# Patient Record
Sex: FEMALE | Race: WHITE | NOT HISPANIC OR LATINO | URBAN - METROPOLITAN AREA
[De-identification: names, ages, dates, MRNs, and addresses within clinical notes are randomized per-mention and may not be internally consistent; named-entity substitution may affect disease eponyms.]

---

## 2018-08-29 ENCOUNTER — INPATIENT (INPATIENT)
Facility: HOSPITAL | Age: 83
LOS: 7 days | Discharge: NURSING, REHAB OR LONG-TERM CARE | DRG: 291 | End: 2018-09-06
Attending: INTERNAL MEDICINE | Admitting: INTERNAL MEDICINE
Payer: COMMERCIAL

## 2018-08-29 VITALS
HEART RATE: 64 BPM | OXYGEN SATURATION: 97 % | SYSTOLIC BLOOD PRESSURE: 110 MMHG | RESPIRATION RATE: 20 BRPM | DIASTOLIC BLOOD PRESSURE: 58 MMHG

## 2018-08-29 DIAGNOSIS — D46.9 MYELODYSPLASTIC SYNDROME, UNSPECIFIED: ICD-10-CM

## 2018-08-29 DIAGNOSIS — E03.9 HYPOTHYROIDISM, UNSPECIFIED: ICD-10-CM

## 2018-08-29 DIAGNOSIS — I50.9 HEART FAILURE, UNSPECIFIED: ICD-10-CM

## 2018-08-29 DIAGNOSIS — I10 ESSENTIAL (PRIMARY) HYPERTENSION: ICD-10-CM

## 2018-08-29 DIAGNOSIS — Z29.9 ENCOUNTER FOR PROPHYLACTIC MEASURES, UNSPECIFIED: ICD-10-CM

## 2018-08-29 DIAGNOSIS — I50.21 ACUTE SYSTOLIC (CONGESTIVE) HEART FAILURE: ICD-10-CM

## 2018-08-29 LAB
ALBUMIN SERPL ELPH-MCNC: 3.4 G/DL — SIGNIFICANT CHANGE UP (ref 3.3–5)
ALP SERPL-CCNC: 59 U/L — SIGNIFICANT CHANGE UP (ref 40–120)
ALT FLD-CCNC: 9 U/L — LOW (ref 10–45)
ANION GAP SERPL CALC-SCNC: 13 MMOL/L — SIGNIFICANT CHANGE UP (ref 5–17)
ANION GAP SERPL CALC-SCNC: 14 MMOL/L — SIGNIFICANT CHANGE UP (ref 5–17)
APPEARANCE UR: CLEAR — SIGNIFICANT CHANGE UP
APTT BLD: 32.1 SEC — SIGNIFICANT CHANGE UP (ref 27.5–37.4)
AST SERPL-CCNC: 20 U/L — SIGNIFICANT CHANGE UP (ref 10–40)
BASOPHILS # BLD AUTO: 0 K/UL — SIGNIFICANT CHANGE UP (ref 0–0.2)
BASOPHILS NFR BLD AUTO: 0 % — SIGNIFICANT CHANGE UP (ref 0–2)
BILIRUB SERPL-MCNC: 0.2 MG/DL — SIGNIFICANT CHANGE UP (ref 0.2–1.2)
BILIRUB UR-MCNC: NEGATIVE — SIGNIFICANT CHANGE UP
BUN SERPL-MCNC: 44 MG/DL — HIGH (ref 7–23)
BUN SERPL-MCNC: 48 MG/DL — HIGH (ref 7–23)
CALCIUM SERPL-MCNC: 8.8 MG/DL — SIGNIFICANT CHANGE UP (ref 8.4–10.5)
CALCIUM SERPL-MCNC: 9.1 MG/DL — SIGNIFICANT CHANGE UP (ref 8.4–10.5)
CHLORIDE SERPL-SCNC: 100 MMOL/L — SIGNIFICANT CHANGE UP (ref 96–108)
CHLORIDE SERPL-SCNC: 100 MMOL/L — SIGNIFICANT CHANGE UP (ref 96–108)
CK SERPL-CCNC: 81 U/L — SIGNIFICANT CHANGE UP (ref 25–170)
CO2 SERPL-SCNC: 21 MMOL/L — LOW (ref 22–31)
CO2 SERPL-SCNC: 23 MMOL/L — SIGNIFICANT CHANGE UP (ref 22–31)
COLOR SPEC: COLORLESS — SIGNIFICANT CHANGE UP
CREAT SERPL-MCNC: 1.27 MG/DL — SIGNIFICANT CHANGE UP (ref 0.5–1.3)
CREAT SERPL-MCNC: 1.28 MG/DL — SIGNIFICANT CHANGE UP (ref 0.5–1.3)
DIFF PNL FLD: NEGATIVE — SIGNIFICANT CHANGE UP
EOSINOPHIL # BLD AUTO: 0.1 K/UL — SIGNIFICANT CHANGE UP (ref 0–0.5)
EOSINOPHIL NFR BLD AUTO: 2.3 % — SIGNIFICANT CHANGE UP (ref 0–6)
GLUCOSE SERPL-MCNC: 91 MG/DL — SIGNIFICANT CHANGE UP (ref 70–99)
GLUCOSE SERPL-MCNC: 91 MG/DL — SIGNIFICANT CHANGE UP (ref 70–99)
GLUCOSE UR QL: NEGATIVE — SIGNIFICANT CHANGE UP
HCT VFR BLD CALC: 34.1 % — LOW (ref 34.5–45)
HGB BLD-MCNC: 10.8 G/DL — LOW (ref 11.5–15.5)
INR BLD: 1.01 RATIO — SIGNIFICANT CHANGE UP (ref 0.88–1.16)
KETONES UR-MCNC: NEGATIVE — SIGNIFICANT CHANGE UP
LEUKOCYTE ESTERASE UR-ACNC: NEGATIVE — SIGNIFICANT CHANGE UP
LYMPHOCYTES # BLD AUTO: 1.7 K/UL — SIGNIFICANT CHANGE UP (ref 1–3.3)
LYMPHOCYTES # BLD AUTO: 26.9 % — SIGNIFICANT CHANGE UP (ref 13–44)
MCHC RBC-ENTMCNC: 27.1 PG — SIGNIFICANT CHANGE UP (ref 27–34)
MCHC RBC-ENTMCNC: 31.7 GM/DL — LOW (ref 32–36)
MCV RBC AUTO: 85.7 FL — SIGNIFICANT CHANGE UP (ref 80–100)
MONOCYTES # BLD AUTO: 0.6 K/UL — SIGNIFICANT CHANGE UP (ref 0–0.9)
MONOCYTES NFR BLD AUTO: 9 % — SIGNIFICANT CHANGE UP (ref 2–14)
NEUTROPHILS # BLD AUTO: 3.9 K/UL — SIGNIFICANT CHANGE UP (ref 1.8–7.4)
NEUTROPHILS NFR BLD AUTO: 61.9 % — SIGNIFICANT CHANGE UP (ref 43–77)
NITRITE UR-MCNC: NEGATIVE — SIGNIFICANT CHANGE UP
NT-PROBNP SERPL-SCNC: 1527 PG/ML — HIGH (ref 0–300)
PH UR: 6.5 — SIGNIFICANT CHANGE UP (ref 5–8)
PHOSPHATE SERPL-MCNC: 3.6 MG/DL — SIGNIFICANT CHANGE UP (ref 2.5–4.5)
PLATELET # BLD AUTO: 331 K/UL — SIGNIFICANT CHANGE UP (ref 150–400)
POTASSIUM SERPL-MCNC: 3.9 MMOL/L — SIGNIFICANT CHANGE UP (ref 3.5–5.3)
POTASSIUM SERPL-MCNC: 4.3 MMOL/L — SIGNIFICANT CHANGE UP (ref 3.5–5.3)
POTASSIUM SERPL-SCNC: 3.9 MMOL/L — SIGNIFICANT CHANGE UP (ref 3.5–5.3)
POTASSIUM SERPL-SCNC: 4.3 MMOL/L — SIGNIFICANT CHANGE UP (ref 3.5–5.3)
PROT SERPL-MCNC: 7.3 G/DL — SIGNIFICANT CHANGE UP (ref 6–8.3)
PROT UR-MCNC: SIGNIFICANT CHANGE UP
PROTHROM AB SERPL-ACNC: 10.9 SEC — SIGNIFICANT CHANGE UP (ref 9.8–12.7)
RBC # BLD: 3.98 M/UL — SIGNIFICANT CHANGE UP (ref 3.8–5.2)
RBC # FLD: 14.6 % — HIGH (ref 10.3–14.5)
SODIUM SERPL-SCNC: 134 MMOL/L — LOW (ref 135–145)
SODIUM SERPL-SCNC: 137 MMOL/L — SIGNIFICANT CHANGE UP (ref 135–145)
SP GR SPEC: 1.01 — LOW (ref 1.01–1.02)
TROPONIN T, HIGH SENSITIVITY RESULT: 21 NG/L — SIGNIFICANT CHANGE UP (ref 0–51)
TROPONIN T, HIGH SENSITIVITY RESULT: 22 NG/L — SIGNIFICANT CHANGE UP (ref 0–51)
UROBILINOGEN FLD QL: NEGATIVE — SIGNIFICANT CHANGE UP
WBC # BLD: 6.3 K/UL — SIGNIFICANT CHANGE UP (ref 3.8–10.5)
WBC # FLD AUTO: 6.3 K/UL — SIGNIFICANT CHANGE UP (ref 3.8–10.5)

## 2018-08-29 PROCEDURE — 71045 X-RAY EXAM CHEST 1 VIEW: CPT | Mod: 26

## 2018-08-29 PROCEDURE — 99223 1ST HOSP IP/OBS HIGH 75: CPT

## 2018-08-29 PROCEDURE — 99284 EMERGENCY DEPT VISIT MOD MDM: CPT

## 2018-08-29 PROCEDURE — 93010 ELECTROCARDIOGRAM REPORT: CPT

## 2018-08-29 RX ORDER — ACETAMINOPHEN 500 MG
650 TABLET ORAL EVERY 6 HOURS
Qty: 0 | Refills: 0 | Status: DISCONTINUED | OUTPATIENT
Start: 2018-08-29 | End: 2018-09-06

## 2018-08-29 RX ORDER — MAGNESIUM SULFATE 500 MG/ML
2 VIAL (ML) INJECTION ONCE
Qty: 0 | Refills: 0 | Status: COMPLETED | OUTPATIENT
Start: 2018-08-29 | End: 2018-08-29

## 2018-08-29 RX ORDER — PANTOPRAZOLE SODIUM 20 MG/1
40 TABLET, DELAYED RELEASE ORAL
Qty: 0 | Refills: 0 | Status: DISCONTINUED | OUTPATIENT
Start: 2018-08-29 | End: 2018-09-06

## 2018-08-29 RX ORDER — LATANOPROST 0.05 MG/ML
1 SOLUTION/ DROPS OPHTHALMIC; TOPICAL AT BEDTIME
Qty: 0 | Refills: 0 | Status: DISCONTINUED | OUTPATIENT
Start: 2018-08-29 | End: 2018-09-06

## 2018-08-29 RX ORDER — AMLODIPINE BESYLATE 2.5 MG/1
5 TABLET ORAL ONCE
Qty: 0 | Refills: 0 | Status: COMPLETED | OUTPATIENT
Start: 2018-08-29 | End: 2018-08-29

## 2018-08-29 RX ORDER — LEVOTHYROXINE SODIUM 125 MCG
112 TABLET ORAL DAILY
Qty: 0 | Refills: 0 | Status: DISCONTINUED | OUTPATIENT
Start: 2018-08-29 | End: 2018-09-06

## 2018-08-29 RX ORDER — ASPIRIN/CALCIUM CARB/MAGNESIUM 324 MG
324 TABLET ORAL DAILY
Qty: 0 | Refills: 0 | Status: DISCONTINUED | OUTPATIENT
Start: 2018-08-29 | End: 2018-08-29

## 2018-08-29 RX ORDER — FUROSEMIDE 40 MG
40 TABLET ORAL DAILY
Qty: 0 | Refills: 0 | Status: DISCONTINUED | OUTPATIENT
Start: 2018-08-30 | End: 2018-09-01

## 2018-08-29 RX ORDER — AMLODIPINE BESYLATE 2.5 MG/1
2.5 TABLET ORAL DAILY
Qty: 0 | Refills: 0 | Status: DISCONTINUED | OUTPATIENT
Start: 2018-08-29 | End: 2018-08-31

## 2018-08-29 RX ORDER — FUROSEMIDE 40 MG
40 TABLET ORAL ONCE
Qty: 0 | Refills: 0 | Status: COMPLETED | OUTPATIENT
Start: 2018-08-29 | End: 2018-08-29

## 2018-08-29 RX ORDER — ASPIRIN/CALCIUM CARB/MAGNESIUM 324 MG
81 TABLET ORAL DAILY
Qty: 0 | Refills: 0 | Status: DISCONTINUED | OUTPATIENT
Start: 2018-08-30 | End: 2018-09-06

## 2018-08-29 RX ORDER — INDAPAMIDE 1.25 MG
1.25 TABLET ORAL DAILY
Qty: 0 | Refills: 0 | Status: DISCONTINUED | OUTPATIENT
Start: 2018-08-29 | End: 2018-09-05

## 2018-08-29 RX ORDER — LOSARTAN POTASSIUM 100 MG/1
100 TABLET, FILM COATED ORAL DAILY
Qty: 0 | Refills: 0 | Status: DISCONTINUED | OUTPATIENT
Start: 2018-08-29 | End: 2018-09-01

## 2018-08-29 RX ORDER — FOLIC ACID 0.8 MG
1 TABLET ORAL DAILY
Qty: 0 | Refills: 0 | Status: DISCONTINUED | OUTPATIENT
Start: 2018-08-29 | End: 2018-09-06

## 2018-08-29 RX ORDER — HEPARIN SODIUM 5000 [USP'U]/ML
5000 INJECTION INTRAVENOUS; SUBCUTANEOUS EVERY 8 HOURS
Qty: 0 | Refills: 0 | Status: DISCONTINUED | OUTPATIENT
Start: 2018-08-29 | End: 2018-09-06

## 2018-08-29 RX ADMIN — Medication 50 GRAM(S): at 23:34

## 2018-08-29 RX ADMIN — Medication 40 MILLIGRAM(S): at 17:04

## 2018-08-29 RX ADMIN — HEPARIN SODIUM 5000 UNIT(S): 5000 INJECTION INTRAVENOUS; SUBCUTANEOUS at 22:22

## 2018-08-29 RX ADMIN — AMLODIPINE BESYLATE 5 MILLIGRAM(S): 2.5 TABLET ORAL at 21:44

## 2018-08-29 RX ADMIN — LATANOPROST 1 DROP(S): 0.05 SOLUTION/ DROPS OPHTHALMIC; TOPICAL at 21:56

## 2018-08-29 RX ADMIN — Medication 324 MILLIGRAM(S): at 15:10

## 2018-08-29 RX ADMIN — Medication 40 MILLIGRAM(S): at 22:21

## 2018-08-29 NOTE — H&P ADULT - ATTENDING COMMENTS
NIGHT HOSPITALIST:  Patient/ daughter in attendance aware of course and agree with plan/care as above.   Given comorbidities, patient's long term prognosis is guarded.   Patient/ daughter/ son are presently not yet ready to discuss advance directives.   Emotional support provided to patient/ family.   Care reviewed with covering NP.   Care assumed by the DAY PROVIDER.    Hakan Snider MD  [phone]

## 2018-08-29 NOTE — H&P ADULT - HISTORY OF PRESENT ILLNESS
NIGHT HOSPITALIST:   Patient UNKNOWN to me previously, assigned to me at this point via the ER and by Dr. Leigh and Dr. Law to admit this 89 y/o F--patient seen with adult daughter in attendance--patient followed by Dr. Leigh and Dr. Dennison in the office with a history of CAD with PCI in 2013, premature ventricular contractions on EKG, reported myelodysplastic syndrome, hypothyroidism, essential HTN, reported GERD with patient noting 3+ weeks of lightheadedness and dyspnea with activity, with patient self limiting activity at home, with patient today attempting to walk with daughter in a nearby garage but with dyspnea and chest pain.  Patient presently denies chest pain/pressure.  No dyspnea.   NO HA, no focal weakness.   No fever, no chills, no rigors.   NO abdominal pain, no red blood per rectum or melena.  No back pain, no tearing back pain.   No dysuria, no hematuria.  Denies weight loss or anorexia.  Chronic B/L LE oedema.   Remaining review of systems not contributory.

## 2018-08-29 NOTE — H&P ADULT - NSHPLABSRESULTS_GEN_ALL_CORE
WBC 6.3.  hgb 10.8.  Platelets of 331K>  INR 1.0.  K+ 4.3.  Random glucose of 91.   Cr 1.2.   Alb 3.4.   BNP  1527.  HS troponin 21, repeat nondiagnostic.  Chest radiograph reviewed with no infiltrate or effusion.   EKG tracing reviewed with NSR at 71 with PVC, RBBB.   Tele with ventricular ectopy but no runs.

## 2018-08-29 NOTE — H&P ADULT - ASSESSMENT
NIGHT HOSPITALIST:   Presentation of patient with a hx of CAD with past PCI in 2013, reported MDS, hypothyroidism, essential HTN with elevated BP upon presentation with suspicion for heart failure exacerbation with clinical improvement with IV Lasix given in the ER>>will continue with IV Lasix as above for now, with daily weights.  Patient with reported outpatient echo from 3 weeks ago (not available) will obtain echo in the AM.   Patient with an asymptomatic LEFT olecranon bursa--would monitor for now.  Unclear if the extent of her MDS is complicating patient's clinical picture.

## 2018-08-29 NOTE — ED PROVIDER NOTE - OBJECTIVE STATEMENT
89yo F with pmhx of CAD s/p stents 2013, PVA and PAC on EKG, Myelodysplasia  (gets aranesp injections) hypothyroidism, HTN, GERD presents to ER for episode of lightheadedness associated with chest pain, SOB and nausea this afternoon.  Patient reports she has been feeling lightheaded in the mornings for the past 3weeks. Patient attributes lightheadedness to a change in BP medication from valstartan to lostartan  Admits to intermittent generalized chest pain which occur randomly, last a few minutes and resolve on its own for past month. Patient follows with cardiologist Solomon from [facility] every 4 months. Patient reports to baseline leg swelling, no increase swelling.  Ambulates with cane at baseline.  Denies fever, chills, cough, numbness/tingling, syncope, vomiting, increase leg swelling, orthopena. 89yo F with pmhx of CAD s/p stents 2013, PVA and PAC on EKG, Myelodysplasia  (gets aranesp injections) hypothyroidism, HTN, GERD presents to ER for episode of lightheadedness associated with chest pain, SOB and nausea this afternoon while exerting herself.  Patient reports she has been feeling lightheaded in the mornings for the past 3weeks. Patient attributes lightheadedness to a change in BP medication from valstartan to lostartan  Admits to intermittent generalized chest pain which occur randomly, last a few minutes and resolve on its own for past month. Patient follows with cardiologist Solomon from [facility] every 4 months. Patient reports to baseline leg swelling, no increase swelling.  Ambulates with cane at baseline.  Denies fever, chills, cough, numbness/tingling, syncope, vomiting, increase leg swelling, orthopena.

## 2018-08-29 NOTE — ED ADULT NURSE NOTE - NSIMPLEMENTINTERV_GEN_ALL_ED
Implemented All Fall with Harm Risk Interventions:  [place] to call system. Call bell, personal items and telephone within reach. Instruct patient to call for assistance. Room bathroom lighting operational. Non-slip footwear when patient is off stretcher. Physically safe environment: no spills, clutter or unnecessary equipment. Stretcher in lowest position, wheels locked, appropriate side rails in place. Provide visual cue, wrist band, yellow gown, etc. Monitor gait and stability. Monitor for mental status changes and reorient to person, place, and time. Review medications for side effects contributing to fall risk. Reinforce activity limits and safety measures with patient and family. Provide visual clues: red socks.

## 2018-08-29 NOTE — H&P ADULT - NSHPPHYSICALEXAM_GEN_ALL_CORE
Physical exam with an elderly, chronically ill appearing nontoxic F.  Afebrile.  HR  78   BP  198/61>>172/56 past IV Lasix.   97% on RA.   HEENT< PERRL< EOMI< neck supple, chest with decreased breath sounds at the bases.  Cor s1 s2, declined breast exam.  Abdomen soft nontender, normal bowel sounds, no rebound.   Ext with LEFT olecranon bursae fullness but NO tenderness, No erythema, no warmth.  B/L LE 2++ oedema.  LEFT knee replacement scar c/d/i.  RIGHT knee mild crepitus but no effusion.   Neurologic exam AxOx3.   Speech fluent and cognition intact.   UE/LE 5/5.

## 2018-08-29 NOTE — ED PROVIDER NOTE - PSH
Basal Cell Carcinoma of Face  4 yrs ago  Bilateral Cataracts  71 y/o  Carpal Tunnel Syndrome  10 yrs ago  Cystocele  47 yrs ago  History of Total Knee Replacement  L 1998  Rectocele  47 yrs ago  S/P Breast Lumpectomy  benign  S/P Breast Lumpectomy  10 yrs ago  S/P Cataract Surgery    S/P T&A  childhood    S/P TKR (Total Knee Replacement)  left  Skin Cancer  of face , basal cell   4 yrs ago

## 2018-08-29 NOTE — ED PROVIDER NOTE - PMH
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

## 2018-08-29 NOTE — ED PROVIDER NOTE - PHYSICAL EXAMINATION
NAD  Bilateral pedal edema, no erythematous, no calf tenderness  Patient able to take full inspiration and expiration.  CTAB- no wheezing, no rales  RRR, s1/s1  Abdomen soft nontender.

## 2018-08-29 NOTE — CONSULT NOTE ADULT - SUBJECTIVE AND OBJECTIVE BOX
Requesting Physician : Dr. Carbajal    Reason for Consultation: Chest pain     HISTORY OF PRESENT ILLNESS:  89 yo F with history of CAD s/p remote PCI to LAD, HTN, MDS who is being seen for chest pain.  The patient reports chronic intermittent chest pain for over a month.  Her pain is describes as sharp with no radiations.  No associated symptoms such as dyspnea, palpitations, diaphoresis, or syncope.  Her pain worsened over the last few days and she came to the ED for further evaluation.  Currently chest pain free.  Her last cardiac workup was with an echocardiogram in August of 2018 showing normal LV function.  Her last ischemic eval was in 2015 with a normal NST.     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;mylar dysplasia&#x27; treated with &quot; Arinesp&quot; x past 1.5 yrs- last dose 4 months ago  GERD (Gastroesophageal Reflux Disease)  Chronic Glaucoma: R eye  Hypercholesterolemia  HTN (Hypertension)  Hypothyroidism  Chronic Osteoarthritis  S/P Cataract Surgery  S/P Breast Lumpectomy: benign  S/P TKR (Total Knee Replacement): left  S/P T&A: childhood    Basal Cell Carcinoma of Face: 4 yrs ago  Carpal Tunnel Syndrome: 10 yrs ago  Rectocele: 47 yrs ago  Cystocele: 47 yrs ago  S/P Breast Lumpectomy: 10 yrs ago  Bilateral Cataracts: 69 y/o  History of Total Knee Replacement: L 1998  Skin Cancer: of face , basal cell   4 yrs ago          MEDICATIONS:  MEDICATIONS  (STANDING):  aspirin  chewable 324 milliGRAM(s) Oral daily      Allergies    No Known Allergies    Intolerances        FAMILY HISTORY:    Non-contributary for premature coronary disease or sudden cardiac death    SOCIAL HISTORY:    [x ] Non-smoker  [ ] Smoker  [ ] Alcohol      REVIEW OF SYSTEMS:  [x ]chest pain  [  ]shortness of breath  [  ]palpitations  [  ]syncope  [ ]near syncope [ ]upper extremity weakness   [ ] lower extremity weakness  [  ]diplopia  [  ]altered mental status   [  ]fevers  [ ]chills [ ]nausea  [ ]vomitting  [  ]dysphagia    [ ]abdominal pain  [ ]melena  [ ]BRBPR    [  ]epistaxis  [  ]rash    [x ]lower extremity edema        [x ] All others negative	  [ ] Unable to obtain    PHYSICAL EXAM:  T(C): 37.2 (08-29-18 @ 14:54), Max: 37.2 (08-29-18 @ 14:54)  HR: 67 (08-29-18 @ 14:54) (64 - 67)  BP: 198/61 (08-29-18 @ 14:54) (110/58 - 198/61)  RR: 18 (08-29-18 @ 14:54) (18 - 20)  SpO2: 98% (08-29-18 @ 14:54) (97% - 98%)  Wt(kg): --  I&O's Summary        HEENT:   Normal oral mucosa, PERRL, EOMI	  Lymphatic: No lymphadenopathy , trace edema in LE bilaterally   Cardiovascular: Normal S1 S2, RRR, No JVD, No murmurs ,   Respiratory: Lungs clear to auscultation, normal effort 	  Gastrointestinal:  Soft, Non-tender, + BS	  Skin: No rashes, No ecchymoses, No cyanosis, warm to touch        TELEMETRY: 	    ECG: SR, RBBB APCs (unchanged from prior ecg)  	  RADIOLOGY:  OTHER:     DIAGNOSTIC TESTING:  [ ] Echocardiogram: < from: Transthoracic Echocardiogram w/Doppler (10.17.11 @ 14:30) >  Conclusions:  1. Mitralannular calcification.   Tethered mitral valve  leaflets with normal opening. Mild-moderate mitral  regurgitation.  2. Calcified trileaflet aortic valve with mildly decreased  opening. Peak transaortic valve gradient equals 16 mm Hg,  mean transaortic valve gradient equals 9 mm Hg, estimated  aortic valve area equals 1.4 sqcm (by continuity equation),  consistent with mild to moderate aortic stenosis. No aortic  valve regurgitation seen.  3. Severely dilated left atrium.  LA volume index = 43  cc/m2.  4. Endocardium not well visualized; grossly normal left  ventricular systolic function.  --------------------------------------    < end of copied text >    [ ]  Catheterization:  [ ] Stress Test:    	  	  LABS:	 	    CARDIAC MARKERS:                              10.8   6.3   )-----------( 331      ( 29 Aug 2018 15:24 )             34.1           proBNP:   Lipid Profile:   HgA1c:   TSH:     ASSESSMENT/PLAN: 	89yFemale with history of HTN, CAD s/p remote PCI to LAD, MDS being seen for chest pain.   -Pt. currently chest pain free  -would check labs including troponin and CPK  -ALEX with 3 sets CE  -further workup pending above    Sanju Law MD

## 2018-08-29 NOTE — ED ADULT NURSE NOTE - OBJECTIVE STATEMENT
89y Female PMH CAD with stents, PACs, myelodysplasia presents to the ED via EMS c/o near syncopal episode.     89yo F with pmhx of CAD s/p stents 2013, PVA and PAC on EKG, Myelodysplasia  (gets aranesp injections) hypothyroidism, HTN, GERD presents to ER for episode of lightheadedness associated with chest pain, SOB and nausea this afternoon while exerting herself.  Patient reports she has been feeling lightheaded in the mornings for the past 3weeks. Patient attributes lightheadedness to a change in BP medication from valstartan to lostartan  Admits to intermittent generalized chest pain which occur randomly, last a few minutes and resolve on its own for past month. Patient follows with cardiologist Solomon from [facility] every 4 months. Patient reports to baseline leg swelling, no increase swelling.  Ambulates with cane at baseline.  Denies fever, chills, cough, numbness/tingling, syncope, vomiting, increase leg swelling, orthopena. 89y Female PMH CAD with stents, PACs, myelodysplasia presents to the ED via EMS c/o near syncopal episode. Pt states that she was walking from car to doctors office today and had onset of light-headedness with generalized CP and SOB. Pt sat down and felt a little better. Pt reports dyspnea on exertion for the last few months along with intermittent CP that she states lasts a few seconds and then resolves on its own. Pt thinks these symptoms may be a result of changing her BP medication from Valsartan to Losartan about 3 weeks ago. Pt reports swelling to bilateral legs at baseline and does not report increased swelling. Pt lives alone and uses cane at baseline. Pt presents a&oX3, well in appearance, non-diaphoretic, airway intact, breathing spontaneously and unlabored, 97% on RA, skin warm dry and intact, cap refill <2 seconds, +peripheral pulses X4, denies fever/chills, numbness/tingling to extremities, n/v, syncope, dysuria, hematuria, palpitations. MD at bedside for eval. safety maintained.

## 2018-08-29 NOTE — ED ADULT NURSE REASSESSMENT NOTE - NS ED NURSE REASSESS COMMENT FT1
Pt brought to bathroom with RN, safety maintained. Pt remains a&oX4, resting comfortably in bed in no apparent distress at this time. Denies SOB/Cp at this time

## 2018-08-29 NOTE — H&P ADULT - REASON FOR ADMISSION
Progressive dyspnea for the past 3 weeks with lightheadedness, dyspnea, episode of chest pain, dyspnea this afternoon while walking in the garage.

## 2018-08-30 LAB
ALBUMIN SERPL ELPH-MCNC: 3.1 G/DL — LOW (ref 3.3–5)
ALP SERPL-CCNC: 51 U/L — SIGNIFICANT CHANGE UP (ref 40–120)
ALT FLD-CCNC: 8 U/L — LOW (ref 10–45)
ANION GAP SERPL CALC-SCNC: 13 MMOL/L — SIGNIFICANT CHANGE UP (ref 5–17)
ANION GAP SERPL CALC-SCNC: 15 MMOL/L — SIGNIFICANT CHANGE UP (ref 5–17)
AST SERPL-CCNC: 16 U/L — SIGNIFICANT CHANGE UP (ref 10–40)
BASOPHILS # BLD AUTO: 0 K/UL — SIGNIFICANT CHANGE UP (ref 0–0.2)
BASOPHILS # BLD AUTO: 0 K/UL — SIGNIFICANT CHANGE UP (ref 0–0.2)
BASOPHILS NFR BLD AUTO: 0.1 % — SIGNIFICANT CHANGE UP (ref 0–2)
BASOPHILS NFR BLD AUTO: 0.5 % — SIGNIFICANT CHANGE UP (ref 0–2)
BILIRUB SERPL-MCNC: 0.2 MG/DL — SIGNIFICANT CHANGE UP (ref 0.2–1.2)
BUN SERPL-MCNC: 42 MG/DL — HIGH (ref 7–23)
BUN SERPL-MCNC: 43 MG/DL — HIGH (ref 7–23)
CALCIUM SERPL-MCNC: 9 MG/DL — SIGNIFICANT CHANGE UP (ref 8.4–10.5)
CALCIUM SERPL-MCNC: 9.3 MG/DL — SIGNIFICANT CHANGE UP (ref 8.4–10.5)
CHLORIDE SERPL-SCNC: 100 MMOL/L — SIGNIFICANT CHANGE UP (ref 96–108)
CHLORIDE SERPL-SCNC: 101 MMOL/L — SIGNIFICANT CHANGE UP (ref 96–108)
CO2 SERPL-SCNC: 22 MMOL/L — SIGNIFICANT CHANGE UP (ref 22–31)
CO2 SERPL-SCNC: 22 MMOL/L — SIGNIFICANT CHANGE UP (ref 22–31)
CREAT SERPL-MCNC: 1.28 MG/DL — SIGNIFICANT CHANGE UP (ref 0.5–1.3)
CREAT SERPL-MCNC: 1.35 MG/DL — HIGH (ref 0.5–1.3)
EOSINOPHIL # BLD AUTO: 0.1 K/UL — SIGNIFICANT CHANGE UP (ref 0–0.5)
EOSINOPHIL # BLD AUTO: 0.2 K/UL — SIGNIFICANT CHANGE UP (ref 0–0.5)
EOSINOPHIL NFR BLD AUTO: 2 % — SIGNIFICANT CHANGE UP (ref 0–6)
EOSINOPHIL NFR BLD AUTO: 2.4 % — SIGNIFICANT CHANGE UP (ref 0–6)
GAS PNL BLDA: SIGNIFICANT CHANGE UP
GLUCOSE BLDC GLUCOMTR-MCNC: 126 MG/DL — HIGH (ref 70–99)
GLUCOSE SERPL-MCNC: 109 MG/DL — HIGH (ref 70–99)
GLUCOSE SERPL-MCNC: 99 MG/DL — SIGNIFICANT CHANGE UP (ref 70–99)
HCT VFR BLD CALC: 29.8 % — LOW (ref 34.5–45)
HCT VFR BLD CALC: 31.8 % — LOW (ref 34.5–45)
HGB BLD-MCNC: 10.1 G/DL — LOW (ref 11.5–15.5)
HGB BLD-MCNC: 9.6 G/DL — LOW (ref 11.5–15.5)
LYMPHOCYTES # BLD AUTO: 1.2 K/UL — SIGNIFICANT CHANGE UP (ref 1–3.3)
LYMPHOCYTES # BLD AUTO: 2.9 K/UL — SIGNIFICANT CHANGE UP (ref 1–3.3)
LYMPHOCYTES # BLD AUTO: 20 % — SIGNIFICANT CHANGE UP (ref 13–44)
LYMPHOCYTES # BLD AUTO: 38.2 % — SIGNIFICANT CHANGE UP (ref 13–44)
MAGNESIUM SERPL-MCNC: 2.1 MG/DL — SIGNIFICANT CHANGE UP (ref 1.6–2.6)
MAGNESIUM SERPL-MCNC: 2.1 MG/DL — SIGNIFICANT CHANGE UP (ref 1.6–2.6)
MCHC RBC-ENTMCNC: 27.1 PG — SIGNIFICANT CHANGE UP (ref 27–34)
MCHC RBC-ENTMCNC: 27.4 PG — SIGNIFICANT CHANGE UP (ref 27–34)
MCHC RBC-ENTMCNC: 31.9 GM/DL — LOW (ref 32–36)
MCHC RBC-ENTMCNC: 32.3 GM/DL — SIGNIFICANT CHANGE UP (ref 32–36)
MCV RBC AUTO: 84.7 FL — SIGNIFICANT CHANGE UP (ref 80–100)
MCV RBC AUTO: 85 FL — SIGNIFICANT CHANGE UP (ref 80–100)
MONOCYTES # BLD AUTO: 0.3 K/UL — SIGNIFICANT CHANGE UP (ref 0–0.9)
MONOCYTES # BLD AUTO: 0.6 K/UL — SIGNIFICANT CHANGE UP (ref 0–0.9)
MONOCYTES NFR BLD AUTO: 5.9 % — SIGNIFICANT CHANGE UP (ref 2–14)
MONOCYTES NFR BLD AUTO: 7.6 % — SIGNIFICANT CHANGE UP (ref 2–14)
NEUTROPHILS # BLD AUTO: 4 K/UL — SIGNIFICANT CHANGE UP (ref 1.8–7.4)
NEUTROPHILS # BLD AUTO: 4.2 K/UL — SIGNIFICANT CHANGE UP (ref 1.8–7.4)
NEUTROPHILS NFR BLD AUTO: 51.7 % — SIGNIFICANT CHANGE UP (ref 43–77)
NEUTROPHILS NFR BLD AUTO: 71.6 % — SIGNIFICANT CHANGE UP (ref 43–77)
NT-PROBNP SERPL-SCNC: 1655 PG/ML — HIGH (ref 0–300)
PHOSPHATE SERPL-MCNC: 3.3 MG/DL — SIGNIFICANT CHANGE UP (ref 2.5–4.5)
PHOSPHATE SERPL-MCNC: 3.4 MG/DL — SIGNIFICANT CHANGE UP (ref 2.5–4.5)
PLATELET # BLD AUTO: 310 K/UL — SIGNIFICANT CHANGE UP (ref 150–400)
PLATELET # BLD AUTO: 332 K/UL — SIGNIFICANT CHANGE UP (ref 150–400)
POTASSIUM SERPL-MCNC: 3.5 MMOL/L — SIGNIFICANT CHANGE UP (ref 3.5–5.3)
POTASSIUM SERPL-MCNC: 3.8 MMOL/L — SIGNIFICANT CHANGE UP (ref 3.5–5.3)
POTASSIUM SERPL-SCNC: 3.5 MMOL/L — SIGNIFICANT CHANGE UP (ref 3.5–5.3)
POTASSIUM SERPL-SCNC: 3.8 MMOL/L — SIGNIFICANT CHANGE UP (ref 3.5–5.3)
PROT SERPL-MCNC: 6.3 G/DL — SIGNIFICANT CHANGE UP (ref 6–8.3)
RBC # BLD: 3.51 M/UL — LOW (ref 3.8–5.2)
RBC # BLD: 3.75 M/UL — LOW (ref 3.8–5.2)
RBC # FLD: 14.5 % — SIGNIFICANT CHANGE UP (ref 10.3–14.5)
RBC # FLD: 14.5 % — SIGNIFICANT CHANGE UP (ref 10.3–14.5)
SODIUM SERPL-SCNC: 136 MMOL/L — SIGNIFICANT CHANGE UP (ref 135–145)
SODIUM SERPL-SCNC: 137 MMOL/L — SIGNIFICANT CHANGE UP (ref 135–145)
TROPONIN T, HIGH SENSITIVITY RESULT: 25 NG/L — SIGNIFICANT CHANGE UP (ref 0–51)
TSH SERPL-MCNC: 6.02 UIU/ML — HIGH (ref 0.27–4.2)
WBC # BLD: 5.9 K/UL — SIGNIFICANT CHANGE UP (ref 3.8–10.5)
WBC # BLD: 7.7 K/UL — SIGNIFICANT CHANGE UP (ref 3.8–10.5)
WBC # FLD AUTO: 5.9 K/UL — SIGNIFICANT CHANGE UP (ref 3.8–10.5)
WBC # FLD AUTO: 7.7 K/UL — SIGNIFICANT CHANGE UP (ref 3.8–10.5)

## 2018-08-30 PROCEDURE — 93010 ELECTROCARDIOGRAM REPORT: CPT

## 2018-08-30 RX ORDER — SODIUM CHLORIDE 9 MG/ML
250 INJECTION INTRAMUSCULAR; INTRAVENOUS; SUBCUTANEOUS ONCE
Qty: 0 | Refills: 0 | Status: COMPLETED | OUTPATIENT
Start: 2018-08-30 | End: 2018-08-30

## 2018-08-30 RX ADMIN — PANTOPRAZOLE SODIUM 40 MILLIGRAM(S): 20 TABLET, DELAYED RELEASE ORAL at 06:04

## 2018-08-30 RX ADMIN — Medication 81 MILLIGRAM(S): at 09:29

## 2018-08-30 RX ADMIN — Medication 650 MILLIGRAM(S): at 18:07

## 2018-08-30 RX ADMIN — HEPARIN SODIUM 5000 UNIT(S): 5000 INJECTION INTRAVENOUS; SUBCUTANEOUS at 21:31

## 2018-08-30 RX ADMIN — Medication 112 MICROGRAM(S): at 06:04

## 2018-08-30 RX ADMIN — Medication 1.25 MILLIGRAM(S): at 06:03

## 2018-08-30 RX ADMIN — LOSARTAN POTASSIUM 100 MILLIGRAM(S): 100 TABLET, FILM COATED ORAL at 06:58

## 2018-08-30 RX ADMIN — Medication 1 MILLIGRAM(S): at 09:29

## 2018-08-30 RX ADMIN — Medication 40 MILLIGRAM(S): at 21:31

## 2018-08-30 RX ADMIN — AMLODIPINE BESYLATE 2.5 MILLIGRAM(S): 2.5 TABLET ORAL at 09:29

## 2018-08-30 RX ADMIN — SODIUM CHLORIDE 1000 MILLILITER(S): 9 INJECTION INTRAMUSCULAR; INTRAVENOUS; SUBCUTANEOUS at 03:38

## 2018-08-30 RX ADMIN — LATANOPROST 1 DROP(S): 0.05 SOLUTION/ DROPS OPHTHALMIC; TOPICAL at 21:32

## 2018-08-30 RX ADMIN — Medication 650 MILLIGRAM(S): at 18:28

## 2018-08-30 RX ADMIN — HEPARIN SODIUM 5000 UNIT(S): 5000 INJECTION INTRAVENOUS; SUBCUTANEOUS at 14:32

## 2018-08-30 RX ADMIN — Medication 40 MILLIGRAM(S): at 06:04

## 2018-08-30 RX ADMIN — HEPARIN SODIUM 5000 UNIT(S): 5000 INJECTION INTRAVENOUS; SUBCUTANEOUS at 06:14

## 2018-08-30 NOTE — CHART NOTE - NSCHARTNOTEFT_GEN_A_CORE
Pt. is s/p RRT for vasovagal response to using the bathroom. Pt. was hypotensive, lightheaded, and diaphoretic. RRT gave 250 IV bolus x1. Pt. subsequently felt much better. Will notify attending/family in AM. Continue to monitor pt. on telemetry. F/u w/ primary team in AM.    -Steven Smith PA-C. #[id number]. Pt. is s/p RRT for vasovagal response to using the bathroom. Pt. was hypotensive, lightheaded, and diaphoretic. RRT gave 250 IV bolus x1. Pt. subsequently felt much better. See RRT note for full details. Will notify attending/family in AM. Continue to monitor pt. on telemetry. F/u w/ primary team in AM.    -Steven Smith PA-C. #[id number].

## 2018-08-30 NOTE — PROVIDER CONTACT NOTE (CHANGE IN STATUS NOTIFICATION) - ASSESSMENT
Patient AOx4. Patient c.o of dizziness, and sweating. Patient states "something doesn't feel right" . Patient AOx4. Patient c.o of dizziness, and sweating. Patient states "something doesn't feel right"  .  As per patient symptoms started when patient came back from the bathroom.

## 2018-08-30 NOTE — PROVIDER CONTACT NOTE (OTHER) - ASSESSMENT
Patient AOx4. Patient was sleeping at the time of the event. Patient asymptomatic. /57, HR 59, RR 18, O2 95% on room air. Patient NSR HR 50s on tele at this time.

## 2018-08-30 NOTE — PROGRESS NOTE ADULT - SUBJECTIVE AND OBJECTIVE BOX
Patient is a 89y old  Female who presents with a chief complaint of Progressive dyspnea for the past 3 weeks with lightheadedness, dyspnea, episode of chest pain, dyspnea this afternoon while walking in the garage. (29 Aug 2018 20:07)      INTERVAL HPI/OVERNIGHT EVENTS:  T(C): 36.9 (18 @ 14:03), Max: 36.9 (18 @ 14:03)  HR: 78 (18 @ 14:03) (52 - 89)  BP: 149/69 (18 @ 14:03) (146/57 - 190/60)  RR: 19 (18 @ 14:03) (18 - 19)  SpO2: 96% (18 @ 14:03) (95% - 98%)  Wt(kg): --  I&O's Summary    29 Aug 2018 07:  -  30 Aug 2018 07:00  --------------------------------------------------------  IN: 180 mL / OUT: 800 mL / NET: -620 mL    30 Aug 2018 07:  -  30 Aug 2018 19:48  --------------------------------------------------------  IN: 720 mL / OUT: 650 mL / NET: 70 mL        LABS:                        10.1   5.9   )-----------( 310      ( 30 Aug 2018 07:19 )             31.8     0830    137  |  100  |  42<H>  ----------------------------<  99  3.8   |  22  |  1.28    Ca    9.3      30 Aug 2018 07:11  Phos  3.4       Mg     2.1         TPro  6.3  /  Alb  3.1<L>  /  TBili  0.2  /  DBili  x   /  AST  16  /  ALT  8<L>  /  AlkPhos  51      PT/INR - ( 29 Aug 2018 15:24 )   PT: 10.9 sec;   INR: 1.01 ratio         PTT - ( 29 Aug 2018 15:24 )  PTT:32.1 sec  Urinalysis Basic - ( 29 Aug 2018 21:04 )    Color: Colorless / Appearance: Clear / S.006 / pH: x  Gluc: x / Ketone: Negative  / Bili: Negative / Urobili: Negative   Blood: x / Protein: Trace / Nitrite: Negative   Leuk Esterase: Negative / RBC: x / WBC x   Sq Epi: x / Non Sq Epi: x / Bacteria: x      CAPILLARY BLOOD GLUCOSE      POCT Blood Glucose.: 101 mg/dL (30 Aug 2018 03:02)    ABG - ( 30 Aug 2018 03:24 )  pH, Arterial: 7.45  pH, Blood: x     /  pCO2: 35    /  pO2: 85    / HCO3: 24    / Base Excess: .8    /  SaO2: 96                  Urinalysis Basic - ( 29 Aug 2018 21:04 )    Color: Colorless / Appearance: Clear / S.006 / pH: x  Gluc: x / Ketone: Negative  / Bili: Negative / Urobili: Negative   Blood: x / Protein: Trace / Nitrite: Negative   Leuk Esterase: Negative / RBC: x / WBC x   Sq Epi: x / Non Sq Epi: x / Bacteria: x        MEDICATIONS  (STANDING):  amLODIPine   Tablet 2.5 milliGRAM(s) Oral daily  aspirin enteric coated 81 milliGRAM(s) Oral daily  folic acid 1 milliGRAM(s) Oral daily  furosemide   Injectable 40 milliGRAM(s) IV Push daily  heparin  Injectable 5000 Unit(s) SubCutaneous every 8 hours  indapamide 1.25 milliGRAM(s) Oral daily  latanoprost 0.005% Ophthalmic Solution 1 Drop(s) Both EYES at bedtime  levothyroxine 112 MICROGram(s) Oral daily  losartan 100 milliGRAM(s) Oral daily  pantoprazole    Tablet 40 milliGRAM(s) Oral before breakfast  pravastatin 40 milliGRAM(s) Oral at bedtime    MEDICATIONS  (PRN):  acetaminophen   Tablet. 650 milliGRAM(s) Oral every 6 hours PRN Mild Pain (1 - 3)          PHYSICAL EXAM:  GENERAL: NAD, well-groomed, well-developed  HEAD:  Atraumatic, Normocephalic  CHEST/LUNG: Clear to percussion bilaterally; No rales, rhonchi, wheezing, or rubs  HEART: Regular rate and rhythm; No murmurs, rubs, or gallops  ABDOMEN: Soft, Nontender, Nondistended; Bowel sounds present  EXTREMITIES:  2+ Peripheral Pulses, No clubbing, cyanosis, or edema  LYMPH: No lymphadenopathy noted  SKIN: No rashes or lesions    Care Discussed with Consultants/Other Providers [ ] YES  [ ] NO

## 2018-08-30 NOTE — CONSULT NOTE ADULT - SUBJECTIVE AND OBJECTIVE BOX
Chief Complaint:    HPI:  NIGHT HOSPITALIST:   Patient UNKNOWN to me previously, assigned to me at this point via the ER and by Dr. Leigh and Dr. Law to admit this 89 y/o F--patient seen with adult daughter in attendance--patient followed by Dr. Leigh and Dr. Dennison in the office with a history of CAD with PCI in 2013, premature ventricular contractions on EKG, reported myelodysplastic syndrome, hypothyroidism, essential HTN, reported GERD with patient noting 3+ weeks of lightheadedness and dyspnea with activity, with patient self limiting activity at home, with patient today attempting to walk with daughter in a nearby garage but with dyspnea and chest pain.  Patient presently denies chest pain/pressure.  No dyspnea.   NO HA, no focal weakness.   No fever, no chills, no rigors.   NO abdominal pain, no red blood per rectum or melena.  No back pain, no tearing back pain.   No dysuria, no hematuria.  Denies weight loss or anorexia.  Chronic B/L LE oedema.   Remaining review of systems not contributory. (29 Aug 2018 20:07)     Patient was seen in our office yesterday by Dr. Herrera, but patient had near syncopal episode in the waiting room. She was then sent to the ER for evaluation. She is followed for low grade MDS for many years on intermittent procrit / aranesp, but hgb usually around 10. Another episode happened last night after she got up to use the bathroom with hypotension, tachycardia and sweating.  She feels better today. She denies CP, SOB, reports mild cough with clear phlegm.     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;mylar dysplasia&#x27; treated with &quot; Arinesp&quot; x past 1.5 yrs- last dose 4 months ago  GERD (Gastroesophageal Reflux Disease)  Chronic Glaucoma: R eye  Hypercholesterolemia  HTN (Hypertension)  Hypothyroidism  Chronic Osteoarthritis  S/P Cataract Surgery  S/P Breast Lumpectomy: benign  S/P TKR (Total Knee Replacement): left  S/P T&A: childhood    Basal Cell Carcinoma of Face: 4 yrs ago  Carpal Tunnel Syndrome: 10 yrs ago  Rectocele: 47 yrs ago  Cystocele: 47 yrs ago  S/P Breast Lumpectomy: 10 yrs ago  Bilateral Cataracts: 71 y/o  History of Total Knee Replacement: L 1998  Skin Cancer: of face , basal cell   4 yrs ago      SOCIAL HISTORY:  Smoking - Non smoker   Alcohol - Social  Drugs - No drug use    FAMILY HISTORY:  No pertinent family history in first degree relatives      MEDICATIONS  (STANDING):  amLODIPine   Tablet 2.5 milliGRAM(s) Oral daily  aspirin enteric coated 81 milliGRAM(s) Oral daily  folic acid 1 milliGRAM(s) Oral daily  furosemide   Injectable 40 milliGRAM(s) IV Push daily  heparin  Injectable 5000 Unit(s) SubCutaneous every 8 hours  indapamide 1.25 milliGRAM(s) Oral daily  latanoprost 0.005% Ophthalmic Solution 1 Drop(s) Both EYES at bedtime  levothyroxine 112 MICROGram(s) Oral daily  losartan 100 milliGRAM(s) Oral daily  pantoprazole    Tablet 40 milliGRAM(s) Oral before breakfast  pravastatin 40 milliGRAM(s) Oral at bedtime    MEDICATIONS  (PRN):  acetaminophen   Tablet. 650 milliGRAM(s) Oral every 6 hours PRN Mild Pain (1 - 3)      Allergies    No Known Allergies    Intolerances        Vital Signs Last 24 Hrs  T(C): 36.3 (30 Aug 2018 04:41), Max: 37.2 (29 Aug 2018 14:54)  T(F): 97.4 (30 Aug 2018 04:41), Max: 98.9 (29 Aug 2018 14:54)  HR: 89 (30 Aug 2018 10:11) (52 - 89)  BP: 174/73 (30 Aug 2018 10:11) (110/58 - 198/61)  BP(mean): --  RR: 18 (30 Aug 2018 04:41) (17 - 20)  SpO2: 98% (30 Aug 2018 04:41) (95% - 99%)    PHYSICAL EXAM  General: NAD  HEENT: clear oropharynx, anicteric sclera, pink conjunctiva  Neck: supple  CV: normal S1/S2 with positive murmur, no rubs or gallops  Lungs: clear to auscultation, no wheezes, no rales  Abdomen: soft non-tender non-distended, no hepatosplenomegaly, positive bowel sounds  Ext: no clubbing cyanosis or edema  Skin: no rashes and no petechiae  Lymph Nodes: No LAD in axillae, neck  Neuro: alert and oriented X 3, no focal deficits    LABS:                          10.1   5.9   )-----------( 310      ( 30 Aug 2018 07:19 )             31.8         Mean Cell Volume : 85.0 fl  Mean Cell Hemoglobin : 27.1 pg  Mean Cell Hemoglobin Concentration : 31.9 gm/dL  Auto Neutrophil # : 4.2 K/uL  Auto Lymphocyte # : 1.2 K/uL  Auto Monocyte # : 0.3 K/uL  Auto Eosinophil # : 0.1 K/uL  Auto Basophil # : 0.0 K/uL  Auto Neutrophil % : 71.6 %  Auto Lymphocyte % : 20.0 %  Auto Monocyte % : 5.9 %  Auto Eosinophil % : 2.0 %  Auto Basophil % : 0.5 %      Serial CBC's  08-30 @ 07:19  Hct-31.8 / Hgb-10.1 / Plat-310 / RBC-3.75 / WBC-5.9  Serial CBC's  08-30 @ 03:36  Hct-29.8 / Hgb-9.6 / Plat-332 / RBC-3.51 / WBC-7.7  Serial CBC's  08-29 @ 15:24  Hct-34.1 / Hgb-10.8 / Plat-331 / RBC-3.98 / WBC-6.3      08-30    137  |  100  |  42<H>  ----------------------------<  99  3.8   |  22  |  1.28    Ca    9.3      30 Aug 2018 07:11  Phos  3.4     08-30  Mg     2.1     08-30    TPro  6.3  /  Alb  3.1<L>  /  TBili  0.2  /  DBili  x   /  AST  16  /  ALT  8<L>  /  AlkPhos  51  08-30      PT/INR - ( 29 Aug 2018 15:24 )   PT: 10.9 sec;   INR: 1.01 ratio         PTT - ( 29 Aug 2018 15:24 )  PTT:32.1 sec

## 2018-08-30 NOTE — PROVIDER CONTACT NOTE (MEDICATION) - ASSESSMENT
Patient AOx4. Patient asymptomatic. Patient denies headache, chest pain, or shortness of breath. As per patient, pt took her Norvasc 2.5 mg and losartan 100 mg PO today at home.

## 2018-08-30 NOTE — CHART NOTE - NSCHARTNOTEFT_GEN_A_CORE
RRT called for hypotension SBP in 70s  This is a 89yr old female with PMHx of CAD with PCI in 2013, myelodysplastic syndrome, hypothyroidism, HTN, HLD, GERD presented with dyspnea and chest pain. On arrival to RRT pt A&O x 3, afebrile SBP in 80s. Gave 250cc NS bolus x 1 and sent all labs including ABG and cardiac enzymes. Repeat SBP in 120s and 140s. According to the nurse, pt got up to use the bathroom. Probable orthostatic hypotension vs vasovagal episode. Pt's HR in high 50s. EKG with RBBB and 1st degree block. Cardiology on board. Otherwise pt hemodynamically stable  Plan:  -F/u with CBC, BMP, troponin, ABG  -Optimize electrolytes, trend troponin  -Hypertensive meds with hold parameters  -may need ECHO to assess LV function

## 2018-08-30 NOTE — PROVIDER CONTACT NOTE (MEDICATION) - SITUATION
Lying /66 HR 49. Manual /60   Sitting /76 HR 72  Standing /59 HR 55  Patient on tele with lots of PVC's; and bigeminy; underlying NSR

## 2018-08-30 NOTE — PROGRESS NOTE ADULT - SUBJECTIVE AND OBJECTIVE BOX
Subjective: No chest pain or sob; symptoms have improved after IV lasix   	  MEDICATIONS:  MEDICATIONS  (STANDING):  amLODIPine   Tablet 2.5 milliGRAM(s) Oral daily  aspirin enteric coated 81 milliGRAM(s) Oral daily  folic acid 1 milliGRAM(s) Oral daily  furosemide   Injectable 40 milliGRAM(s) IV Push daily  heparin  Injectable 5000 Unit(s) SubCutaneous every 8 hours  indapamide 1.25 milliGRAM(s) Oral daily  latanoprost 0.005% Ophthalmic Solution 1 Drop(s) Both EYES at bedtime  levothyroxine 112 MICROGram(s) Oral daily  losartan 100 milliGRAM(s) Oral daily  pantoprazole    Tablet 40 milliGRAM(s) Oral before breakfast  pravastatin 40 milliGRAM(s) Oral at bedtime      LABS:	 	    CARDIAC MARKERS:  CARDIAC MARKERS ( 29 Aug 2018 22:37 )  x     / x     / 86 U/L / x     / x      CARDIAC MARKERS ( 29 Aug 2018 19:20 )  x     / x     / 81 U/L / x     / x                                    10.1   5.9   )-----------( 310      ( 30 Aug 2018 07:19 )             31.8     08-30    137  |  100  |  42<H>  ----------------------------<  99  3.8   |  22  |  1.28    Ca    9.3      30 Aug 2018 07:11  Phos  3.4     08-30  Mg     2.1     08-30    TPro  6.3  /  Alb  3.1<L>  /  TBili  0.2  /  DBili  x   /  AST  16  /  ALT  8<L>  /  AlkPhos  51  08-30    proBNP: Serum Pro-Brain Natriuretic Peptide: 1655 pg/mL (08-30 @ 07:11)  Serum Pro-Brain Natriuretic Peptide: 1527 pg/mL (08-29 @ 15:24)    Lipid Profile:   HgA1c:   TSH: Thyroid Stimulating Hormone, Serum: 6.02 uIU/mL (08-30 @ 09:23)        PHYSICAL EXAM:  T(C): 36.3 (08-30-18 @ 04:41), Max: 37.2 (08-29-18 @ 14:54)  HR: 89 (08-30-18 @ 10:11) (52 - 89)  BP: 174/73 (08-30-18 @ 10:11) (110/58 - 198/61)  RR: 18 (08-30-18 @ 04:41) (17 - 20)  SpO2: 98% (08-30-18 @ 04:41) (95% - 99%)  Wt(kg): --  I&O's Summary    29 Aug 2018 07:01  -  30 Aug 2018 07:00  --------------------------------------------------------  IN: 180 mL / OUT: 800 mL / NET: -620 mL    30 Aug 2018 07:01  -  30 Aug 2018 11:04  --------------------------------------------------------  IN: 0 mL / OUT: 300 mL / NET: -300 mL      Height (cm): 142.24 (08-29 @ 19:37)  Weight (kg): 68.9 (08-29 @ 19:37)  BMI (kg/m2): 34.1 (08-29 @ 19:37)  BSA (m2): 1.58 (08-29 @ 19:37)    Heart: normal S1, S2, RRR, no m/r/g  Lungs: cta b/l  Abd: soft nT, nD  Ext: trace edema bilaterally     TELEMETRY: SR, APC's	    ECG:  < from: 12 Lead ECG (08.29.18 @ 22:43) >  SINUS RHYTHM with frequent  PREMATURE ATRIAL COMPLEXES  RIGHT BUNDLE BRANCH BLOCK  ABNORMAL ECG    < end of copied text >  	  RADIOLOGY:   DIAGNOSTIC TESTING:  [ ] Echocardiogram:  [ ]  Catheterization:  [ ] Stress Test:    OTHER: 	      ASSESSMENT/PLAN:  89yFemale with history of HTN, CAD s/p remote PCI to LAD, MDS being seen for chest pain.   -pt. symptoms likely secondary to volume overload given elevated pBNP and improvement in symptoms after IV lasix  -I do not suspect acs given negative cpk  -would continue with asa for history of pci  -check tte  -RRT event noted - appears vagal - orthostatic negative; bp stable; will monitor    Sanju Law MD

## 2018-08-30 NOTE — CONSULT NOTE ADULT - ASSESSMENT
89 year old female withh/o CAD, CHF, MDS admitted with near syncope in the office yesterday    1. MDS - low grade diagnosed on bone marrow biopsy in 2009. Had not needed any pRBC transfusions. Gets aranesp / procrit periodically.  - hgb stable today, baseline between 9-10, no need for intervention. Monitor CBC    2. near syncope - possible vasovagal  - on telemetry, cardiology following  - h/o CAD s/p PCI with 2 stents  - on lipitor and lasix at home    3. Hypothyroidsm - on levothyroxine

## 2018-08-31 LAB
ALBUMIN SERPL ELPH-MCNC: 3.7 G/DL — SIGNIFICANT CHANGE UP (ref 3.3–5)
ALP SERPL-CCNC: 63 U/L — SIGNIFICANT CHANGE UP (ref 40–120)
ALT FLD-CCNC: 10 U/L — SIGNIFICANT CHANGE UP (ref 10–45)
ANION GAP SERPL CALC-SCNC: 20 MMOL/L — HIGH (ref 5–17)
APTT BLD: 32.8 SEC — SIGNIFICANT CHANGE UP (ref 27.5–37.4)
AST SERPL-CCNC: 20 U/L — SIGNIFICANT CHANGE UP (ref 10–40)
BASE EXCESS BLDV CALC-SCNC: -0.3 MMOL/L — SIGNIFICANT CHANGE UP (ref -2–2)
BILIRUB SERPL-MCNC: 0.2 MG/DL — SIGNIFICANT CHANGE UP (ref 0.2–1.2)
BUN SERPL-MCNC: 54 MG/DL — HIGH (ref 7–23)
CA-I SERPL-SCNC: 1.1 MMOL/L — LOW (ref 1.12–1.3)
CALCIUM SERPL-MCNC: 9.5 MG/DL — SIGNIFICANT CHANGE UP (ref 8.4–10.5)
CHLORIDE BLDV-SCNC: 104 MMOL/L — SIGNIFICANT CHANGE UP (ref 96–108)
CHLORIDE SERPL-SCNC: 97 MMOL/L — SIGNIFICANT CHANGE UP (ref 96–108)
CK MB CFR SERPL CALC: 2.8 NG/ML — SIGNIFICANT CHANGE UP (ref 0–3.8)
CK SERPL-CCNC: 91 U/L — SIGNIFICANT CHANGE UP (ref 25–170)
CO2 BLDV-SCNC: 24 MMOL/L — SIGNIFICANT CHANGE UP (ref 22–30)
CO2 SERPL-SCNC: 20 MMOL/L — LOW (ref 22–31)
CREAT SERPL-MCNC: 1.82 MG/DL — HIGH (ref 0.5–1.3)
GAS PNL BLDV: 131 MMOL/L — LOW (ref 136–145)
GAS PNL BLDV: SIGNIFICANT CHANGE UP
GLUCOSE BLDC GLUCOMTR-MCNC: 146 MG/DL — HIGH (ref 70–99)
GLUCOSE BLDV-MCNC: 130 MG/DL — HIGH (ref 70–99)
GLUCOSE SERPL-MCNC: 149 MG/DL — HIGH (ref 70–99)
HCO3 BLDV-SCNC: 23 MMOL/L — SIGNIFICANT CHANGE UP (ref 21–29)
HCT VFR BLD CALC: 36.5 % — SIGNIFICANT CHANGE UP (ref 34.5–45)
HCT VFR BLDA CALC: 35 % — LOW (ref 39–50)
HGB BLD CALC-MCNC: 11.2 G/DL — LOW (ref 11.5–15.5)
HGB BLD-MCNC: 11.4 G/DL — LOW (ref 11.5–15.5)
INR BLD: 1 RATIO — SIGNIFICANT CHANGE UP (ref 0.88–1.16)
LACTATE BLDV-MCNC: 2.6 MMOL/L — HIGH (ref 0.7–2)
LACTATE SERPL-SCNC: 2.5 MMOL/L — HIGH (ref 0.7–2)
MCHC RBC-ENTMCNC: 26.8 PG — LOW (ref 27–34)
MCHC RBC-ENTMCNC: 31.3 GM/DL — LOW (ref 32–36)
MCV RBC AUTO: 85.6 FL — SIGNIFICANT CHANGE UP (ref 80–100)
OTHER CELLS CSF MANUAL: 16 ML/DL — LOW (ref 18–22)
PCO2 BLDV: 37 MMHG — SIGNIFICANT CHANGE UP (ref 35–50)
PH BLDV: 7.42 — SIGNIFICANT CHANGE UP (ref 7.35–7.45)
PLATELET # BLD AUTO: 368 K/UL — SIGNIFICANT CHANGE UP (ref 150–400)
PO2 BLDV: 168 MMHG — HIGH (ref 25–45)
POTASSIUM BLDV-SCNC: 3.5 MMOL/L — SIGNIFICANT CHANGE UP (ref 3.5–5.3)
POTASSIUM SERPL-MCNC: 4.1 MMOL/L — SIGNIFICANT CHANGE UP (ref 3.5–5.3)
POTASSIUM SERPL-SCNC: 4.1 MMOL/L — SIGNIFICANT CHANGE UP (ref 3.5–5.3)
PROCALCITONIN SERPL-MCNC: 0.17 NG/ML — HIGH (ref 0.02–0.1)
PROT SERPL-MCNC: 7.7 G/DL — SIGNIFICANT CHANGE UP (ref 6–8.3)
PROTHROM AB SERPL-ACNC: 10.8 SEC — SIGNIFICANT CHANGE UP (ref 9.8–12.7)
RBC # BLD: 4.27 M/UL — SIGNIFICANT CHANGE UP (ref 3.8–5.2)
RBC # FLD: 14.8 % — HIGH (ref 10.3–14.5)
SAO2 % BLDV: 99 % — HIGH (ref 67–88)
SODIUM SERPL-SCNC: 137 MMOL/L — SIGNIFICANT CHANGE UP (ref 135–145)
T3 SERPL-MCNC: 80 NG/DL — SIGNIFICANT CHANGE UP (ref 80–200)
T4 AB SER-ACNC: 8.3 UG/DL — SIGNIFICANT CHANGE UP (ref 4.6–12)
TROPONIN T, HIGH SENSITIVITY RESULT: 28 NG/L — SIGNIFICANT CHANGE UP (ref 0–51)
TSH SERPL-MCNC: 4.24 UIU/ML — HIGH (ref 0.27–4.2)
WBC # BLD: 10.7 K/UL — HIGH (ref 3.8–10.5)
WBC # FLD AUTO: 10.7 K/UL — HIGH (ref 3.8–10.5)

## 2018-08-31 PROCEDURE — 70450 CT HEAD/BRAIN W/O DYE: CPT | Mod: 26

## 2018-08-31 PROCEDURE — 93306 TTE W/DOPPLER COMPLETE: CPT | Mod: 26

## 2018-08-31 PROCEDURE — 93010 ELECTROCARDIOGRAM REPORT: CPT

## 2018-08-31 RX ORDER — AMLODIPINE BESYLATE 2.5 MG/1
2.5 TABLET ORAL
Qty: 0 | Refills: 0 | Status: DISCONTINUED | OUTPATIENT
Start: 2018-09-01 | End: 2018-09-02

## 2018-08-31 RX ORDER — ERYTHROPOIETIN 10000 [IU]/ML
20000 INJECTION, SOLUTION INTRAVENOUS; SUBCUTANEOUS ONCE
Qty: 0 | Refills: 0 | Status: COMPLETED | OUTPATIENT
Start: 2018-08-31 | End: 2018-08-31

## 2018-08-31 RX ORDER — DARBEPOETIN ALFA IN POLYSORBAT 200MCG/0.4
200 PEN INJECTOR (ML) SUBCUTANEOUS ONCE
Qty: 0 | Refills: 0 | Status: DISCONTINUED | OUTPATIENT
Start: 2018-08-31 | End: 2018-08-31

## 2018-08-31 RX ADMIN — AMLODIPINE BESYLATE 2.5 MILLIGRAM(S): 2.5 TABLET ORAL at 05:50

## 2018-08-31 RX ADMIN — LOSARTAN POTASSIUM 100 MILLIGRAM(S): 100 TABLET, FILM COATED ORAL at 05:49

## 2018-08-31 RX ADMIN — PANTOPRAZOLE SODIUM 40 MILLIGRAM(S): 20 TABLET, DELAYED RELEASE ORAL at 05:49

## 2018-08-31 RX ADMIN — HEPARIN SODIUM 5000 UNIT(S): 5000 INJECTION INTRAVENOUS; SUBCUTANEOUS at 15:54

## 2018-08-31 RX ADMIN — Medication 112 MICROGRAM(S): at 05:49

## 2018-08-31 RX ADMIN — ERYTHROPOIETIN 20000 UNIT(S): 10000 INJECTION, SOLUTION INTRAVENOUS; SUBCUTANEOUS at 17:48

## 2018-08-31 RX ADMIN — Medication 40 MILLIGRAM(S): at 21:40

## 2018-08-31 RX ADMIN — Medication 650 MILLIGRAM(S): at 07:28

## 2018-08-31 RX ADMIN — Medication 650 MILLIGRAM(S): at 06:35

## 2018-08-31 RX ADMIN — Medication 1.25 MILLIGRAM(S): at 05:49

## 2018-08-31 RX ADMIN — HEPARIN SODIUM 5000 UNIT(S): 5000 INJECTION INTRAVENOUS; SUBCUTANEOUS at 21:40

## 2018-08-31 RX ADMIN — Medication 1 MILLIGRAM(S): at 09:36

## 2018-08-31 RX ADMIN — HEPARIN SODIUM 5000 UNIT(S): 5000 INJECTION INTRAVENOUS; SUBCUTANEOUS at 05:50

## 2018-08-31 RX ADMIN — Medication 81 MILLIGRAM(S): at 09:36

## 2018-08-31 RX ADMIN — Medication 40 MILLIGRAM(S): at 05:50

## 2018-08-31 RX ADMIN — LATANOPROST 1 DROP(S): 0.05 SOLUTION/ DROPS OPHTHALMIC; TOPICAL at 21:40

## 2018-08-31 NOTE — CHART NOTE - NSCHARTNOTEFT_GEN_A_CORE
Patient is a 89y old  Female who presents with a chief complaint of Progressive dyspnea for the past 3 weeks with lightheadedness, dyspnea, episode of chest pain, dyspnea this afternoon while walking in the garage. (29 Aug 2018 20:07)  Called by RN, pt was vasovagal in the bathroom after trying tohave a bowel movement.  Blood pressure 88/53 mmhg.  RRT called.     HPI:  89 y/o female appears weak, very slow to respond.   Skin:  Flush, cool and diaphoretic.   Resp: CTA without adventitious sounds.   CV: S1S2.  Telemetry: Atrial Bigemeny 70-90's.    Chest:  Pt utilizing accessory muscles.  Placed on 100% NRB.       Vital Signs Last 24 Hrs  T(C): 36.5 (31 Aug 2018 04:18), Max: 36.9 (30 Aug 2018 14:03)  T(F): 97.7 (31 Aug 2018 04:18), Max: 98.4 (30 Aug 2018 14:03)  HR: 78 (31 Aug 2018 04:18) (63 - 78)  BP: 170/65 (31 Aug 2018 04:18) (149/69 - 170/65)  BP(mean): --  RR: 18 (31 Aug 2018 04:18) (18 - 19)  SpO2: 95% (31 Aug 2018 04:18) (95% - 96%)    Laboratory:  CARDIAC MARKERS ( 29 Aug 2018 22:37 )  x     / x     / 86 U/L / x     / x      CARDIAC MARKERS ( 29 Aug 2018 19:20 )  x     / x     / 81 U/L / x     / x                                10.1   5.9   )-----------( 310      ( 30 Aug 2018 07:19 )             31.8       BNP: Serum Pro-Brain Natriuretic Peptide: 1655 pg/mL (08-30 @ 07:11)  Serum Pro-Brain Natriuretic Peptide: 1527 pg/mL (08-29 @ 15:24)    Impression:   Vasovagal Syncope    Plan:   Seen RRT Sheet.   Pt's blood pressure has now improved.   Pt more awake and alert.   Will continue to monitor on Telemetry.        Cary Smiley [facility]  Spectralink #[id number]

## 2018-08-31 NOTE — CONSULT NOTE ADULT - SUBJECTIVE AND OBJECTIVE BOX
EP ATTENDING      HISTORY OF PRESENT ILLNESS: She is a pleasant 89 y/o female PMH CAD s/p prior PCI now admitted with near syncope. EP is called because she has an underlying RBBB (normal axis). Recent workup includes echo this month (normal) and MCOT in Feb 2018 which was unremarkable. Her SBP was documented to be 70-80s on arrival, and has improved with IVF. The etiology of her near syncope was likely hypotension, and the etiology of the hypotension remains unclear. Tele monitoring in the hospital shows occasional APCs and PVCs, but no malignant arrhythmias to explain near syncope.     PAST MEDICAL & SURGICAL HISTORY:  MDS (Myelodysplastic Syndrome)  GERD (Gastroesophageal Reflux Disease)  Chronic Glaucoma: R eye  Hypercholesterolemia  HTN (Hypertension)  Hypothyroidism  CAD    S/P Cataract Surgery  S/P Breast Lumpectomy: benign  S/P TKR (Total Knee Replacement): left  S/P T&A: childhood  Basal Cell Carcinoma of Face: 4 yrs ago  Carpal Tunnel Syndrome: 10 yrs ago  Rectocele: 47 yrs ago  Cystocele: 47 yrs ago  S/P Breast Lumpectomy: 10 yrs ago  Bilateral Cataracts: 69 y/o  History of Total Knee Replacement: L 1998  Skin Cancer: of face , basal cell   4 yrs ago    MEDICATIONS  (STANDING):  amLODIPine   Tablet 2.5 milliGRAM(s) Oral daily  aspirin enteric coated 81 milliGRAM(s) Oral daily  darbepoetin Injectable ViaL 200 MICROGram(s) SubCutaneous once  folic acid 1 milliGRAM(s) Oral daily  furosemide   Injectable 40 milliGRAM(s) IV Push daily  heparin  Injectable 5000 Unit(s) SubCutaneous every 8 hours  indapamide 1.25 milliGRAM(s) Oral daily  latanoprost 0.005% Ophthalmic Solution 1 Drop(s) Both EYES at bedtime  levothyroxine 112 MICROGram(s) Oral daily  losartan 100 milliGRAM(s) Oral daily  pantoprazole    Tablet 40 milliGRAM(s) Oral before breakfast  pravastatin 40 milliGRAM(s) Oral at bedtime    No Known Allergies    FAMILY HISTORY:  No pertinent family history in first degree relatives  Non-contributary for premature coronary disease or sudden cardiac death    SOCIAL HISTORY:    [x ] Non-smoker  [ ] Smoker  [ ] Alcohol      REVIEW OF SYSTEMS:  [ ]chest pain  [  ]shortness of breath  [  ]palpitations  [  ]syncope  [x ]near syncope [ ]upper extremity weakness   [ ] lower extremity weakness  [  ]diplopia  [  ]altered mental status   [  ]fevers  [ ]chills [ ]nausea  [ ]vomitting  [  ]dysphagia    [ ]abdominal pain  [ ]melena  [ ]BRBPR    [  ]epistaxis  [  ]rash    [ ]lower extremity edema        [x ] All others negative	  [ ] Unable to obtain    PHYSICAL EXAM:  T(C): 36.5 (08-31-18 @ 04:18), Max: 36.9 (08-30-18 @ 14:03)  HR: 78 (08-31-18 @ 04:18) (63 - 89)  BP: 170/65 (08-31-18 @ 04:18) (149/69 - 174/73)  RR: 18 (08-31-18 @ 04:18) (18 - 19)  SpO2: 95% (08-31-18 @ 04:18) (95% - 96%)  Wt(kg): --    no JVD  RRR, no murmurs  CTAB  soft nt/nd  no c/c/e      TELEMETRY: 	  as above  ECG:  	as above    Echo: normal  NST: n/a  Cath: n/a  	  	  LABS:	 	                          10.1   5.9   )-----------( 310      ( 30 Aug 2018 07:19 )             31.8     08-30    137  |  100  |  42<H>  ----------------------------<  99  3.8   |  22  |  1.28    Ca    9.3      30 Aug 2018 07:11  Phos  3.4     08-30  Mg     2.1     08-30    TPro  6.3  /  Alb  3.1<L>  /  TBili  0.2  /  DBili  x   /  AST  16  /  ALT  8<L>  /  AlkPhos  51  08-30    proBNP:   Lipid Profile:   HgA1c:   TSH: Thyroid Stimulating Hormone, Serum: 6.02 uIU/mL (08-30 @ 09:23)      A/P) She is a pleasant 89 y/o female PMH CAD s/p prior PCI now admitted with near syncope. EP is called because she has an underlying RBBB (normal axis). Recent workup includes echo this month (normal) and MCOT in Feb 2018 which was unremarkable. Her SBP was documented to be 70-80s on arrival, and has improved with IVF. The etiology of her near syncope was likely hypotension, and the etiology of the hypotension remains unclear. Tele monitoring in the hospital shows occasional APCs and PVCs, but no malignant arrhythmias to explain near syncope.     -workup of hypotension as per primary team  -workup of chest pain as per cardiology  -no further inpatient EP workup needed as long as tele continues to show no malignant arrhythmias  -recommend serial MCOT monitoring with Dr. Carbajal after discharge      Becky Muñoz M.D., [facility]  Cardiac Electrophysiology  [facility]  [address] [place] [address], [place] [address]  [email or web address]    office [phone]  pager [phone]

## 2018-08-31 NOTE — CHART NOTE - NSCHARTNOTEFT_GEN_A_CORE
[facility] RRT NOTE    CC: RRT called for pt with syncope  HPI: 89F hx CAd s/p stent, MDS, CHF, HTN p/w dizziness and GUO thought to be chf exacerbation. RRT called for syncope.     Pt reportedly was using the bathroom this am and had a bowel movement (flushed before saw color). During and immediately after BM she started "not feeling well". Pt barely able to stand on feet when had syncopal episodes. She was immediately brought to bed. Pt never fully unconscious. No head trauma. RRT called. BP found to be 88/50 (baseline 140/70). Heart rate 50's to 60's in [facility] on monitor, no other events. No labs to review from this am.   Pt initially lethargic, but eventually came to and able to answer all questions. BP improved to 140/88 without intervention. EKG was unchanged from previous.    REVIEW OF SYSTEMS:  Respiratory: Denies cough, wheezing, chills, hemoptysis, shortness of breath, difficulty breathing  Cardiovascular: Denies chest pain, palpitations, dizziness, leg swelling  Gastrointestinal: Denies abdominal pain, nausea, vomiting, hematemesis, diarrhea, constipation, melena, hematochezia  Genitourinary: Denies dysuria, frequency, hematuria, retention, incontinence  Neurological: Denies headaches, memory loss, +weakness    VITALS:  T(C): 36.5 (08-31-18 @ 04:18), Max: 36.9 (08-30-18 @ 14:03)  HR: 78 (08-31-18 @ 04:18) (63 - 78)  BP: 170/65 (08-31-18 @ 04:18) (149/69 - 170/65)  RR: 18 (08-31-18 @ 04:18) (18 - 19)  SpO2: 95% (08-31-18 @ 04:18) (95% - 96%)  Wt(kg): --    PHYSICAL EXAM:  General: lethargy improved   Eyes: PERRLA, EOMI. Conjunctiva and sclera clear.  Lungs: Airway patent. CTA B/L.   Heart: irregular rhythm, border line bradcardia  Abdomen: Soft, NT/ND. Normoactive BS x 4 quadrants.  Neurological:  AAOx3. Good concentration. Strength 5/5 B/L upper and lower extremities. No pronator drift.  Extremities:  2+ B/L peripheral pulses. No clubbing, cyanosis, or edema.    LABS:  CAPILLARY BLOOD GLUCOSE      POCT Blood Glucose.: 146 mg/dL (31 Aug 2018 10:45)  POCT Blood Glucose.: 126 mg/dL (30 Aug 2018 21:18)                          11.4   10.7  )-----------( 368      ( 31 Aug 2018 11:04 )             36.5     08-30    137  |  100  |  42<H>  ----------------------------<  99  3.8   |  22  |  1.28    Ca    9.3      30 Aug 2018 07:11  Phos  3.4     08-30  Mg     2.1     08-30    TPro  6.3  /  Alb  3.1<L>  /  TBili  0.2  /  DBili  x   /  AST  16  /  ALT  8<L>  /  AlkPhos  51  08-30    CARDIAC MARKERS ( 29 Aug 2018 22:37 )  x     / x     / 86 U/L / x     / x      CARDIAC MARKERS ( 29 Aug 2018 19:20 )  x     / x     / 81 U/L / x     / x          PT/INR - ( 29 Aug 2018 15:24 )   PT: 10.9 sec;   INR: 1.01 ratio         PTT - ( 29 Aug 2018 15:24 )  PTT:32.1 sec  LIVER FUNCTIONS - ( 30 Aug 2018 03:36 )  Alb: 3.1 g/dL / Pro: 6.3 g/dL / ALK PHOS: 51 U/L / ALT: 8 U/L / AST: 16 U/L / GGT: x                                     ASSESSMENT:  89F with pmh as mentioned, RRT called for syncope.    #syncope- pt w/ likely vasovagal event following bowel movement, BP initially low, but improved without intervention.   -check orthostatics  -check TTE, call cards to make aware of event  -follow up cbc, bmp and cardiac enzymes  -keep on bed rest for now  -consider staggering am bp meds  -vitals Q4  -plans discussed with primary team    Antonio Aguiar MD  Internal Medicine  [facility] [id number]

## 2018-08-31 NOTE — DIETITIAN INITIAL EVALUATION ADULT. - OTHER INFO
Nutrition consult received, pt seen for initial nutrition assessment. Pt reports no changes to po intake, eating well. She has been following the same meal routine as far back as she can remember. Daughter at bedside notes pt with some wt loss, pt agrees, ~5 lbs x 12 months, non-significant, pt started eating a little less during this time frame. Pt has no c/o n/v/d/c. Endorses lightheadedness at times. No difficulties chewing or swallowing. Pt declines providing additional food preferences. Pt declines nutritional supplementation in-patient, despite having taken ensure enlive at home. Nutrition consult received, pt seen for initial nutrition assessment. Pt reports no changes to po intake, eating well. She has been following the same meal routine as far back as she can remember. Daughter at bedside notes pt with some wt loss, pt agrees, ~5 lbs x 12 months, non-significant, pt started eating a little less during this time frame. Pt has no c/o n/v/d/c. Endorses lightheadedness at times. No difficulties chewing or swallowing. Pt declines providing additional food preferences. Pt declines nutritional supplementation in-patient, despite having taken ensure enlive at home. Nutrition education not indicated at this time, pt of advanced age.

## 2018-08-31 NOTE — PROGRESS NOTE ADULT - SUBJECTIVE AND OBJECTIVE BOX
Patient is a 89y old  Female who presents with a chief complaint of Progressive dyspnea for the past 3 weeks with lightheadedness, dyspnea, episode of chest pain, dyspnea this afternoon while walking in the garage. (29 Aug 2018 20:07)      INTERVAL HPI/OVERNIGHT EVENTS:  T(C): 36.8 (18 @ 14:40), Max: 36.9 (18 @ 21:15)  HR: 61 (18 @ 14:40) (61 - 80)  BP: 140/52 (18 @ 14:40) (88/50 - 170/65)  RR: 18 (18 @ 14:40) (18 - 19)  SpO2: 97% (18 @ 14:40) (95% - 97%)  Wt(kg): --  I&O's Summary    30 Aug 2018 07:  -  31 Aug 2018 07:00  --------------------------------------------------------  IN: 1100 mL / OUT: 1450 mL / NET: -350 mL    31 Aug 2018 07:  -  31 Aug 2018 17:37  --------------------------------------------------------  IN: 120 mL / OUT: 200 mL / NET: -80 mL        LABS:                        11.4   10.7  )-----------( 368      ( 31 Aug 2018 11:04 )             36.5         137  |  97  |  54<H>  ----------------------------<  149<H>  4.1   |  20<L>  |  1.82<H>    Ca    9.5      31 Aug 2018 11:04  Phos  3.4     0830  Mg     2.1         TPro  7.7  /  Alb  3.7  /  TBili  0.2  /  DBili  x   /  AST  20  /  ALT  10  /  AlkPhos  63  08    PT/INR - ( 31 Aug 2018 11:04 )   PT: 10.8 sec;   INR: 1.00 ratio         PTT - ( 31 Aug 2018 11:04 )  PTT:32.8 sec  Urinalysis Basic - ( 29 Aug 2018 21:04 )    Color: Colorless / Appearance: Clear / S.006 / pH: x  Gluc: x / Ketone: Negative  / Bili: Negative / Urobili: Negative   Blood: x / Protein: Trace / Nitrite: Negative   Leuk Esterase: Negative / RBC: x / WBC x   Sq Epi: x / Non Sq Epi: x / Bacteria: x      CAPILLARY BLOOD GLUCOSE      POCT Blood Glucose.: 146 mg/dL (31 Aug 2018 10:45)  POCT Blood Glucose.: 126 mg/dL (30 Aug 2018 21:18)    ABG - ( 30 Aug 2018 03:24 )  pH, Arterial: 7.45  pH, Blood: x     /  pCO2: 35    /  pO2: 85    / HCO3: 24    / Base Excess: .8    /  SaO2: 96                  Urinalysis Basic - ( 29 Aug 2018 21:04 )    Color: Colorless / Appearance: Clear / S.006 / pH: x  Gluc: x / Ketone: Negative  / Bili: Negative / Urobili: Negative   Blood: x / Protein: Trace / Nitrite: Negative   Leuk Esterase: Negative / RBC: x / WBC x   Sq Epi: x / Non Sq Epi: x / Bacteria: x        MEDICATIONS  (STANDING):  aspirin enteric coated 81 milliGRAM(s) Oral daily  epoetin phani Injectable 37714 Unit(s) SubCutaneous once  folic acid 1 milliGRAM(s) Oral daily  furosemide   Injectable 40 milliGRAM(s) IV Push daily  heparin  Injectable 5000 Unit(s) SubCutaneous every 8 hours  indapamide 1.25 milliGRAM(s) Oral daily  latanoprost 0.005% Ophthalmic Solution 1 Drop(s) Both EYES at bedtime  levothyroxine 112 MICROGram(s) Oral daily  losartan 100 milliGRAM(s) Oral daily  pantoprazole    Tablet 40 milliGRAM(s) Oral before breakfast  pravastatin 40 milliGRAM(s) Oral at bedtime    MEDICATIONS  (PRN):  acetaminophen   Tablet. 650 milliGRAM(s) Oral every 6 hours PRN Mild Pain (1 - 3)          PHYSICAL EXAM:  GENERAL: NAD, well-groomed, well-developed  HEAD:  Atraumatic, Normocephalic  CHEST/LUNG: Clear to percussion bilaterally; No rales, rhonchi, wheezing, or rubs  HEART: Regular rate and rhythm; No murmurs, rubs, or gallops  ABDOMEN: Soft, Nontender, Nondistended; Bowel sounds present  EXTREMITIES:  2+ Peripheral Pulses, No clubbing, cyanosis, or edema  LYMPH: No lymphadenopathy noted  SKIN: No rashes or lesions    Care Discussed with Consultants/Other Providers [ ] YES  [ ] NO

## 2018-08-31 NOTE — PROGRESS NOTE ADULT - SUBJECTIVE AND OBJECTIVE BOX
pt seen and examined,  SOB improved over past 24 hrs     acetaminophen   Tablet. 650 milliGRAM(s) Oral every 6 hours PRN  amLODIPine   Tablet 2.5 milliGRAM(s) Oral daily  aspirin enteric coated 81 milliGRAM(s) Oral daily  folic acid 1 milliGRAM(s) Oral daily  furosemide   Injectable 40 milliGRAM(s) IV Push daily  heparin  Injectable 5000 Unit(s) SubCutaneous every 8 hours  indapamide 1.25 milliGRAM(s) Oral daily  latanoprost 0.005% Ophthalmic Solution 1 Drop(s) Both EYES at bedtime  levothyroxine 112 MICROGram(s) Oral daily  losartan 100 milliGRAM(s) Oral daily  pantoprazole    Tablet 40 milliGRAM(s) Oral before breakfast  pravastatin 40 milliGRAM(s) Oral at bedtime                            10.1   5.9   )-----------( 310      ( 30 Aug 2018 07:19 )             31.8       Hemoglobin: 10.1 g/dL (08-30 @ 07:19)  Hemoglobin: 9.6 g/dL (08-30 @ 03:36)  Hemoglobin: 10.8 g/dL (08-29 @ 15:24)      08-30    137  |  100  |  42<H>  ----------------------------<  99  3.8   |  22  |  1.28    Ca    9.3      30 Aug 2018 07:11  Phos  3.4     08-30  Mg     2.1     08-30    TPro  6.3  /  Alb  3.1<L>  /  TBili  0.2  /  DBili  x   /  AST  16  /  ALT  8<L>  /  AlkPhos  51  08-30    Creatinine Trend: 1.28<--, 1.35<--, 1.28<--, 1.27<--    COAGS:     CARDIAC MARKERS ( 29 Aug 2018 22:37 )  x     / x     / 86 U/L / x     / x      CARDIAC MARKERS ( 29 Aug 2018 19:20 )  x     / x     / 81 U/L / x     / x            T(C): 36.5 (08-31-18 @ 04:18), Max: 36.9 (08-30-18 @ 14:03)  HR: 78 (08-31-18 @ 04:18) (63 - 89)  BP: 170/65 (08-31-18 @ 04:18) (149/69 - 174/73)  RR: 18 (08-31-18 @ 04:18) (18 - 19)  SpO2: 95% (08-31-18 @ 04:18) (95% - 96%)  Wt(kg): --    I&O's Summary    29 Aug 2018 07:01  -  30 Aug 2018 07:00  --------------------------------------------------------  IN: 180 mL / OUT: 800 mL / NET: -620 mL    30 Aug 2018 07:01  -  31 Aug 2018 04:46  --------------------------------------------------------  IN: 900 mL / OUT: 850 mL / NET: 50 mL        Heart: normal S1, S2, RRR, no m/r/g  Lungs: cta b/l  Abd: soft nT, nD  Ext: trace edema bilaterally     TELEMETRY: SR, APC's	    ECG:  < from: 12 Lead ECG (08.29.18 @ 22:43) >  SINUS RHYTHM with frequent  PREMATURE ATRIAL COMPLEXES  RIGHT BUNDLE BRANCH BLOCK  ABNORMAL ECG    < end of copied text >  	  RADIOLOGY:   DIAGNOSTIC TESTING:  [ ] Echocardiogram:  [ ]  Catheterization:  [ ] Stress Test:    OTHER: 	      ASSESSMENT/PLAN:  89yFemale with history of HTN, CAD s/p remote PCI to LAD, MDS being seen for chest pain.     ASA, statin    GI / DVT prophylaxis.   keep K>4, mag >2.0   cont IV diuretics at present , daily weights   cont BP control   tele stable   echo  pending   supp O2   D/W Dr Law pt seen and examined,  SOB improved over past 24 hrs     acetaminophen   Tablet. 650 milliGRAM(s) Oral every 6 hours PRN  amLODIPine   Tablet 2.5 milliGRAM(s) Oral daily  aspirin enteric coated 81 milliGRAM(s) Oral daily  folic acid 1 milliGRAM(s) Oral daily  furosemide   Injectable 40 milliGRAM(s) IV Push daily  heparin  Injectable 5000 Unit(s) SubCutaneous every 8 hours  indapamide 1.25 milliGRAM(s) Oral daily  latanoprost 0.005% Ophthalmic Solution 1 Drop(s) Both EYES at bedtime  levothyroxine 112 MICROGram(s) Oral daily  losartan 100 milliGRAM(s) Oral daily  pantoprazole    Tablet 40 milliGRAM(s) Oral before breakfast  pravastatin 40 milliGRAM(s) Oral at bedtime                            10.1   5.9   )-----------( 310      ( 30 Aug 2018 07:19 )             31.8       Hemoglobin: 10.1 g/dL (08-30 @ 07:19)  Hemoglobin: 9.6 g/dL (08-30 @ 03:36)  Hemoglobin: 10.8 g/dL (08-29 @ 15:24)      08-30    137  |  100  |  42<H>  ----------------------------<  99  3.8   |  22  |  1.28    Ca    9.3      30 Aug 2018 07:11  Phos  3.4     08-30  Mg     2.1     08-30    TPro  6.3  /  Alb  3.1<L>  /  TBili  0.2  /  DBili  x   /  AST  16  /  ALT  8<L>  /  AlkPhos  51  08-30    Creatinine Trend: 1.28<--, 1.35<--, 1.28<--, 1.27<--    COAGS:     CARDIAC MARKERS ( 29 Aug 2018 22:37 )  x     / x     / 86 U/L / x     / x      CARDIAC MARKERS ( 29 Aug 2018 19:20 )  x     / x     / 81 U/L / x     / x            T(C): 36.5 (08-31-18 @ 04:18), Max: 36.9 (08-30-18 @ 14:03)  HR: 78 (08-31-18 @ 04:18) (63 - 89)  BP: 170/65 (08-31-18 @ 04:18) (149/69 - 174/73)  RR: 18 (08-31-18 @ 04:18) (18 - 19)  SpO2: 95% (08-31-18 @ 04:18) (95% - 96%)  Wt(kg): --    I&O's Summary    29 Aug 2018 07:01  -  30 Aug 2018 07:00  --------------------------------------------------------  IN: 180 mL / OUT: 800 mL / NET: -620 mL    30 Aug 2018 07:01  -  31 Aug 2018 04:46  --------------------------------------------------------  IN: 900 mL / OUT: 850 mL / NET: 50 mL        Heart: normal S1, S2, RRR, no m/r/g  Lungs: cta b/l  Abd: soft nT, nD  Ext: trace edema bilaterally     TELEMETRY: SR, APC's	    ECG:  < from: 12 Lead ECG (08.29.18 @ 22:43) >  SINUS RHYTHM with frequent  PREMATURE ATRIAL COMPLEXES  RIGHT BUNDLE BRANCH BLOCK  ABNORMAL ECG    < end of copied text >  	  RADIOLOGY:   DIAGNOSTIC TESTING:  [ ] Echocardiogram:  [ ]  Catheterization:  [ ] Stress Test:    OTHER: 	      ASSESSMENT/PLAN:  89yFemale with history of HTN, CAD s/p remote PCI to LAD, MDS being seen for chest pain.     ASA, statin    GI / DVT prophylaxis.   keep K>4, mag >2.0   , daily weights   cont BP control   tele stable   echo  pending   supp O2   D/W Dr Law

## 2018-08-31 NOTE — CONSULT NOTE ADULT - SUBJECTIVE AND OBJECTIVE BOX
[facility]        Patient is a 89y old  Female who presents with a chief complaint of Progressive dyspnea for the past 3 weeks with lightheadedness, dyspnea, episode of chest pain, dyspnea this afternoon while walking in the garage. (29 Aug 2018 20:07)      HPI:   89 y/o F--patient seen with adult daughter in law  attendance--patient followed by Dr. Leigh and Dr. Dennison in the office with a history of CAD with PCI in , premature ventricular contractions on EKG, reported myelodysplastic syndrome, hypothyroidism, essential HTN, reported GERD with patient noting 3+ weeks of lightheadedness and dyspnea with activity, with patient self limiting activity at home, with patient today attempting to walk with daughter in a nearby garage but with dyspnea and chest pain.  Patient presently denies chest pain/pressure.  No dyspnea.   NO HA, no focal weakness.   No fever, no chills, no rigors.   NO abdominal pain, no red blood per rectum or melena.  No back pain, no tearing back pain.   No dysuria, no hematuria.  Denies weight loss or anorexia.  Chronic B/L LE oedema.   Remaining review of systems not contributory. (29 Aug 2018 20:07)  Pt had an episode of near syncope after bm this am, bp dropped to systolic of 88 RRT called, and bp stabilized spontaneously.Denies any constipation or straining on the toilet.   According to daughter in law, pt has had few episodes of near syncope when she exerts herself.          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;mylar dysplasia&#x27; treated with &quot; Arinesp&quot; x past 1.5 yrs- last dose 4 months ago  GERD (Gastroesophageal Reflux Disease)  Chronic Glaucoma: R eye  Hypercholesterolemia  HTN (Hypertension)  Hypothyroidism  Chronic Osteoarthritis  S/P Cataract Surgery  S/P Breast Lumpectomy: benign  S/P TKR (Total Knee Replacement): left  S/P T&A: childhood    Basal Cell Carcinoma of Face: 4 yrs ago  Carpal Tunnel Syndrome: 10 yrs ago  Rectocele: 47 yrs ago  Cystocele: 47 yrs ago  S/P Breast Lumpectomy: 10 yrs ago  Bilateral Cataracts: 69 y/o  History of Total Knee Replacement: L   Skin Cancer: of face , basal cell   4 yrs ago           *****FAMILY HISTORY:  FAMILY HISTORY:  No pertinent family history in first degree relatives           *****SOCIAL HISTORY:  Alcohol: None  Smoking: None         *****ALLERGIES:   Allergies    No Known Allergies    Intolerances             *****MEDICATIONS: current medication reviewed and documented.   MEDICATIONS  (STANDING):  aspirin enteric coated 81 milliGRAM(s) Oral daily  epoetin phani Injectable 17941 Unit(s) SubCutaneous once  folic acid 1 milliGRAM(s) Oral daily  furosemide   Injectable 40 milliGRAM(s) IV Push daily  heparin  Injectable 5000 Unit(s) SubCutaneous every 8 hours  indapamide 1.25 milliGRAM(s) Oral daily  latanoprost 0.005% Ophthalmic Solution 1 Drop(s) Both EYES at bedtime  levothyroxine 112 MICROGram(s) Oral daily  losartan 100 milliGRAM(s) Oral daily  pantoprazole    Tablet 40 milliGRAM(s) Oral before breakfast  pravastatin 40 milliGRAM(s) Oral at bedtime    MEDICATIONS  (PRN):  acetaminophen   Tablet. 650 milliGRAM(s) Oral every 6 hours PRN Mild Pain (1 - 3)           *****REVIEW OF SYSTEM:  GEN: no fever, no chills, no pain  RESP: no SOB, no cough, no sputum  CVS: no chest pain, no palpitations, no edema  GI: no abdominal pain, no nausea, no vomiting, no constipation, no diarrhea  : no dysurea, no frequency, no hematurea  Neuro: no headache, no dizziness  PSYCH: no anxiety, no depression  Derm : no itching, no rash         *****VITAL SIGNS:  T(C): 36.4 (18 @ 10:44), Max: 36.9 (18 @ 14:03)  HR: 66 (18 @ 11:36) (61 - 80)  BP: 130/70 (18 @ 11:36) (88/50 - 170/65)  RR: 18 (18 @ 04:18) (18 - 19)  SpO2: 95% (18 @ 04:18) (95% - 96%)  Wt(kg): --     @ 07:01  -   @ 07:00  --------------------------------------------------------  IN: 1100 mL / OUT: 1450 mL / NET: -350 mL     @ 07:01  -   @ 12:20  --------------------------------------------------------  IN: 0 mL / OUT: 200 mL / NET: -200 mL             *****PHYSICAL EXAM:       Lethargic   Alert oriented x3 knew it was end of august but not the exact date.  Attention comprehension are fair. Able to name, repeat, read without any difficulty.   Able to follow 1-2 step commands.     EOMI fundi not visualized,  blinks to threat b/l   No facial asymmetry   Tongue is midline   Palate elevates symmetrically   Moving all 4 ext symmetrically no pronator drift.  R knee is swollen.   sensation is grossly symmetric  Gait : not assessed.  B/L down going toes               *****LAB AND IMAGIN.4   10.7  )-----------( 368      ( 31 Aug 2018 11:04 )             36.5                   137  |  97  |  54<H>  ----------------------------<  149<H>  4.1   |  20<L>  |  1.82<H>    Ca    9.5      31 Aug 2018 11:04  Phos  3.4     08-30  Mg     2.1     08-30    TPro  7.7  /  Alb  3.7  /  TBili  0.2  /  DBili  x   /  AST  20  /  ALT  10  /  AlkPhos  63      PT/INR - ( 31 Aug 2018 11:04 )   PT: 10.8 sec;   INR: 1.00 ratio         PTT - ( 31 Aug 2018 11:04 )  PTT:32.8 sec            CARDIAC MARKERS ( 31 Aug 2018 11:04 )  x     / x     / 91 U/L / x     / 2.8 ng/mL  CARDIAC MARKERS ( 29 Aug 2018 22:37 )  x     / x     / 86 U/L / x     / x      CARDIAC MARKERS ( 29 Aug 2018 19:20 )  x     / x     / 81 U/L / x     / x                ABG - ( 30 Aug 2018 03:24 )  pH, Arterial: 7.45  pH, Blood: x     /  pCO2: 35    /  pO2: 85    / HCO3: 24    / Base Excess: .8    /  SaO2: 96                Urinalysis Basic - ( 29 Aug 2018 21:04 )    Color: Colorless / Appearance: Clear / S.006 / pH: x  Gluc: x / Ketone: Negative  / Bili: Negative / Urobili: Negative   Blood: x / Protein: Trace / Nitrite: Negative   Leuk Esterase: Negative / RBC: x / WBC x   Sq Epi: x / Non Sq Epi: x / Bacteria: x        [All pertinent recent Imaging reports reviewed]         *****A S S E S S M E N T   A N D   P L A N :    89 y/o F--patient seen with adult daughter in law  attendance--patient followed by Dr. Leigh and Dr. Dennison in the office with a history of CAD with PCI in , premature ventricular contractions on EKG, reported myelodysplastic syndrome, hypothyroidism, essential HTN, reported GERD with patient noting 3+ weeks of lightheadedness and dyspnea with activity, with patient self limiting activity at home, with patient today attempting to walk with daughter in a nearby garage but with dyspnea and chest pain.  Patient presently denies chest pain/pressure.  No dyspnea.   NO HA, no focal weakness.   No fever, no chills, no rigors.   NO abdominal pain, no red blood per rectum or melena.  No back pain, no tearing back pain.   No dysuria, no hematuria.  Denies weight loss or anorexia.  Chronic B/L LE oedema.   Remaining review of systems not contributory. (29 Aug 2018 20:07)  Pt had an episode of near syncope after bm this am, bp dropped to systolic of 88 RRT called, and bp stabilized spontaneously.Denies any constipation or straining on the toilet.   According to daughter in law, pt has had few episodes of near syncope when she exerts herself.        Problem/Recommendations 1: Near syncope of unclear etiology, possible vasovagal syncope vs. orthostatic   Check orthostatic  Doubt seizures as there is lack of normal seizure related stigmata ie  prolonged confusion, tongue laceration.    if Echo shows no acute process consider gentle hydration   Defer to cards for further w/u   monitor o2 sats   ct head to r/o acute process.  Problem/Recommendations 2: toxic metabolic encephalopathy due to renal insufficiency.   ?volume contracted, defer to medicine for w/u and management.       Differential diagnosis and plan of care discussed with patient and daughter in law after the evaluation.   Advanced care planning options discussed.   Pain assessed and judicious use of narcotics when appropriate was discussed.  Importance of Fall prevention discussed.     80 minutes spent on the total encounter;  more than 50 % of the visit was spent on counseling  and or coordinating care by the attending physician.    Thank you for allowing me to participate in the care of this tiki patient. Please do not hesitate to call me if you have any questions.   ___________________________  Will follow with you.  Thank you,  Ranjana Chaudhry MD  Diplomate of the American Board of Neurology and Psychiatry.  Diplomate of the American Board of Vascular Neurology.   [facility]   Ph: [phone]      This and subsequent notes were partially created using voice recognition software and will  inherently be subject to errors including those of syntax and sound alike substitutions which may escape proofreading. In such instances original meaning may be extrapolated by contextual derivation.

## 2018-08-31 NOTE — DIETITIAN INITIAL EVALUATION ADULT. - ENERGY NEEDS
Height: 56 inches (pt verified), Weight: 152 pounds  BMI: 34.1 kg/m2 IBW: 80 pounds (+/-10%), %IBW: 190%  Pertinent Info: No edema, no pressure ulcers noted at this time.   Other pertinent info: Pt admitted for progressive dyspnea x 3 weeks and found with acute on chronic CHF. Pt also c/o joint pain. PMH includes CAD with PCI, hypothyroidism, HTN, GERD, MDS (low grade, dx 2009), hypercholesterolemia. Pt on diuretics and echo pending. Pt also with metabolic encephalopathy due to renal insufficiency. Pt with syncopal events in-patient, likely vasovagal. Nutritionally, nothing of concern at this time.

## 2018-08-31 NOTE — DIETITIAN INITIAL EVALUATION ADULT. - ADHERENCE
pt eats a good variety of food groups, no therapeutic diet restrictions followed. pt eats a variety of food groups, no therapeutic diet restrictions followed.

## 2018-08-31 NOTE — DIETITIAN INITIAL EVALUATION ADULT. - ORAL INTAKE PTA
pt reports good po intake PTA and during current admit. Pt is prepares food and cooks for herself. Consumes 3 balanced meals daily, no snacks. Pt reports drinking ~3 ensure/week to "make my daughter happy." NKFA reported. Pt was taking Iron and B12 supplements on occasion./good good/pt reports good po intake PTA and during current admit. Pt prepares food and cooks for herself. Consumes 3 balanced meals daily, no snacks. Pt reports drinking ~3 ensure/week to "make my daughter happy." NKFA reported. Pt was taking Iron and B12 supplements on occasion.

## 2018-08-31 NOTE — PROGRESS NOTE ADULT - ATTENDING COMMENTS
Patient seen and examined.  Agree with above.   -RRT noted - ? vagal, bp improved  -check orthostatics  -can hold lasix as patient appears more euvolemic  -check TTE    Sanju Law MD

## 2018-08-31 NOTE — PROGRESS NOTE ADULT - SUBJECTIVE AND OBJECTIVE BOX
Chief Complaint: "My right knee hurts".    History of Present Illness:    The patient is having some joint pain.  No chest pain.  No pleuritic chest pain.  No palpitations.  No nausea.  No vomiting.  No diarrhea.  No fevers.  No chills.  No calf pain.  No obvious bleeding.  She does have some SOB with exertion. She does feel weak.  Remainder ROS is stable.     MEDICATIONS  (STANDING):  amLODIPine   Tablet 2.5 milliGRAM(s) Oral daily  aspirin enteric coated 81 milliGRAM(s) Oral daily  folic acid 1 milliGRAM(s) Oral daily  furosemide   Injectable 40 milliGRAM(s) IV Push daily  heparin  Injectable 5000 Unit(s) SubCutaneous every 8 hours  indapamide 1.25 milliGRAM(s) Oral daily  latanoprost 0.005% Ophthalmic Solution 1 Drop(s) Both EYES at bedtime  levothyroxine 112 MICROGram(s) Oral daily  losartan 100 milliGRAM(s) Oral daily  pantoprazole    Tablet 40 milliGRAM(s) Oral before breakfast  pravastatin 40 milliGRAM(s) Oral at bedtime    MEDICATIONS  (PRN):  acetaminophen   Tablet. 650 milliGRAM(s) Oral every 6 hours PRN Mild Pain (1 - 3)      Allergies    No Known Allergies    Intolerances        Vital Signs Last 24 Hrs  T(C): 36.5 (31 Aug 2018 04:18), Max: 36.9 (30 Aug 2018 14:03)  T(F): 97.7 (31 Aug 2018 04:18), Max: 98.4 (30 Aug 2018 14:03)  HR: 78 (31 Aug 2018 04:18) (63 - 89)  BP: 170/65 (31 Aug 2018 04:18) (149/69 - 174/73)  BP(mean): --  RR: 18 (31 Aug 2018 04:18) (18 - 19)  SpO2: 95% (31 Aug 2018 04:18) (95% - 96%)    PHYSICAL EXAM  General: NAD  HEENT: clear oropharynx, anicteric sclera, pink conjunctiva  Neck: supple  CV:  S1/S2   Lungs: decreased at the bases  Abdomen: soft non-tender non-distended, positive bowel sounds  Ext: no edema LE  Lymph Nodes: No LAD in neck  Neuro: alert and oriented X 3    LABS:                          10.1   5.9   )-----------( 310      ( 30 Aug 2018 07:19 )             31.8         Mean Cell Volume : 85.0 fl  Mean Cell Hemoglobin : 27.1 pg  Mean Cell Hemoglobin Concentration : 31.9 gm/dL  Auto Neutrophil # : 4.2 K/uL  Auto Lymphocyte # : 1.2 K/uL  Auto Monocyte # : 0.3 K/uL  Auto Eosinophil # : 0.1 K/uL  Auto Basophil # : 0.0 K/uL  Auto Neutrophil % : 71.6 %  Auto Lymphocyte % : 20.0 %  Auto Monocyte % : 5.9 %  Auto Eosinophil % : 2.0 %  Auto Basophil % : 0.5 %      Serial CBC's  08-30 @ 07:19  Hct-31.8 / Hgb-10.1 / Plat-310 / RBC-3.75 / WBC-5.9  Serial CBC's  08-30 @ 03:36  Hct-29.8 / Hgb-9.6 / Plat-332 / RBC-3.51 / WBC-7.7  Serial CBC's  08-29 @ 15:24  Hct-34.1 / Hgb-10.8 / Plat-331 / RBC-3.98 / WBC-6.3      08-30    137  |  100  |  42<H>  ----------------------------<  99  3.8   |  22  |  1.28    Ca    9.3      30 Aug 2018 07:11  Phos  3.4     08-30  Mg     2.1     08-30    TPro  6.3  /  Alb  3.1<L>  /  TBili  0.2  /  DBili  x   /  AST  16  /  ALT  8<L>  /  AlkPhos  51  08-30      PT/INR - ( 29 Aug 2018 15:24 )   PT: 10.9 sec;   INR: 1.01 ratio         PTT - ( 29 Aug 2018 15:24 )  PTT:32.1 sec

## 2018-09-01 LAB
BASOPHILS # BLD AUTO: 0 K/UL — SIGNIFICANT CHANGE UP (ref 0–0.2)
BASOPHILS NFR BLD AUTO: 0.6 % — SIGNIFICANT CHANGE UP (ref 0–2)
EOSINOPHIL # BLD AUTO: 0.1 K/UL — SIGNIFICANT CHANGE UP (ref 0–0.5)
EOSINOPHIL NFR BLD AUTO: 2.3 % — SIGNIFICANT CHANGE UP (ref 0–6)
HCT VFR BLD CALC: 31.6 % — LOW (ref 34.5–45)
HGB BLD-MCNC: 10.4 G/DL — LOW (ref 11.5–15.5)
LYMPHOCYTES # BLD AUTO: 1.4 K/UL — SIGNIFICANT CHANGE UP (ref 1–3.3)
LYMPHOCYTES # BLD AUTO: 22.9 % — SIGNIFICANT CHANGE UP (ref 13–44)
MCHC RBC-ENTMCNC: 28 PG — SIGNIFICANT CHANGE UP (ref 27–34)
MCHC RBC-ENTMCNC: 32.8 GM/DL — SIGNIFICANT CHANGE UP (ref 32–36)
MCV RBC AUTO: 85.2 FL — SIGNIFICANT CHANGE UP (ref 80–100)
MONOCYTES # BLD AUTO: 0.4 K/UL — SIGNIFICANT CHANGE UP (ref 0–0.9)
MONOCYTES NFR BLD AUTO: 6.9 % — SIGNIFICANT CHANGE UP (ref 2–14)
NEUTROPHILS # BLD AUTO: 4.2 K/UL — SIGNIFICANT CHANGE UP (ref 1.8–7.4)
NEUTROPHILS NFR BLD AUTO: 67.4 % — SIGNIFICANT CHANGE UP (ref 43–77)
PLATELET # BLD AUTO: 298 K/UL — SIGNIFICANT CHANGE UP (ref 150–400)
RBC # BLD: 3.7 M/UL — LOW (ref 3.8–5.2)
RBC # FLD: 14.6 % — HIGH (ref 10.3–14.5)
WBC # BLD: 6.2 K/UL — SIGNIFICANT CHANGE UP (ref 3.8–10.5)
WBC # FLD AUTO: 6.2 K/UL — SIGNIFICANT CHANGE UP (ref 3.8–10.5)

## 2018-09-01 RX ORDER — DICLOFENAC SODIUM 30 MG/G
4 GEL TOPICAL
Qty: 0 | Refills: 0 | Status: DISCONTINUED | OUTPATIENT
Start: 2018-09-01 | End: 2018-09-06

## 2018-09-01 RX ADMIN — Medication 40 MILLIGRAM(S): at 21:23

## 2018-09-01 RX ADMIN — HEPARIN SODIUM 5000 UNIT(S): 5000 INJECTION INTRAVENOUS; SUBCUTANEOUS at 12:31

## 2018-09-01 RX ADMIN — Medication 650 MILLIGRAM(S): at 02:14

## 2018-09-01 RX ADMIN — Medication 650 MILLIGRAM(S): at 13:40

## 2018-09-01 RX ADMIN — LOSARTAN POTASSIUM 100 MILLIGRAM(S): 100 TABLET, FILM COATED ORAL at 06:22

## 2018-09-01 RX ADMIN — Medication 650 MILLIGRAM(S): at 12:31

## 2018-09-01 RX ADMIN — Medication 112 MICROGRAM(S): at 05:01

## 2018-09-01 RX ADMIN — HEPARIN SODIUM 5000 UNIT(S): 5000 INJECTION INTRAVENOUS; SUBCUTANEOUS at 05:01

## 2018-09-01 RX ADMIN — Medication 81 MILLIGRAM(S): at 12:31

## 2018-09-01 RX ADMIN — DICLOFENAC SODIUM 4 GRAM(S): 30 GEL TOPICAL at 21:25

## 2018-09-01 RX ADMIN — AMLODIPINE BESYLATE 2.5 MILLIGRAM(S): 2.5 TABLET ORAL at 12:31

## 2018-09-01 RX ADMIN — Medication 650 MILLIGRAM(S): at 01:44

## 2018-09-01 RX ADMIN — Medication 1.25 MILLIGRAM(S): at 06:22

## 2018-09-01 RX ADMIN — Medication 40 MILLIGRAM(S): at 05:01

## 2018-09-01 RX ADMIN — Medication 1 MILLIGRAM(S): at 12:31

## 2018-09-01 RX ADMIN — HEPARIN SODIUM 5000 UNIT(S): 5000 INJECTION INTRAVENOUS; SUBCUTANEOUS at 21:22

## 2018-09-01 RX ADMIN — Medication 650 MILLIGRAM(S): at 21:23

## 2018-09-01 RX ADMIN — LATANOPROST 1 DROP(S): 0.05 SOLUTION/ DROPS OPHTHALMIC; TOPICAL at 21:24

## 2018-09-01 RX ADMIN — Medication 650 MILLIGRAM(S): at 21:32

## 2018-09-01 RX ADMIN — PANTOPRAZOLE SODIUM 40 MILLIGRAM(S): 20 TABLET, DELAYED RELEASE ORAL at 05:01

## 2018-09-01 NOTE — PROGRESS NOTE ADULT - SUBJECTIVE AND OBJECTIVE BOX
patient resting comfortably in bed    took tylenol for knee pain  received procrit injection yesterday without adverse reaction    denies chest pain/pressure/fever or chills or nausea    126/62 60 98.2 97%RA  SCLERA ANICTERIC  CONJ.-PALE  lungs few bibasilar rales  card-RRR  abd soft  no calf tenderness  alert and oriented    labs  8/31  wbc-10.7 hgb-11.4 plt-368    head ct 8/31--small meningioma  echo 8/31  no pericardial effusion                  normal lv systolic                  + diastolic dysfunction    imp--MDS, on growth factor support--procrit given 8/31          on aranesp typically in our office          not transfusion dependent          OUTPATIENT F/U WITH dR. Junior smith--acute/chronic CHF

## 2018-09-01 NOTE — PROGRESS NOTE ADULT - SUBJECTIVE AND OBJECTIVE BOX
pt seen and examined,  no chest pressure or chest pain over night     acetaminophen   Tablet. 650 milliGRAM(s) Oral every 6 hours PRN  amLODIPine   Tablet 2.5 milliGRAM(s) Oral <User Schedule>  aspirin enteric coated 81 milliGRAM(s) Oral daily  folic acid 1 milliGRAM(s) Oral daily  furosemide   Injectable 40 milliGRAM(s) IV Push daily  heparin  Injectable 5000 Unit(s) SubCutaneous every 8 hours  indapamide 1.25 milliGRAM(s) Oral daily  latanoprost 0.005% Ophthalmic Solution 1 Drop(s) Both EYES at bedtime  levothyroxine 112 MICROGram(s) Oral daily  losartan 100 milliGRAM(s) Oral daily  pantoprazole    Tablet 40 milliGRAM(s) Oral before breakfast  pravastatin 40 milliGRAM(s) Oral at bedtime                            11.4   10.7  )-----------( 368      ( 31 Aug 2018 11:04 )             36.5       Hemoglobin: 11.4 g/dL (08-31 @ 11:04)  Hemoglobin: 10.1 g/dL (08-30 @ 07:19)  Hemoglobin: 9.6 g/dL (08-30 @ 03:36)  Hemoglobin: 10.8 g/dL (08-29 @ 15:24)      08-31    137  |  97  |  54<H>  ----------------------------<  149<H>  4.1   |  20<L>  |  1.82<H>    Ca    9.5      31 Aug 2018 11:04  Phos  3.4     08-30  Mg     2.1     08-30    TPro  7.7  /  Alb  3.7  /  TBili  0.2  /  DBili  x   /  AST  20  /  ALT  10  /  AlkPhos  63  08-31    Creatinine Trend: 1.82<--, 1.28<--, 1.35<--, 1.28<--, 1.27<--    COAGS: PT/INR - ( 31 Aug 2018 11:04 )   PT: 10.8 sec;   INR: 1.00 ratio         PTT - ( 31 Aug 2018 11:04 )  PTT:32.8 sec    CARDIAC MARKERS ( 31 Aug 2018 11:04 )  x     / x     / 91 U/L / x     / 2.8 ng/mL  CARDIAC MARKERS ( 29 Aug 2018 22:37 )  x     / x     / 86 U/L / x     / x      CARDIAC MARKERS ( 29 Aug 2018 19:20 )  x     / x     / 81 U/L / x     / x            T(C): 36.6 (09-01-18 @ 04:47), Max: 36.8 (08-31-18 @ 14:40)  HR: 59 (09-01-18 @ 04:47) (59 - 80)  BP: 130/69 (09-01-18 @ 04:47) (88/50 - 141/59)  RR: 17 (09-01-18 @ 04:47) (17 - 18)  SpO2: 95% (09-01-18 @ 04:47) (95% - 97%)  Wt(kg): --    I&O's Summary    30 Aug 2018 07:01  -  31 Aug 2018 07:00  --------------------------------------------------------  IN: 1100 mL / OUT: 1450 mL / NET: -350 mL    31 Aug 2018 07:01  -  01 Sep 2018 05:09  --------------------------------------------------------  IN: 740 mL / OUT: 750 mL / NET: -10 mL        Heart: normal S1, S2, RRR, no m/r/g  Lungs: cta b/l  Abd: soft nT, nD  Ext: trace edema bilaterally     TELEMETRY: SR, APC's	    ECG:  < from: 12 Lead ECG (08.29.18 @ 22:43) >  SINUS RHYTHM with frequent  PREMATURE ATRIAL COMPLEXES  RIGHT BUNDLE BRANCH BLOCK  ABNORMAL ECG    < end of copied text >  	  RADIOLOGY:   DIAGNOSTIC TESTING:  [ ] Echocardiogram:  [ ]  Catheterization:  [ ] Stress Test:    OTHER: 	      ASSESSMENT/PLAN:  89yFemale with history of HTN, CAD s/p remote PCI to LAD, MDS being seen for chest pain.     ASA, statin   would hold on Diuretics ,  trend creatine    GI / DVT prophylaxis,  keep K>4, mag >2.0    BP controlled on present meds   echo  pending   supp O2   D/W Dr Muñoz

## 2018-09-01 NOTE — PROGRESS NOTE ADULT - ATTENDING COMMENTS
agree with above  check tte    Sanju Law MD agree with above  tte with normal lv function  follow up claudio Law MD

## 2018-09-01 NOTE — PROGRESS NOTE ADULT - SUBJECTIVE AND OBJECTIVE BOX
EP ATTENDING    tele: no events    no palpitations, no syncope, no angina    acetaminophen   Tablet. 650 milliGRAM(s) Oral every 6 hours PRN  amLODIPine   Tablet 2.5 milliGRAM(s) Oral <User Schedule>  aspirin enteric coated 81 milliGRAM(s) Oral daily  folic acid 1 milliGRAM(s) Oral daily  heparin  Injectable 5000 Unit(s) SubCutaneous every 8 hours  indapamide 1.25 milliGRAM(s) Oral daily  latanoprost 0.005% Ophthalmic Solution 1 Drop(s) Both EYES at bedtime  levothyroxine 112 MICROGram(s) Oral daily  pantoprazole    Tablet 40 milliGRAM(s) Oral before breakfast  pravastatin 40 milliGRAM(s) Oral at bedtime                            10.4   6.2   )-----------( 298      ( 01 Sep 2018 06:40 )             31.6       08-31    137  |  97  |  54<H>  ----------------------------<  149<H>  4.1   |  20<L>  |  1.82<H>    Ca    9.5      31 Aug 2018 11:04    TPro  7.7  /  Alb  3.7  /  TBili  0.2  /  DBili  x   /  AST  20  /  ALT  10  /  AlkPhos  63  08-31      CARDIAC MARKERS ( 31 Aug 2018 11:04 )  x     / x     / 91 U/L / x     / 2.8 ng/mL  CARDIAC MARKERS ( 29 Aug 2018 22:37 )  x     / x     / 86 U/L / x     / x      CARDIAC MARKERS ( 29 Aug 2018 19:20 )  x     / x     / 81 U/L / x     / x            T(C): 36.6 (09-01-18 @ 04:47), Max: 36.8 (08-31-18 @ 14:40)  HR: 65 (09-01-18 @ 06:21) (59 - 67)  BP: 138/68 (09-01-18 @ 06:21) (109/51 - 145/75)  RR: 17 (09-01-18 @ 04:47) (17 - 18)  SpO2: 95% (09-01-18 @ 04:47) (95% - 97%)  Wt(kg): --    no JVD  RRR, no murmurs  CTAB  soft nt/nd  no c/c/e    echo: moderate MR, normal LVEF    A/P) She is a pleasant 89 y/o female PMH CAD s/p prior PCI now admitted with near syncope. EP is called because she has an underlying RBBB (normal axis). Recent workup includes echo this month (normal) and MCOT in Feb 2018 which was unremarkable. Her SBP was documented to be 70-80s on arrival, and has improved with IVF. The etiology of her near syncope was likely hypotension, and the etiology of the hypotension remains unclear. Tele monitoring in the hospital shows occasional APCs and PVCs, but no malignant arrhythmias to explain near syncope.     -workup of hypotension as per primary team  -workup of chest pain as per cardiology  -no further inpatient EP workup needed as long as tele continues to show no malignant arrhythmias  -recommend serial MCOT monitoring with Dr. Carbajal after discharge  -hold lasix and losartan given MARIELA Muñoz M.D., RS  Cardiac Electrophysiology  [facility]  [address] [place] [address], [place] [address]  [email or web address]    office [phone]  pager [phone]

## 2018-09-01 NOTE — PROGRESS NOTE ADULT - SUBJECTIVE AND OBJECTIVE BOX
Patient is a 89y old  Female who presents with a chief complaint of Progressive dyspnea for the past 3 weeks with lightheadedness, dyspnea, episode of chest pain, dyspnea this afternoon while walking in the garage. (29 Aug 2018 20:07)      INTERVAL HPI/OVERNIGHT EVENTS:  T(C): 36.7 (09-01-18 @ 14:12), Max: 36.8 (08-31-18 @ 21:00)  HR: 76 (09-01-18 @ 14:12) (55 - 76)  BP: 153/70 (09-01-18 @ 14:12) (126/62 - 153/70)  RR: 19 (09-01-18 @ 14:12) (17 - 19)  SpO2: 96% (09-01-18 @ 14:12) (95% - 97%)  Wt(kg): --  I&O's Summary    31 Aug 2018 07:01  -  01 Sep 2018 07:00  --------------------------------------------------------  IN: 740 mL / OUT: 1050 mL / NET: -310 mL    01 Sep 2018 07:01  -  01 Sep 2018 19:53  --------------------------------------------------------  IN: 720 mL / OUT: 700 mL / NET: 20 mL        LABS:                        10.4   6.2   )-----------( 298      ( 01 Sep 2018 06:40 )             31.6     08-31    137  |  97  |  54<H>  ----------------------------<  149<H>  4.1   |  20<L>  |  1.82<H>    Ca    9.5      31 Aug 2018 11:04    TPro  7.7  /  Alb  3.7  /  TBili  0.2  /  DBili  x   /  AST  20  /  ALT  10  /  AlkPhos  63  08-31    PT/INR - ( 31 Aug 2018 11:04 )   PT: 10.8 sec;   INR: 1.00 ratio         PTT - ( 31 Aug 2018 11:04 )  PTT:32.8 sec    CAPILLARY BLOOD GLUCOSE                MEDICATIONS  (STANDING):  amLODIPine   Tablet 2.5 milliGRAM(s) Oral <User Schedule>  aspirin enteric coated 81 milliGRAM(s) Oral daily  diclofenac sodium 1% Gel 4 Gram(s) Topical <User Schedule>  folic acid 1 milliGRAM(s) Oral daily  heparin  Injectable 5000 Unit(s) SubCutaneous every 8 hours  indapamide 1.25 milliGRAM(s) Oral daily  latanoprost 0.005% Ophthalmic Solution 1 Drop(s) Both EYES at bedtime  levothyroxine 112 MICROGram(s) Oral daily  pantoprazole    Tablet 40 milliGRAM(s) Oral before breakfast  pravastatin 40 milliGRAM(s) Oral at bedtime    MEDICATIONS  (PRN):  acetaminophen   Tablet. 650 milliGRAM(s) Oral every 6 hours PRN Mild Pain (1 - 3)          PHYSICAL EXAM:  GENERAL: NAD, well-groomed, well-developed  HEAD:  Atraumatic, Normocephalic  CHEST/LUNG: Clear to percussion bilaterally; No rales, rhonchi, wheezing, or rubs  HEART: Regular rate and rhythm; No murmurs, rubs, or gallops  ABDOMEN: Soft, Nontender, Nondistended; Bowel sounds present  EXTREMITIES:  2+ Peripheral Pulses, No clubbing, cyanosis, or edema  LYMPH: No lymphadenopathy noted  SKIN: No rashes or lesions    Care Discussed with Consultants/Other Providers [ ] YES  [ ] NO

## 2018-09-02 LAB
ANION GAP SERPL CALC-SCNC: 15 MMOL/L — SIGNIFICANT CHANGE UP (ref 5–17)
BUN SERPL-MCNC: 71 MG/DL — HIGH (ref 7–23)
CALCIUM SERPL-MCNC: 8.8 MG/DL — SIGNIFICANT CHANGE UP (ref 8.4–10.5)
CHLORIDE SERPL-SCNC: 99 MMOL/L — SIGNIFICANT CHANGE UP (ref 96–108)
CO2 SERPL-SCNC: 21 MMOL/L — LOW (ref 22–31)
CREAT SERPL-MCNC: 1.86 MG/DL — HIGH (ref 0.5–1.3)
GLUCOSE SERPL-MCNC: 119 MG/DL — HIGH (ref 70–99)
POTASSIUM SERPL-MCNC: 4.3 MMOL/L — SIGNIFICANT CHANGE UP (ref 3.5–5.3)
POTASSIUM SERPL-SCNC: 4.3 MMOL/L — SIGNIFICANT CHANGE UP (ref 3.5–5.3)
SODIUM SERPL-SCNC: 135 MMOL/L — SIGNIFICANT CHANGE UP (ref 135–145)

## 2018-09-02 RX ADMIN — LATANOPROST 1 DROP(S): 0.05 SOLUTION/ DROPS OPHTHALMIC; TOPICAL at 21:42

## 2018-09-02 RX ADMIN — Medication 650 MILLIGRAM(S): at 17:08

## 2018-09-02 RX ADMIN — Medication 650 MILLIGRAM(S): at 17:38

## 2018-09-02 RX ADMIN — PANTOPRAZOLE SODIUM 40 MILLIGRAM(S): 20 TABLET, DELAYED RELEASE ORAL at 05:05

## 2018-09-02 RX ADMIN — Medication 1 MILLIGRAM(S): at 12:24

## 2018-09-02 RX ADMIN — Medication 112 MICROGRAM(S): at 05:05

## 2018-09-02 RX ADMIN — HEPARIN SODIUM 5000 UNIT(S): 5000 INJECTION INTRAVENOUS; SUBCUTANEOUS at 05:05

## 2018-09-02 RX ADMIN — HEPARIN SODIUM 5000 UNIT(S): 5000 INJECTION INTRAVENOUS; SUBCUTANEOUS at 13:28

## 2018-09-02 RX ADMIN — HEPARIN SODIUM 5000 UNIT(S): 5000 INJECTION INTRAVENOUS; SUBCUTANEOUS at 21:42

## 2018-09-02 RX ADMIN — DICLOFENAC SODIUM 4 GRAM(S): 30 GEL TOPICAL at 09:30

## 2018-09-02 RX ADMIN — Medication 1.25 MILLIGRAM(S): at 05:05

## 2018-09-02 RX ADMIN — Medication 40 MILLIGRAM(S): at 21:42

## 2018-09-02 RX ADMIN — Medication 81 MILLIGRAM(S): at 12:24

## 2018-09-02 NOTE — PHYSICAL THERAPY INITIAL EVALUATION ADULT - ACTIVE RANGE OF MOTION EXAMINATION, REHAB EVAL
noticeable arthritis of R knee and lackign 5-10 degrees extension 2/2 pain/bilateral upper extremity Active ROM was WFL (within functional limits)/bilateral  lower extremity Active ROM was WFL (within functional limits)

## 2018-09-02 NOTE — PROGRESS NOTE ADULT - SUBJECTIVE AND OBJECTIVE BOX
pt seen and examined,  no events overnight     acetaminophen   Tablet. 650 milliGRAM(s) Oral every 6 hours PRN  amLODIPine   Tablet 2.5 milliGRAM(s) Oral <User Schedule>  aspirin enteric coated 81 milliGRAM(s) Oral daily  diclofenac sodium 1% Gel 4 Gram(s) Topical <User Schedule>  folic acid 1 milliGRAM(s) Oral daily  heparin  Injectable 5000 Unit(s) SubCutaneous every 8 hours  indapamide 1.25 milliGRAM(s) Oral daily  latanoprost 0.005% Ophthalmic Solution 1 Drop(s) Both EYES at bedtime  levothyroxine 112 MICROGram(s) Oral daily  pantoprazole    Tablet 40 milliGRAM(s) Oral before breakfast  pravastatin 40 milliGRAM(s) Oral at bedtime                            10.4   6.2   )-----------( 298      ( 01 Sep 2018 06:40 )             31.6       Hemoglobin: 10.4 g/dL (09-01 @ 06:40)  Hemoglobin: 11.4 g/dL (08-31 @ 11:04)  Hemoglobin: 10.1 g/dL (08-30 @ 07:19)  Hemoglobin: 9.6 g/dL (08-30 @ 03:36)  Hemoglobin: 10.8 g/dL (08-29 @ 15:24)      08-31    137  |  97  |  54<H>  ----------------------------<  149<H>  4.1   |  20<L>  |  1.82<H>    Ca    9.5      31 Aug 2018 11:04    TPro  7.7  /  Alb  3.7  /  TBili  0.2  /  DBili  x   /  AST  20  /  ALT  10  /  AlkPhos  63  08-31    Creatinine Trend: 1.82<--, 1.28<--, 1.35<--, 1.28<--, 1.27<--    COAGS:     CARDIAC MARKERS ( 31 Aug 2018 11:04 )  x     / x     / 91 U/L / x     / 2.8 ng/mL        T(C): 37.1 (09-02-18 @ 04:50), Max: 37.1 (09-02-18 @ 04:50)  HR: 73 (09-02-18 @ 04:50) (55 - 78)  BP: 124/70 (09-02-18 @ 04:50) (124/70 - 153/70)  RR: 18 (09-02-18 @ 04:50) (18 - 19)  SpO2: 96% (09-02-18 @ 04:50) (96% - 96%)  Wt(kg): --    I&O's Summary    01 Sep 2018 07:01  -  02 Sep 2018 07:00  --------------------------------------------------------  IN: 720 mL / OUT: 1200 mL / NET: -480 mL          Heart: normal S1, S2, RRR, no m/r/g  Lungs: cta b/l  Abd: soft nT, nD  Ext: trace edema bilaterally     TELEMETRY: SR, APC's	    ECG:  < from: 12 Lead ECG (08.29.18 @ 22:43) >  SINUS RHYTHM with frequent  PREMATURE ATRIAL COMPLEXES  RIGHT BUNDLE BRANCH BLOCK  ABNORMAL ECG    < end of copied text >  	  RADIOLOGY:   DIAGNOSTIC TESTING:  [ ] Echocardiogram: < from: TTE with Doppler (w/Cont) (08.31.18 @ 12:52) >  Conclusions:  1. Mild to moderate mitral regurgitation (severity  underestimated).  2. Peak transaortic valve gradient equals 19 mm Hg, mean  transaortic valve gradient equals 11 mm Hg, estimated  aortic valve area equals 1.3 sqcm (by continuity equation),  aortic valve velocity time integral equals 48 cm,  consistent with moderate aortic stenosis. Mild aortic  regurgitation.  3. Normal left ventricular internal dimensions and wall  thicknesses.  4. Normal left ventricular systolic function. No segmental  wall motion abnormalities.  5. Reversal of the E-A  waves of the mitral inflow pattern  is consistent with diastolic LV dysfunction.  6. Normal right ventricular size and function.    < end of copied text >    [ ]  Catheterization:  [ ] Stress Test:    OTHER: 	      ASSESSMENT/PLAN:  89yFemale with history of HTN, CAD s/p remote PCI to LAD, MDS being seen for chest pain.     ASA, statin   trend creatine , Diuretics and ACE on hold    GI / DVT prophylaxis,  keep K>4, mag >2.0    BP controlled on present meds   echo  noted with mod MR, NL lv fx,   supp O2   tele stable , no arrythmia noted   D/W Dr Courtney

## 2018-09-02 NOTE — PHYSICAL THERAPY INITIAL EVALUATION ADULT - PLANNED THERAPY INTERVENTIONS, PT EVAL
gait training/Stairs Goal: Pt will go up/down 2 steps inependently in 2 weeks./balance training/strengthening/transfer training/bed mobility training

## 2018-09-02 NOTE — PROVIDER CONTACT NOTE (OTHER) - ASSESSMENT
Pt A+O x4, pale and diaphoretic, pt complaining of feeling dizzy and about to faint, Sitting BP was 145/68 while standing was 87/47

## 2018-09-02 NOTE — PROGRESS NOTE ADULT - SUBJECTIVE AND OBJECTIVE BOX
Patient is a 89y old  Female who presents with a chief complaint of Progressive dyspnea for the past 3 weeks with lightheadedness, dyspnea, episode of chest pain, dyspnea this afternoon while walking in the garage. (29 Aug 2018 20:07)      INTERVAL HPI/OVERNIGHT EVENTS:  T(C): 37.2 (09-02-18 @ 14:08), Max: 37.2 (09-02-18 @ 14:08)  HR: 78 (09-02-18 @ 15:39) (50 - 78)  BP: 162/51 (09-02-18 @ 15:39) (124/70 - 162/54)  RR: 19 (09-02-18 @ 14:08) (18 - 19)  SpO2: 95% (09-02-18 @ 15:39) (95% - 96%)  Wt(kg): --  I&O's Summary    01 Sep 2018 07:01  -  02 Sep 2018 07:00  --------------------------------------------------------  IN: 720 mL / OUT: 1200 mL / NET: -480 mL    02 Sep 2018 07:01  -  02 Sep 2018 18:53  --------------------------------------------------------  IN: 360 mL / OUT: 0 mL / NET: 360 mL        LABS:                        10.4   6.2   )-----------( 298      ( 01 Sep 2018 06:40 )             31.6     09-02    135  |  99  |  71<H>  ----------------------------<  119<H>  4.3   |  21<L>  |  1.86<H>    Ca    8.8      02 Sep 2018 06:48          CAPILLARY BLOOD GLUCOSE                MEDICATIONS  (STANDING):  aspirin enteric coated 81 milliGRAM(s) Oral daily  diclofenac sodium 1% Gel 4 Gram(s) Topical <User Schedule>  folic acid 1 milliGRAM(s) Oral daily  heparin  Injectable 5000 Unit(s) SubCutaneous every 8 hours  indapamide 1.25 milliGRAM(s) Oral daily  latanoprost 0.005% Ophthalmic Solution 1 Drop(s) Both EYES at bedtime  levothyroxine 112 MICROGram(s) Oral daily  pantoprazole    Tablet 40 milliGRAM(s) Oral before breakfast  pravastatin 40 milliGRAM(s) Oral at bedtime    MEDICATIONS  (PRN):  acetaminophen   Tablet. 650 milliGRAM(s) Oral every 6 hours PRN Mild Pain (1 - 3)          PHYSICAL EXAM:  GENERAL: NAD, well-groomed, well-developed  HEAD:  Atraumatic, Normocephalic  CHEST/LUNG: Clear to percussion bilaterally; No rales, rhonchi, wheezing, or rubs  HEART: Regular rate and rhythm; No murmurs, rubs, or gallops  ABDOMEN: Soft, Nontender, Nondistended; Bowel sounds present  EXTREMITIES:  2+ Peripheral Pulses, No clubbing, cyanosis, or edema  LYMPH: No lymphadenopathy noted  SKIN: No rashes or lesions    Care Discussed with Consultants/Other Providers [ ] YES  [ ] NO

## 2018-09-02 NOTE — PROGRESS NOTE ADULT - ATTENDING COMMENTS
agree with above  tte with normal lv function  no ischemic eval planned at this time    Sanju Law MD

## 2018-09-02 NOTE — PHYSICAL THERAPY INITIAL EVALUATION ADULT - BALANCE TRAINING, PT EVAL
GOal; Pt will improve standing balance to fair with device to improve performance and safety of standing activities, transfers and ambulation in 2 weeks.

## 2018-09-02 NOTE — PHYSICAL THERAPY INITIAL EVALUATION ADULT - PERTINENT HX OF CURRENT PROBLEM, REHAB EVAL
89 y/o female PMH CAD s/p prior PCI now admitted with near syncope, progressive GUO, x 3 weeks, lightheadedness and CP day of admission. EP c/s  for an underlying RBBB (normal axis). Recent workup includes echo this month (normal) and MCOT in Feb 2018 which was unremarkable. Her SBP was documented to be 70-80s on arrival, and has improved with IVF. The etiology of her near syncope was likely hypotension, and the etiology of the hypotension remains unclear.

## 2018-09-02 NOTE — PROGRESS NOTE ADULT - SUBJECTIVE AND OBJECTIVE BOX
EP ATTENDING    tele: no events    no palpitations, no syncope, no angina, still orthostatic    acetaminophen   Tablet. 650 milliGRAM(s) Oral every 6 hours PRN  aspirin enteric coated 81 milliGRAM(s) Oral daily  diclofenac sodium 1% Gel 4 Gram(s) Topical <User Schedule>  folic acid 1 milliGRAM(s) Oral daily  heparin  Injectable 5000 Unit(s) SubCutaneous every 8 hours  indapamide 1.25 milliGRAM(s) Oral daily  latanoprost 0.005% Ophthalmic Solution 1 Drop(s) Both EYES at bedtime  levothyroxine 112 MICROGram(s) Oral daily  pantoprazole    Tablet 40 milliGRAM(s) Oral before breakfast  pravastatin 40 milliGRAM(s) Oral at bedtime                            10.4   6.2   )-----------( 298      ( 01 Sep 2018 06:40 )             31.6       09-02    135  |  99  |  71<H>  ----------------------------<  119<H>  4.3   |  21<L>  |  1.86<H>    Ca    8.8      02 Sep 2018 06:48        CARDIAC MARKERS ( 31 Aug 2018 11:04 )  x     / x     / 91 U/L / x     / 2.8 ng/mL        T(C): 37.1 (09-02-18 @ 04:50), Max: 37.1 (09-02-18 @ 04:50)  HR: 50 (09-02-18 @ 12:21) (50 - 78)  BP: 162/54 (09-02-18 @ 12:21) (124/70 - 162/54)  RR: 18 (09-02-18 @ 04:50) (18 - 19)  SpO2: 96% (09-02-18 @ 04:50) (96% - 96%)  Wt(kg): --             no JVD  RRR, no murmurs  CTAB  soft nt/nd  no c/c/e    echo: moderate MR, normal LVEF    A/P) She is a pleasant 89 y/o female PMH CAD s/p prior PCI now admitted with near syncope. EP is called because she has an underlying RBBB (normal axis). Recent workup includes echo this month (normal) and MCOT in Feb 2018 which was unremarkable. Her SBP was documented to be 70-80s on arrival, and has improved with IVF. The etiology of her near syncope was likely hypotension, and the etiology of the hypotension remains unclear. Tele monitoring in the hospital shows occasional APCs and PVCs, but no malignant arrhythmias to explain near syncope.     -workup of hypotension as per primary team  -workup of chest pain as per cardiology  -no further inpatient EP workup needed as long as tele continues to show no malignant arrhythmias  -recommend serial MCOT monitoring with Dr. Carbajal after discharge  -hold lasix and losartan given MARIELA  -hold norvasc given orthostatic syncope  -recommend avoid dehydration/prolonged standing, and compression stockings  -d/c planning       Becky Muñoz M.D., [facility]  Cardiac Electrophysiology  [facility]  [address] [place] [address], [place] [address]  [email or web address]    office [phone]  pager [phone]

## 2018-09-02 NOTE — PHYSICAL THERAPY INITIAL EVALUATION ADULT - ADDITIONAL COMMENTS
Pt lives in a private [facility] style house with 2 steps to  enter (1 to porch and 1 into home); pt resides on main floor.

## 2018-09-03 LAB
ANION GAP SERPL CALC-SCNC: 15 MMOL/L — SIGNIFICANT CHANGE UP (ref 5–17)
BUN SERPL-MCNC: 73 MG/DL — HIGH (ref 7–23)
CALCIUM SERPL-MCNC: 8.6 MG/DL — SIGNIFICANT CHANGE UP (ref 8.4–10.5)
CHLORIDE SERPL-SCNC: 98 MMOL/L — SIGNIFICANT CHANGE UP (ref 96–108)
CO2 SERPL-SCNC: 21 MMOL/L — LOW (ref 22–31)
CREAT SERPL-MCNC: 1.73 MG/DL — HIGH (ref 0.5–1.3)
GLUCOSE SERPL-MCNC: 105 MG/DL — HIGH (ref 70–99)
POTASSIUM SERPL-MCNC: 4 MMOL/L — SIGNIFICANT CHANGE UP (ref 3.5–5.3)
POTASSIUM SERPL-SCNC: 4 MMOL/L — SIGNIFICANT CHANGE UP (ref 3.5–5.3)
SODIUM SERPL-SCNC: 134 MMOL/L — LOW (ref 135–145)

## 2018-09-03 RX ORDER — LIDOCAINE 4 G/100G
1 CREAM TOPICAL DAILY
Qty: 0 | Refills: 0 | Status: DISCONTINUED | OUTPATIENT
Start: 2018-09-03 | End: 2018-09-06

## 2018-09-03 RX ADMIN — PANTOPRAZOLE SODIUM 40 MILLIGRAM(S): 20 TABLET, DELAYED RELEASE ORAL at 05:20

## 2018-09-03 RX ADMIN — Medication 1.25 MILLIGRAM(S): at 05:19

## 2018-09-03 RX ADMIN — Medication 650 MILLIGRAM(S): at 05:20

## 2018-09-03 RX ADMIN — LIDOCAINE 1 PATCH: 4 CREAM TOPICAL at 11:22

## 2018-09-03 RX ADMIN — Medication 650 MILLIGRAM(S): at 20:45

## 2018-09-03 RX ADMIN — HEPARIN SODIUM 5000 UNIT(S): 5000 INJECTION INTRAVENOUS; SUBCUTANEOUS at 13:22

## 2018-09-03 RX ADMIN — HEPARIN SODIUM 5000 UNIT(S): 5000 INJECTION INTRAVENOUS; SUBCUTANEOUS at 21:27

## 2018-09-03 RX ADMIN — Medication 81 MILLIGRAM(S): at 11:22

## 2018-09-03 RX ADMIN — LATANOPROST 1 DROP(S): 0.05 SOLUTION/ DROPS OPHTHALMIC; TOPICAL at 21:27

## 2018-09-03 RX ADMIN — Medication 650 MILLIGRAM(S): at 20:15

## 2018-09-03 RX ADMIN — DICLOFENAC SODIUM 4 GRAM(S): 30 GEL TOPICAL at 09:15

## 2018-09-03 RX ADMIN — Medication 1 MILLIGRAM(S): at 11:22

## 2018-09-03 RX ADMIN — HEPARIN SODIUM 5000 UNIT(S): 5000 INJECTION INTRAVENOUS; SUBCUTANEOUS at 05:20

## 2018-09-03 RX ADMIN — Medication 650 MILLIGRAM(S): at 06:00

## 2018-09-03 RX ADMIN — DICLOFENAC SODIUM 4 GRAM(S): 30 GEL TOPICAL at 20:15

## 2018-09-03 RX ADMIN — Medication 112 MICROGRAM(S): at 05:20

## 2018-09-03 RX ADMIN — LIDOCAINE 1 PATCH: 4 CREAM TOPICAL at 23:10

## 2018-09-03 RX ADMIN — Medication 40 MILLIGRAM(S): at 21:27

## 2018-09-03 NOTE — PROGRESS NOTE ADULT - ATTENDING COMMENTS
Agree with above  tte with normal lv function  no plans for ischemic eval at this time    Sanju Law MD

## 2018-09-03 NOTE — PROGRESS NOTE ADULT - SUBJECTIVE AND OBJECTIVE BOX
pt seen and examined,  no chest pressure , on exam     tele stable     acetaminophen   Tablet. 650 milliGRAM(s) Oral every 6 hours PRN  aspirin enteric coated 81 milliGRAM(s) Oral daily  diclofenac sodium 1% Gel 4 Gram(s) Topical <User Schedule>  folic acid 1 milliGRAM(s) Oral daily  heparin  Injectable 5000 Unit(s) SubCutaneous every 8 hours  indapamide 1.25 milliGRAM(s) Oral daily  latanoprost 0.005% Ophthalmic Solution 1 Drop(s) Both EYES at bedtime  levothyroxine 112 MICROGram(s) Oral daily  pantoprazole    Tablet 40 milliGRAM(s) Oral before breakfast  pravastatin 40 milliGRAM(s) Oral at bedtime                            10.4   6.2   )-----------( 298      ( 01 Sep 2018 06:40 )             31.6       Hemoglobin: 10.4 g/dL (09-01 @ 06:40)  Hemoglobin: 11.4 g/dL (08-31 @ 11:04)  Hemoglobin: 10.1 g/dL (08-30 @ 07:19)  Hemoglobin: 9.6 g/dL (08-30 @ 03:36)  Hemoglobin: 10.8 g/dL (08-29 @ 15:24)      09-02    135  |  99  |  71<H>  ----------------------------<  119<H>  4.3   |  21<L>  |  1.86<H>    Ca    8.8      02 Sep 2018 06:48      Creatinine Trend: 1.86<--, 1.82<--, 1.28<--, 1.35<--, 1.28<--, 1.27<--    COAGS:     CARDIAC MARKERS ( 31 Aug 2018 11:04 )  x     / x     / 91 U/L / x     / 2.8 ng/mL        T(C): 37.3 (09-02-18 @ 20:46), Max: 37.3 (09-02-18 @ 20:46)  HR: 78 (09-02-18 @ 15:39) (50 - 78)  BP: 162/51 (09-02-18 @ 15:39) (124/70 - 162/54)  RR: 18 (09-02-18 @ 20:46) (18 - 19)  SpO2: 96% (09-02-18 @ 20:46) (95% - 96%)  Wt(kg): --    I&O's Summary    01 Sep 2018 07:01  -  02 Sep 2018 07:00  --------------------------------------------------------  IN: 720 mL / OUT: 1200 mL / NET: -480 mL    02 Sep 2018 07:01  -  03 Sep 2018 03:15  --------------------------------------------------------  IN: 600 mL / OUT: 400 mL / NET: 200 mL        Heart: normal S1, S2, RRR, no m/r/g  Lungs: cta b/l  Abd: soft nT, nD  Ext: trace edema bilaterally     TELEMETRY: SR, APC's	    ECG:  < from: 12 Lead ECG (08.29.18 @ 22:43) >  SINUS RHYTHM with frequent  PREMATURE ATRIAL COMPLEXES  RIGHT BUNDLE BRANCH BLOCK  ABNORMAL ECG    < end of copied text >  	  RADIOLOGY:   DIAGNOSTIC TESTING:  [ ] Echocardiogram: < from: TTE with Doppler (w/Cont) (08.31.18 @ 12:52) >  Conclusions:  1. Mild to moderate mitral regurgitation (severity  underestimated).  2. Peak transaortic valve gradient equals 19 mm Hg, mean  transaortic valve gradient equals 11 mm Hg, estimated  aortic valve area equals 1.3 sqcm (by continuity equation),  aortic valve velocity time integral equals 48 cm,  consistent with moderate aortic stenosis. Mild aortic  regurgitation.  3. Normal left ventricular internal dimensions and wall  thicknesses.  4. Normal left ventricular systolic function. No segmental  wall motion abnormalities.  5. Reversal of the E-A  waves of the mitral inflow pattern  is consistent with diastolic LV dysfunction.  6. Normal right ventricular size and function.    < end of copied text >    [ ]  Catheterization:  [ ] Stress Test:    OTHER: 	  CT head: < from: CT Head No Cont (08.31.18 @ 16:19) >    Impression: Small meningioma suspected as described above.    < end of copied text >        ASSESSMENT/PLAN:  89yFemale with history of HTN, CAD s/p remote PCI to LAD, MDS being seen for chest pain.     ASA, statin   trend creatine daily  , Diuretics and ACE on hold - enc PO fluids    GI / DVT prophylaxis,  keep K>4, mag >2.0   echo  noted with mod MR, NL lv fx,   tele stable , no arrythmia noted   D/W Dr Law

## 2018-09-03 NOTE — PROGRESS NOTE ADULT - SUBJECTIVE AND OBJECTIVE BOX
Patient is a 89y old  Female who presents with a chief complaint of Progressive dyspnea for the past 3 weeks with lightheadedness, dyspnea, episode of chest pain, dyspnea this afternoon while walking in the garage. (29 Aug 2018 20:07)      INTERVAL HPI/OVERNIGHT EVENTS:  T(C): 36.7 (09-03-18 @ 11:56), Max: 37.3 (09-02-18 @ 20:46)  HR: 70 (09-03-18 @ 11:56) (70 - 72)  BP: 135/64 (09-03-18 @ 11:56) (135/64 - 152/70)  RR: 18 (09-03-18 @ 11:56) (18 - 18)  SpO2: 98% (09-03-18 @ 11:56) (95% - 98%)  Wt(kg): --  I&O's Summary    02 Sep 2018 07:01  -  03 Sep 2018 07:00  --------------------------------------------------------  IN: 620 mL / OUT: 400 mL / NET: 220 mL    03 Sep 2018 07:01  -  03 Sep 2018 17:31  --------------------------------------------------------  IN: 960 mL / OUT: 0 mL / NET: 960 mL        LABS:    09-03    134<L>  |  98  |  73<H>  ----------------------------<  105<H>  4.0   |  21<L>  |  1.73<H>    Ca    8.6      03 Sep 2018 06:26          CAPILLARY BLOOD GLUCOSE                MEDICATIONS  (STANDING):  aspirin enteric coated 81 milliGRAM(s) Oral daily  diclofenac sodium 1% Gel 4 Gram(s) Topical <User Schedule>  folic acid 1 milliGRAM(s) Oral daily  heparin  Injectable 5000 Unit(s) SubCutaneous every 8 hours  indapamide 1.25 milliGRAM(s) Oral daily  latanoprost 0.005% Ophthalmic Solution 1 Drop(s) Both EYES at bedtime  levothyroxine 112 MICROGram(s) Oral daily  lidocaine   Patch 1 Patch Transdermal daily  pantoprazole    Tablet 40 milliGRAM(s) Oral before breakfast  pravastatin 40 milliGRAM(s) Oral at bedtime    MEDICATIONS  (PRN):  acetaminophen   Tablet. 650 milliGRAM(s) Oral every 6 hours PRN Mild Pain (1 - 3)          PHYSICAL EXAM:  GENERAL: NAD, well-groomed, well-developed  HEAD:  Atraumatic, Normocephalic  CHEST/LUNG: Clear to percussion bilaterally; No rales, rhonchi, wheezing, or rubs  HEART: Regular rate and rhythm; No murmurs, rubs, or gallops  ABDOMEN: Soft, Nontender, Nondistended; Bowel sounds present  EXTREMITIES:  2+ Peripheral Pulses, No clubbing, cyanosis, or edema  LYMPH: No lymphadenopathy noted  SKIN: No rashes or lesions    Care Discussed with Consultants/Other Providers [ ] YES  [ ] NO

## 2018-09-04 ENCOUNTER — TRANSCRIPTION ENCOUNTER (OUTPATIENT)
Age: 83
End: 2018-09-04

## 2018-09-04 LAB
ANION GAP SERPL CALC-SCNC: 16 MMOL/L — SIGNIFICANT CHANGE UP (ref 5–17)
BUN SERPL-MCNC: 79 MG/DL — HIGH (ref 7–23)
CALCIUM SERPL-MCNC: 8.8 MG/DL — SIGNIFICANT CHANGE UP (ref 8.4–10.5)
CHLORIDE SERPL-SCNC: 95 MMOL/L — LOW (ref 96–108)
CO2 SERPL-SCNC: 22 MMOL/L — SIGNIFICANT CHANGE UP (ref 22–31)
CREAT SERPL-MCNC: 1.76 MG/DL — HIGH (ref 0.5–1.3)
GLUCOSE SERPL-MCNC: 101 MG/DL — HIGH (ref 70–99)
POTASSIUM SERPL-MCNC: 3.9 MMOL/L — SIGNIFICANT CHANGE UP (ref 3.5–5.3)
POTASSIUM SERPL-SCNC: 3.9 MMOL/L — SIGNIFICANT CHANGE UP (ref 3.5–5.3)
SODIUM SERPL-SCNC: 133 MMOL/L — LOW (ref 135–145)

## 2018-09-04 PROCEDURE — 93880 EXTRACRANIAL BILAT STUDY: CPT | Mod: 26

## 2018-09-04 RX ADMIN — Medication 40 MILLIGRAM(S): at 22:10

## 2018-09-04 RX ADMIN — LIDOCAINE 1 PATCH: 4 CREAM TOPICAL at 08:46

## 2018-09-04 RX ADMIN — HEPARIN SODIUM 5000 UNIT(S): 5000 INJECTION INTRAVENOUS; SUBCUTANEOUS at 13:06

## 2018-09-04 RX ADMIN — LATANOPROST 1 DROP(S): 0.05 SOLUTION/ DROPS OPHTHALMIC; TOPICAL at 22:11

## 2018-09-04 RX ADMIN — Medication 112 MICROGRAM(S): at 05:40

## 2018-09-04 RX ADMIN — HEPARIN SODIUM 5000 UNIT(S): 5000 INJECTION INTRAVENOUS; SUBCUTANEOUS at 22:11

## 2018-09-04 RX ADMIN — LIDOCAINE 1 PATCH: 4 CREAM TOPICAL at 20:00

## 2018-09-04 RX ADMIN — Medication 1 MILLIGRAM(S): at 08:46

## 2018-09-04 RX ADMIN — Medication 81 MILLIGRAM(S): at 08:46

## 2018-09-04 RX ADMIN — Medication 1.25 MILLIGRAM(S): at 05:40

## 2018-09-04 RX ADMIN — PANTOPRAZOLE SODIUM 40 MILLIGRAM(S): 20 TABLET, DELAYED RELEASE ORAL at 05:40

## 2018-09-04 RX ADMIN — HEPARIN SODIUM 5000 UNIT(S): 5000 INJECTION INTRAVENOUS; SUBCUTANEOUS at 05:40

## 2018-09-04 RX ADMIN — DICLOFENAC SODIUM 4 GRAM(S): 30 GEL TOPICAL at 22:08

## 2018-09-04 NOTE — DISCHARGE NOTE ADULT - CARE PROVIDER_API CALL
Pennie Leigh), Cardiovascular Disease; Internal Medicine  [address] [facility] [address] [place] [address]  Phone: [phone]  Fax: [phone] Pennie Leigh), Cardiovascular Disease; Internal Medicine  [address] [facility]  [address]  [place] [address]  Phone: [phone]  Fax: [phone]    Yanci Dennison (MD), Internal Medicine  [address] [place] [address]  Phone: [phone]  Fax: [phone]    Zora Herrera), Hematology; Medical Oncology  [address] [facility] [address]  [place] [address]  Phone: [phone]  Fax: [phone] Pennie Leigh), Cardiovascular Disease; Internal Medicine  [address] [facility]  [address]  [place] [address]  Phone: [phone]  Fax: [phone]    Zora Herrera), Hematology; Medical Oncology  [address] [facility] [address]  [place] [address]  Phone: [phone]  Fax: [phone]    Liseth Trujlilo  [facility] [place]  Phone: [phone]  Fax: (   )    -

## 2018-09-04 NOTE — DISCHARGE NOTE ADULT - ADDITIONAL INSTRUCTIONS
please follow up with DR Carbajal's office for OP MCOT Monitoring Follow up with cardiologist DR Carbajal in 1 week - need serial MCOT Monitoring as outpatient  Follow up with PCP Jesi in 1 week after rehab  Follow up with hematologist Dr. Herrera in 2 weeks  Repeat BMP in 1 week at rehab to monitor Kidney function - resume lasix as clinically needed as outpatient Follow up with cardiologist DR Carbajal in 1 week - need serial MCOT Monitoring as outpatient  Follow up with PCP Jesi in 1 week after rehab  Follow up with hematologist Dr. Herrera in 2 weeks  Repeat blood work (BMP) in 1 week at rehab to monitor Kidney function and sodium levels - resume lasix as clinically needed for shortness of breath/weight gain as outpatient Follow up with cardiologist DR Carbajal in 1 week - need serial MCOT Monitoring as outpatient  Follow up with PCP Dr. Trujillo in 1 week after rehab  Follow up with hematologist Dr. Herrera in 2 weeks  Repeat blood work (BMP) in 1 week at rehab to monitor Kidney function and sodium levels - resume lasix as clinically needed for shortness of breath/weight gain as outpatient

## 2018-09-04 NOTE — PROGRESS NOTE ADULT - SUBJECTIVE AND OBJECTIVE BOX
[facility]        - Patient seen and examined.  - Today, patient is without complaints.         *****MEDICATIONS: Current medication reviewed and documented.    MEDICATIONS  (STANDING):  aspirin enteric coated 81 milliGRAM(s) Oral daily  diclofenac sodium 1% Gel 4 Gram(s) Topical <User Schedule>  folic acid 1 milliGRAM(s) Oral daily  heparin  Injectable 5000 Unit(s) SubCutaneous every 8 hours  indapamide 1.25 milliGRAM(s) Oral daily  latanoprost 0.005% Ophthalmic Solution 1 Drop(s) Both EYES at bedtime  levothyroxine 112 MICROGram(s) Oral daily  lidocaine   Patch 1 Patch Transdermal daily  pantoprazole    Tablet 40 milliGRAM(s) Oral before breakfast  pravastatin 40 milliGRAM(s) Oral at bedtime    MEDICATIONS  (PRN):  acetaminophen   Tablet. 650 milliGRAM(s) Oral every 6 hours PRN Mild Pain (1 - 3)           ***** REVIEW OF SYSTEM:  GEN: no fever, no chills, no pain  RESP: no SOB, no cough, no sputum  CVS: no chest pain, no palpitations, no edema  GI: no abdominal pain, no nausea, no vomiting, no constipation, no diarrhea  : no dysurea, no frequency  NEURO: no headache, no diziness  PSYCH: no depression, not anxious  Derm : no itching, no rash         ***** VITAL SIGNS:  T(F): 97.9 (18 @ 12:04), Max: 98.7 (18 @ 20:53)  HR: 76 (18 @ 12:04) (56 - 76)  BP: 163/68 (18 @ 12:04) (163/68 - 163/68)  RR: 18 (18 @ 12:04) (18 - 18)  SpO2: 97% (18 @ 12:04) (96% - 97%)  Wt(kg): --  ,   I&O's Summary    03 Sep 2018 07:01  -  04 Sep 2018 07:00  --------------------------------------------------------  IN: 1240 mL / OUT: 550 mL / NET: 690 mL    04 Sep 2018 07:01  -  04 Sep 2018 14:28  --------------------------------------------------------  IN: 600 mL / OUT: 400 mL / NET: 200 mL             *****PHYSICAL EXAM:      Lethargy improved   Alert oriented x3 knew it was end of august but not the exact date.  Attention comprehension are fair. Able to name, repeat, read without any difficulty.   Able to follow 1-2 step commands.     EOMI fundi not visualized,  blinks to threat b/l   No facial asymmetry   Tongue is midline   Palate elevates symmetrically   Moving all 4 ext symmetrically no pronator drift.  R knee is swollen.   sensation is grossly symmetric  Gait : not assessed.  B/L down going toes            *****LAB AND IMAGIN-04    133<L>  |  95<L>  |  79<H>  ----------------------------<  101<H>  3.9   |  22  |  1.76<H>    Ca    8.8      04 Sep 2018 07:14             < from: CT Head No Cont (18 @ 16:19) >  Extra-axial area calcification is seen involving the high right frontal   region which measures approximately 1.1 cm. This likely compatible with a   small meningioma. No significant shift or herniation is seen.    Evaluation of the osseous structures with the appropriate window appears   unremarkable      < end of copied text >                [All pertinent recent Imaging/Reports reviewed]           *****A S S E S S M E N T   A N D   P L A N :   89 y/o F--patient seen with adult daughter in law  attendance--patient followed by Dr. Leigh and Dr. Dennison in the office with a history of CAD with PCI in , premature ventricular contractions on EKG, reported myelodysplastic syndrome, hypothyroidism, essential HTN, reported GERD with patient noting 3+ weeks of lightheadedness and dyspnea with activity, with patient self limiting activity at home, with patient today attempting to walk with daughter in a nearby garage but with dyspnea and chest pain.  Patient presently denies chest pain/pressure.  No dyspnea.   NO HA, no focal weakness.   No fever, no chills, no rigors.   NO abdominal pain, no red blood per rectum or melena.  No back pain, no tearing back pain.   No dysuria, no hematuria.  Denies weight loss or anorexia.  Chronic B/L LE oedema.   Remaining review of systems not contributory. (29 Aug 2018 20:07)  Pt had an episode of near syncope after bm this am, bp dropped to systolic of 88 RRT called, and bp stabilized spontaneously.Denies any constipation or straining on the toilet.   According to daughter in law, pt has had few episodes of near syncope when she exerts herself.        Problem/Recommendations 1: Near syncope of unclear etiology, possible vasovagal syncope    Doubt seizures as there is lack of normal seizure related stigmata ie  prolonged confusion, tongue laceration.       Defer to cards for further w/u   Echo c/w moderate aortic stenosis   ct head  no acute process, small meningioma    Problem/Recommendations 2: toxic metabolic encephalopathy due to renal insufficiency, hyponatremia     defer to medicine for w/u and management.         Thank you for allowing me to participate in the care of this patient. Please do not hesitate to call me if you have any  questions.        ________________  Ranjana Chaudhry MD  [facility] ([facility]  [phone]     30 minutes spent on total encounter; more than 50 % of the visit was  spent counseling and or  coordinating care by the attending physician.   At the present time, [facility] does not  provide outpatient followup, best to call the your insurance to find a participating provider.  This was explained to you at the time of the visit. Alternatively, if your insurance allows it, you can follow up with a neurologist  Dr. Charlie Villegas([place]) [phone] or Dr. Eliu Alvarez ( [place]) [phone]

## 2018-09-04 NOTE — DISCHARGE NOTE ADULT - PROVIDER TOKENS
TOKEN:'[id number]' TOKEN:'[id number]',TOKEN:'[id number]',TOKEN:'[id number]' TOKEN:'[id number]',TOKEN:'[id number]',FREE:[LAST:[Ronnie],FIRST:[Liseth],PHONE:[phone]],FAX:[(   )    -],ADDRESS:[[facility]]]

## 2018-09-04 NOTE — DISCHARGE NOTE ADULT - MEDICATION SUMMARY - MEDICATIONS TO TAKE
I will START or STAY ON the medications listed below when I get home from the hospital:    aspirin 81 mg oral delayed release tablet  -- 1 tab(s) by mouth once a day  -- Indication: For Cardiac Health    pravastatin 40 mg oral tablet  -- 1 tab(s) by mouth once a day  -- Indication: For Hyperlipidemia    amLODIPine 2.5 mg oral tablet  -- 1 tab(s) by mouth once a day  -- Indication: For Hypertension    lidocaine 5% topical film  -- Apply on skin to affected area (right knee) once a day  -- Indication: For Knee Pain    diclofenac 1% topical gel  -- Apply on skin to affected area (right knee) 2 times a day  -- Indication: For Knee Pain    Aranesp  --  injectable   -- Indication: For MDS (Myelodysplastic Syndrome)    bimatoprost 0.01% ophthalmic solution  -- 1 drop(s) to each affected eye once a day (in the evening)  -- Indication: For Glaucoma    omeprazole 20 mg oral delayed release tablet  -- 1 tab(s) by mouth once a day  -- Indication: For Acid reflux    levothyroxine 112 mcg (0.112 mg) oral tablet  -- 1 tab(s) by mouth once a day  -- Indication: For Hypothyroidism    folic acid 1 mg oral tablet  -- 1 tab(s) by mouth once a day  -- Indication: For Supplement

## 2018-09-04 NOTE — DISCHARGE NOTE ADULT - CARE PROVIDERS DIRECT ADDRESSES
,DirectAddress_Unknown ,DirectAddress_Unknown,DirectAddress_Unknown,[email or web address] ,DirectAddress_Unknown,[email or web address],DirectAddress_Unknown

## 2018-09-04 NOTE — PROGRESS NOTE ADULT - ATTENDING COMMENTS
Patient seen and examined.  Agree with above.  -TTE with normal LV function - no plans for ischemic eval at this time; pt. and pt. family want conservative cv care  -no further cardiac workup needed at this time     Sanju Law MD

## 2018-09-04 NOTE — PROGRESS NOTE ADULT - SUBJECTIVE AND OBJECTIVE BOX
Chief Complaint: "I am weak".    History of Present Illness:    The patient overall is feeling weak.  No obvious bleeding.  No worsening SOB at rest.  No chest pain.  No pleuritic chest pain.   No abdominal pain.  No fevers.   No chills.  No calf pain.  Her leg swelling is better.  Overall she still feels her energy is low.     MEDICATIONS  (STANDING):  aspirin enteric coated 81 milliGRAM(s) Oral daily  diclofenac sodium 1% Gel 4 Gram(s) Topical <User Schedule>  folic acid 1 milliGRAM(s) Oral daily  heparin  Injectable 5000 Unit(s) SubCutaneous every 8 hours  indapamide 1.25 milliGRAM(s) Oral daily  latanoprost 0.005% Ophthalmic Solution 1 Drop(s) Both EYES at bedtime  levothyroxine 112 MICROGram(s) Oral daily  lidocaine   Patch 1 Patch Transdermal daily  pantoprazole    Tablet 40 milliGRAM(s) Oral before breakfast  pravastatin 40 milliGRAM(s) Oral at bedtime    MEDICATIONS  (PRN):  acetaminophen   Tablet. 650 milliGRAM(s) Oral every 6 hours PRN Mild Pain (1 - 3)      Allergies    No Known Allergies    Intolerances        Vital Signs Last 24 Hrs  T(C): 36.9 (04 Sep 2018 05:03), Max: 37.1 (03 Sep 2018 20:53)  T(F): 98.5 (04 Sep 2018 05:03), Max: 98.7 (03 Sep 2018 20:53)  HR: 56 (04 Sep 2018 05:03) (56 - 70)  BP: 135/64 (03 Sep 2018 11:56) (135/64 - 135/64)  BP(mean): --  RR: 18 (04 Sep 2018 05:03) (18 - 18)  SpO2: 97% (04 Sep 2018 05:03) (96% - 98%)    PHYSICAL EXAM  General: NAD  HEENT: clear oropharynx, anicteric sclera, pink conjunctiva  Neck: supple  CV: normal S1/S2 with no murmur rubs or gallops  Lungs: clear to auscultation, no wheezes, no rales  Abdomen: soft non-tender non-distended, no hepatosplenomegaly, positive bowel sounds  Ext: no clubbing cyanosis or edema  Skin: no rashes and no petechiae  Lymph Nodes: No LAD in axillae, groin, neck  Neuro: alert and oriented X 3, no focal deficits    LABS:            Serial CBC's  09-01 @ 06:40  Hct-31.6 / Hgb-10.4 / Plat-298 / RBC-3.70 / WBC-6.2  Serial CBC's  08-31 @ 11:04  Hct-36.5 / Hgb-11.4 / Plat-368 / RBC-4.27 / WBC-10.7      09-03    134<L>  |  98  |  73<H>  ----------------------------<  105<H>  4.0   |  21<L>  |  1.73<H>    Ca    8.6      03 Sep 2018 06:26

## 2018-09-04 NOTE — PROGRESS NOTE ADULT - SUBJECTIVE AND OBJECTIVE BOX
EP ATTENDING    tele: no events    no palpitations, no syncope, no angina, orthostatics improving    acetaminophen   Tablet. 650 milliGRAM(s) Oral every 6 hours PRN  aspirin enteric coated 81 milliGRAM(s) Oral daily  diclofenac sodium 1% Gel 4 Gram(s) Topical <User Schedule>  folic acid 1 milliGRAM(s) Oral daily  heparin  Injectable 5000 Unit(s) SubCutaneous every 8 hours  indapamide 1.25 milliGRAM(s) Oral daily  latanoprost 0.005% Ophthalmic Solution 1 Drop(s) Both EYES at bedtime  levothyroxine 112 MICROGram(s) Oral daily  lidocaine   Patch 1 Patch Transdermal daily  pantoprazole    Tablet 40 milliGRAM(s) Oral before breakfast  pravastatin 40 milliGRAM(s) Oral at bedtime          09-04    133<L>  |  95<L>  |  79<H>  ----------------------------<  101<H>  3.9   |  22  |  1.76<H>    Ca    8.8      04 Sep 2018 07:14        T(C): 36.9 (09-04-18 @ 05:03), Max: 37.1 (09-03-18 @ 20:53)  HR: 56 (09-04-18 @ 05:03) (56 - 70)  BP: 135/64 (09-03-18 @ 11:56) (135/64 - 135/64)  RR: 18 (09-04-18 @ 05:03) (18 - 18)  SpO2: 97% (09-04-18 @ 05:03) (96% - 98%)  Wt(kg): --             no JVD  RRR, no murmurs  CTAB  soft nt/nd  no c/c/e    echo: moderate MR, normal LVEF    A/P) She is a pleasant 89 y/o female PMH CAD s/p prior PCI now admitted with near syncope. EP is called because she has an underlying RBBB (normal axis). Recent workup includes echo this month (normal) and MCOT in Feb 2018 which was unremarkable. Her SBP was documented to be 70-80s on arrival, and has improved with IVF. The etiology of her near syncope was likely hypotension, and the etiology of the hypotension remains unclear. Tele monitoring in the hospital shows occasional APCs and PVCs, but no malignant arrhythmias to explain near syncope.     -workup of hypotension as per primary team  -workup of chest pain as per cardiology  -no further inpatient EP workup needed as long as tele continues to show no malignant arrhythmias  -recommend serial MCOT monitoring with Dr. Carbajal after discharge  -hold lasix and losartan given MARIELA  -hold norvasc given orthostatic syncope  -recommend avoid dehydration/prolonged standing, and compression stockings  -d/c planning       Becky Muñoz M.D., [facility]  Cardiac Electrophysiology  [facility]  [address] [place] [address], [address] [place] [address]  [email or web address]    office [phone]  pager [phone]

## 2018-09-04 NOTE — DISCHARGE NOTE ADULT - HOSPITAL COURSE
89 y/o F patient followed by Dr. Leigh and Dr. Dennison in the office with a history of CAD with PCI in 2013, premature ventricular contractions on EKG, reported myelodysplastic syndrome, hypothyroidism, essential HTN, reported GERD with patient noting 3+ weeks of lightheadedness and dyspnea with activity, with patient self limiting activity at home, with patient today attempting to walk with daughter in a nearby garage but with dyspnea and chest pain. Patient admitted with CHF exacerbation - seen by cardiology, resolved with IV lasix - TTE shows normal LV fxn,mod MR/mod AS. Cardiac enzymes negative. Course complicated by RRT x 2 for orthostatic syncope requiring IVF and vasovagal syncope - seen by neurology and EP. BP meds held. CT head no acute process. Followed by heme/onc for MDS - Had a shot of procrit on 8-31-18. Pt with MARIELA - seen by nephrology - s/p IVF and diuretics/ACE on hold for discharge. Patient medically cleared for discharge to rehab with outpatient cardiology/PCP follow up. 89 y/o F patient followed by Dr. Leigh and Dr. Dennison in the office with a history of CAD with PCI in 2013, premature ventricular contractions on EKG, reported myelodysplastic syndrome, hypothyroidism, essential HTN, reported GERD with patient noting 3+ weeks of lightheadedness and dyspnea with activity, with patient self limiting activity at home, with patient today attempting to walk with daughter in a nearby garage but with dyspnea and chest pain. Patient admitted with CHF exacerbation - seen by cardiology, resolved with IV lasix - TTE shows normal LV fxn,mod MR/mod AS. Cardiac enzymes negative. Course complicated by RRT x 2 for orthostatic syncope requiring IVF and vasovagal syncope - seen by neurology and EP. BP meds held. CT head no acute process. Followed by heme/onc for MDS - Had a shot of procrit on 8-31-18. Pt with MARIELA - seen by nephrology - s/p IVF and diuretics/ACE on hold for discharge. Low dose norvasc resumed for HTN upon discharge. Patient medically cleared for discharge to rehab with outpatient cardiology/PCP follow up.

## 2018-09-04 NOTE — DISCHARGE NOTE ADULT - CARE PLAN
Principal Discharge DX:	Acute on chronic diastolic congestive heart failure  Goal:	Resolved  Assessment and plan of treatment:	Weigh yourself daily.  If you gain 3lbs in 3 days, or 5lbs in a week call your Health Care Provider.  Do not eat or drink foods containing more than 2000mg of salt (sodium) in your diet every day.  Call your Health Care Provider if you have any swelling or increased swelling in your feet, ankles, and/or stomach.  Take all of your medication as directed.  If you become dizzy call your Health Care Provider.  Secondary Diagnosis:	Syncope, unspecified syncope type  Goal:	Prevent further episodes  Assessment and plan of treatment:	HOME CARE INSTRUCTIONS  Have someone stay with you until you feel stable.  Do not drive, operate machinery, or play sports until your caregiver says it is okay.  Keep all follow-up appointments as directed by your caregiver.   Lie down right away if you start feeling like you might faint. Breathe deeply and steadily. Wait until all the symptoms have passed.Drink enough fluids to keep your urine clear or pale yellow.  If you are taking blood pressure or heart medicine, get up slowly, taking several minutes to sit and then stand. This can reduce dizziness.  SEEK IMMEDIATE MEDICAL CARE IF:  You have a severe headache.  You have unusual pain in the chest, abdomen, or back.  You are bleeding from the mouth or rectum, or you have black or tarry stool.  You have an irregular or very fast heartbeat.  You have pain with breathing.  You have repeated fainting or seizure-like jerking during an episode.  You faint when sitting or lying down.  You have confusion.  You have difficulty walking.  You have severe weakness.  You have vision problems.  If you fainted, call your local emergency services. Do not drive yourself to the hospital  Secondary Diagnosis:	Acute kidney injury  Goal:	Return to baseline creatinine  Assessment and plan of treatment:	Avoid taking (NSAIDs) - (ex: Ibuprofen, Advil, Celebrex, Naprosyn)  Avoid taking any nephrotoxic agents (can harm kidneys) - Intravenous contrast for diagnostic testing, combination cold medications.  Have all medications adjusted for your renal function by your Health Care Provider.  Blood pressure control is important.  Take all medication as prescribed.  Secondary Diagnosis:	MDS (Myelodysplastic Syndrome)  Goal:	Stable  Assessment and plan of treatment:	Follow up with hematologist Dr. Herrera in 2 weeks  Continue aranesp/procrit injections as scheduled by hematologist Principal Discharge DX:	Acute on chronic diastolic congestive heart failure  Goal:	Resolved  Assessment and plan of treatment:	Repeat BMP in 1 week at rehab to monitor Kidney function and sodium levels - resume lasix as clinically needed for shortness of breath/weight gain as outpatient  Weigh yourself daily.  If you gain 3lbs in 3 days, or 5lbs in a week call your Health Care Provider.  Do not eat or drink foods containing more than 2000mg of salt (sodium) in your diet every day.  Call your Health Care Provider if you have any swelling or increased swelling in your feet, ankles, and/or stomach.  Take all of your medication as directed.  If you become dizzy call your Health Care Provider.  Secondary Diagnosis:	Syncope, unspecified syncope type  Goal:	Prevent further episodes  Assessment and plan of treatment:	HOME CARE INSTRUCTIONS  Have someone stay with you until you feel stable.  Do not drive, operate machinery, or play sports until your caregiver says it is okay.  Keep all follow-up appointments as directed by your caregiver.   Lie down right away if you start feeling like you might faint. Breathe deeply and steadily. Wait until all the symptoms have passed.Drink enough fluids to keep your urine clear or pale yellow.  If you are taking blood pressure or heart medicine, get up slowly, taking several minutes to sit and then stand. This can reduce dizziness.  SEEK IMMEDIATE MEDICAL CARE IF:  You have a severe headache.  You have unusual pain in the chest, abdomen, or back.  You are bleeding from the mouth or rectum, or you have black or tarry stool.  You have an irregular or very fast heartbeat.  You have pain with breathing.  You have repeated fainting or seizure-like jerking during an episode.  You faint when sitting or lying down.  You have confusion.  You have difficulty walking.  You have severe weakness.  You have vision problems.  If you fainted, call your local emergency services. Do not drive yourself to the hospital  Secondary Diagnosis:	Acute kidney injury  Goal:	Return to baseline creatinine  Assessment and plan of treatment:	Avoid taking (NSAIDs) - (ex: Ibuprofen, Advil, Celebrex, Naprosyn)  Avoid taking any nephrotoxic agents (can harm kidneys) - Intravenous contrast for diagnostic testing, combination cold medications.  Have all medications adjusted for your renal function by your Health Care Provider.  Blood pressure control is important.  Take all medication as prescribed.  Secondary Diagnosis:	MDS (Myelodysplastic Syndrome)  Goal:	Stable  Assessment and plan of treatment:	Follow up with hematologist Dr. Herrera in 2 weeks  Continue aranesp/procrit injections as scheduled by hematologist  Secondary Diagnosis:	Hyponatremia  Goal:	Resolution - monitor at rehab  Assessment and plan of treatment:	Diuretics are on hold - Repeat BMP in 1 week at rehab to monitor Kidney function and sodium levels - resume lasix as clinically needed for shortness of breath/weight gain as outpatient

## 2018-09-04 NOTE — PROGRESS NOTE ADULT - SUBJECTIVE AND OBJECTIVE BOX
pt seen and examined,  no chest pressure on exam     tele: Stable rhythm , NS / SB 50's     acetaminophen   Tablet. 650 milliGRAM(s) Oral every 6 hours PRN  aspirin enteric coated 81 milliGRAM(s) Oral daily  diclofenac sodium 1% Gel 4 Gram(s) Topical <User Schedule>  folic acid 1 milliGRAM(s) Oral daily  heparin  Injectable 5000 Unit(s) SubCutaneous every 8 hours  indapamide 1.25 milliGRAM(s) Oral daily  latanoprost 0.005% Ophthalmic Solution 1 Drop(s) Both EYES at bedtime  levothyroxine 112 MICROGram(s) Oral daily  lidocaine   Patch 1 Patch Transdermal daily  pantoprazole    Tablet 40 milliGRAM(s) Oral before breakfast  pravastatin 40 milliGRAM(s) Oral at bedtime          Hemoglobin: 10.4 g/dL (09-01 @ 06:40)  Hemoglobin: 11.4 g/dL (08-31 @ 11:04)  Hemoglobin: 10.1 g/dL (08-30 @ 07:19)      09-03    134<L>  |  98  |  73<H>  ----------------------------<  105<H>  4.0   |  21<L>  |  1.73<H>    Ca    8.6      03 Sep 2018 06:26      Creatinine Trend: 1.73<--, 1.86<--, 1.82<--, 1.28<--, 1.35<--, 1.28<--    COAGS:           T(C): 36.9 (09-04-18 @ 05:03), Max: 37.1 (09-03-18 @ 20:53)  HR: 56 (09-04-18 @ 05:03) (56 - 70)  BP: 135/64 (09-03-18 @ 11:56) (135/64 - 135/64)  RR: 18 (09-04-18 @ 05:03) (18 - 18)  SpO2: 97% (09-04-18 @ 05:03) (96% - 98%)  Wt(kg): --    I&O's Summary    02 Sep 2018 07:01  -  03 Sep 2018 07:00  --------------------------------------------------------  IN: 620 mL / OUT: 400 mL / NET: 220 mL    03 Sep 2018 07:01  -  04 Sep 2018 06:24  --------------------------------------------------------  IN: 1240 mL / OUT: 550 mL / NET: 690 mL        Heart: normal S1, S2, RRR, no m/r/g  Lungs: cta b/l  Abd: soft nT, nD  Ext: trace edema bilaterally     TELEMETRY: SR, APC's	    ECG:  < from: 12 Lead ECG (08.29.18 @ 22:43) >  SINUS RHYTHM with frequent  PREMATURE ATRIAL COMPLEXES  RIGHT BUNDLE BRANCH BLOCK  ABNORMAL ECG    < end of copied text >  	  RADIOLOGY:   DIAGNOSTIC TESTING:  [ ] Echocardiogram: < from: TTE with Doppler (w/Cont) (08.31.18 @ 12:52) >  Conclusions:  1. Mild to moderate mitral regurgitation (severity  underestimated).  2. Peak transaortic valve gradient equals 19 mm Hg, mean  transaortic valve gradient equals 11 mm Hg, estimated  aortic valve area equals 1.3 sqcm (by continuity equation),  aortic valve velocity time integral equals 48 cm,  consistent with moderate aortic stenosis. Mild aortic  regurgitation.  3. Normal left ventricular internal dimensions and wall  thicknesses.  4. Normal left ventricular systolic function. No segmental  wall motion abnormalities.  5. Reversal of the E-A  waves of the mitral inflow pattern  is consistent with diastolic LV dysfunction.  6. Normal right ventricular size and function.    < end of copied text >    [ ]  Catheterization:  [ ] Stress Test:    OTHER: 	  CT head: < from: CT Head No Cont (08.31.18 @ 16:19) >    Impression: Small meningioma suspected as described above.    < end of copied text >        ASSESSMENT/PLAN:  89yFemale with history of HTN, CAD s/p remote PCI to LAD, MDS being seen for chest pain.     ASA, statin   orthostatic VS improving   trend creatine daily  , Diuretics and ACE on hold - enc PO fluids    GI / DVT prophylaxis,  keep K>4, mag >2.0   echo  noted with mod MR, NL lv fx,   no further CV workup needed   D/W Dr Courtney

## 2018-09-04 NOTE — DISCHARGE NOTE ADULT - PATIENT PORTAL LINK FT
You can access the [facility], offered by [facility], by registering with the following website: [email or web address]

## 2018-09-04 NOTE — DISCHARGE NOTE ADULT - MEDICATION SUMMARY - MEDICATIONS TO STOP TAKING
I will STOP taking the medications listed below when I get home from the hospital:    meclizine  --  by mouth    furosemide 20 mg oral tablet  -- 1 tab(s) by mouth once a day    indapamide 1.25 mg oral tablet  -- 1 tab(s) by mouth once a day (in the morning)    losartan 100 mg oral tablet  -- 1 tab(s) by mouth once a day

## 2018-09-04 NOTE — DISCHARGE NOTE ADULT - PLAN OF CARE
Resolved Weigh yourself daily.  If you gain 3lbs in 3 days, or 5lbs in a week call your Health Care Provider.  Do not eat or drink foods containing more than 2000mg of salt (sodium) in your diet every day.  Call your Health Care Provider if you have any swelling or increased swelling in your feet, ankles, and/or stomach.  Take all of your medication as directed.  If you become dizzy call your Health Care Provider. Prevent further episodes HOME CARE INSTRUCTIONS  Have someone stay with you until you feel stable.  Do not drive, operate machinery, or play sports until your caregiver says it is okay.  Keep all follow-up appointments as directed by your caregiver.   Lie down right away if you start feeling like you might faint. Breathe deeply and steadily. Wait until all the symptoms have passed.Drink enough fluids to keep your urine clear or pale yellow.  If you are taking blood pressure or heart medicine, get up slowly, taking several minutes to sit and then stand. This can reduce dizziness.  SEEK IMMEDIATE MEDICAL CARE IF:  You have a severe headache.  You have unusual pain in the chest, abdomen, or back.  You are bleeding from the mouth or rectum, or you have black or tarry stool.  You have an irregular or very fast heartbeat.  You have pain with breathing.  You have repeated fainting or seizure-like jerking during an episode.  You faint when sitting or lying down.  You have confusion.  You have difficulty walking.  You have severe weakness.  You have vision problems.  If you fainted, call your local emergency services. Do not drive yourself to the hospital Return to baseline creatinine Avoid taking (NSAIDs) - (ex: Ibuprofen, Advil, Celebrex, Naprosyn)  Avoid taking any nephrotoxic agents (can harm kidneys) - Intravenous contrast for diagnostic testing, combination cold medications.  Have all medications adjusted for your renal function by your Health Care Provider.  Blood pressure control is important.  Take all medication as prescribed. Stable Follow up with hematologist Dr. Herrera in 2 weeks  Continue aranesp/procrit injections as scheduled by hematologist Repeat BMP in 1 week at rehab to monitor Kidney function and sodium levels - resume lasix as clinically needed for shortness of breath/weight gain as outpatient  Weigh yourself daily.  If you gain 3lbs in 3 days, or 5lbs in a week call your Health Care Provider.  Do not eat or drink foods containing more than 2000mg of salt (sodium) in your diet every day.  Call your Health Care Provider if you have any swelling or increased swelling in your feet, ankles, and/or stomach.  Take all of your medication as directed.  If you become dizzy call your Health Care Provider. Resolution - monitor at rehab Diuretics are on hold - Repeat BMP in 1 week at rehab to monitor Kidney function and sodium levels - resume lasix as clinically needed for shortness of breath/weight gain as outpatient

## 2018-09-05 DIAGNOSIS — N18.3 CHRONIC KIDNEY DISEASE, STAGE 3 (MODERATE): ICD-10-CM

## 2018-09-05 DIAGNOSIS — I12.9 HYPERTENSIVE CHRONIC KIDNEY DISEASE WITH STAGE 1 THROUGH STAGE 4 CHRONIC KIDNEY DISEASE, OR UNSPECIFIED CHRONIC KIDNEY DISEASE: ICD-10-CM

## 2018-09-05 DIAGNOSIS — N17.9 ACUTE KIDNEY FAILURE, UNSPECIFIED: ICD-10-CM

## 2018-09-05 DIAGNOSIS — R60.0 LOCALIZED EDEMA: ICD-10-CM

## 2018-09-05 LAB
ANION GAP SERPL CALC-SCNC: 14 MMOL/L — SIGNIFICANT CHANGE UP (ref 5–17)
BUN SERPL-MCNC: 81 MG/DL — HIGH (ref 7–23)
CALCIUM SERPL-MCNC: 9.1 MG/DL — SIGNIFICANT CHANGE UP (ref 8.4–10.5)
CHLORIDE SERPL-SCNC: 101 MMOL/L — SIGNIFICANT CHANGE UP (ref 96–108)
CO2 SERPL-SCNC: 21 MMOL/L — LOW (ref 22–31)
CREAT SERPL-MCNC: 1.8 MG/DL — HIGH (ref 0.5–1.3)
GLUCOSE SERPL-MCNC: 93 MG/DL — SIGNIFICANT CHANGE UP (ref 70–99)
POTASSIUM SERPL-MCNC: 4 MMOL/L — SIGNIFICANT CHANGE UP (ref 3.5–5.3)
POTASSIUM SERPL-SCNC: 4 MMOL/L — SIGNIFICANT CHANGE UP (ref 3.5–5.3)
SODIUM SERPL-SCNC: 136 MMOL/L — SIGNIFICANT CHANGE UP (ref 135–145)

## 2018-09-05 RX ORDER — SODIUM CHLORIDE 9 MG/ML
250 INJECTION INTRAMUSCULAR; INTRAVENOUS; SUBCUTANEOUS ONCE
Qty: 0 | Refills: 0 | Status: COMPLETED | OUTPATIENT
Start: 2018-09-05 | End: 2018-09-05

## 2018-09-05 RX ADMIN — LIDOCAINE 1 PATCH: 4 CREAM TOPICAL at 05:57

## 2018-09-05 RX ADMIN — LIDOCAINE 1 PATCH: 4 CREAM TOPICAL at 17:13

## 2018-09-05 RX ADMIN — HEPARIN SODIUM 5000 UNIT(S): 5000 INJECTION INTRAVENOUS; SUBCUTANEOUS at 05:58

## 2018-09-05 RX ADMIN — Medication 650 MILLIGRAM(S): at 21:24

## 2018-09-05 RX ADMIN — HEPARIN SODIUM 5000 UNIT(S): 5000 INJECTION INTRAVENOUS; SUBCUTANEOUS at 21:24

## 2018-09-05 RX ADMIN — Medication 40 MILLIGRAM(S): at 21:24

## 2018-09-05 RX ADMIN — HEPARIN SODIUM 5000 UNIT(S): 5000 INJECTION INTRAVENOUS; SUBCUTANEOUS at 13:03

## 2018-09-05 RX ADMIN — Medication 81 MILLIGRAM(S): at 09:29

## 2018-09-05 RX ADMIN — DICLOFENAC SODIUM 4 GRAM(S): 30 GEL TOPICAL at 21:25

## 2018-09-05 RX ADMIN — Medication 112 MICROGRAM(S): at 05:58

## 2018-09-05 RX ADMIN — LATANOPROST 1 DROP(S): 0.05 SOLUTION/ DROPS OPHTHALMIC; TOPICAL at 21:24

## 2018-09-05 RX ADMIN — PANTOPRAZOLE SODIUM 40 MILLIGRAM(S): 20 TABLET, DELAYED RELEASE ORAL at 05:58

## 2018-09-05 RX ADMIN — Medication 1 MILLIGRAM(S): at 09:29

## 2018-09-05 RX ADMIN — SODIUM CHLORIDE 50 MILLILITER(S): 9 INJECTION INTRAMUSCULAR; INTRAVENOUS; SUBCUTANEOUS at 17:13

## 2018-09-05 RX ADMIN — Medication 650 MILLIGRAM(S): at 22:00

## 2018-09-05 RX ADMIN — Medication 1.25 MILLIGRAM(S): at 05:58

## 2018-09-05 NOTE — PROGRESS NOTE ADULT - SUBJECTIVE AND OBJECTIVE BOX
EP ATTENDING    tele: no events    no palpitations, no syncope, no angina, orthostatics improved    acetaminophen   Tablet. 650 milliGRAM(s) Oral every 6 hours PRN  aspirin enteric coated 81 milliGRAM(s) Oral daily  diclofenac sodium 1% Gel 4 Gram(s) Topical <User Schedule>  folic acid 1 milliGRAM(s) Oral daily  heparin  Injectable 5000 Unit(s) SubCutaneous every 8 hours  indapamide 1.25 milliGRAM(s) Oral daily  latanoprost 0.005% Ophthalmic Solution 1 Drop(s) Both EYES at bedtime  levothyroxine 112 MICROGram(s) Oral daily  lidocaine   Patch 1 Patch Transdermal daily  pantoprazole    Tablet 40 milliGRAM(s) Oral before breakfast  pravastatin 40 milliGRAM(s) Oral at bedtime          09-05    136  |  101  |  81<H>  ----------------------------<  93  4.0   |  21<L>  |  1.80<H>    Ca    9.1      05 Sep 2018 06:41        T(C): 37.1 (09-05-18 @ 05:16), Max: 37.1 (09-05-18 @ 05:16)  HR: 56 (09-04-18 @ 20:53) (56 - 76)  BP: 153/66 (09-04-18 @ 20:53) (153/66 - 163/68)  RR: 18 (09-05-18 @ 05:16) (18 - 18)  SpO2: 96% (09-05-18 @ 05:16) (95% - 97%)  Wt(kg): --             no JVD  RRR, no murmurs  CTAB  soft nt/nd  no c/c/e    echo: moderate MR, normal LVEF    A/P) She is a pleasant 89 y/o female PMH CAD s/p prior PCI now admitted with near syncope. EP is called because she has an underlying RBBB (normal axis). Recent workup includes echo this month (normal) and MCOT in Feb 2018 which was unremarkable. Her SBP was documented to be 70-80s on arrival, and has improved with IVF. The etiology of her near syncope was likely hypotension, and the etiology of the hypotension remains unclear. Tele monitoring in the hospital shows occasional APCs and PVCs, but no malignant arrhythmias to explain near syncope.     -no further inpatient EP workup needed as long as tele continues to show no malignant arrhythmias  -recommend serial MCOT monitoring with Dr. Carbajal after discharge  -hold lasix and losartan given MARIELA  -hold norvasc given orthostatic syncope  -recommend avoid dehydration/prolonged standing, and compression stockings  -allow lenient hypertension  -d/c planning as per primary team      Becky Muñoz M.D., [facility]  Cardiac Electrophysiology  [facility]  [address] [place] [address], [address] [place] [address]  [email or web address]    office [phone]  pager [phone]

## 2018-09-05 NOTE — CONSULT NOTE ADULT - PROBLEM SELECTOR RECOMMENDATION 4
Likely secondary to vascular etiology given non-pitting appearance. Would continue holding lasix on discharge and restart as clinically needed as an outpatient. Monitor UO.

## 2018-09-05 NOTE — PROGRESS NOTE ADULT - SUBJECTIVE AND OBJECTIVE BOX
Chief Complaint:  fu    History of Present Illness:  The patient overall is feeling better but still not 100%.  No obvious bleeding.  No worsening SOB at rest.  No chest pain.  No pleuritic chest pain.   No abdominal pain.  No fevers.   No chills.  No calf pain.  Her leg swelling is better.  Overall she still feels her energy is low. she had no other further hypotensive events      MEDICATIONS  (STANDING):  aspirin enteric coated 81 milliGRAM(s) Oral daily  diclofenac sodium 1% Gel 4 Gram(s) Topical <User Schedule>  folic acid 1 milliGRAM(s) Oral daily  heparin  Injectable 5000 Unit(s) SubCutaneous every 8 hours  latanoprost 0.005% Ophthalmic Solution 1 Drop(s) Both EYES at bedtime  levothyroxine 112 MICROGram(s) Oral daily  lidocaine   Patch 1 Patch Transdermal daily  pantoprazole    Tablet 40 milliGRAM(s) Oral before breakfast  pravastatin 40 milliGRAM(s) Oral at bedtime  sodium chloride 0.9% Bolus 250 milliLiter(s) IV Bolus once    MEDICATIONS  (PRN):  acetaminophen   Tablet. 650 milliGRAM(s) Oral every 6 hours PRN Mild Pain (1 - 3)      Allergies    No Known Allergies    Intolerances        Vital Signs Last 24 Hrs  T(C): 36.4 (05 Sep 2018 12:15), Max: 37.1 (05 Sep 2018 05:16)  T(F): 97.5 (05 Sep 2018 12:15), Max: 98.7 (05 Sep 2018 05:16)  HR: 64 (05 Sep 2018 12:15) (56 - 64)  BP: 133/64 (05 Sep 2018 12:15) (133/64 - 153/66)  BP(mean): --  RR: 18 (05 Sep 2018 12:15) (18 - 18)  SpO2: 97% (05 Sep 2018 12:15) (95% - 97%)    PHYSICAL EXAM  General: NAD  HEENT: clear oropharynx, anicteric sclera, pink conjunctiva  Neck: supple  CV: normal S1/S2 with no murmur rubs or gallops  Lungs: clear to auscultation, no wheezes, no rales  Abdomen: soft non-tender non-distended, no hepatosplenomegaly, positive bowel sounds  Ext: no clubbing cyanosis or edema  Neuro: alert and oriented X 3, no focal deficits    LABS:                09-05    136  |  101  |  81<H>  ----------------------------<  93  4.0   |  21<L>  |  1.80<H>    Ca    9.1      05 Sep 2018 06:41

## 2018-09-05 NOTE — CONSULT NOTE ADULT - ATTENDING COMMENTS
[facility] NEPHROLOGY  Solo Sanchez M.D.  Spencer Torres D.O.  Kori Abraham M.D.  Pita Crawford, MSN, ANP-C    Telephone: [phone]  Facsimile: [phone]    [address] [place] [address]

## 2018-09-05 NOTE — CONSULT NOTE ADULT - SUBJECTIVE AND OBJECTIVE BOX
[facility] NEPHROLOGY- CONSULTATION NOTE    89y Female with history of below presents with chest pain. Nephrology consulted for elevated Scr.    Patient appears to have a history of CKD-3 with baseline Scr 1.2 on admission. Scr increased on  in setting of indapamide, lasix and losartan use for which lasix and losartan were discontinued on . Scr has remained relatively stable since that time. Patient with orthostatic hypotension for which getting IVF bolus today and amlodipine discontinued.    Patient planned for discharge to [facility].    REVIEW OF SYSTEMS:  Gen: no changes in weight  HEENT: no rhinorrhea  Neck: no sore throat  Cards: no chest pain  Resp: no dyspnea  GI: no nausea or vomiting or diarrhea  : no dysuria or hematuria  Vascular: + chronic non-pitting LE edema  Derm: no rashes  Neuro: no numbness/tingling    No Known Allergies      Home Medications Reviewed  Hospital Medications:   MEDICATIONS  (STANDING):  aspirin enteric coated 81 milliGRAM(s) Oral daily  diclofenac sodium 1% Gel 4 Gram(s) Topical <User Schedule>  folic acid 1 milliGRAM(s) Oral daily  heparin  Injectable 5000 Unit(s) SubCutaneous every 8 hours  latanoprost 0.005% Ophthalmic Solution 1 Drop(s) Both EYES at bedtime  levothyroxine 112 MICROGram(s) Oral daily  lidocaine   Patch 1 Patch Transdermal daily  pantoprazole    Tablet 40 milliGRAM(s) Oral before breakfast  pravastatin 40 milliGRAM(s) Oral at bedtime  sodium chloride 0.9% Bolus 250 milliLiter(s) IV Bolus once      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;mylar dysplasia&#x27; treated with &quot; Arinesp&quot; x past 1.5 yrs- last dose 4 months ago  GERD (Gastroesophageal Reflux Disease)  Chronic Glaucoma: R eye  Hypercholesterolemia  HTN (Hypertension)  Hypothyroidism  Chronic Osteoarthritis  S/P Cataract Surgery  S/P Breast Lumpectomy: benign  S/P TKR (Total Knee Replacement): left  S/P T&A: childhood    Basal Cell Carcinoma of Face: 4 yrs ago  Carpal Tunnel Syndrome: 10 yrs ago  Rectocele: 47 yrs ago  Cystocele: 47 yrs ago  S/P Breast Lumpectomy: 10 yrs ago  Bilateral Cataracts: 69 y/o  History of Total Knee Replacement: L   Skin Cancer: of face , basal cell   4 yrs ago      FAMILY HISTORY:  No pertinent family history in first degree relatives      SOCIAL HISTORY:  Denies toxic substance use     VITALS:  T(F): 97.5 (18 @ 12:15), Max: 98.7 (18 @ 05:16)  HR: 64 (18 @ 12:15)  BP: 133/64 (18 @ 12:15)  RR: 18 (18 @ 12:15)  SpO2: 97% (18 @ 12:15)  Wt(kg): --     @ 07:01  -   @ 07:00  --------------------------------------------------------  IN: 1310 mL / OUT: 401 mL / NET: 909 mL     @ 07:01  -   @ 13:24  --------------------------------------------------------  IN: 780 mL / OUT: 400 mL / NET: 380 mL          PHYSICAL EXAM:  Gen: NAD, calm  HEENT: MMM  Neck: no JVD  Cards: RRR, +S1/S2, + LUDMILA  Resp: CTA B/L  GI: soft, NT/ND, NABS  : no CVA tenderness  Vascular: non-pitting LE edema B/L  Derm: no rashes  Neuro: non-focal    LABS:      136  |  101  |  81<H>  ----------------------------<  93  4.0   |  21<L>  |  1.80<H>    Ca    9.1      05 Sep 2018 06:41      Creatinine Trend: 1.80 <--, 1.76 <--, 1.73 <--, 1.86 <--, 1.82 <--, 1.28 <--, 1.35 <--, 1.28 <--, 1.27 <--    Urine Studies:  Urinalysis Basic - ( 29 Aug 2018 21:04 )    Color: Colorless / Appearance: Clear / S.006 / pH:   Gluc:  / Ketone: Negative  / Bili: Negative / Urobili: Negative   Blood:  / Protein: Trace / Nitrite: Negative   Leuk Esterase: Negative / RBC:  / WBC    Sq Epi:  / Non Sq Epi:  / Bacteria:           RADIOLOGY & ADDITIONAL STUDIES:  < from: Xray Chest 1 View- PORTABLE-Urgent (18 @ 16:38) >  IMPRESSION:   Bilateral lower lobe subsegmental atelectasis.    < end of copied text >

## 2018-09-05 NOTE — CONSULT NOTE ADULT - PROBLEM SELECTOR RECOMMENDATION 2
Baseline Scr 1.2-1.3 likely age appropriate. Defer further work up given advanced age with bland UA with trace proteinuria on admission. Monitor electrolytes.

## 2018-09-05 NOTE — CONSULT NOTE ADULT - PROBLEM SELECTOR RECOMMENDATION 9
Patient with MARIELA since 8/31 likely hemodynamically mediated in setting of lasix, losartan and indapamide use. Scr has been relatively stable since that time. Plans for IVF bolus today prior to D/C. Discontinue indapamide today. Check bladder scan r/o urinary retention. Given plans for discharge to rehab today, would defer further work up as inpatient and have patient repeat BMP in 1 week to monitor for resolution of MARIELA. Avoid nephrotoxins.

## 2018-09-05 NOTE — CONSULT NOTE ADULT - PROBLEM SELECTOR RECOMMENDATION 3
BP labile. Would discharge patient off of losartan and indapamide. Can restart amlodipine as an outpatient as needed if BP increases. Monitor BP.

## 2018-09-05 NOTE — CONSULT NOTE ADULT - ASSESSMENT
89y Female with history of moderate AS presents with chest pain. Nephrology consulted for elevated Scr.

## 2018-09-05 NOTE — PROGRESS NOTE ADULT - SUBJECTIVE AND OBJECTIVE BOX
[facility]        - Patient seen and examined.  - Today, patient is without complaints.         *****MEDICATIONS: Current medication reviewed and documented.    MEDICATIONS  (STANDING):  aspirin enteric coated 81 milliGRAM(s) Oral daily  diclofenac sodium 1% Gel 4 Gram(s) Topical <User Schedule>  folic acid 1 milliGRAM(s) Oral daily  heparin  Injectable 5000 Unit(s) SubCutaneous every 8 hours  latanoprost 0.005% Ophthalmic Solution 1 Drop(s) Both EYES at bedtime  levothyroxine 112 MICROGram(s) Oral daily  lidocaine   Patch 1 Patch Transdermal daily  pantoprazole    Tablet 40 milliGRAM(s) Oral before breakfast  pravastatin 40 milliGRAM(s) Oral at bedtime  sodium chloride 0.9% Bolus 250 milliLiter(s) IV Bolus once    MEDICATIONS  (PRN):  acetaminophen   Tablet. 650 milliGRAM(s) Oral every 6 hours PRN Mild Pain (1 - 3)           ***** REVIEW OF SYSTEM:  GEN: no fever, no chills, no pain  RESP: no SOB, no cough, no sputum  CVS: no chest pain, no palpitations, no edema  GI: no abdominal pain, no nausea, no vomiting, no constipation, no diarrhea  : no dysurea, no frequency  NEURO: no headache, no diziness  PSYCH: no depression, not anxious  Derm : no itching, no rash         ***** VITAL SIGNS:  T(F): 97.5 (18 @ 12:15), Max: 98.7 (18 @ 05:16)  HR: 64 (18 @ 12:15) (56 - 64)  BP: 133/64 (18 @ 12:15) (133/64 - 153/66)  RR: 18 (18 @ 12:15) (18 - 18)  SpO2: 97% (18 @ 12:15) (95% - 97%)  Wt(kg): --  ,   I&O's Summary    04 Sep 2018 07:01  -  05 Sep 2018 07:00  --------------------------------------------------------  IN: 1310 mL / OUT: 401 mL / NET: 909 mL    05 Sep 2018 07:01  -  05 Sep 2018 14:39  --------------------------------------------------------  IN: 780 mL / OUT: 400 mL / NET: 380 mL             *****PHYSICAL EXAM: Lethargy improved   Alert oriented x3 knew it was end of august but not the exact date.  Attention comprehension are fair. Able to name, repeat, read without any difficulty.   Able to follow 1-2 step commands.     EOMI fundi not visualized,  blinks to threat b/l   No facial asymmetry   Tongue is midline   Palate elevates symmetrically   Moving all 4 ext symmetrically no pronator drift.  R knee is swollen.   sensation is grossly symmetric  Gait : not assessed.  B/L down going toes            *****LAB AND IMAGIN-05    136  |  101  |  81<H>  ----------------------------<  93  4.0   |  21<L>  |  1.80<H>    Ca    9.1      05 Sep 2018 06:41                           [All pertinent recent Imaging/Reports reviewed]           *****A S S E S S M E N T   A N D   P L A N : 89 y/o F--patient seen with adult daughter in law  attendance--patient followed by Dr. Leigh and Dr. Dennison in the office with a history of CAD with PCI in , premature ventricular contractions on EKG, reported myelodysplastic syndrome, hypothyroidism, essential HTN, reported GERD with patient noting 3+ weeks of lightheadedness and dyspnea with activity, with patient self limiting activity at home, with patient today attempting to walk with daughter in a nearby garage but with dyspnea and chest pain.  Patient presently denies chest pain/pressure.  No dyspnea.   NO HA, no focal weakness.   No fever, no chills, no rigors.   NO abdominal pain, no red blood per rectum or melena.  No back pain, no tearing back pain.   No dysuria, no hematuria.  Denies weight loss or anorexia.  Chronic B/L LE oedema.   Remaining review of systems not contributory. (29 Aug 2018 20:07)  Pt had an episode of near syncope after bm this am, bp dropped to systolic of 88 RRT called, and bp stabilized spontaneously.Denies any constipation or straining on the toilet.   According to daughter in law, pt has had few episodes of near syncope when she exerts herself.        Problem/Recommendations 1: Near syncope of unclear etiology, possible vasovagal syncope    Doubt seizures as there is lack of normal seizure related stigmata ie  prolonged confusion, tongue laceration.       Defer to cards for further w/u   Echo c/w moderate aortic stenosis   ct head  no acute process, small meningioma    Problem/Recommendations 2: toxic metabolic encephalopathy due to renal insufficiency, hyponatremia     defer to renal for w/u and management.               Thank you for allowing me to participate in the care of this patient. Please do not hesitate to call me if you have any  questions.        ________________  Ranjana Chaudhry MD  [facility] ([facility]  [phone]     30 minutes spent on total encounter; more than 50 % of the visit was  spent counseling and or  coordinating care by the attending physician.   At the present time, [facility] does not  provide outpatient followup, best to call the your insurance to find a participating provider.  This was explained to you at the time of the visit. Alternatively, if your insurance allows it, you can follow up with a neurologist  Dr. Charlie Villegas([place]) [phone] or Dr. Eliu Alvarez ( [place]) [phone]

## 2018-09-05 NOTE — PROGRESS NOTE ADULT - ATTENDING COMMENTS
Agree with above.   TTE with normal lv function  no further cardiac workup needed at this time    Sanju Law MD

## 2018-09-05 NOTE — PROGRESS NOTE ADULT - SUBJECTIVE AND OBJECTIVE BOX
Patient with no anginal chest pain or shortness of breath      tele:  NSR / SB 50's           MEDICATIONS  (STANDING):  aspirin enteric coated 81 milliGRAM(s) Oral daily  diclofenac sodium 1% Gel 4 Gram(s) Topical <User Schedule>  folic acid 1 milliGRAM(s) Oral daily  heparin  Injectable 5000 Unit(s) SubCutaneous every 8 hours  indapamide 1.25 milliGRAM(s) Oral daily  latanoprost 0.005% Ophthalmic Solution 1 Drop(s) Both EYES at bedtime  levothyroxine 112 MICROGram(s) Oral daily  lidocaine   Patch 1 Patch Transdermal daily  pantoprazole    Tablet 40 milliGRAM(s) Oral before breakfast  pravastatin 40 milliGRAM(s) Oral at bedtime    MEDICATIONS  (PRN):  acetaminophen   Tablet. 650 milliGRAM(s) Oral every 6 hours PRN Mild Pain (1 - 3)      LABS:    Hemoglobin: 10.4 g/dL (09-01 @ 06:40)  Hemoglobin: 11.4 g/dL (08-31 @ 11:04)    09-05    136  |  101  |  81<H>  ----------------------------<  93  4.0   |  21<L>  |  1.80<H>    Ca    9.1      05 Sep 2018 06:41      Creatinine Trend: 1.80<--, 1.76<--, 1.73<--, 1.86<--, 1.82<--, 1.28<--           PHYSICAL EXAM  Vital Signs Last 24 Hrs  T(C): 37.1 (05 Sep 2018 05:16), Max: 37.1 (05 Sep 2018 05:16)  T(F): 98.7 (05 Sep 2018 05:16), Max: 98.7 (05 Sep 2018 05:16)  HR: 56 (04 Sep 2018 20:53) (56 - 76)  BP: 153/66 (04 Sep 2018 20:53) (153/66 - 163/68)  BP(mean): --  RR: 18 (05 Sep 2018 05:16) (18 - 18)  SpO2: 96% (05 Sep 2018 05:16) (95% - 97%)      Heart: normal S1, S2, RRR, no m/r/g  Lungs: cta b/l  Abd: soft nT, nD  Ext: trace edema bilaterally     TELEMETRY: SR, APC's	    ECG:  < from: 12 Lead ECG (08.29.18 @ 22:43) >  SINUS RHYTHM with frequent  PREMATURE ATRIAL COMPLEXES  RIGHT BUNDLE BRANCH BLOCK  ABNORMAL ECG    < end of copied text >  	  RADIOLOGY:   DIAGNOSTIC TESTING:  [ ] Echocardiogram: < from: TTE with Doppler (w/Cont) (08.31.18 @ 12:52) >  Conclusions:  1. Mild to moderate mitral regurgitation (severity  underestimated).  2. Peak transaortic valve gradient equals 19 mm Hg, mean  transaortic valve gradient equals 11 mm Hg, estimated  aortic valve area equals 1.3 sqcm (by continuity equation),  aortic valve velocity time integral equals 48 cm,  consistent with moderate aortic stenosis. Mild aortic  regurgitation.  3. Normal left ventricular internal dimensions and wall  thicknesses.  4. Normal left ventricular systolic function. No segmental  wall motion abnormalities.  5. Reversal of the E-A  waves of the mitral inflow pattern  is consistent with diastolic LV dysfunction.  6. Normal right ventricular size and function.    < end of copied text >    [ ]  Catheterization:  [ ] Stress Test:    OTHER: 	  CT head: < from: CT Head No Cont (08.31.18 @ 16:19) >    Impression: Small meningioma suspected as described above.    < end of copied text >        ASSESSMENT/PLAN:  89yFemale with history of HTN, CAD s/p remote PCI to LAD, MDS being seen for chest pain.     -- ASA, statin   -- orthostatics negative  -- trend creatine daily  , Diuretics and ACE on hold - encourage PO fluids   -- GI / DVT prophylaxis,  keep K>4, mag >2.0   -- echo  noted with mod MR/mod AS (1.3cm) NL lv fxn,- no plan for aggressive cardiac work up as patient and family wish for conservative management  -- no further CV workup needed

## 2018-09-06 VITALS — DIASTOLIC BLOOD PRESSURE: 58 MMHG | SYSTOLIC BLOOD PRESSURE: 152 MMHG | HEART RATE: 51 BPM

## 2018-09-06 DIAGNOSIS — E87.1 HYPO-OSMOLALITY AND HYPONATREMIA: ICD-10-CM

## 2018-09-06 LAB
ANION GAP SERPL CALC-SCNC: 13 MMOL/L — SIGNIFICANT CHANGE UP (ref 5–17)
BUN SERPL-MCNC: 73 MG/DL — HIGH (ref 7–23)
CALCIUM SERPL-MCNC: 7.8 MG/DL — LOW (ref 8.4–10.5)
CHLORIDE SERPL-SCNC: 99 MMOL/L — SIGNIFICANT CHANGE UP (ref 96–108)
CO2 SERPL-SCNC: 19 MMOL/L — LOW (ref 22–31)
CREAT SERPL-MCNC: 1.51 MG/DL — HIGH (ref 0.5–1.3)
GLUCOSE SERPL-MCNC: 97 MG/DL — SIGNIFICANT CHANGE UP (ref 70–99)
POTASSIUM SERPL-MCNC: 4 MMOL/L — SIGNIFICANT CHANGE UP (ref 3.5–5.3)
POTASSIUM SERPL-SCNC: 4 MMOL/L — SIGNIFICANT CHANGE UP (ref 3.5–5.3)
SODIUM SERPL-SCNC: 131 MMOL/L — LOW (ref 135–145)

## 2018-09-06 PROCEDURE — 80053 COMPREHEN METABOLIC PANEL: CPT

## 2018-09-06 PROCEDURE — 85027 COMPLETE CBC AUTOMATED: CPT

## 2018-09-06 PROCEDURE — 82947 ASSAY GLUCOSE BLOOD QUANT: CPT

## 2018-09-06 PROCEDURE — 96374 THER/PROPH/DIAG INJ IV PUSH: CPT

## 2018-09-06 PROCEDURE — 85014 HEMATOCRIT: CPT

## 2018-09-06 PROCEDURE — 85610 PROTHROMBIN TIME: CPT

## 2018-09-06 PROCEDURE — C8929: CPT

## 2018-09-06 PROCEDURE — 93005 ELECTROCARDIOGRAM TRACING: CPT

## 2018-09-06 PROCEDURE — 84145 PROCALCITONIN (PCT): CPT

## 2018-09-06 PROCEDURE — 83605 ASSAY OF LACTIC ACID: CPT

## 2018-09-06 PROCEDURE — 82553 CREATINE MB FRACTION: CPT

## 2018-09-06 PROCEDURE — 82803 BLOOD GASES ANY COMBINATION: CPT

## 2018-09-06 PROCEDURE — 93880 EXTRACRANIAL BILAT STUDY: CPT

## 2018-09-06 PROCEDURE — 70450 CT HEAD/BRAIN W/O DYE: CPT

## 2018-09-06 PROCEDURE — 82330 ASSAY OF CALCIUM: CPT

## 2018-09-06 PROCEDURE — 84480 ASSAY TRIIODOTHYRONINE (T3): CPT

## 2018-09-06 PROCEDURE — 84436 ASSAY OF TOTAL THYROXINE: CPT

## 2018-09-06 PROCEDURE — 83735 ASSAY OF MAGNESIUM: CPT

## 2018-09-06 PROCEDURE — 81003 URINALYSIS AUTO W/O SCOPE: CPT

## 2018-09-06 PROCEDURE — 85730 THROMBOPLASTIN TIME PARTIAL: CPT

## 2018-09-06 PROCEDURE — 84295 ASSAY OF SERUM SODIUM: CPT

## 2018-09-06 PROCEDURE — 84484 ASSAY OF TROPONIN QUANT: CPT

## 2018-09-06 PROCEDURE — 82962 GLUCOSE BLOOD TEST: CPT

## 2018-09-06 PROCEDURE — 83880 ASSAY OF NATRIURETIC PEPTIDE: CPT

## 2018-09-06 PROCEDURE — 71045 X-RAY EXAM CHEST 1 VIEW: CPT

## 2018-09-06 PROCEDURE — 97116 GAIT TRAINING THERAPY: CPT

## 2018-09-06 PROCEDURE — 97162 PT EVAL MOD COMPLEX 30 MIN: CPT

## 2018-09-06 PROCEDURE — 84443 ASSAY THYROID STIM HORMONE: CPT

## 2018-09-06 PROCEDURE — 99285 EMERGENCY DEPT VISIT HI MDM: CPT | Mod: 25

## 2018-09-06 PROCEDURE — 82435 ASSAY OF BLOOD CHLORIDE: CPT

## 2018-09-06 PROCEDURE — 80048 BASIC METABOLIC PNL TOTAL CA: CPT

## 2018-09-06 PROCEDURE — 97530 THERAPEUTIC ACTIVITIES: CPT

## 2018-09-06 PROCEDURE — 84132 ASSAY OF SERUM POTASSIUM: CPT

## 2018-09-06 PROCEDURE — 84100 ASSAY OF PHOSPHORUS: CPT

## 2018-09-06 PROCEDURE — 82550 ASSAY OF CK (CPK): CPT

## 2018-09-06 RX ORDER — AMLODIPINE BESYLATE 2.5 MG/1
1 TABLET ORAL
Qty: 0 | Refills: 0 | DISCHARGE
Start: 2018-09-06

## 2018-09-06 RX ORDER — DICLOFENAC SODIUM 30 MG/G
4 GEL TOPICAL
Qty: 0 | Refills: 0 | DISCHARGE
Start: 2018-09-06

## 2018-09-06 RX ORDER — INDAPAMIDE 1.25 MG
1 TABLET ORAL
Qty: 0 | Refills: 0 | COMMUNITY

## 2018-09-06 RX ORDER — LOSARTAN POTASSIUM 100 MG/1
1 TABLET, FILM COATED ORAL
Qty: 0 | Refills: 0 | COMMUNITY

## 2018-09-06 RX ORDER — LIDOCAINE 4 G/100G
1 CREAM TOPICAL
Qty: 0 | Refills: 0 | DISCHARGE
Start: 2018-09-06

## 2018-09-06 RX ORDER — FUROSEMIDE 40 MG
1 TABLET ORAL
Qty: 0 | Refills: 0 | COMMUNITY

## 2018-09-06 RX ORDER — AMLODIPINE BESYLATE 2.5 MG/1
1 TABLET ORAL
Qty: 0 | Refills: 0 | COMMUNITY

## 2018-09-06 RX ORDER — DICLOFENAC SODIUM 30 MG/G
0 GEL TOPICAL
Qty: 0 | Refills: 0 | COMMUNITY

## 2018-09-06 RX ORDER — AMLODIPINE BESYLATE 2.5 MG/1
2.5 TABLET ORAL DAILY
Qty: 0 | Refills: 0 | Status: DISCONTINUED | OUTPATIENT
Start: 2018-09-06 | End: 2018-09-06

## 2018-09-06 RX ADMIN — LIDOCAINE 1 PATCH: 4 CREAM TOPICAL at 09:17

## 2018-09-06 RX ADMIN — Medication 112 MICROGRAM(S): at 05:47

## 2018-09-06 RX ADMIN — AMLODIPINE BESYLATE 2.5 MILLIGRAM(S): 2.5 TABLET ORAL at 13:44

## 2018-09-06 RX ADMIN — Medication 1 MILLIGRAM(S): at 09:17

## 2018-09-06 RX ADMIN — HEPARIN SODIUM 5000 UNIT(S): 5000 INJECTION INTRAVENOUS; SUBCUTANEOUS at 13:22

## 2018-09-06 RX ADMIN — Medication 81 MILLIGRAM(S): at 09:17

## 2018-09-06 RX ADMIN — HEPARIN SODIUM 5000 UNIT(S): 5000 INJECTION INTRAVENOUS; SUBCUTANEOUS at 05:47

## 2018-09-06 RX ADMIN — PANTOPRAZOLE SODIUM 40 MILLIGRAM(S): 20 TABLET, DELAYED RELEASE ORAL at 05:47

## 2018-09-06 NOTE — PROGRESS NOTE ADULT - ATTENDING COMMENTS
[facility] NEPHROLOGY  Solo Sanchez M.D.  Spencer Torres D.O.  Kori Abraham M.D.  Pita Crawford, MSN, ANP-C    Telephone: [phone]  Facsimile: [phone]    [address] [place] [address]

## 2018-09-06 NOTE — CHART NOTE - NSCHARTNOTEFT_GEN_A_CORE
Request from Dr. Santiago to facilitate patient discharge. Medication reconciliation reviewed, revised, and resolved with Dr. Santiago who had medically cleared patient for discharge with follow-up as advised. Please refer to discharge note for detailed hospital course. Patient is currently stable for discharge to rehab at this time.    Ivan Montero PA-C  Department of Medicine  #[id number]

## 2018-09-06 NOTE — PROGRESS NOTE ADULT - PROVIDER SPECIALTY LIST ADULT
Cardiology
Electrophysiology
Heme/Onc
Hospitalist
Neurology
Neurology
Cardiology
Cardiology
Electrophysiology
Electrophysiology
Hospitalist
Nephrology

## 2018-09-06 NOTE — PROGRESS NOTE ADULT - SUBJECTIVE AND OBJECTIVE BOX
[facility] NEPHROLOGY- PROGRESS NOTE    89y Female with history of moderate AS presents with chest pain. Nephrology consulted for elevated Scr.    REVIEW OF SYSTEMS:  Gen: no changes in weight  Cards: no chest pain  Resp: no dyspnea  GI: no nausea or vomiting or diarrhea  Vascular: + chronic LE edema    No Known Allergies      Hospital Medications: Medications reviewed    VITALS:  T(F): 97.8 (09-06-18 @ 04:25), Max: 97.9 (09-05-18 @ 21:01)  HR: 50 (09-06-18 @ 04:25)  BP: 128/52 (09-06-18 @ 04:25)  RR: 18 (09-06-18 @ 04:25)  SpO2: 94% (09-06-18 @ 04:25)  Wt(kg): --  Height (cm): 142.24 (08-29 @ 19:37)  Weight (kg): 68.9 (08-29 @ 19:37)  BMI (kg/m2): 34.1 (08-29 @ 19:37)  BSA (m2): 1.58 (08-29 @ 19:37)    09-05 @ 07:01  -  09-06 @ 07:00  --------------------------------------------------------  IN: 1770 mL / OUT: 1200 mL / NET: 570 mL    09-06 @ 07:01  -  09-06 @ 12:50  --------------------------------------------------------  IN: 240 mL / OUT: 400 mL / NET: -160 mL        PHYSICAL EXAM:    Gen: NAD, calm  Cards: RRR, +S1/S2, + LUDMILA  Resp: CTA B/L  GI: soft, NT/ND, NABS  Vascular: + non-pitting LE edema B/L    LABS:  09-06    131<L>  |  99  |  73<H>  ----------------------------<  97  4.0   |  19<L>  |  1.51<H>    Ca    7.8<L>      06 Sep 2018 06:40      Creatinine Trend: 1.51 <--, 1.80 <--, 1.76 <--, 1.73 <--, 1.86 <--, 1.82 <--

## 2018-09-06 NOTE — PROGRESS NOTE ADULT - SUBJECTIVE AND OBJECTIVE BOX
Chief Complaint:  fu    History of Present Illness:  The patient overall is feeling better but still not 100%. slightly high BP today and meds being adjusted. No obvious bleeding.  No worsening SOB at rest.  No chest pain.  No pleuritic chest pain.   No abdominal pain.  No fevers.   No chills.  No calf pain.  Her leg swelling is better.  Overall she still feels her energy is low. She had no other further hypotensive events    MEDICATIONS  (STANDING):  aspirin enteric coated 81 milliGRAM(s) Oral daily  diclofenac sodium 1% Gel 4 Gram(s) Topical <User Schedule>  folic acid 1 milliGRAM(s) Oral daily  heparin  Injectable 5000 Unit(s) SubCutaneous every 8 hours  latanoprost 0.005% Ophthalmic Solution 1 Drop(s) Both EYES at bedtime  levothyroxine 112 MICROGram(s) Oral daily  lidocaine   Patch 1 Patch Transdermal daily  pantoprazole    Tablet 40 milliGRAM(s) Oral before breakfast  pravastatin 40 milliGRAM(s) Oral at bedtime    MEDICATIONS  (PRN):  acetaminophen   Tablet. 650 milliGRAM(s) Oral every 6 hours PRN Mild Pain (1 - 3)      Allergies    No Known Allergies    Intolerances        Vital Signs Last 24 Hrs  T(C): 36.6 (06 Sep 2018 04:25), Max: 36.6 (05 Sep 2018 21:01)  T(F): 97.8 (06 Sep 2018 04:25), Max: 97.9 (05 Sep 2018 21:01)  HR: 50 (06 Sep 2018 04:25) (50 - 59)  BP: 128/52 (06 Sep 2018 04:25) (128/52 - 167/71)  BP(mean): --  RR: 18 (06 Sep 2018 04:25) (18 - 18)  SpO2: 94% (06 Sep 2018 04:25) (94% - 97%)    PHYSICAL EXAM  General: NAD  HEENT: clear oropharynx, anicteric sclera, pink conjunctiva  Neck: supple  Abdomen: soft non-tender non-distended, no hepatosplenomegaly, positive bowel sounds  Ext: no clubbing cyanosis or edema  Neuro: alert and oriented X 3, no focal deficits    LABS:                09-06    131<L>  |  99  |  73<H>  ----------------------------<  97  4.0   |  19<L>  |  1.51<H>    Ca    7.8<L>      06 Sep 2018 06:40

## 2018-09-06 NOTE — PROGRESS NOTE ADULT - ASSESSMENT
Problem/Plan - 1:  ·  Problem: Acute on chronic heart failure, unspecified heart failure type.  Plan: See above.  Will obtain echo.   Daily weights.  Will check Mg++ with ectopy.  IV Lasix      Problem/Plan - 2:  ·  Problem: Essential hypertension.  Plan: cw current meds    Problem/Plan - 3:  ·  Problem: Hypothyroidism, unspecified type.  Plan: cw synthyroid    Problem/Plan - 4:  ·  Problem: MDS (Myelodysplastic Syndrome).  Plan: monitor cbc
89 year old female withh/o CAD, CHF, MDS admitted with near syncope in the office yesterday    1. MDS - low grade diagnosed on bone marrow biopsy in 2009. Had not needed any pRBC transfusions.   -  She get aranesp / procrit periodically was due for a shot this week  -  hgb 10.1 on 8-30-18 will give her shot for today 8-31-18 with MDS goal is 11 she has been stable on the shot    2. near syncope - possible vasovagal  - on telemetry, cardiology following  - h/o CAD s/p PCI with 2 stents  - acute on chronic CHF diuresis per cardiology on IV lasix    3. Hypothyroidsm - on levothyroxine    4.  Knee pain (right)  -   on tylenol as needed
89 year old female withh/o CAD, CHF, MDS admitted with near syncope in the office yesterday    1. MDS - low grade diagnosed on bone marrow biopsy in 2009. Had not needed any pRBC transfusions.   -  She get aranesp / procrit periodically was due for a shot this week  -  hgb 10.4 on 9-1-18.  Had a shot of procrit on 8-31-18 with MDS goal is around 11.     2. near syncope - possible vasovagal  - on telemetry, cardiology following  - h/o CAD s/p PCI with 2 stents  - acute on chronic CHF diuresis per cardiology was diuresis    3. Hypothyroidsm - on levothyroxine    4.  Knee pain (right)  -   on tylenol as needed
89 year old female withh/o CAD, CHF, MDS admitted with near syncope in the office yesterday    1. MDS - low grade, diagnosed on bone marrow biopsy in 2009. Had not needed any pRBC transfusions.   -  She get aranesp / procrit periodically was due for a shot this week  -  hgb 10.4 on 9-1-18.  Had a shot of procrit on 8-31-18 with MDS goal is around 11.     2. near syncope - possible vasovagal  - on telemetry, cardiology and EP following  - h/o CAD s/p PCI with 2 stents  - acute on chronic CHF diuresis per cardiology    3. Hypothyroidsm - on levothyroxine    4.  Knee pain (right)  -   on tylenol as needed    5. CKD - nephrology consulted and pt given small bolus - creatinine improved to 1.5    6. HTN - BP meds being adjusted    Agree with DC planning - fu with Dr. Herrera ~2 weeks
89 year old female withh/o CAD, CHF, MDS admitted with near syncope in the office yesterday    1. MDS - low grade, diagnosed on bone marrow biopsy in 2009. Had not needed any pRBC transfusions.   -  She get aranesp / procrit periodically was due for a shot this week  -  hgb 10.4 on 9-1-18.  Had a shot of procrit on 8-31-18 with MDS goal is around 11.     2. near syncope - possible vasovagal  - on telemetry, cardiology and EP following  - h/o CAD s/p PCI with 2 stents  - acute on chronic CHF diuresis per cardiology    3. Hypothyroidsm - on levothyroxine    4.  Knee pain (right)  -   on tylenol as needed    5. CKD - nephrology consulted and pt given small bolus today    Agree with DC planning - fu with Dr. Herrera ~2 weeks
Acute on chronic heart failure, unspecified heart failure type.  Plan: See above.  Will obtain echo.   Daily weights.  Will check Mg++ with ectopy.  IV Lasix      Essential hypertension.  Plan: cw current meds     Hypothyroidism, unspecified type.  Plan: cw synthyroid     MDS (Myelodysplastic Syndrome).  Plan: monitor cbc  heme fu    syncope Plan check CT head  neuro consult
Acute on chronic heart failure, unspecified heart failure type.  Plan: cards fu.     Daily weights.   I and O      Essential hypertension.  Plan: cw current meds    Hypothyroidism, unspecified type.  Plan: cw synthyroid    MDS (Myelodysplastic Syndrome).  Plan: monitor cbc  heme fu    syncope Plan CT head -ve  neuro consult  check echo
Acute on chronic heart failure, unspecified heart failure type.  Plan: cards fu.     Daily weights.   I and O      Essential hypertension.  Plan: cw current meds    Hypothyroidism, unspecified type.  Plan: cw synthyroid    MDS (Myelodysplastic Syndrome).  Plan: monitor cbc  heme fu    syncope Plan CT head -ve  neuro consult  pt orthostatic , management as per cards
Acute on chronic heart failure, unspecified heart failure type.  Plan: cards fu.     Daily weights.   I and O      Essential hypertension.  Plan: cw current meds    Hypothyroidism, unspecified type.  Plan: cw synthyroid    MDS (Myelodysplastic Syndrome).  Plan: monitor cbc  heme fu    syncope Plan CT head -ve  neuro consult  pt orthostatic , management as per cards
Acute on chronic heart failure, unspecified heart failure type.  Plan: cards fu.     Daily weights.   I and O      Essential hypertension.  Plan: cw current meds    Hypothyroidism, unspecified type.  Plan: cw synthyroid    MDS (Myelodysplastic Syndrome).  Plan: monitor cbc  heme fu    syncope Plan CT head -ve  neuro consult  pt orthostatic , management as per cards    dc planning today
89y Female with history of moderate AS presents with chest pain. Nephrology consulted for elevated Scr.

## 2018-09-06 NOTE — PROGRESS NOTE ADULT - PROBLEM SELECTOR PLAN 3
BP labile with prior orthostatics likely secondary to indapamide (discontinued after dose on 9/5). Would discharge patient off of losartan and indapamide and restart amlodipine to prevent uncontrolled HTN. Monitor BP.

## 2018-09-06 NOTE — PROGRESS NOTE ADULT - SUBJECTIVE AND OBJECTIVE BOX
Patient is a 89y old  Female who presents with a chief complaint of Progressive dyspnea for the past 3 weeks with lightheadedness, dyspnea, episode of chest pain, dyspnea this afternoon while walking in the garage. (06 Sep 2018 12:37)      INTERVAL HPI/OVERNIGHT EVENTS:  T(C): 36.4 (09-06-18 @ 12:51), Max: 36.6 (09-05-18 @ 21:01)  HR: 59 (09-06-18 @ 12:25) (50 - 59)  BP: 181/65 (09-06-18 @ 12:25) (128/52 - 181/65)  RR: 18 (09-06-18 @ 12:51) (18 - 18)  SpO2: 96% (09-06-18 @ 12:51) (94% - 97%)  Wt(kg): --  I&O's Summary    05 Sep 2018 07:01  -  06 Sep 2018 07:00  --------------------------------------------------------  IN: 1770 mL / OUT: 1200 mL / NET: 570 mL    06 Sep 2018 07:01  -  06 Sep 2018 15:25  --------------------------------------------------------  IN: 420 mL / OUT: 600 mL / NET: -180 mL        LABS:    09-06    131<L>  |  99  |  73<H>  ----------------------------<  97  4.0   |  19<L>  |  1.51<H>    Ca    7.8<L>      06 Sep 2018 06:40          CAPILLARY BLOOD GLUCOSE                MEDICATIONS  (STANDING):  amLODIPine   Tablet 2.5 milliGRAM(s) Oral daily  aspirin enteric coated 81 milliGRAM(s) Oral daily  diclofenac sodium 1% Gel 4 Gram(s) Topical <User Schedule>  folic acid 1 milliGRAM(s) Oral daily  heparin  Injectable 5000 Unit(s) SubCutaneous every 8 hours  latanoprost 0.005% Ophthalmic Solution 1 Drop(s) Both EYES at bedtime  levothyroxine 112 MICROGram(s) Oral daily  lidocaine   Patch 1 Patch Transdermal daily  pantoprazole    Tablet 40 milliGRAM(s) Oral before breakfast  pravastatin 40 milliGRAM(s) Oral at bedtime    MEDICATIONS  (PRN):  acetaminophen   Tablet. 650 milliGRAM(s) Oral every 6 hours PRN Mild Pain (1 - 3)          PHYSICAL EXAM:  GENERAL: NAD, well-groomed, well-developed  HEAD:  Atraumatic, Normocephalic  CHEST/LUNG: Clear to percussion bilaterally; No rales, rhonchi, wheezing, or rubs  HEART: Regular rate and rhythm; No murmurs, rubs, or gallops  ABDOMEN: Soft, Nontender, Nondistended; Bowel sounds present  EXTREMITIES:  2+ Peripheral Pulses, No clubbing, cyanosis, or edema  LYMPH: No lymphadenopathy noted  SKIN: No rashes or lesions    Care Discussed with Consultants/Other Providers [ ] YES  [ ] NO

## 2018-09-06 NOTE — PROGRESS NOTE ADULT - PROBLEM SELECTOR PLAN 1
Patient with resolving MARIELA likely hemodynamically mediated in setting of lasix, losartan and indapamide use. Scr improving today with bladder scan on 9/5 reviewed and negative (22 ml). Given plans for discharge to rehab today, would defer further work up as inpatient and have patient repeat BMP in 1 week to monitor for resolution of MARIELA. Avoid nephrotoxins.

## 2018-09-06 NOTE — PROGRESS NOTE ADULT - SUBJECTIVE AND OBJECTIVE BOX
Patient with no anginal chest pain or shortness of breath      tele:  NSR / SB 50's       acetaminophen   Tablet. 650 milliGRAM(s) Oral every 6 hours PRN  amLODIPine   Tablet 2.5 milliGRAM(s) Oral daily  aspirin enteric coated 81 milliGRAM(s) Oral daily  diclofenac sodium 1% Gel 4 Gram(s) Topical <User Schedule>  folic acid 1 milliGRAM(s) Oral daily  heparin  Injectable 5000 Unit(s) SubCutaneous every 8 hours  latanoprost 0.005% Ophthalmic Solution 1 Drop(s) Both EYES at bedtime  levothyroxine 112 MICROGram(s) Oral daily  lidocaine   Patch 1 Patch Transdermal daily  pantoprazole    Tablet 40 milliGRAM(s) Oral before breakfast  pravastatin 40 milliGRAM(s) Oral at bedtime              09-06    131<L>  |  99  |  73<H>  ----------------------------<  97  4.0   |  19<L>  |  1.51<H>    Ca    7.8<L>      06 Sep 2018 06:40      Creatinine Trend: 1.51<--, 1.80<--, 1.76<--, 1.73<--, 1.86<--, 1.82<--    COAGS:           T(C): 36.4 (09-06-18 @ 12:51), Max: 36.6 (09-05-18 @ 21:01)  HR: 59 (09-06-18 @ 12:25) (50 - 59)  BP: 181/65 (09-06-18 @ 12:25) (128/52 - 181/65)  RR: 18 (09-06-18 @ 12:51) (18 - 18)  SpO2: 96% (09-06-18 @ 12:51) (94% - 97%)  Wt(kg): --    I&O's Summary    05 Sep 2018 07:01  -  06 Sep 2018 07:00  --------------------------------------------------------  IN: 1770 mL / OUT: 1200 mL / NET: 570 mL    06 Sep 2018 07:01  -  06 Sep 2018 16:38  --------------------------------------------------------  IN: 420 mL / OUT: 600 mL / NET: -180 mL        Heart: normal S1, S2, RRR, no m/r/g  Lungs: cta b/l  Abd: soft nT, nD  Ext: trace edema bilaterally     TELEMETRY: SR, APC's	    ECG:  < from: 12 Lead ECG (08.29.18 @ 22:43) >  SINUS RHYTHM with frequent  PREMATURE ATRIAL COMPLEXES  RIGHT BUNDLE BRANCH BLOCK  ABNORMAL ECG    < end of copied text >  	  RADIOLOGY:   DIAGNOSTIC TESTING:  [ ] Echocardiogram: < from: TTE with Doppler (w/Cont) (08.31.18 @ 12:52) >  Conclusions:  1. Mild to moderate mitral regurgitation (severity  underestimated).  2. Peak transaortic valve gradient equals 19 mm Hg, mean  transaortic valve gradient equals 11 mm Hg, estimated  aortic valve area equals 1.3 sqcm (by continuity equation),  aortic valve velocity time integral equals 48 cm,  consistent with moderate aortic stenosis. Mild aortic  regurgitation.  3. Normal left ventricular internal dimensions and wall  thicknesses.  4. Normal left ventricular systolic function. No segmental  wall motion abnormalities.  5. Reversal of the E-A  waves of the mitral inflow pattern  is consistent with diastolic LV dysfunction.  6. Normal right ventricular size and function.    < end of copied text >    [ ]  Catheterization:  [ ] Stress Test:    OTHER: 	  CT head: < from: CT Head No Cont (08.31.18 @ 16:19) >    Impression: Small meningioma suspected as described above.    < end of copied text >        ASSESSMENT/PLAN:  89yFemale with history of HTN, CAD s/p remote PCI to LAD, MDS being seen for chest pain.     -- ASA, statin   -- orthostatics negative  -- trend creatine daily  , Diuretics and ACE on hold - encourage PO fluids   -- GI / DVT prophylaxis,  keep K>4, mag >2.0   -- echo  noted with mod MR/mod AS (1.3cm) NL lv fxn,- no plan for aggressive cardiac work up as patient and family wish for conservative management  -- no further CV workup needed   - D/C planning per chaim Courtney MD, FACC

## 2018-09-06 NOTE — PROGRESS NOTE ADULT - PROBLEM SELECTOR PLAN 5
Mild and likely secondary to indapamide (last given on 9/5) for which medication discontinued. Monitor serum sodium.

## 2018-09-06 NOTE — PROGRESS NOTE ADULT - SUBJECTIVE AND OBJECTIVE BOX
EP     tele: NSR PACS    no palpitations, no syncope, no angina, orthostatics improved      MEDICATIONS  (STANDING):  amLODIPine   Tablet 2.5 milliGRAM(s) Oral daily  aspirin enteric coated 81 milliGRAM(s) Oral daily  diclofenac sodium 1% Gel 4 Gram(s) Topical <User Schedule>  folic acid 1 milliGRAM(s) Oral daily  heparin  Injectable 5000 Unit(s) SubCutaneous every 8 hours  latanoprost 0.005% Ophthalmic Solution 1 Drop(s) Both EYES at bedtime  levothyroxine 112 MICROGram(s) Oral daily  lidocaine   Patch 1 Patch Transdermal daily  pantoprazole    Tablet 40 milliGRAM(s) Oral before breakfast  pravastatin 40 milliGRAM(s) Oral at bedtime    MEDICATIONS  (PRN):  acetaminophen   Tablet. 650 milliGRAM(s) Oral every 6 hours PRN Mild Pain (1 - 3)      LABS:      09-06    131<L>  |  99  |  73<H>  ----------------------------<  97  4.0   |  19<L>  |  1.51<H>    Ca    7.8<L>      06 Sep 2018 06:40      Creatinine Trend: 1.51<--, 1.80<--, 1.76<--, 1.73<--, 1.86<--, 1.82<--           PHYSICAL EXAM  Vital Signs Last 24 Hrs  T(C): 36.4 (06 Sep 2018 12:51), Max: 36.6 (05 Sep 2018 21:01)  T(F): 97.6 (06 Sep 2018 12:51), Max: 97.9 (05 Sep 2018 21:01)  HR: 59 (06 Sep 2018 12:25) (50 - 59)  BP: 181/65 (06 Sep 2018 12:25) (128/52 - 181/65)  BP(mean): --  RR: 18 (06 Sep 2018 12:51) (18 - 18)  SpO2: 96% (06 Sep 2018 12:51) (94% - 97%)             no JVD  RRR, no murmurs  CTAB  soft nt/nd  no c/c/e    echo: moderate MR, normal LVEF    A/P) She is a pleasant 89 y/o female PMH CAD s/p prior PCI now admitted with near syncope. EP is called because she has an underlying RBBB (normal axis). Recent workup includes echo this month (normal) and MCOT in Feb 2018 which was unremarkable. Her SBP was documented to be 70-80s on arrival, and has improved with IVF. The etiology of her near syncope was likely hypotension, and the etiology of the hypotension remains unclear. Tele monitoring in the hospital shows occasional APCs and PVCs, but no malignant arrhythmias to explain near syncope.     -no further inpatient EP workup needed as long as tele continues to show no malignant arrhythmias  -recommend serial MCOT monitoring with Dr. Carbajal after discharge  -hold lasix and losartan given MARIELA  -hold norvasc given orthostatic syncope  -recommend avoid dehydration/prolonged standing, and compression stockings  -allow lenient hypertension  -d/c planning as per primary team

## 2018-11-23 NOTE — PHYSICAL THERAPY INITIAL EVALUATION ADULT - PREDICTED DURATION OF THERAPY (DAYS/WKS), PT EVAL
Pt of Dr Gilbert presents for follow up after [facility] ER evaluation yesterday for new onset vaginal bleeding. Minimal today. Reportedly had ER u/s revealing no FHM.  Pt denies f/c, n/v, intolerable pain.  Exam  VSS              Abdomen soft NT              Cervix LTC, mild supracervical bleeding    Procedure TVS 6.5MHz probe  Positive IUP CRL 8w1d NO FHM, NO FREE FLUID OR EXTRAUTERINE MASS    I discussed missed ab with pt and family.  I discussed and offered expectant management as well as Suction D and C. I discussed risks and benefits of both options.  Pt elects expectant management with bleeding precautions  MBT O pos    Plan Expectant management  Pt to follow up Dr Gilbert Monday if no spontaneous passage   2 weeks

## 2019-09-19 ENCOUNTER — EMERGENCY (EMERGENCY)
Facility: HOSPITAL | Age: 84
LOS: 1 days | End: 2019-09-19
Attending: EMERGENCY MEDICINE | Admitting: EMERGENCY MEDICINE
Payer: COMMERCIAL

## 2019-09-19 VITALS
OXYGEN SATURATION: 99 % | SYSTOLIC BLOOD PRESSURE: 200 MMHG | RESPIRATION RATE: 20 BRPM | DIASTOLIC BLOOD PRESSURE: 73 MMHG | HEART RATE: 94 BPM

## 2019-09-19 VITALS
OXYGEN SATURATION: 94 % | HEART RATE: 92 BPM | SYSTOLIC BLOOD PRESSURE: 169 MMHG | TEMPERATURE: 98 F | RESPIRATION RATE: 16 BRPM | DIASTOLIC BLOOD PRESSURE: 68 MMHG

## 2019-09-19 DIAGNOSIS — R04.0 EPISTAXIS: ICD-10-CM

## 2019-09-19 LAB
ALBUMIN SERPL ELPH-MCNC: 3.6 G/DL — SIGNIFICANT CHANGE UP (ref 3.3–5)
ALP SERPL-CCNC: 56 U/L — SIGNIFICANT CHANGE UP (ref 40–120)
ALT FLD-CCNC: 9 U/L — LOW (ref 10–45)
ANION GAP SERPL CALC-SCNC: 14 MMOL/L — SIGNIFICANT CHANGE UP (ref 5–17)
APPEARANCE UR: CLEAR — SIGNIFICANT CHANGE UP
APTT BLD: 39.9 SEC — HIGH (ref 27.5–36.3)
AST SERPL-CCNC: 19 U/L — SIGNIFICANT CHANGE UP (ref 10–40)
BACTERIA # UR AUTO: NEGATIVE — SIGNIFICANT CHANGE UP
BASOPHILS # BLD AUTO: 0 K/UL — SIGNIFICANT CHANGE UP (ref 0–0.2)
BASOPHILS NFR BLD AUTO: 0.1 % — SIGNIFICANT CHANGE UP (ref 0–2)
BILIRUB SERPL-MCNC: 0.4 MG/DL — SIGNIFICANT CHANGE UP (ref 0.2–1.2)
BILIRUB UR-MCNC: NEGATIVE — SIGNIFICANT CHANGE UP
BUN SERPL-MCNC: 37 MG/DL — HIGH (ref 7–23)
CALCIUM SERPL-MCNC: 9.5 MG/DL — SIGNIFICANT CHANGE UP (ref 8.4–10.5)
CHLORIDE SERPL-SCNC: 105 MMOL/L — SIGNIFICANT CHANGE UP (ref 96–108)
CO2 SERPL-SCNC: 21 MMOL/L — LOW (ref 22–31)
COLOR SPEC: COLORLESS — SIGNIFICANT CHANGE UP
CREAT SERPL-MCNC: 1.43 MG/DL — HIGH (ref 0.5–1.3)
DIFF PNL FLD: ABNORMAL
EOSINOPHIL # BLD AUTO: 0.2 K/UL — SIGNIFICANT CHANGE UP (ref 0–0.5)
EOSINOPHIL NFR BLD AUTO: 2.5 % — SIGNIFICANT CHANGE UP (ref 0–6)
EPI CELLS # UR: 0 /HPF — SIGNIFICANT CHANGE UP
GLUCOSE SERPL-MCNC: 106 MG/DL — HIGH (ref 70–99)
GLUCOSE UR QL: NEGATIVE — SIGNIFICANT CHANGE UP
HCT VFR BLD CALC: 33.9 % — LOW (ref 34.5–45)
HCT VFR BLD CALC: 38.5 % — SIGNIFICANT CHANGE UP (ref 34.5–45)
HGB BLD-MCNC: 11.1 G/DL — LOW (ref 11.5–15.5)
HGB BLD-MCNC: 12 G/DL — SIGNIFICANT CHANGE UP (ref 11.5–15.5)
HYALINE CASTS # UR AUTO: 0 /LPF — SIGNIFICANT CHANGE UP (ref 0–2)
INR BLD: 1.14 RATIO — SIGNIFICANT CHANGE UP (ref 0.88–1.16)
KETONES UR-MCNC: NEGATIVE — SIGNIFICANT CHANGE UP
LEUKOCYTE ESTERASE UR-ACNC: NEGATIVE — SIGNIFICANT CHANGE UP
LYMPHOCYTES # BLD AUTO: 1.3 K/UL — SIGNIFICANT CHANGE UP (ref 1–3.3)
LYMPHOCYTES # BLD AUTO: 17.3 % — SIGNIFICANT CHANGE UP (ref 13–44)
MCHC RBC-ENTMCNC: 27.8 PG — SIGNIFICANT CHANGE UP (ref 27–34)
MCHC RBC-ENTMCNC: 29.1 PG — SIGNIFICANT CHANGE UP (ref 27–34)
MCHC RBC-ENTMCNC: 31.2 GM/DL — LOW (ref 32–36)
MCHC RBC-ENTMCNC: 32.9 GM/DL — SIGNIFICANT CHANGE UP (ref 32–36)
MCV RBC AUTO: 88.5 FL — SIGNIFICANT CHANGE UP (ref 80–100)
MCV RBC AUTO: 89.1 FL — SIGNIFICANT CHANGE UP (ref 80–100)
MONOCYTES # BLD AUTO: 0.4 K/UL — SIGNIFICANT CHANGE UP (ref 0–0.9)
MONOCYTES NFR BLD AUTO: 5 % — SIGNIFICANT CHANGE UP (ref 2–14)
NEUTROPHILS # BLD AUTO: 5.5 K/UL — SIGNIFICANT CHANGE UP (ref 1.8–7.4)
NEUTROPHILS NFR BLD AUTO: 75 % — SIGNIFICANT CHANGE UP (ref 43–77)
NITRITE UR-MCNC: NEGATIVE — SIGNIFICANT CHANGE UP
PH UR: 6.5 — SIGNIFICANT CHANGE UP (ref 5–8)
PLATELET # BLD AUTO: 233 K/UL — SIGNIFICANT CHANGE UP (ref 150–400)
PLATELET # BLD AUTO: 253 K/UL — SIGNIFICANT CHANGE UP (ref 150–400)
POTASSIUM SERPL-MCNC: 3.7 MMOL/L — SIGNIFICANT CHANGE UP (ref 3.5–5.3)
POTASSIUM SERPL-SCNC: 3.7 MMOL/L — SIGNIFICANT CHANGE UP (ref 3.5–5.3)
PROT SERPL-MCNC: 7.5 G/DL — SIGNIFICANT CHANGE UP (ref 6–8.3)
PROT UR-MCNC: ABNORMAL
PROTHROM AB SERPL-ACNC: 13.2 SEC — HIGH (ref 10–12.9)
RBC # BLD: 3.83 M/UL — SIGNIFICANT CHANGE UP (ref 3.8–5.2)
RBC # BLD: 4.32 M/UL — SIGNIFICANT CHANGE UP (ref 3.8–5.2)
RBC # FLD: 15.7 % — HIGH (ref 10.3–14.5)
RBC # FLD: 15.9 % — HIGH (ref 10.3–14.5)
RBC CASTS # UR COMP ASSIST: 1 /HPF — SIGNIFICANT CHANGE UP (ref 0–4)
SODIUM SERPL-SCNC: 140 MMOL/L — SIGNIFICANT CHANGE UP (ref 135–145)
SP GR SPEC: 1.01 — SIGNIFICANT CHANGE UP (ref 1.01–1.02)
TROPONIN T, HIGH SENSITIVITY RESULT: 22 NG/L — SIGNIFICANT CHANGE UP (ref 0–51)
TROPONIN T, HIGH SENSITIVITY RESULT: 22 NG/L — SIGNIFICANT CHANGE UP (ref 0–51)
UROBILINOGEN FLD QL: NEGATIVE — SIGNIFICANT CHANGE UP
WBC # BLD: 7.4 K/UL — SIGNIFICANT CHANGE UP (ref 3.8–10.5)
WBC # BLD: 7.4 K/UL — SIGNIFICANT CHANGE UP (ref 3.8–10.5)
WBC # FLD AUTO: 7.4 K/UL — SIGNIFICANT CHANGE UP (ref 3.8–10.5)
WBC # FLD AUTO: 7.4 K/UL — SIGNIFICANT CHANGE UP (ref 3.8–10.5)
WBC UR QL: 2 /HPF — SIGNIFICANT CHANGE UP (ref 0–5)

## 2019-09-19 PROCEDURE — 30903 CONTROL OF NOSEBLEED: CPT | Mod: LT

## 2019-09-19 PROCEDURE — 73562 X-RAY EXAM OF KNEE 3: CPT | Mod: 26,RT

## 2019-09-19 PROCEDURE — 70450 CT HEAD/BRAIN W/O DYE: CPT | Mod: 26

## 2019-09-19 PROCEDURE — 81001 URINALYSIS AUTO W/SCOPE: CPT

## 2019-09-19 PROCEDURE — 72125 CT NECK SPINE W/O DYE: CPT | Mod: 26

## 2019-09-19 PROCEDURE — 85730 THROMBOPLASTIN TIME PARTIAL: CPT

## 2019-09-19 PROCEDURE — 99284 EMERGENCY DEPT VISIT MOD MDM: CPT | Mod: 25

## 2019-09-19 PROCEDURE — 73562 X-RAY EXAM OF KNEE 3: CPT

## 2019-09-19 PROCEDURE — 80053 COMPREHEN METABOLIC PANEL: CPT

## 2019-09-19 PROCEDURE — 72125 CT NECK SPINE W/O DYE: CPT

## 2019-09-19 PROCEDURE — G0378: CPT

## 2019-09-19 PROCEDURE — 85610 PROTHROMBIN TIME: CPT

## 2019-09-19 PROCEDURE — 85027 COMPLETE CBC AUTOMATED: CPT

## 2019-09-19 PROCEDURE — 99234 HOSP IP/OBS SM DT SF/LOW 45: CPT | Mod: 25

## 2019-09-19 PROCEDURE — 93005 ELECTROCARDIOGRAM TRACING: CPT | Mod: XU

## 2019-09-19 PROCEDURE — 70450 CT HEAD/BRAIN W/O DYE: CPT

## 2019-09-19 PROCEDURE — 30903 CONTROL OF NOSEBLEED: CPT | Mod: RT

## 2019-09-19 PROCEDURE — 84484 ASSAY OF TROPONIN QUANT: CPT

## 2019-09-19 RX ORDER — AMLODIPINE BESYLATE 2.5 MG/1
2.5 TABLET ORAL DAILY
Refills: 0 | Status: DISCONTINUED | OUTPATIENT
Start: 2019-09-19 | End: 2019-09-23

## 2019-09-19 RX ORDER — CEPHALEXIN 500 MG
500 CAPSULE ORAL ONCE
Refills: 0 | Status: COMPLETED | OUTPATIENT
Start: 2019-09-19 | End: 2019-09-19

## 2019-09-19 RX ORDER — LEVOTHYROXINE SODIUM 125 MCG
112 TABLET ORAL DAILY
Refills: 0 | Status: DISCONTINUED | OUTPATIENT
Start: 2019-09-19 | End: 2019-09-23

## 2019-09-19 RX ORDER — CEPHALEXIN 500 MG
1 CAPSULE ORAL
Qty: 10 | Refills: 0
Start: 2019-09-19 | End: 2019-09-23

## 2019-09-19 RX ORDER — LATANOPROST 0.05 MG/ML
1 SOLUTION/ DROPS OPHTHALMIC; TOPICAL AT BEDTIME
Refills: 0 | Status: DISCONTINUED | OUTPATIENT
Start: 2019-09-19 | End: 2019-09-23

## 2019-09-19 RX ORDER — PANTOPRAZOLE SODIUM 20 MG/1
40 TABLET, DELAYED RELEASE ORAL DAILY
Refills: 0 | Status: DISCONTINUED | OUTPATIENT
Start: 2019-09-19 | End: 2019-09-23

## 2019-09-19 RX ORDER — PHENYLEPHRINE HCL 0.25 %
1 AEROSOL, SPRAY WITH PUMP (ML) NASAL ONCE
Refills: 0 | Status: COMPLETED | OUTPATIENT
Start: 2019-09-19 | End: 2019-09-19

## 2019-09-19 RX ORDER — ACETAMINOPHEN 500 MG
650 TABLET ORAL ONCE
Refills: 0 | Status: COMPLETED | OUTPATIENT
Start: 2019-09-19 | End: 2019-09-19

## 2019-09-19 RX ORDER — ATORVASTATIN CALCIUM 80 MG/1
10 TABLET, FILM COATED ORAL DAILY
Refills: 0 | Status: DISCONTINUED | OUTPATIENT
Start: 2019-09-19 | End: 2019-09-23

## 2019-09-19 RX ORDER — ASPIRIN/CALCIUM CARB/MAGNESIUM 324 MG
1 TABLET ORAL
Qty: 0 | Refills: 0 | DISCHARGE

## 2019-09-19 RX ORDER — OXYCODONE HYDROCHLORIDE 5 MG/1
5 TABLET ORAL ONCE
Refills: 0 | Status: DISCONTINUED | OUTPATIENT
Start: 2019-09-19 | End: 2019-09-19

## 2019-09-19 RX ADMIN — Medication 500 MILLIGRAM(S): at 18:26

## 2019-09-19 RX ADMIN — OXYCODONE HYDROCHLORIDE 5 MILLIGRAM(S): 5 TABLET ORAL at 10:43

## 2019-09-19 RX ADMIN — Medication 650 MILLIGRAM(S): at 10:12

## 2019-09-19 RX ADMIN — Medication 1 SPRAY(S): at 06:38

## 2019-09-19 RX ADMIN — Medication 650 MILLIGRAM(S): at 06:36

## 2019-09-19 NOTE — ED CDU PROVIDER INITIAL DAY NOTE - PROGRESS NOTE DETAILS
CDU NOTE JADEN Rojas: pt resting comfortably, feels well without complaint. NAD recent VSS. packing in place. no active bleeding. pt seen by ENT attending, everything looks good, will reeval later but pt should be able to go home later. CDU NOTE JADEN Rojas: pt resting comfortably, feels well without complaint. no bleeding. NAD recent VSS. no bleeding. packing in place. CDU NOTE JADEN Rojas: pt reevaluated by ENT attending, no bleeding, ok to send home with keflex 500mg BID x 5 days. pt to f/up in office monday for packing removal. and to hold eliquis.   as per Dr. Downey - pt aria for d/c home.

## 2019-09-19 NOTE — ED CDU PROVIDER INITIAL DAY NOTE - ATTENDING CONTRIBUTION TO CARE
I, Jose Beckwith, have personally performed a face to face diagnostic evaluation on this patient.  I have reviewed the ACP note and agree with the history, exam, and plan of care, except as noted.  History and Exam by me shows left nasal packing in place.  Patient stable and to be re-evaluated by ENT prior to discharge.

## 2019-09-19 NOTE — CONSULT NOTE ADULT - PROBLEM SELECTOR RECOMMENDATION 9
- gram-positive abx coverage for duration of packing placement  - Continue with Nasal Packing (Rapid Rhino)  - Strict blood pressure control.  - Nasal saline, 2 sprays to both nares 4 times a day  - Avoid nasal trauma; no nose rubbing, blowing or manipulating nasal packing.  Sneeze with mouth open and pinching nares.  - Avoid bending with head below the waist.    -No heavy lifting.

## 2019-09-19 NOTE — CONSULT NOTE ADULT - SUBJECTIVE AND OBJECTIVE BOX
CC: Epistaxis    HPI:   89yo female pmhx afib on eliquis presenting to ED s/p fall. Pt is c/o bilateral nose bleed and blood in mouth. Pt was seen by ED attending who placed bilateral merocel, however pt continued to bleed. Pt admits to prior hx of cauterization for nose bleeds. BP is elevated (188/74 - 200/73). Pt denies bleeding disorders, recent sinusitis/cold.       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;mylar dysplasia&#x27; treated with &quot; Arinesp&quot; x past 1.5 yrs- last dose 4 months ago  GERD (Gastroesophageal Reflux Disease)  Chronic Glaucoma: R eye  Hypercholesterolemia  HTN (Hypertension)  Hypothyroidism  Chronic Osteoarthritis  S/P Cataract Surgery  S/P Breast Lumpectomy: benign  S/P TKR (Total Knee Replacement): left  S/P T&A: childhood    Basal Cell Carcinoma of Face: 4 yrs ago  Carpal Tunnel Syndrome: 10 yrs ago  Rectocele: 47 yrs ago  Cystocele: 47 yrs ago  S/P Breast Lumpectomy: 10 yrs ago  Bilateral Cataracts: 71 y/o  History of Total Knee Replacement: L 1998  Skin Cancer: of face , basal cell   4 yrs ago    Allergies    No Known Allergies    Intolerances      MEDICATIONS  (STANDING):    MEDICATIONS  (PRN):      Social History: unknown    Family history: no pertinent family history    ROS:   ENT: all negative except as noted in HPI   CV: denies palpitations  Pulm: denies SOB, cough, hemoptysis  GI: denies change in appetite indigestion, n/v  : denies pertinent urinary symptoms, urgency  Neuro: denies numbness/tingling, loss of sensation  Psych: denies anxiety  MS: denies muscle weakness, instability  Heme: denies easy bruising or bleeding  Endo: denies heat/cold intolerance, excessive sweating  Vascular: denies LE edema    Vital Signs Last 24 Hrs  T(C): 37.1 (19 Sep 2019 06:03), Max: 37.1 (19 Sep 2019 06:03)  T(F): 98.8 (19 Sep 2019 06:03), Max: 98.8 (19 Sep 2019 06:03)  HR: 69 (19 Sep 2019 06:03) (69 - 94)  BP: 188/74 (19 Sep 2019 06:03) (188/74 - 200/73)  BP(mean): --  RR: 19 (19 Sep 2019 06:03) (19 - 20)  SpO2: 98% (19 Sep 2019 06:03) (98% - 99%)                          12.0   7.4   )-----------( 253      ( 19 Sep 2019 05:36 )             38.5    09-19    140  |  105  |  37<H>  ----------------------------<  106<H>  3.7   |  21<L>  |  1.43<H>    Ca    9.5      19 Sep 2019 05:36    TPro  7.5  /  Alb  3.6  /  TBili  0.4  /  DBili  x   /  AST  19  /  ALT  9<L>  /  AlkPhos  56  09-19   PT/INR - ( 19 Sep 2019 05:36 )   PT: 13.2 sec;   INR: 1.14 ratio         PTT - ( 19 Sep 2019 05:36 )  PTT:39.9 sec    PHYSICAL EXAM:  Gen: NAD  Skin: No rashes, bruises, or lesions  Head: Normocephalic, Atraumatic  Face: no edema, erythema, or fluctuance. Parotid glands soft without mass  Eyes: no scleral injection  Ears: Right - ear canal clear, TM intact without effusion or erythema. No evidence of any fluid drainage. No mastoid tenderness, erythema, or ear bulging            Left - ear canal with blood, TM intact without effusion or erythema. No evidence of any fluid drainage. No mastoid tenderness, erythema, or ear bulging  Nose: Bilateral nasal packing (merocel) in place, actively bleeding. Rapid rhino 5.5 and surgicel placed in left nare. Surgicel and bacitracin placed in right nare.  Mouth: No Stridor / Drooling / Trismus.  Mucosa moist, tongue/uvula midline, actively bleeding from oropharynx prior to packing which subsided  Neck: Flat, supple, no lymphadenopathy, trachea midline, no masses  Lymphatic: No lymphadenopathy  Resp:  no stridor  CV: no peripheral edema/cyanosis  GI: nondistended   Peripheral vascular: no JVD or edema  Neuro: facial nerve intact, no facial droop      IMAGING STUDIES: IMPRESSION:   CT Head:    1.  Small anterior frontal scalp/forehead hematoma.  No CT evidence of   acute intracranial hemorrhage, extra-axial collection, mass effect or   calvarial fracture.  2.  Partially visualized, there is amorphous high attenuation material in   the right nasal cavity extending into the nasopharynx, suspicious for   hemorrhage.  Correlate clinically for epistaxis.  3.  Air-fluid levels in the maxillary sinuses and sphenoid sinuses may   also be related to epistaxes.  Sinusitis should be excluded clinically.  4.  Dedicated maxillofacial CT may be obtained as clinically warranted.    CT Cervical Spine: No CT evidence of cervical spine fracture or traumatic   malalignment.

## 2019-09-19 NOTE — ED PROVIDER NOTE - OBJECTIVE STATEMENT
89F pertinent PMH of Afib on Eliquis BIBEMS after fall from standing. Pt got up to use bathroom this AM and fell forward onto her face. No LOC. No N/V. No CP/SOB/Abd pain. No recent illness. Averages about 2 falls per year per family member. Ambulates independently.

## 2019-09-19 NOTE — ED PROCEDURE NOTE - ATTENDING CONTRIBUTION TO CARE
I was physically present for the E/M service provided. I was physically present for the key portions of the service provided. ES.

## 2019-09-19 NOTE — ED ADULT NURSE NOTE - CHIEF COMPLAINT QUOTE
sp tripped/fell nasal inj w/ chronic knee pain on eliquiis sp tripped/fell nasal inj w/ chronic knee pain on Eliquis

## 2019-09-19 NOTE — ED CDU PROVIDER INITIAL DAY NOTE - PHYSICAL EXAMINATION
L ear- mild oozing of blood, no laceration noted.   L nare with packing in place, no active bleeding. pharynx with dark blood, no new blood noted.

## 2019-09-19 NOTE — ED CDU PROVIDER DISPOSITION NOTE - NSFOLLOWUPINSTRUCTIONS_ED_ALL_ED_FT
1. stay hydrated. rest. ice to forehead 20 mins on, 40 mins off in cycle. leave packing in place, avoid touching. Your blood pressure was elevated here, important blood pressure controlled, watch blood pressures at home and take your medicine as routine.  - Nasal saline, 2 sprays to both nares 4 times a day  - Avoid nasal trauma; no nose rubbing, blowing or manipulating nasal packing.  Sneeze with mouth open and pinching nares.  - Avoid bending with head below the waist.    -No heavy lifting.  2. continue current home medications EXCEPT HOLD Eliquis. Discuss with your Cardiologist tomorrow. Follow up with Cardiologist Dr. Carbajal in 1-2 days, and follow up elevated blood pressure.   3. Follow up with your PCP in 1 -2 days.  4. Follow up with ENT Dr. Awad on Monday for packing removal.  5. Bring Printed Results to your Doctor Visits. Head injury instructions given to you.  6. Return if symptoms worsen, fever, weakness, numbness, dizziness, vision change, slurred speech, bleeding and all other concerns

## 2019-09-19 NOTE — ED ADULT NURSE NOTE - OBJECTIVE STATEMENT
89y female presents to ED complaining of fall. PT is a/ox4 BIBEMS from home s/p fall. Pt states that she was walking to the bathroom around 0200 when she tripped and fell on the carpet. PT normally ambulatory and independent at home with cane/walker. Pt presents with slight epistaxis to R nare, bruising on upper and lower lip, small L sided forehead hematoma. Pt on eliquis from Afib. 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 Pt breathing spontaneous and unlabored with lungs clear to auscultation bilaterally. Pt skin is warm, dry and intact with no edema present. Pt abdomen soft, nontender, nondistended with bowel sounds present in all 4 quadrants. Pt PERRL, with equal strength bilaterally in upper and lower extremities with full sensation. Pt denies chest pain and SOB, denies n/v/d, denies fever/chills and cough, denies dysuria, denies numbness/tingling and weakness.

## 2019-09-19 NOTE — ED CDU PROVIDER DISPOSITION NOTE - PATIENT PORTAL LINK FT
You can access the FollowMyHealth Patient Portal offered by [facility] by registering at the following website: [email or web address]. By joining [facility]’s FollowMyHealth portal, you will also be able to view your health information using other applications (apps) compatible with our system.

## 2019-09-19 NOTE — ED CDU PROVIDER INITIAL DAY NOTE - OBJECTIVE STATEMENT
89 y/o F h/o Afib on Eliquis, HLD, GERD, Hypothyroidism, here for epistaxis s/p mechanical fall. pt reports that she was at home got up at 2am to go to bathroom and trip and fall hitting face on floor. pt has life alert but didn't use because didn't want to come to hospital, was able to call daughter who called EMS. pt reports greatest concern was nose bleed that wouldn't stop and injury to forehead. no other injuries. never lost consciousness. does also report some oozing of blood from L ear.   denies fever, weakness, numbness, sob/dyspnea, cp, lightheadedness.

## 2019-09-19 NOTE — ED ADULT NURSE NOTE - NSIMPLEMENTINTERV_GEN_ALL_ED
Implemented All Universal Safety Interventions:  [place] to call system. Call bell, personal items and telephone within reach. Instruct patient to call for assistance. Room bathroom lighting operational. Non-slip footwear when patient is off stretcher. Physically safe environment: no spills, clutter or unnecessary equipment. Stretcher in lowest position, wheels locked, appropriate side rails in place.

## 2019-09-19 NOTE — ED CDU PROVIDER DISPOSITION NOTE - CLINICAL COURSE
89 y/o F h/o Afib on Eliquis, HLD, GERD, Hypothyroidism, here for epistaxis s/p mechanical fall. pt reports that she was at home got up at 2am to go to bathroom and trip and fall hitting face on floor. pt has life alert but didn't use because didn't want to come to hospital, was able to call daughter who called EMS. pt reports greatest concern was nose bleed that wouldn't stop and injury to forehead. no other injuries. never lost consciousness. does also report some oozing of blood from L ear.   denies fever, weakness, numbness, sob/dyspnea, cp, lightheadedness.  pt seen by ENT for epistaxis- packed with nasal packing and sent to cdu for obs for bleeding, repeat cbc, and ENT follow. pt did well, no additional bleeding. pt to go home on keflex, hold eliquis and to f/up in ENT office Monday.

## 2019-09-19 NOTE — ED CDU PROVIDER DISPOSITION NOTE - ATTENDING CONTRIBUTION TO CARE
Patient seen and evaluated at bedside.  Case d/w CDU PA, labs reviewed, ENT c/s appreciated.  Pt without bleeding for ~9hrs, asymptomatic, given strict precautions re caring for nasal packing.  Given strict return precautions.  Recommended d/c eliquis until pt seen by ENT in 4 days; shared decision making used, pt understands she is at slightly higher risk for CVA though this is outweighed by risk of rpt clinically significant recurrence of epistaxis if continuing AC.  Pt/daughter understand risks, will f/u c ENT and cards.  D/C.  --BMM

## 2019-09-19 NOTE — ED PROVIDER NOTE - CARE PLAN
Principal Discharge DX:	Epistaxis  Secondary Diagnosis:	Contusion of forehead, initial encounter  Secondary Diagnosis:	Fall, initial encounter

## 2019-09-19 NOTE — CONSULT NOTE ADULT - ASSESSMENT
89yo female pmhx afib on eliquis with bilateral epistaxis s/p fall controlled with bilateral nasal packings.

## 2019-09-19 NOTE — ED CDU PROVIDER INITIAL DAY NOTE - MEDICAL DECISION MAKING DETAILS
Patient placed in CDU post nasal packing.  Patient initially had b/l nasal packing and is on a blood thinner.  Will monitor in CDU for continual bleeding although pt stable prior to transfer to CDU.  ENT aware of patient and removed the right nare packing.

## 2019-09-19 NOTE — ED PROVIDER NOTE - PROGRESS NOTE DETAILS
Jovanna Hernández MD PGY-2 pt signed out to me. pending CDU admission. On reasessment, pt states it is hard for her to breath, placed on pulse ox and saturating 97% on RA, recommended mouth breathing while packing in.

## 2019-09-19 NOTE — ED PROVIDER NOTE - PHYSICAL EXAMINATION
CONSTITUTIONAL: Well appearing, well nourished, awake, alert, oriented to person, place, time/situation and in no apparent distress  ENMT: Airway patent, contusion center of forehead, fresh blood right nares  EYES: Clear bilaterally  CARDIAC: Normal rate, regular rhythm.  Heart sounds S1, S2.  No murmurs, rubs or gallops  RESPIRATORY: Breath sounds clear and equal bilaterally.  ABDOMEN: Abdomen soft, non-tender, no guarding  MUSCULOSKELETAL: Spine appears normal, no deformities, equal active FROM bilaterally  NEUROLOGIC: CN II-XII grossly intact, moves all extremities without lateralization  SKIN: Exposed skin normal color for race, warm, dry and intact

## 2019-09-19 NOTE — ED ADULT NURSE NOTE - ED STAT RN HANDOFF DETAILS 2
Patient  is  transferred  to  CDU,  report  is  given.  Blood  oozing  persists b from  left  nostril.  Patient  denies  any  dizziness.

## 2019-09-19 NOTE — ED PROVIDER NOTE - ATTENDING CONTRIBUTION TO CARE
Afebrile. Awake and Alert. No mid-line CS TTP. Forehead hematoma. Lungs CTA. Heart RRR. Abdomen soft NTND. CN II-XII grossly intact. Moves all extremities without lateralization. Pelvis stable.    Fall mechanical by history  CTH/CS given fall on AC  Afib rate controlled Afebrile. Awake and Alert. No mid-line CS TTP. Forehead hematoma. Lungs CTA. Heart RRR. Abdomen soft NTND. CN II-XII grossly intact. Moves all extremities without lateralization. Pelvis stable. Bleeding right nares.    Fall mechanical by history  CTH/CS given fall on AC  Afib rate controlled  Epistaxis packing per procedure note  ENT c/s for persistent epistaxis

## 2019-09-19 NOTE — ED ADULT NURSE NOTE - NS ED NURSE DC INFO COMPLEXITY
Class II - visualization of the soft palate, fauces, and uvula
Patient asked questions/Verbalized Understanding/Simple: Patient demonstrates quick and easy understanding

## 2019-09-19 NOTE — ED ADULT NURSE REASSESSMENT NOTE - COMFORT CARE
wait time explained/side rails up/treatment delay explained/warm blanket provided/darkened lights/plan of care explained

## 2019-09-23 ENCOUNTER — APPOINTMENT (OUTPATIENT)
Dept: OTOLARYNGOLOGY | Facility: CLINIC | Age: 84
End: 2019-09-23
Payer: MEDICARE

## 2019-09-23 VITALS — BODY MASS INDEX: 33.74 KG/M2 | HEIGHT: 56 IN | WEIGHT: 150 LBS

## 2019-09-23 PROCEDURE — 31238 NSL/SINS NDSC SRG NSL HEMRRG: CPT | Mod: LT

## 2019-09-23 PROCEDURE — 99203 OFFICE O/P NEW LOW 30 MIN: CPT | Mod: 25

## 2019-09-23 RX ORDER — PRAVASTATIN SODIUM 40 MG/1
40 TABLET ORAL
Qty: 90 | Refills: 0 | Status: ACTIVE | COMMUNITY
Start: 2019-04-17

## 2019-09-23 RX ORDER — APIXABAN 2.5 MG/1
2.5 TABLET, FILM COATED ORAL
Qty: 180 | Refills: 0 | Status: ACTIVE | COMMUNITY
Start: 2019-09-16

## 2019-09-23 RX ORDER — LEVOTHYROXINE SODIUM 0.11 MG/1
112 TABLET ORAL
Qty: 90 | Refills: 0 | Status: ACTIVE | COMMUNITY
Start: 2019-04-17

## 2019-09-23 RX ORDER — FUROSEMIDE 20 MG/1
20 TABLET ORAL
Qty: 90 | Refills: 0 | Status: ACTIVE | COMMUNITY
Start: 2019-06-12

## 2019-09-23 RX ORDER — OMEPRAZOLE 20 MG/1
20 CAPSULE, DELAYED RELEASE ORAL
Qty: 90 | Refills: 0 | Status: ACTIVE | COMMUNITY
Start: 2019-01-15

## 2019-09-23 RX ORDER — CEPHALEXIN 500 MG/1
500 TABLET ORAL
Qty: 10 | Refills: 0 | Status: ACTIVE | COMMUNITY
Start: 2019-09-19

## 2019-09-23 RX ORDER — AMLODIPINE BESYLATE 2.5 MG/1
2.5 TABLET ORAL
Qty: 90 | Refills: 0 | Status: ACTIVE | COMMUNITY
Start: 2019-08-07

## 2019-09-23 RX ORDER — BIMATOPROST 0.1 MG/ML
0.01 SOLUTION/ DROPS OPHTHALMIC
Qty: 5 | Refills: 0 | Status: ACTIVE | COMMUNITY
Start: 2018-11-16

## 2019-09-23 RX ORDER — FOLIC ACID 1 MG/1
1 TABLET ORAL
Qty: 90 | Refills: 0 | Status: ACTIVE | COMMUNITY
Start: 2019-06-17

## 2019-09-23 RX ORDER — DICLOFENAC SODIUM 10 MG/G
1 GEL TOPICAL
Qty: 100 | Refills: 0 | Status: ACTIVE | COMMUNITY
Start: 2019-04-24

## 2019-09-23 NOTE — ASSESSMENT
[FreeTextEntry1] : Epistaxis status post endoscopic cauterization - arterial bleed from floor of the nose, cauterized and packed \par Will give regiment of moisturization and nasal precautions to allow cauterized area to heal and avoid further bleeding. patient was instructed what to do in the case of another bleed\par \par \par I personally saw and examined BERHANE WOODWARD in detail. I spoke to JADEN Thomas regarding the assessment and plan of care.  I preformed the procedures and I reviewed the above assessment and plan of care, and agree. I have made changes in changes in the body of the note where appropriate.\par \par

## 2019-09-23 NOTE — PROCEDURE
[FreeTextEntry3] : Procedure performed: endoscopic control of epistaxis left \par Pre-op/post-op indication: Epistaxis left\par Verbal and/or written consent obtained from patient\par Telescope - Rigid glass telescope #0\par Anesthesia and/or vasoconstriction was achieved topically by usin% Lidocaine spray and Afrin\par The scope was introduced in the nasal passage between the middle and inferior turbinates to exam the inferior portion of the middle meatus and the fontanelle, as well as the maxillary ostia.  Next, the scope was passed medically and posteriorly to the middle turbinates to examine the sphenoethmoid recess and the superior turbinate region.\par Upon visualization the finders are as follows:\par Nasal Septum: sigmoidal septal deviation. Possible source of bleeding visualized on left nasal floor - pulsatile \par Bilateral exam showed normal Mucosa of the Inferior Turbinate, Middle Turbinate, and Superior Turbinate; scant Mucous, no polyps or masses; Inferior, Super and Middle Meatus were clear\par Area of likely bleeding left mid nasal floor cauterized with silver nitrate and layered with surgicell, Nasopore and bacitracin ointment\par The patient tolerated the procedure well\par \par

## 2019-09-23 NOTE — HISTORY OF PRESENT ILLNESS
[de-identified] : 89 y/o female with bilat. epistaxis which began after nasal trauma- fell at home this past Thursday. She was evaluated. at [facility], no nasal fracture but left epistaxis balloon placed after bleeding continued after initial packing. She was on Eliquis at the time but has since stopped. She notes very scant blood in her mucus- bright and dark red at times. Completing abx course now.

## 2019-09-23 NOTE — PHYSICAL EXAM
[Midline] : trachea located in midline position [Normal] : no rashes [de-identified] : Left epistaxis balloon in place

## 2019-10-15 ENCOUNTER — APPOINTMENT (OUTPATIENT)
Dept: OTOLARYNGOLOGY | Facility: CLINIC | Age: 84
End: 2019-10-15
Payer: MEDICARE

## 2019-10-15 VITALS
SYSTOLIC BLOOD PRESSURE: 177 MMHG | HEART RATE: 81 BPM | DIASTOLIC BLOOD PRESSURE: 83 MMHG | WEIGHT: 150 LBS | BODY MASS INDEX: 33.74 KG/M2 | HEIGHT: 56 IN

## 2019-10-15 DIAGNOSIS — R04.0 EPISTAXIS: ICD-10-CM

## 2019-10-15 PROCEDURE — 31231 NASAL ENDOSCOPY DX: CPT

## 2019-10-15 PROCEDURE — 99213 OFFICE O/P EST LOW 20 MIN: CPT | Mod: 25

## 2019-10-15 NOTE — ASSESSMENT
[FreeTextEntry1] : Epistaxis status post endoscopic cauterization - arterial bleed from floor of the nose, cauterized and packed \par Will give regiment of moisturization and nasal precautions to allow cauterized area to heal and avoid further bleeding. patient was instructed what to do in the case of another bleed\par \par \par \par I personally saw and examined BERHANE WOODWARD in detail. I spoke to JADEN Thomas regarding the assessment and plan of care.  I preformed the procedures and I reviewed the above assessment and plan of care, and agree. I have made changes in changes in the body of the note where appropriate.\par \par \par

## 2019-10-15 NOTE — PHYSICAL EXAM
[Midline] : trachea located in midline position [Normal] : orientation to person, place, and time: normal [de-identified] : Left epistaxis balloon in place

## 2019-10-15 NOTE — HISTORY OF PRESENT ILLNESS
[de-identified] : 89 y/o female with bilat. epistaxis which began after nasal trauma after fall at home with arterial bleed from floor of the nose, now s/p cauterization and packing in office 9/24/19. Hasn't had any bleeding since- has been following post cautery instructions. \par No nasal obstruction or congestion.\par No facial pain or sinus pressure. \par \par \par \par

## 2019-10-15 NOTE — PROCEDURE
[FreeTextEntry3] : \par \par  [FreeTextEntry6] : Procedure performed: Nasal Endoscopy- Diagnostic\par Pre / post -procedure indication(s): nasal congestion\par Verbal and/or written consent obtained from patient\par Scope #: [id number],  flexible fiber optic telescope used\par The scope was introduced in the nasal passage between the middle and inferior turbinates to exam the inferior portion of the middle meatus and the fontanelle, as well as the maxillary ostia.  Next, the scope was passed medically and posteriorly to the middle turbinates to examine the sphenoethmoid recess and the superior turbinate region.\par Upon visualization the finders are as follows:\par Nasal Septum: sigmoidal septal deviation, left mid nasal floor healing well\par B/L: Mucosa: pink and moist, Mucous: scant, Polyp: not seen, Inferior Turbinate, Middle and Superior Turbinate: normal, Inferior Meatus: narrow, Middle Meatus: narrow, Super Meatus:normal, Sphenoethmoidal Recess: clear.  Eustachian tube clear. \par The patient tolerated the procedure well\par \par \par

## 2019-10-15 NOTE — HISTORY OF PRESENT ILLNESS
[de-identified] : 89 y/o female with bilat. epistaxis which began after nasal trauma after fall at home with arterial bleed from floor of the nose, now s/p cauterization and packing in office 9/24/19. Hasn't had any bleeding since- has been following post cautery instructions. \par No nasal obstruction or congestion.\par No facial pain or sinus pressure. \par \par \par \par

## 2019-10-15 NOTE — PHYSICAL EXAM
[Midline] : trachea located in midline position [Normal] : no rashes [de-identified] : Left epistaxis balloon in place

## 2019-10-20 ENCOUNTER — EMERGENCY (EMERGENCY)
Facility: HOSPITAL | Age: 84
LOS: 1 days | Discharge: HOME | End: 2019-10-20
Attending: EMERGENCY MEDICINE
Payer: COMMERCIAL

## 2019-10-20 ENCOUNTER — TRANSCRIPTION ENCOUNTER (OUTPATIENT)
Age: 84
End: 2019-10-20

## 2019-10-20 VITALS
HEIGHT: 56 IN | OXYGEN SATURATION: 97 % | TEMPERATURE: 98 F | DIASTOLIC BLOOD PRESSURE: 80 MMHG | WEIGHT: 293 LBS | HEART RATE: 93 BPM | RESPIRATION RATE: 16 BRPM | SYSTOLIC BLOOD PRESSURE: 189 MMHG

## 2019-10-20 VITALS
TEMPERATURE: 98 F | SYSTOLIC BLOOD PRESSURE: 155 MMHG | HEART RATE: 60 BPM | OXYGEN SATURATION: 98 % | RESPIRATION RATE: 18 BRPM | DIASTOLIC BLOOD PRESSURE: 79 MMHG

## 2019-10-20 LAB
ALBUMIN SERPL ELPH-MCNC: 3.7 G/DL — SIGNIFICANT CHANGE UP (ref 3.3–5)
ALP SERPL-CCNC: 55 U/L — SIGNIFICANT CHANGE UP (ref 40–120)
ALT FLD-CCNC: 14 U/L — SIGNIFICANT CHANGE UP (ref 10–45)
ANION GAP SERPL CALC-SCNC: 15 MMOL/L — SIGNIFICANT CHANGE UP (ref 5–17)
AST SERPL-CCNC: 19 U/L — SIGNIFICANT CHANGE UP (ref 10–40)
BASE EXCESS BLDV CALC-SCNC: -0.3 MMOL/L — SIGNIFICANT CHANGE UP (ref -2–2)
BASOPHILS # BLD AUTO: 0.03 K/UL — SIGNIFICANT CHANGE UP (ref 0–0.2)
BASOPHILS NFR BLD AUTO: 0.4 % — SIGNIFICANT CHANGE UP (ref 0–2)
BILIRUB SERPL-MCNC: 0.2 MG/DL — SIGNIFICANT CHANGE UP (ref 0.2–1.2)
BUN SERPL-MCNC: 26 MG/DL — HIGH (ref 7–23)
CA-I SERPL-SCNC: 1.15 MMOL/L — SIGNIFICANT CHANGE UP (ref 1.12–1.3)
CALCIUM SERPL-MCNC: 8.9 MG/DL — SIGNIFICANT CHANGE UP (ref 8.4–10.5)
CHLORIDE BLDV-SCNC: 107 MMOL/L — SIGNIFICANT CHANGE UP (ref 96–108)
CHLORIDE SERPL-SCNC: 103 MMOL/L — SIGNIFICANT CHANGE UP (ref 96–108)
CO2 BLDV-SCNC: 25 MMOL/L — SIGNIFICANT CHANGE UP (ref 22–30)
CO2 SERPL-SCNC: 21 MMOL/L — LOW (ref 22–31)
CREAT SERPL-MCNC: 1.65 MG/DL — HIGH (ref 0.5–1.3)
EOSINOPHIL # BLD AUTO: 0.06 K/UL — SIGNIFICANT CHANGE UP (ref 0–0.5)
EOSINOPHIL NFR BLD AUTO: 0.8 % — SIGNIFICANT CHANGE UP (ref 0–6)
GAS PNL BLDV: 136 MMOL/L — SIGNIFICANT CHANGE UP (ref 135–145)
GAS PNL BLDV: SIGNIFICANT CHANGE UP
GAS PNL BLDV: SIGNIFICANT CHANGE UP
GLUCOSE BLDV-MCNC: 108 MG/DL — HIGH (ref 70–99)
GLUCOSE SERPL-MCNC: 111 MG/DL — HIGH (ref 70–99)
HCO3 BLDV-SCNC: 24 MMOL/L — SIGNIFICANT CHANGE UP (ref 21–29)
HCT VFR BLD CALC: 32.8 % — LOW (ref 34.5–45)
HCT VFR BLDA CALC: 35 % — LOW (ref 39–50)
HGB BLD CALC-MCNC: 11.4 G/DL — LOW (ref 11.5–15.5)
HGB BLD-MCNC: 10.4 G/DL — LOW (ref 11.5–15.5)
IMM GRANULOCYTES NFR BLD AUTO: 0.3 % — SIGNIFICANT CHANGE UP (ref 0–1.5)
LACTATE BLDV-MCNC: 1.2 MMOL/L — SIGNIFICANT CHANGE UP (ref 0.7–2)
LYMPHOCYTES # BLD AUTO: 1.4 K/UL — SIGNIFICANT CHANGE UP (ref 1–3.3)
LYMPHOCYTES # BLD AUTO: 19 % — SIGNIFICANT CHANGE UP (ref 13–44)
MCHC RBC-ENTMCNC: 28.3 PG — SIGNIFICANT CHANGE UP (ref 27–34)
MCHC RBC-ENTMCNC: 31.7 GM/DL — LOW (ref 32–36)
MCV RBC AUTO: 89.4 FL — SIGNIFICANT CHANGE UP (ref 80–100)
MONOCYTES # BLD AUTO: 0.47 K/UL — SIGNIFICANT CHANGE UP (ref 0–0.9)
MONOCYTES NFR BLD AUTO: 6.4 % — SIGNIFICANT CHANGE UP (ref 2–14)
NEUTROPHILS # BLD AUTO: 5.4 K/UL — SIGNIFICANT CHANGE UP (ref 1.8–7.4)
NEUTROPHILS NFR BLD AUTO: 73.1 % — SIGNIFICANT CHANGE UP (ref 43–77)
NRBC # BLD: 0 /100 WBCS — SIGNIFICANT CHANGE UP (ref 0–0)
PCO2 BLDV: 39 MMHG — SIGNIFICANT CHANGE UP (ref 35–50)
PH BLDV: 7.4 — SIGNIFICANT CHANGE UP (ref 7.35–7.45)
PLATELET # BLD AUTO: 282 K/UL — SIGNIFICANT CHANGE UP (ref 150–400)
PO2 BLDV: 51 MMHG — HIGH (ref 25–45)
POTASSIUM BLDV-SCNC: 5.8 MMOL/L — HIGH (ref 3.5–5.3)
POTASSIUM SERPL-MCNC: 4.3 MMOL/L — SIGNIFICANT CHANGE UP (ref 3.5–5.3)
POTASSIUM SERPL-SCNC: 4.3 MMOL/L — SIGNIFICANT CHANGE UP (ref 3.5–5.3)
PROT SERPL-MCNC: 7.5 G/DL — SIGNIFICANT CHANGE UP (ref 6–8.3)
RBC # BLD: 3.67 M/UL — LOW (ref 3.8–5.2)
RBC # FLD: 16.7 % — HIGH (ref 10.3–14.5)
SAO2 % BLDV: 84 % — SIGNIFICANT CHANGE UP (ref 67–88)
SODIUM SERPL-SCNC: 139 MMOL/L — SIGNIFICANT CHANGE UP (ref 135–145)
WBC # BLD: 7.38 K/UL — SIGNIFICANT CHANGE UP (ref 3.8–10.5)
WBC # FLD AUTO: 7.38 K/UL — SIGNIFICANT CHANGE UP (ref 3.8–10.5)

## 2019-10-20 PROCEDURE — 99285 EMERGENCY DEPT VISIT HI MDM: CPT

## 2019-10-20 PROCEDURE — 83605 ASSAY OF LACTIC ACID: CPT

## 2019-10-20 PROCEDURE — 85014 HEMATOCRIT: CPT

## 2019-10-20 PROCEDURE — 94640 AIRWAY INHALATION TREATMENT: CPT

## 2019-10-20 PROCEDURE — 93005 ELECTROCARDIOGRAM TRACING: CPT

## 2019-10-20 PROCEDURE — 84132 ASSAY OF SERUM POTASSIUM: CPT

## 2019-10-20 PROCEDURE — 82330 ASSAY OF CALCIUM: CPT

## 2019-10-20 PROCEDURE — 82947 ASSAY GLUCOSE BLOOD QUANT: CPT

## 2019-10-20 PROCEDURE — 82803 BLOOD GASES ANY COMBINATION: CPT

## 2019-10-20 PROCEDURE — 84295 ASSAY OF SERUM SODIUM: CPT

## 2019-10-20 PROCEDURE — 82435 ASSAY OF BLOOD CHLORIDE: CPT

## 2019-10-20 PROCEDURE — 85027 COMPLETE CBC AUTOMATED: CPT

## 2019-10-20 PROCEDURE — 80053 COMPREHEN METABOLIC PANEL: CPT

## 2019-10-20 PROCEDURE — 99283 EMERGENCY DEPT VISIT LOW MDM: CPT | Mod: 25

## 2019-10-20 PROCEDURE — 93010 ELECTROCARDIOGRAM REPORT: CPT

## 2019-10-20 RX ORDER — IPRATROPIUM/ALBUTEROL SULFATE 18-103MCG
3 AEROSOL WITH ADAPTER (GRAM) INHALATION ONCE
Refills: 0 | Status: COMPLETED | OUTPATIENT
Start: 2019-10-20 | End: 2019-10-20

## 2019-10-20 RX ORDER — FLUTICASONE PROPIONATE 50 MCG
1 SPRAY, SUSPENSION NASAL
Refills: 0 | Status: DISCONTINUED | OUTPATIENT
Start: 2019-10-20 | End: 2019-10-25

## 2019-10-20 RX ORDER — ALBUTEROL 90 UG/1
2 AEROSOL, METERED ORAL
Qty: 1 | Refills: 0
Start: 2019-10-20

## 2019-10-20 RX ORDER — ALBUTEROL 90 UG/1
2 AEROSOL, METERED ORAL ONCE
Refills: 0 | Status: COMPLETED | OUTPATIENT
Start: 2019-10-20 | End: 2019-10-20

## 2019-10-20 RX ADMIN — ALBUTEROL 2 PUFF(S): 90 AEROSOL, METERED ORAL at 18:27

## 2019-10-20 RX ADMIN — Medication 3 MILLILITER(S): at 17:39

## 2019-10-20 RX ADMIN — Medication 1 SPRAY(S): at 18:27

## 2019-10-20 NOTE — ED PROVIDER NOTE - NSFOLLOWUPINSTRUCTIONS_ED_ALL_ED_FT
1. Stay well hydrated  2.  Use flonase 1 spray in each nostril daily.  Albuterol inhaler 2 puffs every 4 hrs as needed for shortness of breath  3.  Follow up with your PMD in 2-3 days.  4.  Return to the ER for worsening cough, shortness of breath, fevers or any other concerning symptoms Thank you for visiting our Emergency Department, it has been a pleasure taking part in your healthcare.    1. Stay well hydrated  2.  Use Flonase 1 spray in each nostril daily.  Albuterol inhaler 2 puffs every 4 hrs as needed for shortness of breath  3.  Follow up with your PMD in 2-3 days.  4.  Return to the ER for worsening cough, shortness of breath, fevers or any other concerning symptoms

## 2019-10-20 NOTE — ED ADULT NURSE NOTE - OBJECTIVE STATEMENT
89y old female brought in by EMS from [facility] for cough. Patient states shes been having a cough and GUO for 1 week. Patient states she coughs up green/yellow sputum intermittently.  PMH HTN, MDS, Glaucoma, Hypothyroid. A&Ox3. Patient denies fever, HA, n/v vomiting, CP, dizziness, numbness and tingling. Patient is able to speak in full sentences. Rale/crackles in lower left base. 20G IV inserted in Lt AC. Patietn sitting in bed w/ daughter at bedside.

## 2019-10-20 NOTE — ED PROVIDER NOTE - CLINICAL SUMMARY MEDICAL DECISION MAKING FREE TEXT BOX
Alma Givens MD - Attending Physician: Pt here with URI symptoms, cough. Mild rhonchi bilaterally but no increased wob, no sob. Xray at  reviewed and was negative for pna. Meds for symptoms, f/u outpatient

## 2019-10-20 NOTE — ED PROVIDER NOTE - PSH
Basal Cell Carcinoma of Face  4 yrs ago  Bilateral Cataracts  69 y/o  Carpal Tunnel Syndrome  10 yrs ago  Cystocele  47 yrs ago  History of Total Knee Replacement  L 1998  Rectocele  47 yrs ago  S/P Breast Lumpectomy  benign  S/P Breast Lumpectomy  10 yrs ago  S/P Cataract Surgery    S/P T&A  childhood    S/P TKR (Total Knee Replacement)  left  Skin Cancer  of face , basal cell   4 yrs ago

## 2019-10-20 NOTE — ED PROVIDER NOTE - OBJECTIVE STATEMENT
89 yo female with PMhx of HTN, HLD, MDS p/w cough.  Patient reports that she has had intermittent cough for the past few days.  Cough has been associated with chills and nasal congestion.  Patient reports that she feels mucus going down the back of her throat and that causes her to cough.  No recent travel or sick contacts.  Denies fevers, CP, SOB, abd pain, NVD.  Went to urgent care today and was sent to the ER to R/o Pneumonia.  CXR from urgent care today did not show any signs of pneumonia.

## 2019-10-20 NOTE — ED ADULT NURSE NOTE - NSIMPLEMENTINTERV_GEN_ALL_ED
Implemented All Fall with Harm Risk Interventions:  [place] to call system. Call bell, personal items and telephone within reach. Instruct patient to call for assistance. Room bathroom lighting operational. Non-slip footwear when patient is off stretcher. Physically safe environment: no spills, clutter or unnecessary equipment. Stretcher in lowest position, wheels locked, appropriate side rails in place. Provide visual cue, wrist band, yellow gown, etc. Monitor gait and stability. Monitor for mental status changes and reorient to person, place, and time. Review medications for side effects contributing to fall risk. Reinforce activity limits and safety measures with patient and family. Provide visual clues: red socks.

## 2019-10-20 NOTE — ED PROVIDER NOTE - ATTENDING CONTRIBUTION TO CARE
Alma Givens MD - Attending Physician: I have personally seen and examined this patient. I have discussed the case with the ACP. I have reviewed all pertinent clinical information, including history, physical exam, plan and the ACP’s note and agree except as noted. See MDM

## 2019-10-20 NOTE — ED PROVIDER NOTE - PROGRESS NOTE DETAILS
CXR reviewed from urgent care, no evidence of pneumonia.  Patient afebrile.  no leukocytosis.  O2 sat 100% on RA.  Given albuterol nebs and flonase with improvement of symptoms.  Will DC home with close outpatient follow.  Strict return precautions provided.  -Solo Branch PA-C

## 2019-10-20 NOTE — ED PROVIDER NOTE - PHYSICAL EXAMINATION
Gen: AAO x 3, NAD  Skin: No rashes or lesions  HEENT: NC/AT, PERRLA, EOMI, MMM  Resp: unlabored, +ronchi BL  Cardiac: rrr s1s2, no murmurs, rubs or gallops  GI: ND, +BS, Soft, NT  Ext: no pedal edema, FROM in all extremities  Neuro: no focal deficits Gen: AAO x 3, NAD  Skin: No rashes or lesions  HEENT: NC/AT, PERRLA, EOMI, MMM  Resp: unlabored, +rhonchi BL, no increased WOB, no retractions, no hypoxia  Cardiac: rrr s1s2, no murmurs, rubs or gallops  GI: ND, +BS, Soft, NT  Ext: no pedal edema, FROM in all extremities  Neuro: no focal deficits

## 2019-10-20 NOTE — ED PROVIDER NOTE - PATIENT PORTAL LINK FT
You can access the FollowMyHealth Patient Portal offered by [facility] by registering at the following website: [email or web address]. By joining [facility]’s FollowMyHealth portal, you will also be able to view your health information using other applications (apps) compatible with our system.

## 2019-11-25 ENCOUNTER — APPOINTMENT (OUTPATIENT)
Dept: DERMATOLOGY | Facility: CLINIC | Age: 84
End: 2019-11-25

## 2020-10-28 ENCOUNTER — APPOINTMENT (OUTPATIENT)
Dept: ORTHOPEDIC SURGERY | Facility: CLINIC | Age: 85
End: 2020-10-28
Payer: MEDICARE

## 2020-10-28 DIAGNOSIS — M19.011 PRIMARY OSTEOARTHRITIS, RIGHT SHOULDER: ICD-10-CM

## 2020-10-28 DIAGNOSIS — M19.012 PRIMARY OSTEOARTHRITIS, RIGHT SHOULDER: ICD-10-CM

## 2020-10-28 PROCEDURE — 99072 ADDL SUPL MATRL&STAF TM PHE: CPT

## 2020-10-28 PROCEDURE — 99204 OFFICE O/P NEW MOD 45 MIN: CPT | Mod: 25

## 2020-10-28 PROCEDURE — 20610 DRAIN/INJ JOINT/BURSA W/O US: CPT | Mod: RT

## 2020-10-28 PROCEDURE — 73030 X-RAY EXAM OF SHOULDER: CPT | Mod: RT

## 2020-10-28 RX ORDER — LOSARTAN POTASSIUM 50 MG/1
50 TABLET, FILM COATED ORAL
Qty: 90 | Refills: 0 | Status: ACTIVE | COMMUNITY
Start: 2020-04-23

## 2020-10-29 PROBLEM — M19.011 PRIMARY OSTEOARTHRITIS OF BOTH SHOULDERS: Status: ACTIVE | Noted: 2020-10-29

## 2020-10-29 NOTE — HISTORY OF PRESENT ILLNESS
[de-identified] : Patient is here for bilateral shoulder pain that began over a year ago. There was no inciting injury. She has been seen by her PCP.  Pain is diffuse around b/l shoulders, pt reports very limited range of motion and has been worsening over the years. Has pain whenever she tires to reach over her head. Also has been having pain when sleeping on her sides. R > L, reports having some weakness in the right side with occasional shooting pains. Denies any numbness.\par \par The patient's past medical history, past surgical history, medications and allergies were reviewed by me today and documented accordingly. In addition, the patient's family and social history, which were noncontributory to this visit, were reviewed also. Intake form was reviewed. The patient has no family history of arthritis.

## 2020-10-29 NOTE — DISCUSSION/SUMMARY
[de-identified] : Discussed findings of today's exam and possible causes of patient's pain.  Educated patient on their most probable diagnosis of bilateral shoulder pain due to glenohumeral osteoarthritis.  Reviewed possible courses of treatment, and we collaboratively decided best course of treatment at this time will include bilateral cortisone injections today (see procedure note).  Informed the patient that the numbing medicine in today's injection will last for about 4-6 hours. The steroid that was injected will start to work in 1 to 2 days, peak at 1-2 weeks, and may last up to 1-2 months.  We discussed the role of physical therapy as well as home physical therapy, but due to patient's age and COVID-19 pandemic she does not feel comfortable with either of these options.  We may consider repeat cortisone injections with the limitations of 3 injections per joint per year.  If patient continues to have pain radiating down to the hands or numbness/tingling of the hands would recommend further evaluation of the C-spine at next visit.  Patient and her daughter appreciate and agree with current plan.\par \par This note was generated using dragon medical dictation software.  A reasonable effort has been made for proofreading its contents, but typos may still remain.  If there are any questions or points of clarification needed please notify my office.

## 2020-10-29 NOTE — PROCEDURE
[de-identified] : Injection: Left Glenohumeral Joint\par Indication: Osteoarthritis\par \par A discussion was had with the patient regarding this procedure and all questions were answered. All risks, benefits and alternatives were discussed. These included but were not limited to bleeding, infection, and allergic reaction.  A timeout was done to ensure correct side and pt agreed to the procedure.   Alcohol was used to clean the skin, and betadine was used to sterilize and prep the area in the posterior aspect of the shoulder. Ethyl chloride spray was then used as a topical anesthetic. A 22-gauge 1.5" needle was used to inject 4cc of 1% lidocaine without epinephrine and 1cc of 40mg/ml methylprednisolone into the glenohumeral joint. A sterile bandage was then applied. The patient tolerated the procedure well and there were no complications. \par \par _________________________\par Injection: Right Glenohumeral Joint\par Indication: Osteoarthritis\par \par A discussion was had with the patient regarding this procedure and all questions were answered. All risks, benefits and alternatives were discussed. These included but were not limited to bleeding, infection, and allergic reaction.  A timeout was done to ensure correct side and pt agreed to the procedure.   Alcohol was used to clean the skin, and betadine was used to sterilize and prep the area in the posterior aspect of the shoulder. Ethyl chloride spray was then used as a topical anesthetic. A 22-gauge 1.5" needle was used to inject 4cc of 1% lidocaine without epinephrine and 1cc of 40mg/ml methylprednisolone into the glenohumeral joint. A sterile bandage was then applied. The patient tolerated the procedure well and there were no complications. \par \par

## 2020-10-29 NOTE — PHYSICAL EXAM
[de-identified] : Constitutional: Well-nourished, well-developed, No acute distress\par Respiratory:  Good respiratory effort, no SOB\par Lymphatic: No regional lymphadenopathy, no lymphedema\par Psychiatric: Pleasant and normal affect, alert and oriented x3\par Skin: Clean dry and intact B/L UE/LE\par Musculoskeletal: normal except where as noted in regional exam\par \par B/L Elbows:  No asymmetry, malalignment, or swelling, Full ROM, 5/5 strength in flexion/ext, pronation/supination, Joints stable\par B/L Wrist and Hand:  No asymmetry, malalignment, or swelling, Full ROM, 5/5 strength in wrist and long finger flexion/ext, radial/ulnar deviation, Joints stable\par \par Bilateral shoulders:\par APPEARANCE: no marked deformities, no swelling or malalignment\par POSITIVE TENDERNESS: + Diffusely throughout the anterior/lateral/posterior shoulder, + anterior/posterior capsule\par NONTENDER: supraspinatus, infraspinatus, teres minor. biceps. AC joint. \par ROM: Significantly limited in all planes active and passive motion \par RESISTIVE TESTING: + pain, 4/5 resisted flex/ext, empty can/ER/IR, horizontal abd/add \par SPECIAL TESTS: + apley's scratch test for limited motion and pain, + drop arm for pain without sig weakness, + empty can for pain without sig weakness\par Vasc: 2+ radial pulse\par Neuro: AIN, PIN, Ulnar nerve intact to motor, DTRs 2+/4 biceps, triceps, brachioradialis\par Sensation: Intact to light touch throughout [de-identified] : The following radiographs were ordered and read by me during this patient's visit. I reviewed each radiograph in detail with the patient and discussed the findings as highlighted below. \par \par 3 views of the bilateral shoulders were obtained today that show degenerative changes and evidence of early severe osteoarthritis in the glenohumeral joints.  No fracture, or dislocation seen at this time. There is no malalignment. No other obvious osseous abnormality. Otherwise unremarkable.

## 2020-10-29 NOTE — CONSULT LETTER
[Dear  ___] : Dear ~PAUL, [Consult Letter:] : I had the pleasure of evaluating your patient, [unfilled]. [Please see my note below.] : Please see my note below. [Sincerely,] : Sincerely, [FreeTextEntry3] : Marko Ren DO, ATC\par Primary Care Sports Medicine\par [facility] [place]

## 2021-07-13 NOTE — DIETITIAN INITIAL EVALUATION ADULT. - PROBLEM SELECTOR PROBLEM 5
Need for prophylactic measure Ftsg Text: The defect edges were debeveled with a #15 scalpel blade.  Given the location of the defect, shape of the defect and the proximity to free margins a full thickness skin graft was deemed most appropriate.  Using a sterile surgical marker, the primary defect shape was transferred to the donor site. The area thus outlined was incised deep to adipose tissue with a #15 scalpel blade.  The harvested graft was then trimmed of adipose tissue until only dermis and epidermis was left.  The skin margins of the secondary defect were undermined to an appropriate distance in all directions utilizing iris scissors.  The secondary defect was closed with interrupted buried subcutaneous sutures.  The skin edges were then re-apposed with running  sutures.  The skin graft was then placed in the primary defect and oriented appropriately.

## 2022-05-12 ENCOUNTER — TRANSCRIPTION ENCOUNTER (OUTPATIENT)
Age: 87
End: 2022-05-12

## 2022-05-14 ENCOUNTER — APPOINTMENT (OUTPATIENT)
Dept: DISASTER EMERGENCY | Facility: HOSPITAL | Age: 87
End: 2022-05-14

## 2022-05-14 ENCOUNTER — OUTPATIENT (OUTPATIENT)
Dept: OUTPATIENT SERVICES | Facility: HOSPITAL | Age: 87
LOS: 1 days | End: 2022-05-14

## 2022-05-14 VITALS
RESPIRATION RATE: 18 BRPM | HEART RATE: 69 BPM | OXYGEN SATURATION: 95 % | TEMPERATURE: 98 F | DIASTOLIC BLOOD PRESSURE: 72 MMHG | SYSTOLIC BLOOD PRESSURE: 145 MMHG

## 2022-05-14 VITALS
SYSTOLIC BLOOD PRESSURE: 154 MMHG | OXYGEN SATURATION: 96 % | TEMPERATURE: 98 F | HEART RATE: 68 BPM | RESPIRATION RATE: 17 BRPM | DIASTOLIC BLOOD PRESSURE: 71 MMHG

## 2022-05-14 DIAGNOSIS — U07.1 COVID-19: ICD-10-CM

## 2022-05-14 RX ORDER — BEBTELOVIMAB 87.5 MG/ML
175 INJECTION, SOLUTION INTRAVENOUS ONCE
Refills: 0 | Status: COMPLETED | OUTPATIENT
Start: 2022-05-14 | End: 2022-05-14

## 2022-05-14 RX ADMIN — BEBTELOVIMAB 175 MILLIGRAM(S): 87.5 INJECTION, SOLUTION INTRAVENOUS at 15:00

## 2022-05-21 ENCOUNTER — INPATIENT (INPATIENT)
Facility: HOSPITAL | Age: 87
LOS: 5 days | Discharge: HOME | DRG: 948 | End: 2022-05-27
Attending: STUDENT IN AN ORGANIZED HEALTH CARE EDUCATION/TRAINING PROGRAM | Admitting: HOSPITALIST
Payer: COMMERCIAL

## 2022-05-21 VITALS
DIASTOLIC BLOOD PRESSURE: 78 MMHG | HEIGHT: 56 IN | SYSTOLIC BLOOD PRESSURE: 180 MMHG | HEART RATE: 74 BPM | TEMPERATURE: 99 F | RESPIRATION RATE: 20 BRPM | OXYGEN SATURATION: 100 %

## 2022-05-21 LAB
ALBUMIN SERPL ELPH-MCNC: 4.2 G/DL — SIGNIFICANT CHANGE UP (ref 3.3–5)
ALP SERPL-CCNC: 68 U/L — SIGNIFICANT CHANGE UP (ref 40–120)
ALT FLD-CCNC: 16 U/L — SIGNIFICANT CHANGE UP (ref 10–45)
ANION GAP SERPL CALC-SCNC: 15 MMOL/L — SIGNIFICANT CHANGE UP (ref 5–17)
AST SERPL-CCNC: 20 U/L — SIGNIFICANT CHANGE UP (ref 10–40)
BASE EXCESS BLDV CALC-SCNC: -0.5 MMOL/L — SIGNIFICANT CHANGE UP (ref -2–2)
BASOPHILS # BLD AUTO: 0.02 K/UL — SIGNIFICANT CHANGE UP (ref 0–0.2)
BASOPHILS NFR BLD AUTO: 0.3 % — SIGNIFICANT CHANGE UP (ref 0–2)
BILIRUB SERPL-MCNC: 0.3 MG/DL — SIGNIFICANT CHANGE UP (ref 0.2–1.2)
BUN SERPL-MCNC: 35 MG/DL — HIGH (ref 7–23)
CA-I SERPL-SCNC: 1.18 MMOL/L — SIGNIFICANT CHANGE UP (ref 1.15–1.33)
CALCIUM SERPL-MCNC: 9.4 MG/DL — SIGNIFICANT CHANGE UP (ref 8.4–10.5)
CHLORIDE BLDV-SCNC: 95 MMOL/L — LOW (ref 96–108)
CHLORIDE SERPL-SCNC: 96 MMOL/L — SIGNIFICANT CHANGE UP (ref 96–108)
CO2 BLDV-SCNC: 25 MMOL/L — SIGNIFICANT CHANGE UP (ref 22–26)
CO2 SERPL-SCNC: 20 MMOL/L — LOW (ref 22–31)
CREAT SERPL-MCNC: 1.34 MG/DL — HIGH (ref 0.5–1.3)
EGFR: 36 ML/MIN/1.73M2 — LOW
EOSINOPHIL # BLD AUTO: 0.18 K/UL — SIGNIFICANT CHANGE UP (ref 0–0.5)
EOSINOPHIL NFR BLD AUTO: 2.6 % — SIGNIFICANT CHANGE UP (ref 0–6)
GAS PNL BLDV: 127 MMOL/L — LOW (ref 136–145)
GAS PNL BLDV: SIGNIFICANT CHANGE UP
GAS PNL BLDV: SIGNIFICANT CHANGE UP
GLUCOSE BLDV-MCNC: 101 MG/DL — HIGH (ref 70–99)
GLUCOSE SERPL-MCNC: 103 MG/DL — HIGH (ref 70–99)
HCO3 BLDV-SCNC: 24 MMOL/L — SIGNIFICANT CHANGE UP (ref 22–29)
HCT VFR BLD CALC: 24.6 % — LOW (ref 34.5–45)
HCT VFR BLDA CALC: 31 % — LOW (ref 34.5–46.5)
HGB BLD CALC-MCNC: 10.2 G/DL — LOW (ref 11.7–16.1)
HGB BLD-MCNC: 8.1 G/DL — LOW (ref 11.5–15.5)
IMM GRANULOCYTES NFR BLD AUTO: 0.7 % — SIGNIFICANT CHANGE UP (ref 0–1.5)
LACTATE BLDV-MCNC: 0.9 MMOL/L — SIGNIFICANT CHANGE UP (ref 0.7–2)
LYMPHOCYTES # BLD AUTO: 0.96 K/UL — LOW (ref 1–3.3)
LYMPHOCYTES # BLD AUTO: 14.1 % — SIGNIFICANT CHANGE UP (ref 13–44)
MCHC RBC-ENTMCNC: 28.2 PG — SIGNIFICANT CHANGE UP (ref 27–34)
MCHC RBC-ENTMCNC: 32.9 GM/DL — SIGNIFICANT CHANGE UP (ref 32–36)
MCV RBC AUTO: 85.7 FL — SIGNIFICANT CHANGE UP (ref 80–100)
MONOCYTES # BLD AUTO: 0.48 K/UL — SIGNIFICANT CHANGE UP (ref 0–0.9)
MONOCYTES NFR BLD AUTO: 7 % — SIGNIFICANT CHANGE UP (ref 2–14)
NEUTROPHILS # BLD AUTO: 5.13 K/UL — SIGNIFICANT CHANGE UP (ref 1.8–7.4)
NEUTROPHILS NFR BLD AUTO: 75.3 % — SIGNIFICANT CHANGE UP (ref 43–77)
NRBC # BLD: 0 /100 WBCS — SIGNIFICANT CHANGE UP (ref 0–0)
NT-PROBNP SERPL-SCNC: 3797 PG/ML — HIGH (ref 0–300)
PCO2 BLDV: 39 MMHG — SIGNIFICANT CHANGE UP (ref 39–42)
PH BLDV: 7.4 — SIGNIFICANT CHANGE UP (ref 7.32–7.43)
PLATELET # BLD AUTO: 236 K/UL — SIGNIFICANT CHANGE UP (ref 150–400)
PO2 BLDV: 38 MMHG — SIGNIFICANT CHANGE UP (ref 25–45)
POTASSIUM BLDV-SCNC: 4.2 MMOL/L — SIGNIFICANT CHANGE UP (ref 3.5–5.1)
POTASSIUM SERPL-MCNC: 4.1 MMOL/L — SIGNIFICANT CHANGE UP (ref 3.5–5.3)
POTASSIUM SERPL-SCNC: 4.1 MMOL/L — SIGNIFICANT CHANGE UP (ref 3.5–5.3)
PROCALCITONIN SERPL-MCNC: 0.11 NG/ML — HIGH (ref 0.02–0.1)
PROT SERPL-MCNC: 7.4 G/DL — SIGNIFICANT CHANGE UP (ref 6–8.3)
RBC # BLD: 2.87 M/UL — LOW (ref 3.8–5.2)
RBC # FLD: 14.9 % — HIGH (ref 10.3–14.5)
SAO2 % BLDV: 66.5 % — LOW (ref 67–88)
SODIUM SERPL-SCNC: 131 MMOL/L — LOW (ref 135–145)
TROPONIN T, HIGH SENSITIVITY RESULT: 27 NG/L — SIGNIFICANT CHANGE UP (ref 0–51)
WBC # BLD: 6.82 K/UL — SIGNIFICANT CHANGE UP (ref 3.8–10.5)
WBC # FLD AUTO: 6.82 K/UL — SIGNIFICANT CHANGE UP (ref 3.8–10.5)

## 2022-05-21 PROCEDURE — 71045 X-RAY EXAM CHEST 1 VIEW: CPT | Mod: 26

## 2022-05-21 PROCEDURE — 93010 ELECTROCARDIOGRAM REPORT: CPT

## 2022-05-21 PROCEDURE — 99285 EMERGENCY DEPT VISIT HI MDM: CPT

## 2022-05-21 NOTE — ED PROVIDER NOTE - TOBACCO USE
Never smoker Implemented All Universal Safety Interventions:  [place] to call system. Call bell, personal items and telephone within reach. Instruct patient to call for assistance. Room bathroom lighting operational. Non-slip footwear when patient is off stretcher. Physically safe environment: no spills, clutter or unnecessary equipment. Stretcher in lowest position, wheels locked, appropriate side rails in place.

## 2022-05-21 NOTE — ED PROVIDER NOTE - PHYSICAL EXAMINATION
Vital signs reviewed  GENERAL: Patient nontoxic appearing, NAD  HEAD: NCAT  EYES: Anicteric  ENT: MMM  NECK: Supple, non tender  RESPIRATORY: Normal respiratory effort. CTA B/L. No wheezing, rales, rhonchi  CARDIOVASCULAR: Regular rate and rhythm  ABDOMEN: Soft. Nondistended. Nontender. No guarding or rebound. No CVA tenderness.  MUSCULOSKELETAL/EXTREMITIES: Brisk cap refill. 2+ radial pulses. 1+ leg edema   SKIN:  Warm and dry  NEURO: AAOx3. No gross FND.  PSYCHIATRIC: Cooperative. Affect appropriate.

## 2022-05-21 NOTE — ED PROVIDER NOTE - NSICDXPASTMEDICALHX_GEN_ALL_CORE_FT
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

## 2022-05-21 NOTE — ED PROVIDER NOTE - ATTENDING CONTRIBUTION TO CARE
Maciej Rowe MD:   I personally saw the patient and performed a substantive portion of the visit including all aspects of the medical decision making.    MDM: Maciej Rowe MD:   I personally saw the patient and performed a substantive portion of the visit including all aspects of the medical decision making.    MDM: Will evaluate for cardiopulmonary pathology including pneumonia, bronchospasm, pulmonary edema.   Differential includes asthma/COPD and CHF exacerbation. Will obtain EKG and keep patient on cardiac monitor and evaluate for ACS.   Will keep patient on pulse oximeter and provide supplemental O2 if needed.

## 2022-05-21 NOTE — ED ADULT NURSE NOTE - OBJECTIVE STATEMENT
89 y/o F A&Ox3 PMH glaucoma, GERD, MDS, HTN PSH cataract surgery, skin CA presents to the ED via EMS c/o lethargy. PT states she tested positive for Covid 8 days ago, yesterday tested negative at home. Pt reports increasing lethargy & acutely worsening SOB this afternoon. Upon arrival to ED pt is well appearing. Breathing is even and unlabored, satting high 90s RA. Skin is warm, dry & in tact. Pt denies CP, cough, fevers, chills, N/V/D, urinary s/s, vision changes, numbness, tingling. Call bell within reach, comfort & safety provided.

## 2022-05-21 NOTE — ED PROVIDER NOTE - NS ED ROS FT
Constitutional: No fever, chills.  Eyes:  No visual changes  ENMT:  No neck pain  Cardiac:  No chest pain  Respiratory:  +cough  GI:  No nausea, vomiting, diarrhea, abdominal pain.  :  No dysuria, hematuria  MS:  No back pain.  Neuro:  No headache or lightheadedness  Skin:  No skin rash  Except as documented in the HPI,  all other systems are negative.

## 2022-05-21 NOTE — ED PROVIDER NOTE - CLINICAL SUMMARY MEDICAL DECISION MAKING FREE TEXT BOX
89 y/o Female with PMhx of HTN, HLD, MDS, afib eliquis p/w generalized weakness x 1 day. vitals stable, on exam lungs clear 1+ pitting edema, abd ntnd, soft. concerning for worsening covid, dehydration, electrolyte derangements, pneumonia. pending labs and imaging, will assess ambulatory sat and HR, if worsening will admit

## 2022-05-21 NOTE — ED PROVIDER NOTE - PROGRESS NOTE DETAILS
Melanie Ngo PGY-2 patient had an episode of rapid afib in room, hr 150s irregular, returned back to normal irregular in 70s after 5 minutes Melanie Melanie Ngo PGY-2 patient tba for FTT, 1+ pitting edema, hypoxic with ambulation. now satting 98% on ra.

## 2022-05-21 NOTE — ED PROVIDER NOTE - NSICDXNOFAMILYHX_GEN_ALL_ED
Siliq Pregnancy And Lactation Text: The risk during pregnancy and breastfeeding is uncertain with this medication. Benzoyl Peroxide Pregnancy And Lactation Text: This medication is Pregnancy Category C. It is unknown if benzoyl peroxide is excreted in breast milk. Xolair Counseling:  Patient informed of potential adverse effects including but not limited to fever, muscle aches, rash and allergic reactions.  The patient verbalized understanding of the proper use and possible adverse effects of Xolair.  All of the patient's questions and concerns were addressed. Azathioprine Pregnancy And Lactation Text: This medication is Pregnancy Category D and isn't considered safe during pregnancy. It is unknown if this medication is excreted in breast milk. Picato Counseling:  I discussed with the patient the risks of Picato including but not limited to erythema, scaling, itching, weeping, crusting, and pain. <-- Click to add NO pertinent Family History Hydroxyzine Pregnancy And Lactation Text: This medication is not safe during pregnancy and should not be taken. It is also excreted in breast milk and breast feeding isn't recommended. Fluconazole Counseling:  Patient counseled regarding adverse effects of fluconazole including but not limited to headache, diarrhea, nausea, upset stomach, liver function test abnormalities, taste disturbance, and stomach pain.  There is a rare possibility of liver failure that can occur when taking fluconazole.  The patient understands that monitoring of LFTs and kidney function test may be required, especially at baseline. The patient verbalized understanding of the proper use and possible adverse effects of fluconazole.  All of the patient's questions and concerns were addressed. Dupixent Counseling: I discussed with the patient the risks of dupilumab including but not limited to eye infection and irritation, cold sores, injection site reactions, worsening of asthma, allergic reactions and increased risk of parasitic infection.  Live vaccines should be avoided while taking dupilumab. Dupilumab will also interact with certain medications such as warfarin and cyclosporine. The patient understands that monitoring is required and they must alert us or the primary physician if symptoms of infection or other concerning signs are noted. Zyclara Counseling:  I discussed with the patient the risks of imiquimod including but not limited to erythema, scaling, itching, weeping, crusting, and pain.  Patient understands that the inflammatory response to imiquimod is variable from person to person and was educated regarded proper titration schedule.  If flu-like symptoms develop, patient knows to discontinue the medication and contact us. Hydroquinone Counseling:  Patient advised that medication may result in skin irritation, lightening (hypopigmentation), dryness, and burning.  In the event of skin irritation, the patient was advised to reduce the amount of the drug applied or use it less frequently.  Rarely, spots that are treated with hydroquinone can become darker (pseudoochronosis).  Should this occur, patient instructed to stop medication and call the office. The patient verbalized understanding of the proper use and possible adverse effects of hydroquinone.  All of the patient's questions and concerns were addressed. Itraconazole Pregnancy And Lactation Text: This medication is Pregnancy Category C and it isn't know if it is safe during pregnancy. It is also excreted in breast milk. Stelara Counseling:  I discussed with the patient the risks of ustekinumab including but not limited to immunosuppression, malignancy, posterior leukoencephalopathy syndrome, and serious infections.  The patient understands that monitoring is required including a PPD at baseline and must alert us or the primary physician if symptoms of infection or other concerning signs are noted. Topical Retinoid counseling:  Patient advised to apply a pea-sized amount only at bedtime and wait 30 minutes after washing their face before applying.  If too drying, patient may add a non-comedogenic moisturizer. The patient verbalized understanding of the proper use and possible adverse effects of retinoids.  All of the patient's questions and concerns were addressed. Gabapentin Counseling: I discussed with the patient the risks of gabapentin including but not limited to dizziness, somnolence, fatigue and ataxia. Thalidomide Pregnancy And Lactation Text: This medication is Pregnancy Category X and is absolutely contraindicated during pregnancy. It is unknown if it is excreted in breast milk. Griseofulvin Counseling:  I discussed with the patient the risks of griseofulvin including but not limited to photosensitivity, cytopenia, liver damage, nausea/vomiting and severe allergy.  The patient understands that this medication is best absorbed when taken with a fatty meal (e.g., ice cream or french fries). Benzoyl Peroxide Counseling: Patient counseled that medicine may cause skin irritation and bleach clothing.  In the event of skin irritation, the patient was advised to reduce the amount of the drug applied or use it less frequently.   The patient verbalized understanding of the proper use and possible adverse effects of benzoyl peroxide.  All of the patient's questions and concerns were addressed. Rifampin Counseling: I discussed with the patient the risks of rifampin including but not limited to liver damage, kidney damage, red-orange body fluids, nausea/vomiting and severe allergy. Enbrel Counseling:  I discussed with the patient the risks of etanercept including but not limited to myelosuppression, immunosuppression, autoimmune hepatitis, demyelinating diseases, lymphoma, and infections.  The patient understands that monitoring is required including a PPD at baseline and must alert us or the primary physician if symptoms of infection or other concerning signs are noted. Imiquimod Counseling:  I discussed with the patient the risks of imiquimod including but not limited to erythema, scaling, itching, weeping, crusting, and pain.  Patient understands that the inflammatory response to imiquimod is variable from person to person and was educated regarded proper titration schedule.  If flu-like symptoms develop, patient knows to discontinue the medication and contact us. Eucrisa Counseling: Patient may experience a mild burning sensation during topical application. Eucrisa is not approved in children less than 2 years of age. Gabapentin Pregnancy And Lactation Text: This medication is Pregnancy Category C and isn't considered safe during pregnancy. It is excreted in breast milk. Isotretinoin Pregnancy And Lactation Text: This medication is Pregnancy Category X and is considered extremely dangerous during pregnancy. It is unknown if it is excreted in breast milk. Topical Retinoid Pregnancy And Lactation Text: This medication is Pregnancy Category C. It is unknown if this medication is excreted in breast milk. Cyclosporine Counseling:  I discussed with the patient the risks of cyclosporine including but not limited to hypertension, gingival hyperplasia,myelosuppression, immunosuppression, liver damage, kidney damage, neurotoxicity, lymphoma, and serious infections. The patient understands that monitoring is required including baseline blood pressure, CBC, CMP, lipid panel and uric acid, and then 1-2 times monthly CMP and blood pressure. Erythromycin Counseling:  I discussed with the patient the risks of erythromycin including but not limited to GI upset, allergic reaction, drug rash, diarrhea, increase in liver enzymes, and yeast infections. Azithromycin Counseling:  I discussed with the patient the risks of azithromycin including but not limited to GI upset, allergic reaction, drug rash, diarrhea, and yeast infections. Use Enhanced Medication Counseling?: No Cyclophosphamide Pregnancy And Lactation Text: This medication is Pregnancy Category D and it isn't considered safe during pregnancy. This medication is excreted in breast milk. Nsaids Counseling: NSAID Counseling: I discussed with the patient that NSAIDs should be taken with food. Prolonged use of NSAIDs can result in the development of stomach ulcers.  Patient advised to stop taking NSAIDs if abdominal pain occurs.  The patient verbalized understanding of the proper use and possible adverse effects of NSAIDs.  All of the patient's questions and concerns were addressed. Cyclosporine Pregnancy And Lactation Text: This medication is Pregnancy Category C and it isn't know if it is safe during pregnancy. This medication is excreted in breast milk. Doxepin Counseling:  Patient advised that the medication is sedating and not to drive a car after taking this medication. Patient informed of potential adverse effects including but not limited to dry mouth, urinary retention, and blurry vision.  The patient verbalized understanding of the proper use and possible adverse effects of doxepin.  All of the patient's questions and concerns were addressed. High Dose Vitamin A Counseling: Side effects reviewed, pt to contact office should one occur. Griseofulvin Pregnancy And Lactation Text: This medication is Pregnancy Category X and is known to cause serious birth defects. It is unknown if this medication is excreted in breast milk but breast feeding should be avoided. Elidel Counseling: Patient may experience a mild burning sensation during topical application. Elidel is not approved in children less than 2 years of age. There have been case reports of hematologic and skin malignancies in patients using topical calcineurin inhibitors although causality is questionable. Cephalexin Counseling: I counseled the patient regarding use of cephalexin as an antibiotic for prophylactic and/or therapeutic purposes. Cephalexin (commonly prescribed under brand name Keflex) is a cephalosporin antibiotic which is active against numerous classes of bacteria, including most skin bacteria. Side effects may include nausea, diarrhea, gastrointestinal upset, rash, hives, yeast infections, and in rare cases, hepatitis, kidney disease, seizures, fever, confusion, neurologic symptoms, and others. Patients with severe allergies to penicillin medications are cautioned that there is about a 10% incidence of cross-reactivity with cephalosporins. When possible, patients with penicillin allergies should use alternatives to cephalosporins for antibiotic therapy. Terbinafine Counseling: Patient counseling regarding adverse effects of terbinafine including but not limited to headache, diarrhea, rash, upset stomach, liver function test abnormalities, itching, taste/smell disturbance, nausea, abdominal pain, and flatulence.  There is a rare possibility of liver failure that can occur when taking terbinafine.  The patient understands that a baseline LFT and kidney function test may be required. The patient verbalized understanding of the proper use and possible adverse effects of terbinafine.  All of the patient's questions and concerns were addressed. Tazorac Pregnancy And Lactation Text: This medication is not safe during pregnancy. It is unknown if this medication is excreted in breast milk. Hydroxyzine Counseling: Patient advised that the medication is sedating and not to drive a car after taking this medication.  Patient informed of potential adverse effects including but not limited to dry mouth, urinary retention, and blurry vision.  The patient verbalized understanding of the proper use and possible adverse effects of hydroxyzine.  All of the patient's questions and concerns were addressed. Tazorac Counseling:  Patient advised that medication is irritating and drying.  Patient may need to apply sparingly and wash off after an hour before eventually leaving it on overnight.  The patient verbalized understanding of the proper use and possible adverse effects of tazorac.  All of the patient's questions and concerns were addressed. Tetracycline Pregnancy And Lactation Text: This medication is Pregnancy Category D and not consider safe during pregnancy. It is also excreted in breast milk. Valtrex Pregnancy And Lactation Text: this medication is Pregnancy Category B and is considered safe during pregnancy. This medication is not directly found in breast milk but it's metabolite acyclovir is present. Cimzia Pregnancy And Lactation Text: This medication crosses the placenta but can be considered safe in certain situations. Cimzia may be excreted in breast milk. Sski Pregnancy And Lactation Text: This medication is Pregnancy Category D and isn't considered safe during pregnancy. It is excreted in breast milk. Oxybutynin Counseling:  I discussed with the patient the risks of oxybutynin including but not limited to skin rash, drowsiness, dry mouth, difficulty urinating, and blurred vision. Taltz Counseling: I discussed with the patient the risks of ixekizumab including but not limited to immunosuppression, serious infections, worsening of inflammatory bowel disease and drug reactions.  The patient understands that monitoring is required including a PPD at baseline and must alert us or the primary physician if symptoms of infection or other concerning signs are noted. Birth Control Pills Pregnancy And Lactation Text: This medication should be avoided if pregnant and for the first 30 days post-partum. Albendazole Counseling:  I discussed with the patient the risks of albendazole including but not limited to cytopenia, kidney damage, nausea/vomiting and severe allergy.  The patient understands that this medication is being used in an off-label manner. Protopic Pregnancy And Lactation Text: This medication is Pregnancy Category C. It is unknown if this medication is excreted in breast milk when applied topically. Infliximab Pregnancy And Lactation Text: This medication is Pregnancy Category B and is considered safe during pregnancy. It is unknown if this medication is excreted in breast milk. Carac Counseling:  I discussed with the patient the risks of Carac including but not limited to erythema, scaling, itching, weeping, crusting, and pain. Cimetidine Pregnancy And Lactation Text: This medication is Pregnancy Category B and is considered safe during pregnancy. It is also excreted in breast milk and breast feeding isn't recommended. Clindamycin Pregnancy And Lactation Text: This medication can be used in pregnancy if certain situations. Clindamycin is also present in breast milk. Protopic Counseling: Patient may experience a mild burning sensation during topical application. Protopic is not approved in children less than 2 years of age. There have been case reports of hematologic and skin malignancies in patients using topical calcineurin inhibitors although causality is questionable. Colchicine Counseling:  Patient counseled regarding adverse effects including but not limited to stomach upset (nausea, vomiting, stomach pain, or diarrhea).  Patient instructed to limit alcohol consumption while taking this medication.  Colchicine may reduce blood counts especially with prolonged use.  The patient understands that monitoring of kidney function and blood counts may be required, especially at baseline. The patient verbalized understanding of the proper use and possible adverse effects of colchicine.  All of the patient's questions and concerns were addressed. Topical Sulfur Applications Pregnancy And Lactation Text: This medication is Pregnancy Category C and has an unknown safety profile during pregnancy. It is unknown if this topical medication is excreted in breast milk. Humira Counseling:  I discussed with the patient the risks of adalimumab including but not limited to myelosuppression, immunosuppression, autoimmune hepatitis, demyelinating diseases, lymphoma, and serious infections.  The patient understands that monitoring is required including a PPD at baseline and must alert us or the primary physician if symptoms of infection or other concerning signs are noted. Xelacez Pregnancy And Lactation Text: This medication is Pregnancy Category D and is not considered safe during pregnancy.  The risk during breast feeding is also uncertain. Bexarotene Pregnancy And Lactation Text: This medication is Pregnancy Category X and should not be given to women who are pregnant or may become pregnant. This medication should not be used if you are breast feeding. Azathioprine Counseling:  I discussed with the patient the risks of azathioprine including but not limited to myelosuppression, immunosuppression, hepatotoxicity, lymphoma, and infections.  The patient understands that monitoring is required including baseline LFTs, Creatinine, possible TPMP genotyping and weekly CBCs for the first month and then every 2 weeks thereafter.  The patient verbalized understanding of the proper use and possible adverse effects of azathioprine.  All of the patient's questions and concerns were addressed. Bexarotene Counseling:  I discussed with the patient the risks of bexarotene including but not limited to hair loss, dry lips/skin/eyes, liver abnormalities, hyperlipidemia, pancreatitis, depression/suicidal ideation, photosensitivity, drug rash/allergic reactions, hypothyroidism, anemia, leukopenia, infection, cataracts, and teratogenicity.  Patient understands that they will need regular blood tests to check lipid profile, liver function tests, white blood cell count, thyroid function tests and pregnancy test if applicable. Simponi Counseling:  I discussed with the patient the risks of golimumab including but not limited to myelosuppression, immunosuppression, autoimmune hepatitis, demyelinating diseases, lymphoma, and serious infections.  The patient understands that monitoring is required including a PPD at baseline and must alert us or the primary physician if symptoms of infection or other concerning signs are noted. Birth Control Pills Counseling: Birth Control Pill Counseling: I discussed with the patient the potential side effects of OCPs including but not limited to increased risk of stroke, heart attack, thrombophlebitis, deep venous thrombosis, hepatic adenomas, breast changes, GI upset, headaches, and depression.  The patient verbalized understanding of the proper use and possible adverse effects of OCPs. All of the patient's questions and concerns were addressed. Drysol Pregnancy And Lactation Text: This medication is considered safe during pregnancy and breast feeding. Arava Counseling:  Patient counseled regarding adverse effects of Arava including but not limited to nausea, vomiting, abnormalities in liver function tests. Patients may develop mouth sores, rash, diarrhea, and abnormalities in blood counts. The patient understands that monitoring is required including LFTs and blood counts.  There is a rare possibility of scarring of the liver and lung problems that can occur when taking methotrexate. Persistent nausea, loss of appetite, pale stools, dark urine, cough, and shortness of breath should be reported immediately. Patient advised to discontinue Arava treatment and consult with a physician prior to attempting conception. The patient will have to undergo a treatment to eliminate Arava from the body prior to conception. Glycopyrrolate Counseling:  I discussed with the patient the risks of glycopyrrolate including but not limited to skin rash, drowsiness, dry mouth, difficulty urinating, and blurred vision. Acitretin Counseling:  I discussed with the patient the risks of acitretin including but not limited to hair loss, dry lips/skin/eyes, liver damage, hyperlipidemia, depression/suicidal ideation, photosensitivity.  Serious rare side effects can include but are not limited to pancreatitis, pseudotumor cerebri, bony changes, clot formation/stroke/heart attack.  Patient understands that alcohol is contraindicated since it can result in liver toxicity and significantly prolong the elimination of the drug by many years. Ketoconazole Counseling:   Patient counseled regarding improving absorption with orange juice.  Adverse effects include but are not limited to breast enlargement, headache, diarrhea, nausea, upset stomach, liver function test abnormalities, taste disturbance, and stomach pain.  There is a rare possibility of liver failure that can occur when taking ketoconazole. The patient understands that monitoring of LFTs may be required, especially at baseline. The patient verbalized understanding of the proper use and possible adverse effects of ketoconazole.  All of the patient's questions and concerns were addressed. Clofazimine Counseling:  I discussed with the patient the risks of clofazimine including but not limited to skin and eye pigmentation, liver damage, nausea/vomiting, gastrointestinal bleeding and allergy. Valtrex Counseling: I discussed with the patient the risks of valacyclovir including but not limited to kidney damage, nausea, vomiting and severe allergy.  The patient understands that if the infection seems to be worsening or is not improving, they are to call. Erivedge Counseling- I discussed with the patient the risks of Erivedge including but not limited to nausea, vomiting, diarrhea, constipation, weight loss, changes in the sense of taste, decreased appetite, muscle spasms, and hair loss.  The patient verbalized understanding of the proper use and possible adverse effects of Erivedge.  All of the patient's questions and concerns were addressed. Rituxan Pregnancy And Lactation Text: This medication is Pregnancy Category C and it isn't know if it is safe during pregnancy. It is unknown if this medication is excreted in breast milk but similar antibodies are known to be excreted. Dapsone Pregnancy And Lactation Text: This medication is Pregnancy Category C and is not considered safe during pregnancy or breast feeding. Odomzo Counseling- I discussed with the patient the risks of Odomzo including but not limited to nausea, vomiting, diarrhea, constipation, weight loss, changes in the sense of taste, decreased appetite, muscle spasms, and hair loss.  The patient verbalized understanding of the proper use and possible adverse effects of Odomzo.  All of the patient's questions and concerns were addressed. Eucrisa Pregnancy And Lactation Text: This medication has not been assigned a Pregnancy Risk Category but animal studies failed to show danger with the topical medication. It is unknown if the medication is excreted in breast milk. Cimzia Counseling:  I discussed with the patient the risks of Cimzia including but not limited to immunosuppression, allergic reactions and infections.  The patient understands that monitoring is required including a PPD at baseline and must alert us or the primary physician if symptoms of infection or other concerning signs are noted. Methotrexate Counseling:  Patient counseled regarding adverse effects of methotrexate including but not limited to nausea, vomiting, abnormalities in liver function tests. Patients may develop mouth sores, rash, diarrhea, and abnormalities in blood counts. The patient understands that monitoring is required including LFT's and blood counts.  There is a rare possibility of scarring of the liver and lung problems that can occur when taking methotrexate. Persistent nausea, loss of appetite, pale stools, dark urine, cough, and shortness of breath should be reported immediately. Patient advised to discontinue methotrexate treatment at least three months before attempting to become pregnant.  I discussed the need for folate supplements while taking methotrexate.  These supplements can decrease side effects during methotrexate treatment. The patient verbalized understanding of the proper use and possible adverse effects of methotrexate.  All of the patient's questions and concerns were addressed. Quinolones Counseling:  I discussed with the patient the risks of fluoroquinolones including but not limited to GI upset, allergic reaction, drug rash, diarrhea, dizziness, photosensitivity, yeast infections, liver function test abnormalities, tendonitis/tendon rupture. Itraconazole Counseling:  I discussed with the patient the risks of itraconazole including but not limited to liver damage, nausea/vomiting, neuropathy, and severe allergy.  The patient understands that this medication is best absorbed when taken with acidic beverages such as non-diet cola or ginger ale.  The patient understands that monitoring is required including baseline LFTs and repeat LFTs at intervals.  The patient understands that they are to contact us or the primary physician if concerning signs are noted. Ivermectin Pregnancy And Lactation Text: This medication is Pregnancy Category C and it isn't known if it is safe during pregnancy. It is also excreted in breast milk. Prednisone Counseling:  I discussed with the patient the risks of prolonged use of prednisone including but not limited to weight gain, insomnia, osteoporosis, mood changes, diabetes, susceptibility to infection, glaucoma and high blood pressure.  In cases where prednisone use is prolonged, patients should be monitored with blood pressure checks, serum glucose levels and an eye exam.  Additionally, the patient may need to be placed on GI prophylaxis, PCP prophylaxis, and calcium and vitamin D supplementation and/or a bisphosphonate.  The patient verbalized understanding of the proper use and the possible adverse effects of prednisone.  All of the patient's questions and concerns were addressed. Rituxan Counseling:  I discussed with the patient the risks of Rituxan infusions. Side effects can include infusion reactions, severe drug rashes including mucocutaneous reactions, reactivation of latent hepatitis and other infections and rarely progressive multifocal leukoencephalopathy.  All of the patient's questions and concerns were addressed. Tremfya Counseling: I discussed with the patient the risks of guselkumab including but not limited to immunosuppression, serious infections, worsening of inflammatory bowel disease and drug reactions.  The patient understands that monitoring is required including a PPD at baseline and must alert us or the primary physician if symptoms of infection or other concerning signs are noted. Dupixent Pregnancy And Lactation Text: This medication likely crosses the placenta but the risk for the fetus is uncertain. This medication is excreted in breast milk. Cephalexin Pregnancy And Lactation Text: This medication is Pregnancy Category B and considered safe during pregnancy.  It is also excreted in breast milk but can be used safely for shorter doses. Xolair Pregnancy And Lactation Text: This medication is Pregnancy Category B and is considered safe during pregnancy. This medication is excreted in breast milk. Doxycycline Pregnancy And Lactation Text: This medication is Pregnancy Category D and not consider safe during pregnancy. It is also excreted in breast milk but is considered safe for shorter treatment courses. Spironolactone Counseling: Patient advised regarding risks of diarrhea, abdominal pain, hyperkalemia, birth defects (for female patients), liver toxicity and renal toxicity. The patient may need blood work to monitor liver and kidney function and potassium levels while on therapy. The patient verbalized understanding of the proper use and possible adverse effects of spironolactone.  All of the patient's questions and concerns were addressed. Hydroxychloroquine Counseling:  I discussed with the patient that a baseline ophthalmologic exam is needed at the start of therapy and every year thereafter while on therapy. A CBC may also be warranted for monitoring.  The side effects of this medication were discussed with the patient, including but not limited to agranulocytosis, aplastic anemia, seizures, rashes, retinopathy, and liver toxicity. Patient instructed to call the office should any adverse effect occur.  The patient verbalized understanding of the proper use and possible adverse effects of Plaquenil.  All the patient's questions and concerns were addressed. Hydroxychloroquine Pregnancy And Lactation Text: This medication has been shown to cause fetal harm but it isn't assigned a Pregnancy Risk Category. There are small amounts excreted in breast milk. Solaraze Counseling:  I discussed with the patient the risks of Solaraze including but not limited to erythema, scaling, itching, weeping, crusting, and pain. Erythromycin Pregnancy And Lactation Text: This medication is Pregnancy Category B and is considered safe during pregnancy. It is also excreted in breast milk. Topical Clindamycin Pregnancy And Lactation Text: This medication is Pregnancy Category B and is considered safe during pregnancy. It is unknown if it is excreted in breast milk. Nsaids Pregnancy And Lactation Text: These medications are considered safe up to 30 weeks gestation. It is excreted in breast milk. Cyclophosphamide Counseling:  I discussed with the patient the risks of cyclophosphamide including but not limited to hair loss, hormonal abnormalities, decreased fertility, abdominal pain, diarrhea, nausea and vomiting, bone marrow suppression and infection. The patient understands that monitoring is required while taking this medication. 5-Fu Pregnancy And Lactation Text: This medication is Pregnancy Category X and contraindicated in pregnancy and in women who may become pregnant. It is unknown if this medication is excreted in breast milk. Acitretin Pregnancy And Lactation Text: This medication is Pregnancy Category X and should not be given to women who are pregnant or may become pregnant in the future. This medication is excreted in breast milk. Otezla Pregnancy And Lactation Text: This medication is Pregnancy Category C and it isn't known if it is safe during pregnancy. It is unknown if it is excreted in breast milk. 5-Fu Counseling: 5-Fluorouracil Counseling:  I discussed with the patient the risks of 5-fluorouracil including but not limited to erythema, scaling, itching, weeping, crusting, and pain. SSKI Counseling:  I discussed with the patient the risks of SSKI including but not limited to thyroid abnormalities, metallic taste, GI upset, fever, headache, acne, arthralgias, paraesthesias, lymphadenopathy, easy bleeding, arrhythmias, and allergic reaction. Bactrim Counseling:  I discussed with the patient the risks of sulfa antibiotics including but not limited to GI upset, allergic reaction, drug rash, diarrhea, dizziness, photosensitivity, and yeast infections.  Rarely, more serious reactions can occur including but not limited to aplastic anemia, agranulocytosis, methemoglobinemia, blood dyscrasias, liver or kidney failure, lung infiltrates or desquamative/blistering drug rashes. Rifampin Pregnancy And Lactation Text: This medication is Pregnancy Category C and it isn't know if it is safe during pregnancy. It is also excreted in breast milk and should not be used if you are breast feeding. Dapsone Counseling: I discussed with the patient the risks of dapsone including but not limited to hemolytic anemia, agranulocytosis, rashes, methemoglobinemia, kidney failure, peripheral neuropathy, headaches, GI upset, and liver toxicity.  Patients who start dapsone require monitoring including baseline LFTs and weekly CBCs for the first month, then every month thereafter.  The patient verbalized understanding of the proper use and possible adverse effects of dapsone.  All of the patient's questions and concerns were addressed. Topical Sulfur Applications Counseling: Topical Sulfur Counseling: Patient counseled that this medication may cause skin irritation or allergic reactions.  In the event of skin irritation, the patient was advised to reduce the amount of the drug applied or use it less frequently.   The patient verbalized understanding of the proper use and possible adverse effects of topical sulfur application.  All of the patient's questions and concerns were addressed. Glycopyrrolate Pregnancy And Lactation Text: This medication is Pregnancy Category B and is considered safe during pregnancy. It is unknown if it is excreted breast milk. Cimetidine Counseling:  I discussed with the patient the risks of Cimetidine including but not limited to gynecomastia, headache, diarrhea, nausea, drowsiness, arrhythmias, pancreatitis, skin rashes, psychosis, bone marrow suppression and kidney toxicity. Azithromycin Pregnancy And Lactation Text: This medication is considered safe during pregnancy and is also secreted in breast milk. Bactrim Pregnancy And Lactation Text: This medication is Pregnancy Category D and is known to cause fetal risk.  It is also excreted in breast milk. Minocycline Counseling: Patient advised regarding possible photosensitivity and discoloration of the teeth, skin, lips, tongue and gums.  Patient instructed to avoid sunlight, if possible.  When exposed to sunlight, patients should wear protective clothing, sunglasses, and sunscreen.  The patient was instructed to call the office immediately if the following severe adverse effects occur:  hearing changes, easy bruising/bleeding, severe headache, or vision changes.  The patient verbalized understanding of the proper use and possible adverse effects of minocycline.  All of the patient's questions and concerns were addressed. Ivermectin Counseling:  Patient instructed to take medication on an empty stomach with a full glass of water.  Patient informed of potential adverse effects including but not limited to nausea, diarrhea, dizziness, itching, and swelling of the extremities or lymph nodes.  The patient verbalized understanding of the proper use and possible adverse effects of ivermectin.  All of the patient's questions and concerns were addressed. Metronidazole Pregnancy And Lactation Text: This medication is Pregnancy Category B and considered safe during pregnancy.  It is also excreted in breast milk. Ilumya Counseling: I discussed with the patient the risks of tildrakizumab including but not limited to immunosuppression, malignancy, posterior leukoencephalopathy syndrome, and serious infections.  The patient understands that monitoring is required including a PPD at baseline and must alert us or the primary physician if symptoms of infection or other concerning signs are noted. Cosentyx Counseling:  I discussed with the patient the risks of Cosentyx including but not limited to worsening of Crohn's disease, immunosuppression, allergic reactions and infections.  The patient understands that monitoring is required including a PPD at baseline and must alert us or the primary physician if symptoms of infection or other concerning signs are noted. Ketoconazole Pregnancy And Lactation Text: This medication is Pregnancy Category C and it isn't know if it is safe during pregnancy. It is also excreted in breast milk and breast feeding isn't recommended. Siliq Counseling:  I discussed with the patient the risks of Siliq including but not limited to new or worsening depression, suicidal thoughts and behavior, immunosuppression, malignancy, posterior leukoencephalopathy syndrome, and serious infections.  The patient understands that monitoring is required including a PPD at baseline and must alert us or the primary physician if symptoms of infection or other concerning signs are noted. There is also a special program designed to monitor depression which is required with Siliq. Doxepin Pregnancy And Lactation Text: This medication is Pregnancy Category C and it isn't known if it is safe during pregnancy. It is also excreted in breast milk and breast feeding isn't recommended. Doxycycline Counseling:  Patient counseled regarding possible photosensitivity and increased risk for sunburn.  Patient instructed to avoid sunlight, if possible.  When exposed to sunlight, patients should wear protective clothing, sunglasses, and sunscreen.  The patient was instructed to call the office immediately if the following severe adverse effects occur:  hearing changes, easy bruising/bleeding, severe headache, or vision changes.  The patient verbalized understanding of the proper use and possible adverse effects of doxycycline.  All of the patient's questions and concerns were addressed. Detail Level: Zone Methotrexate Pregnancy And Lactation Text: This medication is Pregnancy Category X and is known to cause fetal harm. This medication is excreted in breast milk. Drysol Counseling:  I discussed with the patient the risks of drysol/aluminum chloride including but not limited to skin rash, itching, irritation, burning. Tetracycline Counseling: Patient counseled regarding possible photosensitivity and increased risk for sunburn.  Patient instructed to avoid sunlight, if possible.  When exposed to sunlight, patients should wear protective clothing, sunglasses, and sunscreen.  The patient was instructed to call the office immediately if the following severe adverse effects occur:  hearing changes, easy bruising/bleeding, severe headache, or vision changes.  The patient verbalized understanding of the proper use and possible adverse effects of tetracycline.  All of the patient's questions and concerns were addressed. Patient understands to avoid pregnancy while on therapy due to potential birth defects. Clindamycin Counseling: I counseled the patient regarding use of clindamycin as an antibiotic for prophylactic and/or therapeutic purposes. Clindamycin is active against numerous classes of bacteria, including skin bacteria. Side effects may include nausea, diarrhea, gastrointestinal upset, rash, hives, yeast infections, and in rare cases, colitis. Topical Clindamycin Counseling: Patient counseled that this medication may cause skin irritation or allergic reactions.  In the event of skin irritation, the patient was advised to reduce the amount of the drug applied or use it less frequently.   The patient verbalized understanding of the proper use and possible adverse effects of clindamycin.  All of the patient's questions and concerns were addressed. Metronidazole Counseling:  I discussed with the patient the risks of metronidazole including but not limited to seizures, nausea/vomiting, a metallic taste in the mouth, nausea/vomiting and severe allergy. Solaraze Pregnancy And Lactation Text: This medication is Pregnancy Category B and is considered safe. There is some data to suggest avoiding during the third trimester. It is unknown if this medication is excreted in breast milk. Infliximab Counseling:  I discussed with the patient the risks of infliximab including but not limited to myelosuppression, immunosuppression, autoimmune hepatitis, demyelinating diseases, lymphoma, and serious infections.  The patient understands that monitoring is required including a PPD at baseline and must alert us or the primary physician if symptoms of infection or other concerning signs are noted. Cellcept Counseling:  I discussed with the patient the risks of mycophenolate mofetil including but not limited to infection/immunosuppression, GI upset, hypokalemia, hypercholesterolemia, bone marrow suppression, lymphoproliferative disorders, malignancy, GI ulceration/bleed/perforation, colitis, interstitial lung disease, kidney failure, progressive multifocal leukoencephalopathy, and birth defects.  The patient understands that monitoring is required including a baseline creatinine and regular CBC testing. In addition, patient must alert us immediately if symptoms of infection or other concerning signs are noted. Thalidomide Counseling: I discussed with the patient the risks of thalidomide including but not limited to birth defects, anxiety, weakness, chest pain, dizziness, cough and severe allergy. Xeljanz Counseling: I discussed with the patient the risks of Xeljanz therapy including increased risk of infection, liver issues, headache, diarrhea, or cold symptoms. Live vaccines should be avoided. They were instructed to call if they have any problems. Isotretinoin Counseling: Patient should get monthly blood tests, not donate blood, not drive at night if vision affected, not share medication, and not undergo elective surgery for 6 months after tx completed. Side effects reviewed, pt to contact office should one occur. High Dose Vitamin A Pregnancy And Lactation Text: High dose vitamin A therapy is contraindicated during pregnancy and breast feeding. Minoxidil Counseling: Minoxidil is a topical medication which can increase blood flow where it is applied. It is uncertain how this medication increases hair growth. Side effects are uncommon and include stinging and allergic reactions. Otezla Counseling: The side effects of Otezla were discussed with the patient, including but not limited to worsening or new depression, weight loss, diarrhea, nausea, upper respiratory tract infection, and headache. Patient instructed to call the office should any adverse effect occur.  The patient verbalized understanding of the proper use and possible adverse effects of Otezla.  All the patient's questions and concerns were addressed. Spironolactone Pregnancy And Lactation Text: This medication can cause feminization of the male fetus and should be avoided during pregnancy. The active metabolite is also found in breast milk.

## 2022-05-21 NOTE — ED PROVIDER NOTE - CARE PLAN
1 Principal Discharge DX:	2019 novel coronavirus disease (COVID-19)  Secondary Diagnosis:	CHF exacerbation

## 2022-05-21 NOTE — ED PROVIDER NOTE - OBJECTIVE STATEMENT
89 y/o Female with PMhx of HTN, HLD, MDS, afib eliquis p/w generalized weakness x 1 day. pt tested positive for covid 5/12, received monoclonial 5/14   had symptoms of dry cough and weakness. today patient began to having worsening fatigue, trouble getting out of bed. +dry cough  no recent fever, cp, sob, vomiting, diarrhea, abd pain, back pain

## 2022-05-21 NOTE — ED PROVIDER NOTE - NSICDXPASTSURGICALHX_GEN_ALL_CORE_FT
PAST SURGICAL HISTORY:  Basal Cell Carcinoma of Face 4 yrs ago    Bilateral Cataracts 69 y/o    Carpal Tunnel Syndrome 10 yrs ago    Cystocele 47 yrs ago    History of Total Knee Replacement L 1998    Rectocele 47 yrs ago    S/P Breast Lumpectomy 10 yrs ago    S/P Breast Lumpectomy benign    S/P Cataract Surgery     S/P T&A childhood      S/P TKR (Total Knee Replacement) left    Skin Cancer of face , basal cell   4 yrs ago

## 2022-05-22 DIAGNOSIS — R53.1 WEAKNESS: ICD-10-CM

## 2022-05-22 DIAGNOSIS — I50.9 HEART FAILURE, UNSPECIFIED: ICD-10-CM

## 2022-05-22 DIAGNOSIS — I10 ESSENTIAL (PRIMARY) HYPERTENSION: ICD-10-CM

## 2022-05-22 DIAGNOSIS — D46.9 MYELODYSPLASTIC SYNDROME, UNSPECIFIED: ICD-10-CM

## 2022-05-22 DIAGNOSIS — E03.9 HYPOTHYROIDISM, UNSPECIFIED: ICD-10-CM

## 2022-05-22 DIAGNOSIS — I48.20 CHRONIC ATRIAL FIBRILLATION, UNSPECIFIED: ICD-10-CM

## 2022-05-22 DIAGNOSIS — Z71.89 OTHER SPECIFIED COUNSELING: ICD-10-CM

## 2022-05-22 DIAGNOSIS — U07.1 COVID-19: ICD-10-CM

## 2022-05-22 LAB
APPEARANCE UR: CLEAR — SIGNIFICANT CHANGE UP
BACTERIA # UR AUTO: NEGATIVE — SIGNIFICANT CHANGE UP
BILIRUB UR-MCNC: NEGATIVE — SIGNIFICANT CHANGE UP
COLOR SPEC: SIGNIFICANT CHANGE UP
DIFF PNL FLD: NEGATIVE — SIGNIFICANT CHANGE UP
EPI CELLS # UR: 0 /HPF — SIGNIFICANT CHANGE UP
GLUCOSE UR QL: NEGATIVE — SIGNIFICANT CHANGE UP
KETONES UR-MCNC: NEGATIVE — SIGNIFICANT CHANGE UP
LEUKOCYTE ESTERASE UR-ACNC: NEGATIVE — SIGNIFICANT CHANGE UP
NITRITE UR-MCNC: NEGATIVE — SIGNIFICANT CHANGE UP
PH UR: 7 — SIGNIFICANT CHANGE UP (ref 5–8)
PROT UR-MCNC: ABNORMAL
RBC CASTS # UR COMP ASSIST: 1 /HPF — SIGNIFICANT CHANGE UP (ref 0–4)
SARS-COV-2 RNA SPEC QL NAA+PROBE: SIGNIFICANT CHANGE UP
SP GR SPEC: 1.01 — LOW (ref 1.01–1.02)
UROBILINOGEN FLD QL: NEGATIVE — SIGNIFICANT CHANGE UP
WBC UR QL: 0 /HPF — SIGNIFICANT CHANGE UP (ref 0–5)

## 2022-05-22 PROCEDURE — 12345: CPT | Mod: NC

## 2022-05-22 RX ORDER — ATORVASTATIN CALCIUM 80 MG/1
10 TABLET, FILM COATED ORAL AT BEDTIME
Refills: 0 | Status: DISCONTINUED | OUTPATIENT
Start: 2022-05-22 | End: 2022-05-27

## 2022-05-22 RX ORDER — PANTOPRAZOLE SODIUM 20 MG/1
40 TABLET, DELAYED RELEASE ORAL
Refills: 0 | Status: DISCONTINUED | OUTPATIENT
Start: 2022-05-22 | End: 2022-05-27

## 2022-05-22 RX ORDER — LOSARTAN POTASSIUM 100 MG/1
50 TABLET, FILM COATED ORAL DAILY
Refills: 0 | Status: DISCONTINUED | OUTPATIENT
Start: 2022-05-22 | End: 2022-05-25

## 2022-05-22 RX ORDER — ACETAMINOPHEN 500 MG
650 TABLET ORAL EVERY 6 HOURS
Refills: 0 | Status: DISCONTINUED | OUTPATIENT
Start: 2022-05-22 | End: 2022-05-24

## 2022-05-22 RX ORDER — FUROSEMIDE 40 MG
20 TABLET ORAL DAILY
Refills: 0 | Status: DISCONTINUED | OUTPATIENT
Start: 2022-05-22 | End: 2022-05-24

## 2022-05-22 RX ORDER — ONDANSETRON 8 MG/1
4 TABLET, FILM COATED ORAL EVERY 8 HOURS
Refills: 0 | Status: DISCONTINUED | OUTPATIENT
Start: 2022-05-22 | End: 2022-05-27

## 2022-05-22 RX ORDER — ALBUTEROL 90 UG/1
2 AEROSOL, METERED ORAL EVERY 6 HOURS
Refills: 0 | Status: DISCONTINUED | OUTPATIENT
Start: 2022-05-22 | End: 2022-05-27

## 2022-05-22 RX ORDER — FUROSEMIDE 40 MG
40 TABLET ORAL ONCE
Refills: 0 | Status: COMPLETED | OUTPATIENT
Start: 2022-05-22 | End: 2022-05-22

## 2022-05-22 RX ORDER — APIXABAN 2.5 MG/1
2.5 TABLET, FILM COATED ORAL EVERY 12 HOURS
Refills: 0 | Status: DISCONTINUED | OUTPATIENT
Start: 2022-05-22 | End: 2022-05-27

## 2022-05-22 RX ORDER — LATANOPROST 0.05 MG/ML
1 SOLUTION/ DROPS OPHTHALMIC; TOPICAL AT BEDTIME
Refills: 0 | Status: DISCONTINUED | OUTPATIENT
Start: 2022-05-22 | End: 2022-05-27

## 2022-05-22 RX ORDER — AMLODIPINE BESYLATE 2.5 MG/1
2.5 TABLET ORAL DAILY
Refills: 0 | Status: DISCONTINUED | OUTPATIENT
Start: 2022-05-22 | End: 2022-05-27

## 2022-05-22 RX ORDER — LEVOTHYROXINE SODIUM 125 MCG
112 TABLET ORAL DAILY
Refills: 0 | Status: DISCONTINUED | OUTPATIENT
Start: 2022-05-22 | End: 2022-05-27

## 2022-05-22 RX ORDER — FOLIC ACID 0.8 MG
1 TABLET ORAL DAILY
Refills: 0 | Status: DISCONTINUED | OUTPATIENT
Start: 2022-05-22 | End: 2022-05-27

## 2022-05-22 RX ORDER — LANOLIN ALCOHOL/MO/W.PET/CERES
3 CREAM (GRAM) TOPICAL AT BEDTIME
Refills: 0 | Status: DISCONTINUED | OUTPATIENT
Start: 2022-05-22 | End: 2022-05-27

## 2022-05-22 RX ADMIN — PANTOPRAZOLE SODIUM 40 MILLIGRAM(S): 20 TABLET, DELAYED RELEASE ORAL at 06:03

## 2022-05-22 RX ADMIN — AMLODIPINE BESYLATE 2.5 MILLIGRAM(S): 2.5 TABLET ORAL at 06:03

## 2022-05-22 RX ADMIN — Medication 1 MILLIGRAM(S): at 11:55

## 2022-05-22 RX ADMIN — APIXABAN 2.5 MILLIGRAM(S): 2.5 TABLET, FILM COATED ORAL at 22:01

## 2022-05-22 RX ADMIN — Medication 112 MICROGRAM(S): at 06:04

## 2022-05-22 RX ADMIN — APIXABAN 2.5 MILLIGRAM(S): 2.5 TABLET, FILM COATED ORAL at 11:55

## 2022-05-22 RX ADMIN — LATANOPROST 1 DROP(S): 0.05 SOLUTION/ DROPS OPHTHALMIC; TOPICAL at 22:00

## 2022-05-22 RX ADMIN — APIXABAN 2.5 MILLIGRAM(S): 2.5 TABLET, FILM COATED ORAL at 02:25

## 2022-05-22 RX ADMIN — Medication 650 MILLIGRAM(S): at 20:30

## 2022-05-22 RX ADMIN — LOSARTAN POTASSIUM 50 MILLIGRAM(S): 100 TABLET, FILM COATED ORAL at 06:03

## 2022-05-22 RX ADMIN — Medication 40 MILLIGRAM(S): at 01:06

## 2022-05-22 RX ADMIN — ATORVASTATIN CALCIUM 10 MILLIGRAM(S): 80 TABLET, FILM COATED ORAL at 22:00

## 2022-05-22 RX ADMIN — Medication 650 MILLIGRAM(S): at 19:51

## 2022-05-22 NOTE — CONSULT NOTE ADULT - SUBJECTIVE AND OBJECTIVE BOX
Date of service: 05/22/22    Requesting Physician : Dr. Carbajal     Reason for Consultation: AF    HISTORY OF PRESENT ILLNESS:  89 year old female  with PMhx of HTN, HLD, MDS, afib on Eliquis presents for worsened generalized weakness for the past day in setting of recent covid infection.  The patient reports dyspnea and dry, non-productive cough over the last few days.  She was diagnosed with COVID on 05/12/22 and received monoclonal antibodies.  She denies chest pain or anginal symptoms.  Of note, she reports an episode of epistaxis at home that resolved on its own.  No other complaints.             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;mylar dysplasia&#x27; treated with &quot; Arinesp&quot; x past 1.5 yrs- last dose 4 months ago      MDS (Myelodysplastic Syndrome)      Skin Cancer  of face , basal cell   4 yrs ago      History of Total Knee Replacement  L 1998      Bilateral Cataracts  71 y/o      S/P Breast Lumpectomy  10 yrs ago      Cystocele  47 yrs ago      Rectocele  47 yrs ago      Carpal Tunnel Syndrome  10 yrs ago      Basal Cell Carcinoma of Face  4 yrs ago      S/P T&amp;A  childhood        S/P TKR (Total Knee Replacement)  left      S/P Breast Lumpectomy  benign      S/P Cataract Surgery              MEDICATIONS:  MEDICATIONS  (STANDING):  amLODIPine   Tablet 2.5 milliGRAM(s) Oral daily  apixaban 2.5 milliGRAM(s) Oral every 12 hours  atorvastatin 10 milliGRAM(s) Oral at bedtime  folic acid 1 milliGRAM(s) Oral daily  furosemide    Tablet 20 milliGRAM(s) Oral daily  latanoprost 0.005% Ophthalmic Solution 1 Drop(s) Both EYES at bedtime  levothyroxine 112 MICROGram(s) Oral daily  losartan 50 milliGRAM(s) Oral daily  pantoprazole    Tablet 40 milliGRAM(s) Oral before breakfast      Allergies    No Known Allergies    Intolerances        FAMILY HISTORY:  No pertinent family history in first degree relatives      Non-contributary for premature coronary disease or sudden cardiac death    SOCIAL HISTORY:    [x ] Non-smoker  [ ] Smoker  [ ] Alcohol      REVIEW OF SYSTEMS:  [ ]chest pain  [ x ]shortness of breath  [  ]palpitations  [  ]syncope  [ ]near syncope [ ]upper extremity weakness   [ ] lower extremity weakness  [  ]diplopia  [  ]altered mental status   [  ]fevers  [ ]chills [ ]nausea  [ ]vomitting  [  ]dysphagia    [ ]abdominal pain  [ ]melena  [ ]BRBPR    [  ]epistaxis  [  ]rash    [ ]lower extremity edema        [x ] All others negative	  [ ] Unable to obtain    PHYSICAL EXAM:  T(C): 36.4 (05-22-22 @ 12:19), Max: 37.1 (05-21-22 @ 21:00)  HR: 62 (05-22-22 @ 12:19) (62 - 82)  BP: 129/75 (05-22-22 @ 12:19) (129/75 - 180/78)  RR: 18 (05-22-22 @ 12:19) (18 - 20)  SpO2: 96% (05-22-22 @ 12:19) (96% - 100%)  Wt(kg): --  I&O's Summary    21 May 2022 07:01  -  22 May 2022 07:00  --------------------------------------------------------  IN: 0 mL / OUT: 550 mL / NET: -550 mL    22 May 2022 07:01  -  22 May 2022 13:55  --------------------------------------------------------  IN: 240 mL / OUT: 200 mL / NET: 40 mL          HEENT:   Normal oral mucosa, PERRL, EOMI	  Lymphatic: No lymphadenopathy , no edema  Cardiovascular: Normal S1 S2, No JVD, IRRR, No murmurs , Peripheral pulses palpable 2+ bilaterally  Respiratory: Lungs clear to auscultation, normal effort 	  Gastrointestinal:  Soft, Non-tender, + BS	  Skin: No rashes, No ecchymoses, No cyanosis, warm to touch  Musculoskeletal: Normal range of motion, normal strength  Psychiatry:  Mood & affect appropriate      TELEMETRY: AF	    ECG:  	  RADIOLOGY:  OTHER:     DIAGNOSTIC TESTING:  [ ] Echocardiogram:  [ ]  Catheterization:  [ ] Stress Test:    	  	  LABS:	 	    CARDIAC MARKERS:                              8.1    6.82  )-----------( 236      ( 21 May 2022 22:19 )             24.6     05-21    131<L>  |  96  |  35<H>  ----------------------------<  103<H>  4.1   |  20<L>  |  1.34<H>    Ca    9.4      21 May 2022 22:19    TPro  7.4  /  Alb  4.2  /  TBili  0.3  /  DBili  x   /  AST  20  /  ALT  16  /  AlkPhos  68  05-21    proBNP: Serum Pro-Brain Natriuretic Peptide: 3797 pg/mL (05-21 @ 22:19)    Lipid Profile:   HgA1c:   TSH:     ASSESSMENT/PLAN: 89 year old female  with PMhx of HTN, HLD, MDS, afib on Eliquis presents for worsened generalized weakness for the past day in setting of recent covid infection.      -pt. currently with no chest pain or anginal symptoms  -check 12 lead ECG  -her symptoms improved after a dose of IV diuretics  -keep net negative  -check TTE  -lifelong ac if no contraindications and ok with primary team given prior epistaxis  -further workup pending above    Sanju Law MD

## 2022-05-22 NOTE — H&P ADULT - NSHPREVIEWOFSYSTEMS_GEN_ALL_CORE
CONSTITUTIONAL: + weakness,  no fevers or chills  EYES/ENT: No visual changes;  No dysphagia  NECK: No pain or stiffness  RESPIRATORY: +  cough, no wheezing, hemoptysis;+ shortness of breath  CARDIOVASCULAR: No chest pain or palpitations;+ lower extremity edema  EXTREMITIES: no le edema, cyanosis, clubbing  GASTROINTESTINAL: No abdominal or epigastric pain. No nausea, vomiting, or hematemesis; No diarrhea or constipation. No melena or hematochezia.  BACK: No back pain  GENITOURINARY: No dysuria, frequency or hematuria  NEUROLOGICAL: No numbness or weakness  MSK: no joint swelling or pain  SKIN: No itching, burning, rashes, or lesions   PSYCH: no agitation  All other review of systems is negative unless indicated above.

## 2022-05-22 NOTE — H&P ADULT - ASSESSMENT
89F w/PMhx of HTN, HLD, MDS, afib on Eliquis presents for worsened generalized weakness for the past day, recent covid infection.

## 2022-05-22 NOTE — CHART NOTE - NSCHARTNOTEFT_GEN_A_CORE
Patients seen and examined  Mildly tachypneic but lungs clear, minimal edema and no JVD  Monitor on home dose lasix  Will obtain TTE  Will also check LE dopplers, though lower suspicion for DVT/PE on AC  Monitor hgb and cont AC for now    Rest of care as per H&P

## 2022-05-22 NOTE — H&P ADULT - NSHPPHYSICALEXAM_GEN_ALL_CORE
Vital Signs Last 24 Hrs  T(C): 37 (21 May 2022 19:12), Max: 37 (21 May 2022 19:12)  T(F): 98.6 (21 May 2022 19:12), Max: 98.6 (21 May 2022 19:12)  HR: 74 (21 May 2022 19:12) (74 - 74)  BP: 180/78 (21 May 2022 19:12) (180/78 - 180/78)  BP(mean): --  RR: 20 (21 May 2022 19:12) (20 - 20)  SpO2: 100% (21 May 2022 19:12) (100% - 100%) Vital Signs Last 24 Hrs  T(C): 37 (21 May 2022 19:12), Max: 37 (21 May 2022 19:12)  T(F): 98.6 (21 May 2022 19:12), Max: 98.6 (21 May 2022 19:12)  HR: 74 (21 May 2022 19:12) (74 - 74)  BP: 180/78 (21 May 2022 19:12) (180/78 - 180/78)  BP(mean): --  RR: 20 (21 May 2022 19:12) (20 - 20)  SpO2: 100% (21 May 2022 19:12) (100% - 100%)    GENERAL: No acute distress, well-developed   HEAD:  Atraumatic, Normocephalic  ENT: EOMI, PERRLA, conjunctiva and sclera clear,  moist mucosa no pharyngeal erythema or exudates   NECK: supple , no JVD   CHEST/LUNG: Clear to auscultation bilaterally; No wheeze, equal breath sounds bilaterally   BACK: No spinal tenderness,  No CVA tenderness   HEART: Regular rate and rhythm; No murmurs, rubs, or gallops  ABDOMEN: Soft, Nontender, Nondistended; Bowel sounds present  EXTREMITIES:  No clubbing, cyanosis, trace edema  MSK: No joint swelling or effusions, ROM intact   PSYCH: Normal behavior/affect  NEUROLOGY: AAOx3, non-focal, cranial nerves intact  SKIN: Normal color, No rashes or lesions

## 2022-05-22 NOTE — H&P ADULT - PROBLEM SELECTOR PLAN 2
- IV lasix x one dose   - resume regular lasix dose in am   - monitor  mag and K, replete as needed  - monitor on telemetry  - strict ins and outs  - daily weight  - echocardiogram

## 2022-05-22 NOTE — H&P ADULT - PROBLEM SELECTOR PLAN 1
currently afebrile , no leukocytosis ,  oxygenating well on ra , reportedly mildly hypoxic w/ ambulation, no evidence of coronary ischemia , briefly in afib w. RVR : presentation can be 2/2 to HF vs. anemia vs. covid infection vs. afib, will diurese and monitor for improvement of symptoms

## 2022-05-22 NOTE — H&P ADULT - HISTORY OF PRESENT ILLNESS
Patient is a 89 year old female  with PMhx of HTN, HLD, MDS, afib on Eliquis presents for worsened generalized weakness for the past day in setting of recent covid infection.  For the past several days, Patient had a dry non productive cough associated with  generalized weakness. She tested positive for covid 5/12 and subsequently received monoclonal antibodies 5/14. on day of admission , patient reports worsening fatigue, and was unable to get out of bed. She has no fever , no shortness of breath. No lower extremity edema and no chest pain.    Patient is a 89 year old female  with PMhx of HTN, HLD, MDS, afib on Eliquis presents for worsened generalized weakness for the past day in setting of recent covid infection.  For the past several days, Patient had a dry non productive cough associated with  generalized weakness. She tested positive for covid 5/12 and subsequently received monoclonal antibodies 5/14. on day of admission , patient reports worsening fatigue, and was unable to get out of bed. She has no fever or chills , she reports some shortness of breath , she has ongoing lower extremity edema , unchanged.  She reports no chest pain.  Of note , patient missed recent Aranesp injection. She also reports a recent episode of epistaxis that lasted a few hours and self resolved

## 2022-05-22 NOTE — H&P ADULT - PROBLEM SELECTOR PLAN 3
missed last aranesp , baseline hgb 10 , currently 8.1  likely contributing to presentation; no active bleeding noted at this time   - monitor H/H   -  Aranesp -   can be given once dose confirmed during day time

## 2022-05-22 NOTE — PROVIDER CONTACT NOTE (OTHER) - SITUATION
Patient admitted with history of Covid + 5/12/22  Asymptomatic  Completed 10 day quarantine  Not immunocompromised.

## 2022-05-22 NOTE — H&P ADULT - NSHPLABSRESULTS_GEN_ALL_CORE
Labs personally reviewed:                          8.1    6.82  )-----------( 236      ( 21 May 2022 22:19 )             24.6     05-21    131<L>  |  96  |  35<H>  ----------------------------<  103<H>  4.1   |  20<L>  |  1.34<H>    Ca    9.4      21 May 2022 22:19    TPro  7.4  /  Alb  4.2  /  TBili  0.3  /  DBili  x   /  AST  20  /  ALT  16  /  AlkPhos  68  05-21        LIVER FUNCTIONS - ( 21 May 2022 22:19 )  Alb: 4.2 g/dL / Pro: 7.4 g/dL / ALK PHOS: 68 U/L / ALT: 16 U/L / AST: 20 U/L / GGT: x               CAPILLARY BLOOD GLUCOSE          Imaging:  CXR personally reviewed:  Clear lungs.  Unchanged cardiomegaly.      EKG personally reviewed: Atrial fib at 65 bpm RBBB, similar to prior ekg

## 2022-05-23 DIAGNOSIS — M25.561 PAIN IN RIGHT KNEE: ICD-10-CM

## 2022-05-23 LAB
ALBUMIN SERPL ELPH-MCNC: 3.6 G/DL — SIGNIFICANT CHANGE UP (ref 3.3–5)
ALP SERPL-CCNC: 126 U/L — HIGH (ref 40–120)
ALT FLD-CCNC: 14 U/L — SIGNIFICANT CHANGE UP (ref 10–45)
ANION GAP SERPL CALC-SCNC: 13 MMOL/L — SIGNIFICANT CHANGE UP (ref 5–17)
AST SERPL-CCNC: 12 U/L — SIGNIFICANT CHANGE UP (ref 10–40)
BASOPHILS # BLD AUTO: 0.14 K/UL — SIGNIFICANT CHANGE UP (ref 0–0.2)
BASOPHILS NFR BLD AUTO: 1.7 % — SIGNIFICANT CHANGE UP (ref 0–2)
BILIRUB SERPL-MCNC: 0.2 MG/DL — SIGNIFICANT CHANGE UP (ref 0.2–1.2)
BUN SERPL-MCNC: 18 MG/DL — SIGNIFICANT CHANGE UP (ref 7–23)
CALCIUM SERPL-MCNC: 9.4 MG/DL — SIGNIFICANT CHANGE UP (ref 8.4–10.5)
CHLORIDE SERPL-SCNC: 101 MMOL/L — SIGNIFICANT CHANGE UP (ref 96–108)
CO2 SERPL-SCNC: 24 MMOL/L — SIGNIFICANT CHANGE UP (ref 22–31)
CREAT SERPL-MCNC: 0.52 MG/DL — SIGNIFICANT CHANGE UP (ref 0.5–1.3)
EGFR: 84 ML/MIN/1.73M2 — SIGNIFICANT CHANGE UP
EOSINOPHIL # BLD AUTO: 0.63 K/UL — HIGH (ref 0–0.5)
EOSINOPHIL NFR BLD AUTO: 7.5 % — HIGH (ref 0–6)
GLUCOSE SERPL-MCNC: 88 MG/DL — SIGNIFICANT CHANGE UP (ref 70–99)
HCT VFR BLD CALC: 37.7 % — SIGNIFICANT CHANGE UP (ref 34.5–45)
HGB BLD-MCNC: 11.1 G/DL — LOW (ref 11.5–15.5)
IMM GRANULOCYTES NFR BLD AUTO: 1.1 % — SIGNIFICANT CHANGE UP (ref 0–1.5)
LYMPHOCYTES # BLD AUTO: 2.8 K/UL — SIGNIFICANT CHANGE UP (ref 1–3.3)
LYMPHOCYTES # BLD AUTO: 33.2 % — SIGNIFICANT CHANGE UP (ref 13–44)
MAGNESIUM SERPL-MCNC: 2.3 MG/DL — SIGNIFICANT CHANGE UP (ref 1.6–2.6)
MCHC RBC-ENTMCNC: 26.1 PG — LOW (ref 27–34)
MCHC RBC-ENTMCNC: 29.4 GM/DL — LOW (ref 32–36)
MCV RBC AUTO: 88.7 FL — SIGNIFICANT CHANGE UP (ref 80–100)
MONOCYTES # BLD AUTO: 0.76 K/UL — SIGNIFICANT CHANGE UP (ref 0–0.9)
MONOCYTES NFR BLD AUTO: 9 % — SIGNIFICANT CHANGE UP (ref 2–14)
NEUTROPHILS # BLD AUTO: 4.01 K/UL — SIGNIFICANT CHANGE UP (ref 1.8–7.4)
NEUTROPHILS NFR BLD AUTO: 47.5 % — SIGNIFICANT CHANGE UP (ref 43–77)
NRBC # BLD: 0 /100 WBCS — SIGNIFICANT CHANGE UP (ref 0–0)
PHOSPHATE SERPL-MCNC: 4.2 MG/DL — SIGNIFICANT CHANGE UP (ref 2.5–4.5)
PLATELET # BLD AUTO: 492 K/UL — HIGH (ref 150–400)
POTASSIUM SERPL-MCNC: 4.4 MMOL/L — SIGNIFICANT CHANGE UP (ref 3.5–5.3)
POTASSIUM SERPL-SCNC: 4.4 MMOL/L — SIGNIFICANT CHANGE UP (ref 3.5–5.3)
PROT SERPL-MCNC: 6.9 G/DL — SIGNIFICANT CHANGE UP (ref 6–8.3)
RBC # BLD: 4.25 M/UL — SIGNIFICANT CHANGE UP (ref 3.8–5.2)
RBC # FLD: 14.2 % — SIGNIFICANT CHANGE UP (ref 10.3–14.5)
SODIUM SERPL-SCNC: 138 MMOL/L — SIGNIFICANT CHANGE UP (ref 135–145)
WBC # BLD: 8.43 K/UL — SIGNIFICANT CHANGE UP (ref 3.8–10.5)
WBC # FLD AUTO: 8.43 K/UL — SIGNIFICANT CHANGE UP (ref 3.8–10.5)

## 2022-05-23 PROCEDURE — 93306 TTE W/DOPPLER COMPLETE: CPT | Mod: 26

## 2022-05-23 PROCEDURE — 99232 SBSQ HOSP IP/OBS MODERATE 35: CPT

## 2022-05-23 RX ORDER — OXYCODONE HYDROCHLORIDE 5 MG/1
2.5 TABLET ORAL EVERY 6 HOURS
Refills: 0 | Status: DISCONTINUED | OUTPATIENT
Start: 2022-05-23 | End: 2022-05-27

## 2022-05-23 RX ORDER — LIDOCAINE 4 G/100G
1 CREAM TOPICAL EVERY 24 HOURS
Refills: 0 | Status: DISCONTINUED | OUTPATIENT
Start: 2022-05-23 | End: 2022-05-24

## 2022-05-23 RX ORDER — DICLOFENAC SODIUM 30 MG/G
4 GEL TOPICAL THREE TIMES A DAY
Refills: 0 | Status: DISCONTINUED | OUTPATIENT
Start: 2022-05-23 | End: 2022-05-24

## 2022-05-23 RX ADMIN — Medication 1 MILLIGRAM(S): at 11:25

## 2022-05-23 RX ADMIN — OXYCODONE HYDROCHLORIDE 2.5 MILLIGRAM(S): 5 TABLET ORAL at 14:46

## 2022-05-23 RX ADMIN — PANTOPRAZOLE SODIUM 40 MILLIGRAM(S): 20 TABLET, DELAYED RELEASE ORAL at 04:57

## 2022-05-23 RX ADMIN — Medication 20 MILLIGRAM(S): at 04:57

## 2022-05-23 RX ADMIN — APIXABAN 2.5 MILLIGRAM(S): 2.5 TABLET, FILM COATED ORAL at 09:45

## 2022-05-23 RX ADMIN — Medication 650 MILLIGRAM(S): at 11:24

## 2022-05-23 RX ADMIN — AMLODIPINE BESYLATE 2.5 MILLIGRAM(S): 2.5 TABLET ORAL at 04:57

## 2022-05-23 RX ADMIN — LIDOCAINE 1 PATCH: 4 CREAM TOPICAL at 14:46

## 2022-05-23 RX ADMIN — Medication 650 MILLIGRAM(S): at 04:59

## 2022-05-23 RX ADMIN — APIXABAN 2.5 MILLIGRAM(S): 2.5 TABLET, FILM COATED ORAL at 22:34

## 2022-05-23 RX ADMIN — Medication 112 MICROGRAM(S): at 04:58

## 2022-05-23 RX ADMIN — LOSARTAN POTASSIUM 50 MILLIGRAM(S): 100 TABLET, FILM COATED ORAL at 04:57

## 2022-05-23 RX ADMIN — Medication 650 MILLIGRAM(S): at 06:30

## 2022-05-23 RX ADMIN — LATANOPROST 1 DROP(S): 0.05 SOLUTION/ DROPS OPHTHALMIC; TOPICAL at 22:34

## 2022-05-23 RX ADMIN — ATORVASTATIN CALCIUM 10 MILLIGRAM(S): 80 TABLET, FILM COATED ORAL at 22:34

## 2022-05-23 NOTE — PHYSICAL THERAPY INITIAL EVALUATION ADULT - ADDITIONAL COMMENTS
Patient lives in private home, 2 steps to enter, first floor set up. Patient has HHA 6 hours x 6 days/week. DME: Rollator, rolling walker, wheelchair

## 2022-05-23 NOTE — PHYSICAL THERAPY INITIAL EVALUATION ADULT - SITTING BALANCE: DYNAMIC
Please see message below.     Lab from 2/9/21  Component      Latest Ref Rng & Units 2/9/2021   Fasting Status      Hours 2   Sodium      135 - 145 mmol/L 139   Potassium      3.4 - 5.1 mmol/L 4.4   Chloride      98 - 107 mmol/L 106   CO2      21 - 32 mmol/L 29   ANION GAP      10 - 20 mmol/L 8 (L)   Glucose      65 - 99 mg/dL 82   BUN      6 - 20 mg/dL 12   Creatinine      0.67 - 1.17 mg/dL 0.78   Glomerular Filtration Rate      >90 mL/min/1.73m2 >90   BUN/CREATININE RATIO      7 - 25 15   CALCIUM      8.4 - 10.2 mg/dL 9.0      Please advise.    good balance

## 2022-05-23 NOTE — PHYSICAL THERAPY INITIAL EVALUATION ADULT - PERTINENT HX OF CURRENT PROBLEM, REHAB EVAL
89 year old female  with PMhx of HTN, HLD, MDS, afib on Eliquis presents for worsened generalized weakness for the past day in setting of recent covid infection. 5/21/22: Chest xray: clear lungs, unchanged cardiomegaly

## 2022-05-23 NOTE — PHYSICAL THERAPY INITIAL EVALUATION ADULT - PLANNED THERAPY INTERVENTIONS, PT EVAL
stair negotiation GOAL: Patient will negotiate up / down 2 steps with min A in 2 weeks/balance training/bed mobility training/gait training/transfer training

## 2022-05-23 NOTE — PROGRESS NOTE ADULT - PROBLEM SELECTOR PLAN 2
- s/p IV lasix x one dose   - cw home PO lasix  - monitor  mag and K, replete as needed  - monitor on telemetry  - strict ins and outs  - daily weight  - echocardiogram

## 2022-05-24 LAB
ANION GAP SERPL CALC-SCNC: 17 MMOL/L — SIGNIFICANT CHANGE UP (ref 5–17)
BUN SERPL-MCNC: 49 MG/DL — HIGH (ref 7–23)
CALCIUM SERPL-MCNC: 8.9 MG/DL — SIGNIFICANT CHANGE UP (ref 8.4–10.5)
CHLORIDE SERPL-SCNC: 98 MMOL/L — SIGNIFICANT CHANGE UP (ref 96–108)
CO2 SERPL-SCNC: 19 MMOL/L — LOW (ref 22–31)
CREAT SERPL-MCNC: 1.61 MG/DL — HIGH (ref 0.5–1.3)
EGFR: 29 ML/MIN/1.73M2 — LOW
GLUCOSE SERPL-MCNC: 91 MG/DL — SIGNIFICANT CHANGE UP (ref 70–99)
HCT VFR BLD CALC: 29.8 % — LOW (ref 34.5–45)
HGB BLD-MCNC: 9.5 G/DL — LOW (ref 11.5–15.5)
MCHC RBC-ENTMCNC: 27.5 PG — SIGNIFICANT CHANGE UP (ref 27–34)
MCHC RBC-ENTMCNC: 31.9 GM/DL — LOW (ref 32–36)
MCV RBC AUTO: 86.4 FL — SIGNIFICANT CHANGE UP (ref 80–100)
NRBC # BLD: 0 /100 WBCS — SIGNIFICANT CHANGE UP (ref 0–0)
PLATELET # BLD AUTO: 283 K/UL — SIGNIFICANT CHANGE UP (ref 150–400)
POTASSIUM SERPL-MCNC: 4 MMOL/L — SIGNIFICANT CHANGE UP (ref 3.5–5.3)
POTASSIUM SERPL-SCNC: 4 MMOL/L — SIGNIFICANT CHANGE UP (ref 3.5–5.3)
RBC # BLD: 3.45 M/UL — LOW (ref 3.8–5.2)
RBC # FLD: 15.6 % — HIGH (ref 10.3–14.5)
SODIUM SERPL-SCNC: 134 MMOL/L — LOW (ref 135–145)
WBC # BLD: 9.98 K/UL — SIGNIFICANT CHANGE UP (ref 3.8–10.5)
WBC # FLD AUTO: 9.98 K/UL — SIGNIFICANT CHANGE UP (ref 3.8–10.5)

## 2022-05-24 PROCEDURE — 93970 EXTREMITY STUDY: CPT | Mod: 26

## 2022-05-24 PROCEDURE — 99222 1ST HOSP IP/OBS MODERATE 55: CPT | Mod: GC

## 2022-05-24 PROCEDURE — 99232 SBSQ HOSP IP/OBS MODERATE 35: CPT

## 2022-05-24 PROCEDURE — 73562 X-RAY EXAM OF KNEE 3: CPT | Mod: 26,RT

## 2022-05-24 RX ORDER — DICLOFENAC SODIUM 30 MG/G
4 GEL TOPICAL
Refills: 0 | Status: DISCONTINUED | OUTPATIENT
Start: 2022-05-24 | End: 2022-05-27

## 2022-05-24 RX ORDER — SODIUM CHLORIDE 9 MG/ML
500 INJECTION, SOLUTION INTRAVENOUS
Refills: 0 | Status: DISCONTINUED | OUTPATIENT
Start: 2022-05-24 | End: 2022-05-25

## 2022-05-24 RX ORDER — ACETAMINOPHEN 500 MG
650 TABLET ORAL EVERY 6 HOURS
Refills: 0 | Status: COMPLETED | OUTPATIENT
Start: 2022-05-24 | End: 2022-05-27

## 2022-05-24 RX ADMIN — APIXABAN 2.5 MILLIGRAM(S): 2.5 TABLET, FILM COATED ORAL at 20:21

## 2022-05-24 RX ADMIN — DICLOFENAC SODIUM 4 GRAM(S): 30 GEL TOPICAL at 16:25

## 2022-05-24 RX ADMIN — Medication 650 MILLIGRAM(S): at 11:20

## 2022-05-24 RX ADMIN — SODIUM CHLORIDE 125 MILLILITER(S): 9 INJECTION, SOLUTION INTRAVENOUS at 15:39

## 2022-05-24 RX ADMIN — Medication 650 MILLIGRAM(S): at 12:00

## 2022-05-24 RX ADMIN — OXYCODONE HYDROCHLORIDE 2.5 MILLIGRAM(S): 5 TABLET ORAL at 21:36

## 2022-05-24 RX ADMIN — Medication 30 MILLIGRAM(S): at 11:19

## 2022-05-24 RX ADMIN — AMLODIPINE BESYLATE 2.5 MILLIGRAM(S): 2.5 TABLET ORAL at 05:54

## 2022-05-24 RX ADMIN — OXYCODONE HYDROCHLORIDE 2.5 MILLIGRAM(S): 5 TABLET ORAL at 05:54

## 2022-05-24 RX ADMIN — LOSARTAN POTASSIUM 50 MILLIGRAM(S): 100 TABLET, FILM COATED ORAL at 05:53

## 2022-05-24 RX ADMIN — Medication 1 MILLIGRAM(S): at 11:20

## 2022-05-24 RX ADMIN — OXYCODONE HYDROCHLORIDE 2.5 MILLIGRAM(S): 5 TABLET ORAL at 07:00

## 2022-05-24 RX ADMIN — APIXABAN 2.5 MILLIGRAM(S): 2.5 TABLET, FILM COATED ORAL at 11:20

## 2022-05-24 RX ADMIN — Medication 112 MICROGRAM(S): at 05:53

## 2022-05-24 RX ADMIN — OXYCODONE HYDROCHLORIDE 2.5 MILLIGRAM(S): 5 TABLET ORAL at 20:24

## 2022-05-24 RX ADMIN — PANTOPRAZOLE SODIUM 40 MILLIGRAM(S): 20 TABLET, DELAYED RELEASE ORAL at 05:53

## 2022-05-24 RX ADMIN — LATANOPROST 1 DROP(S): 0.05 SOLUTION/ DROPS OPHTHALMIC; TOPICAL at 20:22

## 2022-05-24 RX ADMIN — OXYCODONE HYDROCHLORIDE 2.5 MILLIGRAM(S): 5 TABLET ORAL at 12:09

## 2022-05-24 RX ADMIN — ATORVASTATIN CALCIUM 10 MILLIGRAM(S): 80 TABLET, FILM COATED ORAL at 20:21

## 2022-05-24 RX ADMIN — Medication 650 MILLIGRAM(S): at 17:02

## 2022-05-24 RX ADMIN — Medication 20 MILLIGRAM(S): at 05:53

## 2022-05-24 NOTE — PROGRESS NOTE ADULT - PROBLEM SELECTOR PLAN 2
- s/p IV lasix x one dose   - hold lasix, give back 250cc  - monitor  mag and K, replete as needed  - monitor on telemetry  - strict ins and outs  - daily weight  - echocardiogram - s/p IV lasix x one dose   - hold lasix, give back 500cc LR for poor PO, cr jump  - monitor  mag and K, replete as needed  - monitor on telemetry  - strict ins and outs  - daily weight  - echocardiogram

## 2022-05-24 NOTE — CONSULT NOTE ADULT - SUBJECTIVE AND OBJECTIVE BOX
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;mylar dysplasia&#x27; treated with &quot; Arinesp&quot; x past 1.5 yrs- last dose 4 months ago      MDS (Myelodysplastic Syndrome)      Skin Cancer  of face , basal cell   4 yrs ago      History of Total Knee Replacement  L 1998      Bilateral Cataracts  69 y/o      S/P Breast Lumpectomy  10 yrs ago      Cystocele  47 yrs ago      Rectocele  47 yrs ago      Carpal Tunnel Syndrome  10 yrs ago      Basal Cell Carcinoma of Face  4 yrs ago      S/P T&amp;A  childhood        S/P TKR (Total Knee Replacement)  left      S/P Breast Lumpectomy  benign      S/P Cataract Surgery          Review of Systems:  Gen:  No fevers/chills, weight loss  HEENT: No blurry vision, no difficulty swallowing, no oral or nasal ulcers  CVS: No chest pain/palpitations  Resp: No SOB/wheezing  GI: No N/V/C/D/abdominal pain  MSK:  Skin: No new rashes  Neuro: No headaches    MEDICATIONS  (STANDING):  acetaminophen     Tablet .. 650 milliGRAM(s) Oral every 6 hours  amLODIPine   Tablet 2.5 milliGRAM(s) Oral daily  apixaban 2.5 milliGRAM(s) Oral every 12 hours  atorvastatin 10 milliGRAM(s) Oral at bedtime  diclofenac sodium 1% Gel 4 Gram(s) Topical two times a day  folic acid 1 milliGRAM(s) Oral daily  lactated ringers. 500 milliLiter(s) (125 mL/Hr) IV Continuous <Continuous>  latanoprost 0.005% Ophthalmic Solution 1 Drop(s) Both EYES at bedtime  levothyroxine 112 MICROGram(s) Oral daily  losartan 50 milliGRAM(s) Oral daily  pantoprazole    Tablet 40 milliGRAM(s) Oral before breakfast    MEDICATIONS  (PRN):  ALBUTerol    90 MICROgram(s) HFA Inhaler 2 Puff(s) Inhalation every 6 hours PRN Shortness of Breath and/or Wheezing  melatonin 3 milliGRAM(s) Oral at bedtime PRN Insomnia  ondansetron Injectable 4 milliGRAM(s) IV Push every 8 hours PRN Nausea and/or Vomiting  oxyCODONE    IR 2.5 milliGRAM(s) Oral every 6 hours PRN Severe Pain (7 - 10)      Allergies    No Known Allergies    Intolerances        PERTINENT MEDICATION HISTORY:    SOCIAL HISTORY:  OCCUPATION:  TRAVEL HISTORY:    FAMILY HISTORY:  No pertinent family history in first degree relatives        Vital Signs Last 24 Hrs  T(C): 37.1 (24 May 2022 11:28), Max: 37.1 (24 May 2022 11:28)  T(F): 98.8 (24 May 2022 11:28), Max: 98.8 (24 May 2022 11:28)  HR: 74 (24 May 2022 11:28) (73 - 81)  BP: 158/76 (24 May 2022 11:28) (123/73 - 158/76)  BP(mean): --  RR: 16 (24 May 2022 11:28) (16 - 18)  SpO2: 94% (24 May 2022 11:28) (94% - 96%)    Physical Exam:  General: No apparent distress  HEENT: EOMI, MMM  CVS: +S1/S2, RRR, no murmurs/rubs/gallops  Resp: CTA b/l. No crackles/wheezing  GI: Soft, NT/ND +BS  MSK:  Neuro: AAOx3  Skin: no visible rashes    LABS:                        9.5    9.98  )-----------( 283      ( 24 May 2022 06:45 )             29.8     05-24    134<L>  |  98  |  49<H>  ----------------------------<  91  4.0   |  19<L>  |  1.61<H>    Ca    8.9      24 May 2022 06:45  Phos  4.2     05-23  Mg     2.3     05-23    TPro  6.9  /  Alb  3.6  /  TBili  0.2  /  DBili  x   /  AST  12  /  ALT  14  /  AlkPhos  126<H>  05-23          RADIOLOGY & ADDITIONAL STUDIES:     BERHANE WOODWARD  [id number]    HISTORY OF PRESENT ILLNESS:  89 year old female  with PMhx of HTN, HLD, MDS, afib on Eliquis presents for worsened generalized weakness for the past day in setting of recent covid infection.  For the past several days, Patient had a dry non productive cough associated with  generalized weakness. She tested positive for covid 5/12 and subsequently received monoclonal antibodies 5/14. on day of admission , patient reports worsening fatigue, and was unable to get out of bed. She has no fever or chills , she reports some shortness of breath , she has ongoing lower extremity edema , unchanged.  She reports no chest pain.  Of note , patient missed recent Aranesp injection. She also reports a recent episode of epistaxis that lasted a few hours and self resolved    Rheum hx:   reports b/l knee OA, had multiple injections in R. knee before  sudden onset of R. knee pain 3 days ago, ROM severely limited due to pain  s/p L. TKR many years ago, no complains from L. 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;mylar dysplasia&#x27; treated with &quot; Arinesp&quot; x past 1.5 yrs- last dose 4 months ago      MDS (Myelodysplastic Syndrome)      Skin Cancer  of face , basal cell   4 yrs ago      History of Total Knee Replacement  L 1998      Bilateral Cataracts  69 y/o      S/P Breast Lumpectomy  10 yrs ago      Cystocele  47 yrs ago      Rectocele  47 yrs ago      Carpal Tunnel Syndrome  10 yrs ago      Basal Cell Carcinoma of Face  4 yrs ago      S/P T&amp;A  childhood        S/P TKR (Total Knee Replacement)  left      S/P Breast Lumpectomy  benign      S/P Cataract Surgery          Review of Systems:  Gen:  No fevers/chills, weight loss  HEENT: No blurry vision, no difficulty swallowing, no oral or nasal ulcers  CVS: No chest pain/palpitations  Resp: No SOB/wheezing  GI: No N/V/C/D/abdominal pain  MSK: See HPI    Skin: No new rashes  Neuro: No headaches    MEDICATIONS  (STANDING):  acetaminophen     Tablet .. 650 milliGRAM(s) Oral every 6 hours  amLODIPine   Tablet 2.5 milliGRAM(s) Oral daily  apixaban 2.5 milliGRAM(s) Oral every 12 hours  atorvastatin 10 milliGRAM(s) Oral at bedtime  diclofenac sodium 1% Gel 4 Gram(s) Topical two times a day  folic acid 1 milliGRAM(s) Oral daily  lactated ringers. 500 milliLiter(s) (125 mL/Hr) IV Continuous <Continuous>  latanoprost 0.005% Ophthalmic Solution 1 Drop(s) Both EYES at bedtime  levothyroxine 112 MICROGram(s) Oral daily  losartan 50 milliGRAM(s) Oral daily  pantoprazole    Tablet 40 milliGRAM(s) Oral before breakfast    MEDICATIONS  (PRN):  ALBUTerol    90 MICROgram(s) HFA Inhaler 2 Puff(s) Inhalation every 6 hours PRN Shortness of Breath and/or Wheezing  melatonin 3 milliGRAM(s) Oral at bedtime PRN Insomnia  ondansetron Injectable 4 milliGRAM(s) IV Push every 8 hours PRN Nausea and/or Vomiting  oxyCODONE    IR 2.5 milliGRAM(s) Oral every 6 hours PRN Severe Pain (7 - 10)      Allergies  No Known Allergies  Intolerances        PERTINENT MEDICATION HISTORY:    SOCIAL HISTORY:  OCCUPATION:  TRAVEL HISTORY:    FAMILY HISTORY:  No pertinent family history in first degree relatives        Vital Signs Last 24 Hrs  T(C): 37.1 (24 May 2022 11:28), Max: 37.1 (24 May 2022 11:28)  T(F): 98.8 (24 May 2022 11:28), Max: 98.8 (24 May 2022 11:28)  HR: 74 (24 May 2022 11:28) (73 - 81)  BP: 158/76 (24 May 2022 11:28) (123/73 - 158/76)  BP(mean): --  RR: 16 (24 May 2022 11:28) (16 - 18)  SpO2: 94% (24 May 2022 11:28) (94% - 96%)    Physical Exam:  General: NAD  HEENT: EOMI, MMM  CVS: +S1/S2, RRR, no murmurs/rubs/gallops  Resp: CTA b/l. No crackles/wheezing  GI: Soft, NT/ND +BS  MSK: + large suprapatellar effusion in R. knee, TTP, mildly warm, ROM limited 2/2 pain. no synovitis in b/l ankles and L. knee. Remaining joints NTP    Neuro: AAOx3  Skin: no visible rashes    LABS:                        9.5    9.98  )-----------( 283      ( 24 May 2022 06:45 )             29.8     05-24    134<L>  |  98  |  49<H>  ----------------------------<  91  4.0   |  19<L>  |  1.61<H>    Ca    8.9      24 May 2022 06:45  Phos  4.2     05-23  Mg     2.3     05-23    TPro  6.9  /  Alb  3.6  /  TBili  0.2  /  DBili  x   /  AST  12  /  ALT  14  /  AlkPhos  126<H>  05-23      RADIOLOGY & ADDITIONAL STUDIES:

## 2022-05-24 NOTE — CONSULT NOTE ADULT - ATTENDING COMMENTS
Patient with recent covid infection (s/p monoclonal Abs) admitted with decreased ability to walk.  Pain and swelling on the right knee.    No history of gout in the past.  s/p one dose of steroids with persistent symptoms.     Monoarthritis  -diagnostic arthrocentesis discussed with patient and her daughter at bedside and she agreed to proceed. No fluid returned.  Recommend US-guided aspiration.   will follow with you

## 2022-05-24 NOTE — CONSULT NOTE ADULT - SUBJECTIVE AND OBJECTIVE BOX
Patient is a 89y old  Female who presents with a chief complaint of generalized weakness x few days (24 May 2022 13:22)      HPI:  Patient is a 89 year old female  with PMhx of HTN, HLD, MDS, afib on Eliquis presents for worsened generalized weakness for the past day in setting of recent covid infection.  For the past several days, Patient had a dry non productive cough associated with  generalized weakness. She tested positive for covid 5/12 and subsequently received monoclonal antibodies 5/14. on day of admission , patient reports worsening fatigue, and was unable to get out of bed. She has no fever or chills , she reports some shortness of breath , she has ongoing lower extremity edema , unchanged.  She reports no chest pain.  Of note , patient missed recent Aranesp injection. She also reports a recent episode of epistaxis that lasted a few hours and self resolved   (22 May 2022 00:46)    Per pt and family, she is improved today- fatigue is improved and more alert and conversive. main complaint is R knee pain. no f/c, no cp/dyspnea, no n/v/abd pain, no BM in a few days, no bleeding    missed apptmt with Dr. 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;mylar dysplasia&#x27; treated with &quot; Arinesp&quot; x past 1.5 yrs- last dose 4 months ago      MDS (Myelodysplastic Syndrome)      Skin Cancer  of face , basal cell   4 yrs ago      History of Total Knee Replacement  L 1998      Bilateral Cataracts  69 y/o      S/P Breast Lumpectomy  10 yrs ago      Cystocele  47 yrs ago      Rectocele  47 yrs ago      Carpal Tunnel Syndrome  10 yrs ago      Basal Cell Carcinoma of Face  4 yrs ago      S/P T&amp;A  childhood        S/P TKR (Total Knee Replacement)  left      S/P Breast Lumpectomy  benign      S/P Cataract Surgery          SOCIAL HISTORY:  Smoking - Non smoker   Alcohol - Social  Drugs - No drug use  lives alone, has Aide    FAMILY HISTORY:  No pertinent family history in first degree relatives        MEDICATIONS  (STANDING):  acetaminophen     Tablet .. 650 milliGRAM(s) Oral every 6 hours  amLODIPine   Tablet 2.5 milliGRAM(s) Oral daily  apixaban 2.5 milliGRAM(s) Oral every 12 hours  atorvastatin 10 milliGRAM(s) Oral at bedtime  diclofenac sodium 1% Gel 4 Gram(s) Topical two times a day  folic acid 1 milliGRAM(s) Oral daily  lactated ringers. 500 milliLiter(s) (125 mL/Hr) IV Continuous <Continuous>  latanoprost 0.005% Ophthalmic Solution 1 Drop(s) Both EYES at bedtime  levothyroxine 112 MICROGram(s) Oral daily  losartan 50 milliGRAM(s) Oral daily  pantoprazole    Tablet 40 milliGRAM(s) Oral before breakfast    MEDICATIONS  (PRN):  ALBUTerol    90 MICROgram(s) HFA Inhaler 2 Puff(s) Inhalation every 6 hours PRN Shortness of Breath and/or Wheezing  melatonin 3 milliGRAM(s) Oral at bedtime PRN Insomnia  ondansetron Injectable 4 milliGRAM(s) IV Push every 8 hours PRN Nausea and/or Vomiting  oxyCODONE    IR 2.5 milliGRAM(s) Oral every 6 hours PRN Severe Pain (7 - 10)      Allergies    No Known Allergies    Intolerances        Vital Signs Last 24 Hrs  T(C): 37.1 (24 May 2022 11:28), Max: 37.1 (24 May 2022 11:28)  T(F): 98.8 (24 May 2022 11:28), Max: 98.8 (24 May 2022 11:28)  HR: 74 (24 May 2022 11:28) (73 - 81)  BP: 158/76 (24 May 2022 11:28) (123/73 - 158/76)  BP(mean): --  RR: 16 (24 May 2022 11:28) (16 - 18)  SpO2: 94% (24 May 2022 11:28) (94% - 96%)    PHYSICAL EXAM  General: adult in NAD  HEENT: clear oropharynx, anicteric sclera, pink conjunctiva  Neck: supple  CV: normal S1/S2 with no murmur rubs or gallops  Lungs: clear to auscultation, no wheezes, no rales  Abdomen: soft non-tender non-distended, no hepatosplenomegaly, positive bowel sounds  Ext: no clubbing cyanosis or edema; R knee edema  Skin: no rashes and no petechiae  Lymph Nodes: No LAD in axillae, neck  Neuro: alert and oriented X 3, no focal deficits    LABS:                          9.5    9.98  )-----------( 283      ( 24 May 2022 06:45 )             29.8         Mean Cell Volume : 86.4 fl  Mean Cell Hemoglobin : 27.5 pg  Mean Cell Hemoglobin Concentration : 31.9 gm/dL  Auto Neutrophil # : x  Auto Lymphocyte # : x  Auto Monocyte # : x  Auto Eosinophil # : x  Auto Basophil # : x  Auto Neutrophil % : x  Auto Lymphocyte % : x  Auto Monocyte % : x  Auto Eosinophil % : x  Auto Basophil % : x      Serial CBC's  05-24 @ 06:45  Hct-29.8 / Hgb-9.5 / Plat-283 / RBC-3.45 / WBC-9.98  Serial CBC's  05-23 @ 06:28  Hct-37.7 / Hgb-11.1 / Plat-492 / RBC-4.25 / WBC-8.43  Serial CBC's  05-21 @ 22:19  Hct-24.6 / Hgb-8.1 / Plat-236 / RBC-2.87 / WBC-6.82      05-24    134<L>  |  98  |  49<H>  ----------------------------<  91  4.0   |  19<L>  |  1.61<H>    Ca    8.9      24 May 2022 06:45  Phos  4.2     05-23  Mg     2.3     05-23    TPro  6.9  /  Alb  3.6  /  TBili  0.2  /  DBili  x   /  AST  12  /  ALT  14  /  AlkPhos  126<H>  05-23                        Radiology:

## 2022-05-24 NOTE — CONSULT NOTE ADULT - ASSESSMENT
89 year old female  with PMhx of HTN, HLD, MDS, afib on Eliquis, b/l OA s/p L TKR presents for worsened generalized weakness, cough and SOB for the past day in setting of recent covid infection (tested + on 5/12). Also c/o acute R. knee pain x 3 days,  no trauma in the area. Rheumatology consulted for r/o gout     # monoarticular arthritis - differential: OA vs crystalline arthropathy (gout vs pseudogout)    - X-ray R. knee showed severe OA with osteophytes, vascular calcification   - exam showed large supra-patella R. knee effusion   - R. knee arthrocentesis attempted today, 2ml of lidocaine injected, unable to drain fluid. Recommend US guided arthrocentesis  - pain control: continue Voltaren gel, ICE over knee    - S/p prednisone 30mg x1 earlier 5/22     Case discussed with Dr. Nimisha Sherman, PGY-4  Rheumatology Fellow   Pager: [phone]  please enter a full 10 digit number for call back   During weekends, please page on call fellow      89 year old female  with PMhx of HTN, HLD, MDS, afib on Eliquis, b/l OA s/p L TKR presents for worsened generalized weakness, cough and SOB for the past day in setting of recent covid infection (tested + on 5/12). Also c/o acute R. knee pain x 3 days,  no trauma in the area. Rheumatology consulted for r/o gout     # monoarticular arthritis - differential:  crystalline arthropathy (gout vs pseudogout)  vs inflammatory OA  - X-ray R. knee showed severe OA with osteophytes, vascular calcification   - exam showed large supra-patella R. knee effusion   - R. knee arthrocentesis attempted today, 2ml of lidocaine injected, unable to drain fluid. Recommend US guided arthrocentesis  - pain control: continue Voltaren gel, ICE over knee    - S/p prednisone 30mg x1 earlier 5/22     Case discussed with Dr. Nimisha Sherman, PGY-4  Rheumatology Fellow   Pager: [phone]  please enter a full 10 digit number for call back   During weekends, please page on call fellow

## 2022-05-24 NOTE — PATIENT PROFILE ADULT - FALL HARM RISK - HARM RISK INTERVENTIONS

## 2022-05-24 NOTE — CONSULT NOTE ADULT - ASSESSMENT
Patient is a 89 year old female  with PMhx of HTN, HLD, MDS, afib on Eliquis presents for worsened generalized weakness for the past day in setting of recent covid infection, now with R knee pain    #Anemia, MDS  - has been stable on Aranesp, receiving approx q 6 weeks; last about 8 weeks prior  - initial decreased hg of 8.1 on admission may be false value  - currently hg 9,5  - no bleeding  - will recheck iron studies/B12/folate  - will monitor, may give Aranesp as inpatient depending on DC plans    #R knee pain; hx of OA  - diclofenac, tylenol  - Rheum to eval    #COVID infection, recent  - s/p Monoclonal Ab  - fatigue now seems improved    d/w daughter and son at bedside

## 2022-05-25 LAB
ANION GAP SERPL CALC-SCNC: 15 MMOL/L — SIGNIFICANT CHANGE UP (ref 5–17)
BUN SERPL-MCNC: 59 MG/DL — HIGH (ref 7–23)
CALCIUM SERPL-MCNC: 8.9 MG/DL — SIGNIFICANT CHANGE UP (ref 8.4–10.5)
CHLORIDE SERPL-SCNC: 98 MMOL/L — SIGNIFICANT CHANGE UP (ref 96–108)
CO2 SERPL-SCNC: 20 MMOL/L — LOW (ref 22–31)
CREAT SERPL-MCNC: 1.77 MG/DL — HIGH (ref 0.5–1.3)
EGFR: 26 ML/MIN/1.73M2 — LOW
FERRITIN SERPL-MCNC: 397 NG/ML — HIGH (ref 15–150)
FOLATE SERPL-MCNC: >20 NG/ML — SIGNIFICANT CHANGE UP
GLUCOSE SERPL-MCNC: 103 MG/DL — HIGH (ref 70–99)
HCT VFR BLD CALC: 27.6 % — LOW (ref 34.5–45)
HGB BLD-MCNC: 9 G/DL — LOW (ref 11.5–15.5)
IRON SATN MFR SERPL: 16 UG/DL — LOW (ref 30–160)
IRON SATN MFR SERPL: 8 % — LOW (ref 14–50)
MCHC RBC-ENTMCNC: 27.8 PG — SIGNIFICANT CHANGE UP (ref 27–34)
MCHC RBC-ENTMCNC: 32.6 GM/DL — SIGNIFICANT CHANGE UP (ref 32–36)
MCV RBC AUTO: 85.2 FL — SIGNIFICANT CHANGE UP (ref 80–100)
NRBC # BLD: 0 /100 WBCS — SIGNIFICANT CHANGE UP (ref 0–0)
NT-PROBNP SERPL-SCNC: 5302 PG/ML — HIGH (ref 0–300)
PLATELET # BLD AUTO: 268 K/UL — SIGNIFICANT CHANGE UP (ref 150–400)
POTASSIUM SERPL-MCNC: 4.3 MMOL/L — SIGNIFICANT CHANGE UP (ref 3.5–5.3)
POTASSIUM SERPL-SCNC: 4.3 MMOL/L — SIGNIFICANT CHANGE UP (ref 3.5–5.3)
RBC # BLD: 3.24 M/UL — LOW (ref 3.8–5.2)
RBC # FLD: 15.4 % — HIGH (ref 10.3–14.5)
SODIUM SERPL-SCNC: 133 MMOL/L — LOW (ref 135–145)
TIBC SERPL-MCNC: 198 UG/DL — LOW (ref 220–430)
UIBC SERPL-MCNC: 182 UG/DL — SIGNIFICANT CHANGE UP (ref 110–370)
URATE SERPL-MCNC: 8.4 MG/DL — HIGH (ref 2.5–7)
VIT B12 SERPL-MCNC: 498 PG/ML — SIGNIFICANT CHANGE UP (ref 232–1245)
WBC # BLD: 11.19 K/UL — HIGH (ref 3.8–10.5)
WBC # FLD AUTO: 11.19 K/UL — HIGH (ref 3.8–10.5)

## 2022-05-25 PROCEDURE — 73721 MRI JNT OF LWR EXTRE W/O DYE: CPT | Mod: 26,RT

## 2022-05-25 PROCEDURE — 99232 SBSQ HOSP IP/OBS MODERATE 35: CPT

## 2022-05-25 RX ORDER — FUROSEMIDE 40 MG
20 TABLET ORAL DAILY
Refills: 0 | Status: DISCONTINUED | OUTPATIENT
Start: 2022-05-26 | End: 2022-05-27

## 2022-05-25 RX ORDER — IRON SUCROSE 20 MG/ML
100 INJECTION, SOLUTION INTRAVENOUS EVERY 24 HOURS
Refills: 0 | Status: COMPLETED | OUTPATIENT
Start: 2022-05-25 | End: 2022-05-27

## 2022-05-25 RX ADMIN — DICLOFENAC SODIUM 4 GRAM(S): 30 GEL TOPICAL at 17:53

## 2022-05-25 RX ADMIN — AMLODIPINE BESYLATE 2.5 MILLIGRAM(S): 2.5 TABLET ORAL at 05:09

## 2022-05-25 RX ADMIN — LATANOPROST 1 DROP(S): 0.05 SOLUTION/ DROPS OPHTHALMIC; TOPICAL at 21:19

## 2022-05-25 RX ADMIN — Medication 650 MILLIGRAM(S): at 06:30

## 2022-05-25 RX ADMIN — Medication 650 MILLIGRAM(S): at 05:14

## 2022-05-25 RX ADMIN — APIXABAN 2.5 MILLIGRAM(S): 2.5 TABLET, FILM COATED ORAL at 11:17

## 2022-05-25 RX ADMIN — Medication 650 MILLIGRAM(S): at 18:43

## 2022-05-25 RX ADMIN — PANTOPRAZOLE SODIUM 40 MILLIGRAM(S): 20 TABLET, DELAYED RELEASE ORAL at 05:11

## 2022-05-25 RX ADMIN — Medication 1 MILLIGRAM(S): at 11:17

## 2022-05-25 RX ADMIN — DICLOFENAC SODIUM 4 GRAM(S): 30 GEL TOPICAL at 05:14

## 2022-05-25 RX ADMIN — APIXABAN 2.5 MILLIGRAM(S): 2.5 TABLET, FILM COATED ORAL at 21:19

## 2022-05-25 RX ADMIN — Medication 650 MILLIGRAM(S): at 13:02

## 2022-05-25 RX ADMIN — Medication 650 MILLIGRAM(S): at 17:53

## 2022-05-25 RX ADMIN — Medication 650 MILLIGRAM(S): at 11:17

## 2022-05-25 RX ADMIN — LOSARTAN POTASSIUM 50 MILLIGRAM(S): 100 TABLET, FILM COATED ORAL at 05:11

## 2022-05-25 RX ADMIN — ATORVASTATIN CALCIUM 10 MILLIGRAM(S): 80 TABLET, FILM COATED ORAL at 21:19

## 2022-05-25 RX ADMIN — Medication 112 MICROGRAM(S): at 05:11

## 2022-05-25 RX ADMIN — IRON SUCROSE 210 MILLIGRAM(S): 20 INJECTION, SOLUTION INTRAVENOUS at 17:53

## 2022-05-25 NOTE — CONSULT NOTE ADULT - SUBJECTIVE AND OBJECTIVE BOX
Interventional Radiology    Evaluate for Procedure:  R knee aspiration, attempted by Rheumatology on 5/24 w/no success of fluid aspiration.     HPI:  89 year old female  with PMhx of HTN, HLD, MDS, afib on Eliquis, b/l OA s/p L TKR presents for worsened generalized weakness, cough and SOB for the past day in setting of recent covid infection (tested + on 5/12). Also c/o acute R. knee pain x 3 days,  no trauma in the area. Rheumatology consulted for r/o gout and w/ attempted unsuccessful arthrocentesis w/Rheum on 5/24. IR now consulted 5/24 for R knee aspiration.       Allergies:   nkda      Medications (Abx/Cardiac/Anticoagulation/Blood Products)  amLODIPine   Tablet: 2.5 milliGRAM(s) Oral (05-25 @ 05:09)  apixaban: 2.5 milliGRAM(s) Oral (05-24 @ 20:21)  furosemide    Tablet: 20 milliGRAM(s) Oral (05-24 @ 05:53)  losartan: 50 milliGRAM(s) Oral (05-25 @ 05:11)      Data:  T(C): 36.7  HR: 84  BP: 148/94  RR: 18  SpO2: 95%    -WBC 11.19 / HgB 9.0 / Hct 27.6 / Plt 268  -Na 133 / Cl 98 / BUN 59 / Glucose 103  -K 4.3 / CO2 20 / Cr 1.77  -ALT -- / Alk Phos -- / T.Bili --  -INR 1.14 / PTT 39.9    Radiology:  reviewed    Assessment/Plan:   89 year old female  with PMhx of HTN, HLD, MDS, afib on Eliquis, b/l OA s/p L TKR presents for worsened generalized weakness, cough and SOB for the past day in setting of recent covid infection (tested + on 5/12). Also c/o acute R. knee pain x 3 days,  no trauma in the area. Rheumatology consulted for r/o gout and w/ attempted unsuccessful arthrocentesis w/Rheum on 5/24. IR now consulted 5/24 for R knee aspiration.       - R knee xray showing R suprapatellar effxn  - s/p unsuccessful arthrocentesis w/Rheum 5/24  - IR recommendation to obtain MRI  Rt Knee protocol to assess for knee pathologies.   - Please reconsult IR when MRI knee completed.   - d/w primary team  - d/w IR Attending Dr. Mikhail Sadaf Jalili, IR DOMINGUEZ, available on TEAMS or IR callback [phone]   Interventional Radiology    Evaluate for Procedure:  R knee aspiration, attempted by Rheumatology on 5/24 w/no success of fluid aspiration.     HPI:  89 year old female  with PMhx of HTN, HLD, MDS, afib on Eliquis, b/l OA s/p L TKR presents for worsened generalized weakness, cough and SOB for the past day in setting of recent covid infection (tested + on 5/12). Also c/o acute R. knee pain x 3 days,  no trauma in the area. Rheumatology consulted for r/o gout and w/ attempted unsuccessful arthrocentesis w/Rheum on 5/24. IR now consulted 5/24 for R knee aspiration.       Allergies:   nkda      Medications (Abx/Cardiac/Anticoagulation/Blood Products)  amLODIPine   Tablet: 2.5 milliGRAM(s) Oral (05-25 @ 05:09)  apixaban: 2.5 milliGRAM(s) Oral (05-24 @ 20:21)  furosemide    Tablet: 20 milliGRAM(s) Oral (05-24 @ 05:53)  losartan: 50 milliGRAM(s) Oral (05-25 @ 05:11)      Data:  T(C): 36.7  HR: 84  BP: 148/94  RR: 18  SpO2: 95%    -WBC 11.19 / HgB 9.0 / Hct 27.6 / Plt 268  -Na 133 / Cl 98 / BUN 59 / Glucose 103  -K 4.3 / CO2 20 / Cr 1.77  -ALT -- / Alk Phos -- / T.Bili --  -INR 1.14 / PTT 39.9    Radiology:  reviewed    Assessment/Plan:   89 year old female  with PMhx of HTN, HLD, MDS, afib on Eliquis, b/l OA s/p L TKR presents for worsened generalized weakness, cough and SOB for the past day in setting of recent covid infection (tested + on 5/12). Also c/o acute R. knee pain x 3 days,  no trauma in the area. Rheumatology consulted for r/o gout and w/ attempted unsuccessful arthrocentesis w/Rheum on 5/24. IR now consulted 5/24 for R knee aspiration.       - R knee xray showing R suprapatellar effxn  - s/p unsuccessful arthrocentesis w/Rheum 5/24, Likely to be unsuccessful for repeat aspiration w/IR  - IR recommendation to obtain MRI  Rt Knee protocol to assess for knee pathologies.   - Please reconsult IR when MRI knee completed.   - d/w primary team  - d/w IR Attending Dr. Mikhail Sadaf Jalili, IR PACARLI, available on TEAMS or IR callback [phone]

## 2022-05-25 NOTE — PROGRESS NOTE ADULT - PROBLEM SELECTOR PLAN 2
- s/p IV lasix x one dose   - hold lasix today given worsening cr, plan to resume tomorrow as BNP is also rising  - monitor  mag and K, replete as needed  - monitor on telemetry  - strict ins and outs  - daily weight  - echocardiogram

## 2022-05-26 DIAGNOSIS — N17.9 ACUTE KIDNEY FAILURE, UNSPECIFIED: ICD-10-CM

## 2022-05-26 LAB
ANION GAP SERPL CALC-SCNC: 16 MMOL/L — SIGNIFICANT CHANGE UP (ref 5–17)
APPEARANCE UR: ABNORMAL
BACTERIA # UR AUTO: ABNORMAL
BILIRUB UR-MCNC: NEGATIVE — SIGNIFICANT CHANGE UP
BUN SERPL-MCNC: 66 MG/DL — HIGH (ref 7–23)
CALCIUM SERPL-MCNC: 8.8 MG/DL — SIGNIFICANT CHANGE UP (ref 8.4–10.5)
CHLORIDE SERPL-SCNC: 98 MMOL/L — SIGNIFICANT CHANGE UP (ref 96–108)
CO2 SERPL-SCNC: 20 MMOL/L — LOW (ref 22–31)
COLOR SPEC: YELLOW — SIGNIFICANT CHANGE UP
CREAT ?TM UR-MCNC: 108 MG/DL — SIGNIFICANT CHANGE UP
CREAT SERPL-MCNC: 1.83 MG/DL — HIGH (ref 0.5–1.3)
DIFF PNL FLD: NEGATIVE — SIGNIFICANT CHANGE UP
EGFR: 25 ML/MIN/1.73M2 — LOW
EPI CELLS # UR: 0 /HPF — SIGNIFICANT CHANGE UP
GLUCOSE SERPL-MCNC: 83 MG/DL — SIGNIFICANT CHANGE UP (ref 70–99)
GLUCOSE UR QL: NEGATIVE — SIGNIFICANT CHANGE UP
HCT VFR BLD CALC: 26.3 % — LOW (ref 34.5–45)
HGB BLD-MCNC: 8.5 G/DL — LOW (ref 11.5–15.5)
KETONES UR-MCNC: NEGATIVE — SIGNIFICANT CHANGE UP
LEUKOCYTE ESTERASE UR-ACNC: NEGATIVE — SIGNIFICANT CHANGE UP
MCHC RBC-ENTMCNC: 27.7 PG — SIGNIFICANT CHANGE UP (ref 27–34)
MCHC RBC-ENTMCNC: 32.3 GM/DL — SIGNIFICANT CHANGE UP (ref 32–36)
MCV RBC AUTO: 85.7 FL — SIGNIFICANT CHANGE UP (ref 80–100)
NITRITE UR-MCNC: NEGATIVE — SIGNIFICANT CHANGE UP
NRBC # BLD: 0 /100 WBCS — SIGNIFICANT CHANGE UP (ref 0–0)
PH UR: 8 — SIGNIFICANT CHANGE UP (ref 5–8)
PLATELET # BLD AUTO: 305 K/UL — SIGNIFICANT CHANGE UP (ref 150–400)
POTASSIUM SERPL-MCNC: 4.2 MMOL/L — SIGNIFICANT CHANGE UP (ref 3.5–5.3)
POTASSIUM SERPL-SCNC: 4.2 MMOL/L — SIGNIFICANT CHANGE UP (ref 3.5–5.3)
PROT UR-MCNC: ABNORMAL
RBC # BLD: 3.07 M/UL — LOW (ref 3.8–5.2)
RBC # FLD: 15.4 % — HIGH (ref 10.3–14.5)
RBC CASTS # UR COMP ASSIST: 5 /HPF — HIGH (ref 0–4)
SODIUM SERPL-SCNC: 134 MMOL/L — LOW (ref 135–145)
SODIUM UR-SCNC: 36 MMOL/L — SIGNIFICANT CHANGE UP
SP GR SPEC: 1.02 — SIGNIFICANT CHANGE UP (ref 1.01–1.02)
UROBILINOGEN FLD QL: NEGATIVE — SIGNIFICANT CHANGE UP
UUN UR-MCNC: 868 MG/DL — SIGNIFICANT CHANGE UP
WBC # BLD: 7.91 K/UL — SIGNIFICANT CHANGE UP (ref 3.8–10.5)
WBC # FLD AUTO: 7.91 K/UL — SIGNIFICANT CHANGE UP (ref 3.8–10.5)
WBC UR QL: 0 /HPF — SIGNIFICANT CHANGE UP (ref 0–5)

## 2022-05-26 PROCEDURE — 99232 SBSQ HOSP IP/OBS MODERATE 35: CPT

## 2022-05-26 RX ADMIN — Medication 112 MICROGRAM(S): at 05:02

## 2022-05-26 RX ADMIN — AMLODIPINE BESYLATE 2.5 MILLIGRAM(S): 2.5 TABLET ORAL at 05:01

## 2022-05-26 RX ADMIN — Medication 650 MILLIGRAM(S): at 17:16

## 2022-05-26 RX ADMIN — Medication 650 MILLIGRAM(S): at 06:53

## 2022-05-26 RX ADMIN — Medication 650 MILLIGRAM(S): at 12:33

## 2022-05-26 RX ADMIN — Medication 20 MILLIGRAM(S): at 05:02

## 2022-05-26 RX ADMIN — Medication 1 MILLIGRAM(S): at 08:53

## 2022-05-26 RX ADMIN — PANTOPRAZOLE SODIUM 40 MILLIGRAM(S): 20 TABLET, DELAYED RELEASE ORAL at 05:02

## 2022-05-26 RX ADMIN — APIXABAN 2.5 MILLIGRAM(S): 2.5 TABLET, FILM COATED ORAL at 08:50

## 2022-05-26 RX ADMIN — Medication 650 MILLIGRAM(S): at 11:55

## 2022-05-26 RX ADMIN — LATANOPROST 1 DROP(S): 0.05 SOLUTION/ DROPS OPHTHALMIC; TOPICAL at 22:10

## 2022-05-26 RX ADMIN — Medication 650 MILLIGRAM(S): at 00:08

## 2022-05-26 RX ADMIN — Medication 650 MILLIGRAM(S): at 01:34

## 2022-05-26 RX ADMIN — Medication 3 MILLIGRAM(S): at 22:10

## 2022-05-26 RX ADMIN — DICLOFENAC SODIUM 4 GRAM(S): 30 GEL TOPICAL at 17:16

## 2022-05-26 RX ADMIN — Medication 650 MILLIGRAM(S): at 05:01

## 2022-05-26 RX ADMIN — IRON SUCROSE 210 MILLIGRAM(S): 20 INJECTION, SOLUTION INTRAVENOUS at 17:16

## 2022-05-26 RX ADMIN — ATORVASTATIN CALCIUM 10 MILLIGRAM(S): 80 TABLET, FILM COATED ORAL at 22:10

## 2022-05-26 RX ADMIN — Medication 650 MILLIGRAM(S): at 22:10

## 2022-05-26 RX ADMIN — Medication 650 MILLIGRAM(S): at 23:10

## 2022-05-26 RX ADMIN — APIXABAN 2.5 MILLIGRAM(S): 2.5 TABLET, FILM COATED ORAL at 22:10

## 2022-05-26 RX ADMIN — DICLOFENAC SODIUM 4 GRAM(S): 30 GEL TOPICAL at 05:01

## 2022-05-26 NOTE — PROGRESS NOTE ADULT - PROBLEM SELECTOR PROBLEM 6
COVID-19 virus infection
Chronic atrial fibrillation

## 2022-05-26 NOTE — PROGRESS NOTE ADULT - PROBLEM SELECTOR PLAN 4
missed last aranesp , baseline hgb 10 , currently 8.1  likely contributing to presentation; no active bleeding noted at this time   - monitor H/H
monitor H/H
missed last aranesp , baseline hgb 10 , currently 8.1  likely contributing to presentation; no active bleeding noted at this time   - monitor H/H
reports hx of OA  given warm and swollen, c/f crysalline arthropathy/gout vs severe OA  ATC Tylenol + diclofenac gel. Avoid systemic NSAID due to CKD, advanced age and on AC  Rheum consult -unable to obtain fluid  PT  Family wants more conservative approach, can consider MRI/joint aspiration inpt vs outpt

## 2022-05-26 NOTE — PROGRESS NOTE ADULT - PROBLEM SELECTOR PLAN 3
reports hx of OA  uses tylenol + diclofenac gel, will resume here  PT
-b/l cr appears to be ~1.4  Cr up to 1.8 today  F/u urine lytes  Consider pre renal vs cardiorenal  Resume home lasix today for rising BNP as cr did not improve with holding and giving fluid  Renal consult
reports hx of OA  given warm and swollen, c/f crysalline arthropathy/gout vs severe OA  ATC Tylenol + diclofenac gel. Avoid systemic NSAID due to CKD, advanced age and on AC  Rheum consult -unable to obtain fluid  PT  Family wants more conservative approach, can consider MRI/joint aspiration inpt vs outpt
reports hx of OA also reports hx of gout only in toe  given warm and swollen, c/f crysalline arthropathy/gout  Will give one dose pred 30 empirically  ATC Tylenol + diclofenac gel. Avoid systemic NSAID due to CKD, advanced age and on AC  Rheum consult  ?joint injection  PT

## 2022-05-26 NOTE — PROGRESS NOTE ADULT - PROBLEM SELECTOR PLAN 6
- continue Eliquis
+ on 5/12    - supportive care as needed

## 2022-05-26 NOTE — PROGRESS NOTE ADULT - PROBLEM SELECTOR PROBLEM 3
MARIELA (acute kidney injury)
Pain, joint, knee, right

## 2022-05-26 NOTE — PROGRESS NOTE ADULT - PROBLEM SELECTOR PLAN 1
currently afebrile , no leukocytosis ,  oxygenating well on ra , reportedly mildly hypoxic w/ ambulation, no evidence of coronary ischemia , briefly in afib w. RVR : presentation can be 2/2 to HF vs. anemia vs. covid infection vs. afib, will breathing improved after extra lasix, now back on home dose  overall suspect due to recovery from covid  f/u TTE, LE dopplers

## 2022-05-26 NOTE — PROGRESS NOTE ADULT - PROBLEM SELECTOR PROBLEM 5
Chronic atrial fibrillation
Chronic atrial fibrillation
MDS (myelodysplastic syndrome)
Chronic atrial fibrillation

## 2022-05-26 NOTE — PROGRESS NOTE ADULT - PROBLEM SELECTOR PROBLEM 4
Pain, joint, knee, right
MDS (myelodysplastic syndrome)

## 2022-05-27 ENCOUNTER — TRANSCRIPTION ENCOUNTER (OUTPATIENT)
Age: 87
End: 2022-05-27

## 2022-05-27 VITALS
OXYGEN SATURATION: 96 % | TEMPERATURE: 98 F | RESPIRATION RATE: 18 BRPM | HEART RATE: 77 BPM | DIASTOLIC BLOOD PRESSURE: 72 MMHG | SYSTOLIC BLOOD PRESSURE: 144 MMHG

## 2022-05-27 LAB
ANION GAP SERPL CALC-SCNC: 14 MMOL/L — SIGNIFICANT CHANGE UP (ref 5–17)
BUN SERPL-MCNC: 67 MG/DL — HIGH (ref 7–23)
CALCIUM SERPL-MCNC: 9 MG/DL — SIGNIFICANT CHANGE UP (ref 8.4–10.5)
CHLORIDE SERPL-SCNC: 102 MMOL/L — SIGNIFICANT CHANGE UP (ref 96–108)
CO2 SERPL-SCNC: 19 MMOL/L — LOW (ref 22–31)
CREAT SERPL-MCNC: 1.53 MG/DL — HIGH (ref 0.5–1.3)
EGFR: 31 ML/MIN/1.73M2 — LOW
GLUCOSE SERPL-MCNC: 81 MG/DL — SIGNIFICANT CHANGE UP (ref 70–99)
HCT VFR BLD CALC: 28.9 % — LOW (ref 34.5–45)
HGB BLD-MCNC: 9.3 G/DL — LOW (ref 11.5–15.5)
MCHC RBC-ENTMCNC: 27.6 PG — SIGNIFICANT CHANGE UP (ref 27–34)
MCHC RBC-ENTMCNC: 32.2 GM/DL — SIGNIFICANT CHANGE UP (ref 32–36)
MCV RBC AUTO: 85.8 FL — SIGNIFICANT CHANGE UP (ref 80–100)
NRBC # BLD: 0 /100 WBCS — SIGNIFICANT CHANGE UP (ref 0–0)
PLATELET # BLD AUTO: 321 K/UL — SIGNIFICANT CHANGE UP (ref 150–400)
POTASSIUM SERPL-MCNC: 4.2 MMOL/L — SIGNIFICANT CHANGE UP (ref 3.5–5.3)
POTASSIUM SERPL-SCNC: 4.2 MMOL/L — SIGNIFICANT CHANGE UP (ref 3.5–5.3)
RBC # BLD: 3.37 M/UL — LOW (ref 3.8–5.2)
RBC # FLD: 15.7 % — HIGH (ref 10.3–14.5)
SODIUM SERPL-SCNC: 135 MMOL/L — SIGNIFICANT CHANGE UP (ref 135–145)
WBC # BLD: 7.12 K/UL — SIGNIFICANT CHANGE UP (ref 3.8–10.5)
WBC # FLD AUTO: 7.12 K/UL — SIGNIFICANT CHANGE UP (ref 3.8–10.5)

## 2022-05-27 PROCEDURE — 84540 ASSAY OF URINE/UREA-N: CPT

## 2022-05-27 PROCEDURE — 84550 ASSAY OF BLOOD/URIC ACID: CPT

## 2022-05-27 PROCEDURE — 99239 HOSP IP/OBS DSCHRG MGMT >30: CPT

## 2022-05-27 PROCEDURE — 82330 ASSAY OF CALCIUM: CPT

## 2022-05-27 PROCEDURE — 81001 URINALYSIS AUTO W/SCOPE: CPT

## 2022-05-27 PROCEDURE — 83540 ASSAY OF IRON: CPT

## 2022-05-27 PROCEDURE — 84132 ASSAY OF SERUM POTASSIUM: CPT

## 2022-05-27 PROCEDURE — 93005 ELECTROCARDIOGRAM TRACING: CPT

## 2022-05-27 PROCEDURE — 82803 BLOOD GASES ANY COMBINATION: CPT

## 2022-05-27 PROCEDURE — 82570 ASSAY OF URINE CREATININE: CPT

## 2022-05-27 PROCEDURE — 85025 COMPLETE CBC W/AUTO DIFF WBC: CPT

## 2022-05-27 PROCEDURE — 84484 ASSAY OF TROPONIN QUANT: CPT

## 2022-05-27 PROCEDURE — 82565 ASSAY OF CREATININE: CPT

## 2022-05-27 PROCEDURE — 97530 THERAPEUTIC ACTIVITIES: CPT

## 2022-05-27 PROCEDURE — 73721 MRI JNT OF LWR EXTRE W/O DYE: CPT

## 2022-05-27 PROCEDURE — 82435 ASSAY OF BLOOD CHLORIDE: CPT

## 2022-05-27 PROCEDURE — 93306 TTE W/DOPPLER COMPLETE: CPT

## 2022-05-27 PROCEDURE — 71045 X-RAY EXAM CHEST 1 VIEW: CPT

## 2022-05-27 PROCEDURE — 84145 PROCALCITONIN (PCT): CPT

## 2022-05-27 PROCEDURE — 85014 HEMATOCRIT: CPT

## 2022-05-27 PROCEDURE — 82728 ASSAY OF FERRITIN: CPT

## 2022-05-27 PROCEDURE — 80048 BASIC METABOLIC PNL TOTAL CA: CPT

## 2022-05-27 PROCEDURE — U0005: CPT

## 2022-05-27 PROCEDURE — 82607 VITAMIN B-12: CPT

## 2022-05-27 PROCEDURE — 36415 COLL VENOUS BLD VENIPUNCTURE: CPT

## 2022-05-27 PROCEDURE — 83735 ASSAY OF MAGNESIUM: CPT

## 2022-05-27 PROCEDURE — 83880 ASSAY OF NATRIURETIC PEPTIDE: CPT

## 2022-05-27 PROCEDURE — U0003: CPT

## 2022-05-27 PROCEDURE — 97116 GAIT TRAINING THERAPY: CPT

## 2022-05-27 PROCEDURE — 93970 EXTREMITY STUDY: CPT

## 2022-05-27 PROCEDURE — 73562 X-RAY EXAM OF KNEE 3: CPT

## 2022-05-27 PROCEDURE — 83605 ASSAY OF LACTIC ACID: CPT

## 2022-05-27 PROCEDURE — 84300 ASSAY OF URINE SODIUM: CPT

## 2022-05-27 PROCEDURE — 99285 EMERGENCY DEPT VISIT HI MDM: CPT

## 2022-05-27 PROCEDURE — 80053 COMPREHEN METABOLIC PANEL: CPT

## 2022-05-27 PROCEDURE — 85027 COMPLETE CBC AUTOMATED: CPT

## 2022-05-27 PROCEDURE — 84100 ASSAY OF PHOSPHORUS: CPT

## 2022-05-27 PROCEDURE — 82947 ASSAY GLUCOSE BLOOD QUANT: CPT

## 2022-05-27 PROCEDURE — 84295 ASSAY OF SERUM SODIUM: CPT

## 2022-05-27 PROCEDURE — 97161 PT EVAL LOW COMPLEX 20 MIN: CPT

## 2022-05-27 PROCEDURE — 85018 HEMOGLOBIN: CPT

## 2022-05-27 PROCEDURE — 82746 ASSAY OF FOLIC ACID SERUM: CPT

## 2022-05-27 PROCEDURE — 83550 IRON BINDING TEST: CPT

## 2022-05-27 RX ORDER — POLYETHYLENE GLYCOL 3350 17 G/17G
17 POWDER, FOR SOLUTION ORAL DAILY
Refills: 0 | Status: DISCONTINUED | OUTPATIENT
Start: 2022-05-27 | End: 2022-05-27

## 2022-05-27 RX ORDER — AMLODIPINE BESYLATE 2.5 MG/1
1 TABLET ORAL
Qty: 30 | Refills: 0
Start: 2022-05-27 | End: 2022-06-25

## 2022-05-27 RX ORDER — LOSARTAN POTASSIUM 100 MG/1
1 TABLET, FILM COATED ORAL
Qty: 0 | Refills: 0 | DISCHARGE

## 2022-05-27 RX ORDER — DARBEPOETIN ALFA IN POLYSORBAT 200MCG/0.4
200 PEN INJECTOR (ML) SUBCUTANEOUS ONCE
Refills: 0 | Status: COMPLETED | OUTPATIENT
Start: 2022-05-27 | End: 2022-05-27

## 2022-05-27 RX ORDER — SENNA PLUS 8.6 MG/1
1 TABLET ORAL DAILY
Refills: 0 | Status: DISCONTINUED | OUTPATIENT
Start: 2022-05-27 | End: 2022-05-27

## 2022-05-27 RX ADMIN — DICLOFENAC SODIUM 4 GRAM(S): 30 GEL TOPICAL at 05:29

## 2022-05-27 RX ADMIN — Medication 1 MILLIGRAM(S): at 11:47

## 2022-05-27 RX ADMIN — PANTOPRAZOLE SODIUM 40 MILLIGRAM(S): 20 TABLET, DELAYED RELEASE ORAL at 05:30

## 2022-05-27 RX ADMIN — Medication 112 MICROGRAM(S): at 05:30

## 2022-05-27 RX ADMIN — APIXABAN 2.5 MILLIGRAM(S): 2.5 TABLET, FILM COATED ORAL at 09:17

## 2022-05-27 RX ADMIN — Medication 20 MILLIGRAM(S): at 05:29

## 2022-05-27 RX ADMIN — Medication 200 MICROGRAM(S): at 13:24

## 2022-05-27 RX ADMIN — IRON SUCROSE 210 MILLIGRAM(S): 20 INJECTION, SOLUTION INTRAVENOUS at 15:45

## 2022-05-27 RX ADMIN — SENNA PLUS 1 TABLET(S): 8.6 TABLET ORAL at 11:47

## 2022-05-27 RX ADMIN — POLYETHYLENE GLYCOL 3350 17 GRAM(S): 17 POWDER, FOR SOLUTION ORAL at 11:48

## 2022-05-27 RX ADMIN — AMLODIPINE BESYLATE 2.5 MILLIGRAM(S): 2.5 TABLET ORAL at 05:30

## 2022-05-27 NOTE — DISCHARGE NOTE PROVIDER - NSDCMRMEDTOKEN_GEN_ALL_CORE_FT
albuterol 90 mcg/inh inhalation aerosol: 2 puff(s) inhaled every 4 hours, As Needed -for shortness of breath and/or wheezing   amLODIPine 2.5 mg oral tablet: 1 tab(s) orally once a day  Aranesp:  injectable   bimatoprost 0.01% ophthalmic solution: 1 drop(s) to each affected eye once a day (in the evening)  diclofenac 1% topical gel: Apply topically to affected area (right knee) 2 times a day  Eliquis 2.5 mg oral tablet: 1 tab(s) orally 2 times a day  folic acid 1 mg oral tablet: 1 tab(s) orally once a day  Lasix 20 mg oral tablet: 1 tab(s) orally once a day  levothyroxine 112 mcg (0.112 mg) oral tablet: 1 tab(s) orally once a day  losartan 50 mg oral tablet: 1 tab(s) orally once a day  Lumigan 0.01% ophthalmic solution: 1 drop(s) to each affected eye once a day (in the evening)  omeprazole 20 mg oral delayed release tablet: 1 tab(s) orally once a day  pravastatin 40 mg oral tablet: 1 tab(s) orally once a day   albuterol 90 mcg/inh inhalation aerosol: 2 puff(s) inhaled every 4 hours, As Needed -for shortness of breath and/or wheezing   amLODIPine 2.5 mg oral tablet: 1 tab(s) orally once a day  bimatoprost 0.01% ophthalmic solution: 1 drop(s) to each affected eye once a day (in the evening)  diclofenac 1% topical gel: Apply topically to affected area (right knee) 2 times a day  Eliquis 2.5 mg oral tablet: 1 tab(s) orally 2 times a day  folic acid 1 mg oral tablet: 1 tab(s) orally once a day  Lasix 20 mg oral tablet: 1 tab(s) orally once a day  levothyroxine 112 mcg (0.112 mg) oral tablet: 1 tab(s) orally once a day  losartan 50 mg oral tablet: 1 tab(s) orally once a day  Lumigan 0.01% ophthalmic solution: 1 drop(s) to each affected eye once a day (in the evening)  omeprazole 20 mg oral delayed release tablet: 1 tab(s) orally once a day  pravastatin 40 mg oral tablet: 1 tab(s) orally once a day   albuterol 90 mcg/inh inhalation aerosol: 2 puff(s) inhaled every 4 hours, As Needed -for shortness of breath and/or wheezing   amLODIPine 5 mg oral tablet: 1 tab(s) orally once a day   bimatoprost 0.01% ophthalmic solution: 1 drop(s) to each affected eye once a day (in the evening)  diclofenac 1% topical gel: Apply topically to affected area (right knee) 2 times a day  Eliquis 2.5 mg oral tablet: 1 tab(s) orally 2 times a day  folic acid 1 mg oral tablet: 1 tab(s) orally once a day  Lasix 20 mg oral tablet: 1 tab(s) orally once a day  levothyroxine 112 mcg (0.112 mg) oral tablet: 1 tab(s) orally once a day  Lumigan 0.01% ophthalmic solution: 1 drop(s) to each affected eye once a day (in the evening)  omeprazole 20 mg oral delayed release tablet: 1 tab(s) orally once a day  physical therapy: 1-2 times a week for 2 weeks    pravastatin 40 mg oral tablet: 1 tab(s) orally once a day   albuterol 90 mcg/inh inhalation aerosol: 2 puff(s) inhaled every 4 hours, As Needed -for shortness of breath and/or wheezing   amLODIPine 5 mg oral tablet: 1 tab(s) orally once a day   bimatoprost 0.01% ophthalmic solution: 1 drop(s) to each affected eye once a day (in the evening)  diclofenac 1% topical gel: Apply topically to affected area (right knee) 2 times a day  Eliquis 2.5 mg oral tablet: 1 tab(s) orally 2 times a day  folic acid 1 mg oral tablet: 1 tab(s) orally once a day  Lasix 20 mg oral tablet: 1 tab(s) orally once a day  levothyroxine 112 mcg (0.112 mg) oral tablet: 1 tab(s) orally once a day  Lumigan 0.01% ophthalmic solution: 1 drop(s) to each affected eye once a day (in the evening)  omeprazole 20 mg oral delayed release tablet: 1 tab(s) orally once a day  physical therapy: at home  pravastatin 40 mg oral tablet: 1 tab(s) orally once a day

## 2022-05-27 NOTE — DISCHARGE NOTE PROVIDER - NSDCFUADDAPPT_GEN_ALL_CORE_FT
APPTS ARE READY TO BE MADE: [ ] YES    Best Family or Patient Contact (if needed):    Additional Information about above appointments (if needed):    1: Primary care within 1 week of discharge  2: Cardiology with Dr. Carbajal in 1-2 weeks  3: Dr. Herrera, Hematology- daughter aware to call office and make appointment    Other comments or requests:

## 2022-05-27 NOTE — CONSULT NOTE ADULT - SUBJECTIVE AND OBJECTIVE BOX
[facility] NEPHROLOGY- CONSULTATION NOTE    89y Female with history of below presents with weakness. Nephrology consulted for elevated Scr.    Patient with underlying CKD-3b as per prior labs with baseline Scr appearing to be 1.3-1.4. Patient admitted with renal function at baseline and continued on home medications (losartan 50 mg daily and lasix 20 mg daily). Losartan discontinued after dose administered on  due to MARIELA with Scr increased again on . Scr decreased this morning with plans for discharge home today.    REVIEW OF SYSTEMS:  Gen: no changes in weight  HEENT: no rhinorrhea  Neck: no sore throat  Cards: no chest pain  Resp: no dyspnea  GI: no nausea or vomiting or diarrhea  : no dysuria or hematuria  Vascular: no LE edema  Derm: no rashes  Neuro: no numbness/tingling    No Known Allergies      Home Medications Reviewed  Hospital Medications:   MEDICATIONS  (STANDING):  amLODIPine   Tablet 2.5 milliGRAM(s) Oral daily  apixaban 2.5 milliGRAM(s) Oral every 12 hours  atorvastatin 10 milliGRAM(s) Oral at bedtime  diclofenac sodium 1% Gel 4 Gram(s) Topical two times a day  folic acid 1 milliGRAM(s) Oral daily  furosemide    Tablet 20 milliGRAM(s) Oral daily  iron sucrose IVPB 100 milliGRAM(s) IV Intermittent every 24 hours  latanoprost 0.005% Ophthalmic Solution 1 Drop(s) Both EYES at bedtime  levothyroxine 112 MICROGram(s) Oral daily  pantoprazole    Tablet 40 milliGRAM(s) Oral before breakfast  polyethylene glycol 3350 17 Gram(s) Oral daily  senna 1 Tablet(s) Oral daily      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;mylar dysplasia&#x27; treated with &quot; Arinesp&quot; x past 1.5 yrs- last dose 4 months ago      MDS (Myelodysplastic Syndrome)      Skin Cancer  of face , basal cell   4 yrs ago      History of Total Knee Replacement  L       Bilateral Cataracts  69 y/o      S/P Breast Lumpectomy  10 yrs ago      Cystocele  47 yrs ago      Rectocele  47 yrs ago      Carpal Tunnel Syndrome  10 yrs ago      Basal Cell Carcinoma of Face  4 yrs ago      S/P T&amp;A  childhood        S/P TKR (Total Knee Replacement)  left      S/P Breast Lumpectomy  benign      S/P Cataract Surgery          FAMILY HISTORY:  No pertinent family history in first degree relatives        SOCIAL HISTORY:  Denies toxic substance use     VITALS:  T(F): 97.5 (22 @ 11:50), Max: 97.7 (22 @ 04:12)  HR: 77 (22 @ 11:50)  BP: 144/72 (22 @ 11:50)  RR: 18 (22 @ 11:50)  SpO2: 96% (22 @ 11:50)  Wt(kg): --     @ 07:01  -   @ 07:00  --------------------------------------------------------  IN: 1300 mL / OUT: 1350 mL / NET: -50 mL     @ 07:01  -   @ 13:55  --------------------------------------------------------  IN: 720 mL / OUT: 400 mL / NET: 320 mL        PHYSICAL EXAM:  Gen: NAD, calm  HEENT: MMM  Neck: no JVD  Cards: Irregularly irregular, +S1/S2, + LUDMILA  Resp: CTA B/L  GI: soft, NT/ND, NABS  : no CVA tenderness  Vascular: no LE edema B/L  Derm: no rashes  Neuro: non-focal      LABS:      135  |  102  |  67<H>  ----------------------------<  81  4.2   |  19<L>  |  1.53<H>    Ca    9.0      27 May 2022 05:59      Creatinine Trend: 1.53 <--, 1.83 <--, 1.77 <--, 1.61 <--, 0.52 <--, 1.34 <--                        9.3    7.12  )-----------( 321      ( 27 May 2022 05:59 )             28.9     Urine Studies:  Urinalysis Basic - ( 26 May 2022 02:21 )    Color: Yellow / Appearance: Slightly Turbid / S.020 / pH:   Gluc:  / Ketone: Negative  / Bili: Negative / Urobili: Negative   Blood:  / Protein: 30 mg/dL / Nitrite: Negative   Leuk Esterase: Negative / RBC: 5 /hpf / WBC 0 /HPF   Sq Epi:  / Non Sq Epi: 0 /hpf / Bacteria: Many      Sodium, Random Urine: 36 mmol/L ( @ 02:21)  Creatinine, Random Urine: 108 mg/dL ( @ 02:21)      RADIOLOGY & ADDITIONAL STUDIES:  < from: MR Knee No Cont, Right (22 @ 23:46) >  IMPRESSION:  Severe tricompartmental osteoarthritis as described above.  Large knee joint effusion with synovitis.    --- End of Report ---    < end of copied text >      < from: Xray Knee 3 Views, Right (22 @ 14:26) >  IMPRESSION:  Knee joint effusion.    No acute fractures or dislocations.    Redemonstrated advanced tricompartment osteoarthritis with genu varus   deformity. Amorphous calcific deposition in suprapatellar regionapparent   on lateral view may reflect component of chondrocalcinosis as well. No   joint margin erosions.    Generalized osteopenia otherwise no discrete lytic or blastic lesions.    Vascular calcifications.    --- End of Report ---    < end of copied text >        < from: Xray Chest 1 View- PORTABLE-Urgent (22 @ 20:50) >    IMPRESSION:  Clear lungs.  Unchanged cardiomegaly.    --- End of Report ---      < end of copied text >

## 2022-05-27 NOTE — CONSULT NOTE ADULT - CONSULT REQUESTED DATE/TIME
24-May-2022
27-May-2022 08:30
24-May-2022 18:05
25-May-2022 09:04
22-May-2022 13:55
27-May-2022 13:55

## 2022-05-27 NOTE — DISCHARGE NOTE PROVIDER - CARE PROVIDERS DIRECT ADDRESSES
,DirectAddress_Unknown,[email or web address]

## 2022-05-27 NOTE — CONSULT NOTE ADULT - ASSESSMENT
----------------------------------------------------------  Interventional Radiology Brief Consult Note  -----------------------------------------------------------    Reason for Referral: Knee joint effusion for arthrocentesis     Clinical Summary: 89 year old female  with PMhx of HTN, HLD, MDS, afib on Eliquis, b/l OA s/p L TKR presents for worsened generalized weakness, cough and SOB for the past day in setting of recent covid infection (tested + on 5/12). Also c/o acute R. knee pain x 3 days,  no trauma in the area. Rheumatology consulted for r/o gout and w/ attempted unsuccessful arthrocentesis w/Rheum on 5/24. Most recent rheumatology note states in light of improvement in symptoms, no tap is needed. IR is consulted for arthrocentesis.     Vitals:  T(C): 36.5 (05-27-22 @ 04:12), Max: 36.5 (05-26-22 @ 11:53)  HR: 62 (05-27-22 @ 04:12) (62 - 78)  BP: 158/79 (05-27-22 @ 04:12) (93/56 - 158/79)  RR: 18 (05-27-22 @ 04:12) (18 - 18)  SpO2: 96% (05-27-22 @ 04:12) (96% - 97%)    Labs:           9.3  7.12)-----(321     (05-27-22 @ 05:59)         28.9     135 | 102 | 67  --------------------< 81     (05-27-22 @ 05:59)  4.2 | 19 | 1.53         Assessment: 89y Female with knee joint effusion. IR is consulted for arthrocentesis.     Recommendations:    Case was discussed with IR attending and deferred at this time.   No role for use of image guidance with IR in this case, especially given that the most recent rheumatology note states patient's symptoms are improving and there is no need for tap.   Please reach IR back with any questions.

## 2022-05-27 NOTE — PROGRESS NOTE ADULT - PROVIDER SPECIALTY LIST ADULT
Cardiology
Rheumatology
Heme/Onc
Heme/Onc
Rheumatology
Hospitalist

## 2022-05-27 NOTE — DISCHARGE NOTE NURSING/CASE MANAGEMENT/SOCIAL WORK - PATIENT PORTAL LINK FT
You can access the FollowMyHealth Patient Portal offered by [facility] by registering at the following website: [email or web address]. By joining [facility]’s FollowMyHealth portal, you will also be able to view your health information using other applications (apps) compatible with our system.

## 2022-05-27 NOTE — PROGRESS NOTE ADULT - REASON FOR ADMISSION
generalized weakness x few days

## 2022-05-27 NOTE — DISCHARGE NOTE PROVIDER - CARE PROVIDER_API CALL
Pennie Leigh)  Cardiovascular Disease; Internal Medicine  [address], [address]  [place] [address]  Phone: [phone]  Fax: [phone]  Follow Up Time:     Zora Herrera)  Hematology; Medical Oncology  [address], [address]  [place] [address]  Phone: [phone]  Fax: [phone]  Follow Up Time:

## 2022-05-27 NOTE — DISCHARGE NOTE PROVIDER - HOSPITAL COURSE
89F w/PMhx of HTN, HLD, MDS, afib on Eliquis presents for worsened generalized weakness in setting of recent covid infection. Course c/b severe R knee pain and MARIELA.       Problem/Plan - 1:  ·  Problem: Generalized weakness.   ·  Plan: currently afebrile , no leukocytosis ,  oxygenating well on ra , reportedly mildly hypoxic w/ ambulation, no evidence of coronary ischemia , briefly in afib w. RVR : presentation can be 2/2 to HF vs. anemia vs. covid infection vs. afib, will breathing improved after extra lasix, now back on home dose  overall suspect due to recovery from covid  Echo severe MR, normal LV function, moderate tricuspid regurg, right atrial enlargement, LE dopplers.       Problem/Plan - 2:  ·  Problem: HF (heart failure).   ·  Plan: - s/p IV lasix x one dose   - hold lasix today given worsening cr, plan to resume tomorrow as BNP is also rising  - monitor  mag and K, replete as needed  - monitor on telemetry  - strict ins and outs  - daily weight     Problem/Plan - 3:  ·  Problem: MARIELA (acute kidney injury).   ·  Plan: -b/l cr appears to be ~1.4  Cr up to 1.8 today  F/u urine lytes  Consider pre renal vs cardiorenal  Resume home lasix today for rising BNP as cr did not improve with holding and giving fluid  Renal consult.     Problem/Plan - 4:  ·  Problem: Pain, joint, knee, right.   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 Avoid systemic NSAID due to CKD, advanced age and on AC  Rheum consult -unable to obtain fluid  PT  Family wants more conservative approach, can consider MRI/joint aspiration inpt vs outpt.     Problem/Plan - 5:  ·  Problem: MDS (myelodysplastic syndrome).   ·  Plan: monitor H/H.     Problem/Plan - 6:  ·  Problem: Chronic atrial fibrillation.   ·  Plan: - continue Eliquis.     Problem/Plan - 7:  ·  Problem: COVID-19 virus infection.   ·  Plan: + on 5/12    - supportive care as needed.     Problem/Plan - 8:  ·  Problem: Hypothyroidism.   ·  Plan: - levothyroxine.     Problem/Plan - 9:  ·  Problem: HTN (hypertension).   ·  Plan: - amlodipine   - losartan.     Problem/Plan - 10:  ·  Problem: Advance care planning.   ·  Plan; - dnr/ dni.

## 2022-05-27 NOTE — PROGRESS NOTE ADULT - SUBJECTIVE AND OBJECTIVE BOX
BERHANE WOODWARD  [id number]    INTERVAL HPI/OVERNIGHT EVENTS: right knee pain improved  ambulating in the room using a walker this morning.   s/p MRI  MEDICATIONS  (STANDING):  acetaminophen     Tablet .. 650 milliGRAM(s) Oral every 6 hours  amLODIPine   Tablet 2.5 milliGRAM(s) Oral daily  apixaban 2.5 milliGRAM(s) Oral every 12 hours  atorvastatin 10 milliGRAM(s) Oral at bedtime  diclofenac sodium 1% Gel 4 Gram(s) Topical two times a day  folic acid 1 milliGRAM(s) Oral daily  furosemide    Tablet 20 milliGRAM(s) Oral daily  iron sucrose IVPB 100 milliGRAM(s) IV Intermittent every 24 hours  latanoprost 0.005% Ophthalmic Solution 1 Drop(s) Both EYES at bedtime  levothyroxine 112 MICROGram(s) Oral daily  pantoprazole    Tablet 40 milliGRAM(s) Oral before breakfast    MEDICATIONS  (PRN):  ALBUTerol    90 MICROgram(s) HFA Inhaler 2 Puff(s) Inhalation every 6 hours PRN Shortness of Breath and/or Wheezing  melatonin 3 milliGRAM(s) Oral at bedtime PRN Insomnia  ondansetron Injectable 4 milliGRAM(s) IV Push every 8 hours PRN Nausea and/or Vomiting  oxyCODONE    IR 2.5 milliGRAM(s) Oral every 6 hours PRN Severe Pain (7 - 10)      Allergies    No Known Allergies    Intolerances        Review of Systems:  no fevers or chills.       Vital Signs Last 24 Hrs  T(C): 36.5 (26 May 2022 11:53), Max: 36.6 (26 May 2022 04:46)  T(F): 97.7 (26 May 2022 11:53), Max: 97.8 (26 May 2022 04:46)  HR: 72 (26 May 2022 11:53) (72 - 77)  BP: 93/56 (26 May 2022 11:53) (93/56 - 137/78)  BP(mean): --  RR: 18 (26 May 2022 11:53) (18 - 18)  SpO2: 97% (26 May 2022 11:53) (97% - 97%)    Physical Exam:  General: NAD, sitting in a chair  HEENT: EOMI, MMM  MSK: effusion right knee but now warmth or erythema  Neuro: AAOx3    LABS:                        8.5    7.91  )-----------( 305      ( 26 May 2022 05:25 )             26.3         134<L>  |  98  |  66<H>  ----------------------------<  83  4.2   |  20<L>  |  1.83<H>    Ca    8.8      26 May 2022 05:25        Urinalysis Basic - ( 26 May 2022 02:21 )    Color: Yellow / Appearance: Slightly Turbid / S.020 / pH: x  Gluc: x / Ketone: Negative  / Bili: Negative / Urobili: Negative   Blood: x / Protein: 30 mg/dL / Nitrite: Negative   Leuk Esterase: Negative / RBC: 5 /hpf / WBC 0 /HPF   Sq Epi: x / Non Sq Epi: 0 /hpf / Bacteria: Many          RADIOLOGY & ADDITIONAL TESTS:    < from: MR Knee No Cont, Right (22 @ 23:46) >    IMPRESSION:  Severe tricompartmental osteoarthritis as described above.  Large knee joint effusion with synovitis.    < end of copied text >    
C A R D I O [facility] O [facility]  **********************************     DATE OF SERVICE: 05-23-22    Patient c/o R knee pain. denies chest pain or shortness of breath.   Review of systems otherwise negative.  	  MEDICATIONS:  MEDICATIONS  (STANDING):  amLODIPine   Tablet 2.5 milliGRAM(s) Oral daily  apixaban 2.5 milliGRAM(s) Oral every 12 hours  atorvastatin 10 milliGRAM(s) Oral at bedtime  folic acid 1 milliGRAM(s) Oral daily  furosemide    Tablet 20 milliGRAM(s) Oral daily  latanoprost 0.005% Ophthalmic Solution 1 Drop(s) Both EYES at bedtime  levothyroxine 112 MICROGram(s) Oral daily  losartan 50 milliGRAM(s) Oral daily  pantoprazole    Tablet 40 milliGRAM(s) Oral before breakfast      LABS:	 	    CARDIAC MARKERS:                                11.1   8.43  )-----------( 492      ( 23 May 2022 06:28 )             37.7     Hemoglobin: 11.1 g/dL (05-23 @ 06:28)  Hemoglobin: 8.1 g/dL (05-21 @ 22:19)      05-23    138  |  101  |  18  ----------------------------<  88  4.4   |  24  |  0.52    Ca    9.4      23 May 2022 06:28  Phos  4.2     05-23  Mg     2.3     05-23    TPro  6.9  /  Alb  3.6  /  TBili  0.2  /  DBili  x   /  AST  12  /  ALT  14  /  AlkPhos  126<H>  05-23    Creatinine Trend: 0.52<--, 1.34<--    COAGS:       proBNP:   Lipid Profile:   HgA1c:   TSH:       PHYSICAL EXAM:  T(C): 36.7 (05-23-22 @ 04:16), Max: 36.8 (05-22-22 @ 20:48)  HR: 87 (05-23-22 @ 09:51) (62 - 87)  BP: 128/61 (05-23-22 @ 09:51) (122/64 - 130/72)  RR: 18 (05-23-22 @ 04:16) (18 - 18)  SpO2: 96% (05-23-22 @ 09:51) (96% - 100%)  Wt(kg): --  I&O's Summary    22 May 2022 07:01  -  23 May 2022 07:00  --------------------------------------------------------  IN: 840 mL / OUT: 400 mL / NET: 440 mL    23 May 2022 07:01  -  23 May 2022 12:08  --------------------------------------------------------  IN: 360 mL / OUT: 0 mL / NET: 360 mL          Gen: Appears well in NAD  HEENT:  (-)icterus (-)pallor  CV: N S1 S2 1/6 LUDMILA (+)2 Pulses B/l  Resp:  Clear to auscultation B/L, normal effort  GI: (+) BS Soft, NT, ND  Lymph:  (-)Edema, (-)obvious lymphadenopathy  Skin: Warm to touch, Normal turgor  Psych: Appropriate mood and affect      TELEMETRY: AF 60-80	      ASSESSMENT/PLAN: 89 year old female  with PMhx of HTN, HLD, MDS, afib on Eliquis presents for worsened generalized weakness for the past day in setting of recent covid infection.      -pt. currently with no chest pain or anginal symptoms  -EKG with rate controlled AF, RBBB  -lifelong ac if no contraindications and ok with primary team given prior epistaxis  -continue PO lasix  -check TTE  -LE doppler pending  -further workup pending above    Ivan Montero PA-C  Pager: [phone]        
C A R D I O L O G [facility]  **********************************     DATE OF SERVICE: 05-24-22    Patient c/o severe R knee pain. Son at bedside. denies chest pain or shortness of breath.   Review of systems otherwise negative.  	  MEDICATIONS:  MEDICATIONS  (STANDING):  acetaminophen     Tablet .. 650 milliGRAM(s) Oral every 6 hours  amLODIPine   Tablet 2.5 milliGRAM(s) Oral daily  apixaban 2.5 milliGRAM(s) Oral every 12 hours  atorvastatin 10 milliGRAM(s) Oral at bedtime  diclofenac sodium 1% Gel 4 Gram(s) Topical two times a day  folic acid 1 milliGRAM(s) Oral daily  lactated ringers. 500 milliLiter(s) (125 mL/Hr) IV Continuous <Continuous>  latanoprost 0.005% Ophthalmic Solution 1 Drop(s) Both EYES at bedtime  levothyroxine 112 MICROGram(s) Oral daily  losartan 50 milliGRAM(s) Oral daily  pantoprazole    Tablet 40 milliGRAM(s) Oral before breakfast      LABS:	 	    CARDIAC MARKERS:                                9.5    9.98  )-----------( 283      ( 24 May 2022 06:45 )             29.8     Hemoglobin: 9.5 g/dL (05-24 @ 06:45)  Hemoglobin: 11.1 g/dL (05-23 @ 06:28)  Hemoglobin: 8.1 g/dL (05-21 @ 22:19)      05-24    134<L>  |  98  |  49<H>  ----------------------------<  91  4.0   |  19<L>  |  1.61<H>    Ca    8.9      24 May 2022 06:45  Phos  4.2     05-23  Mg     2.3     05-23    TPro  6.9  /  Alb  3.6  /  TBili  0.2  /  DBili  x   /  AST  12  /  ALT  14  /  AlkPhos  126<H>  05-23    Creatinine Trend: 1.61<--, 0.52<--, 1.34<--    COAGS:       proBNP:   Lipid Profile:   HgA1c:   TSH:       PHYSICAL EXAM:  T(C): 37.1 (05-24-22 @ 11:28), Max: 37.1 (05-24-22 @ 11:28)  HR: 74 (05-24-22 @ 11:28) (73 - 81)  BP: 158/76 (05-24-22 @ 11:28) (123/73 - 158/76)  RR: 16 (05-24-22 @ 11:28) (16 - 18)  SpO2: 94% (05-24-22 @ 11:28) (94% - 96%)  Wt(kg): --  I&O's Summary    23 May 2022 07:01  -  24 May 2022 07:00  --------------------------------------------------------  IN: 640 mL / OUT: 200 mL / NET: 440 mL    24 May 2022 07:01  -  24 May 2022 13:22  --------------------------------------------------------  IN: 360 mL / OUT: 0 mL / NET: 360 mL          Gen: Appears well in NAD  HEENT:  (-)icterus (-)pallor  CV: N S1 S2 1/6 LUDMILA (+)2 Pulses B/l  Resp:  Clear to auscultation B/L, normal effort  GI: (+) BS Soft, NT, ND  Lymph:  (-)Edema, (-)obvious lymphadenopathy  Skin: Warm to touch, Normal turgor  Psych: Appropriate mood and affect      TELEMETRY: AF 60-80	      < from: TTE with Doppler (w/Cont) (05.23.22 @ 12:29) >  Conclusions:  1. Mitral valve prolapse involving the posterior mitral  leaflet. Severe mitral regurgitation.  2. Calcified aortic valve with decreased opening. Peak  transaortic valve gradient equals 31 mm Hg, mean  transaortic valve gradient equals 16 mm Hg, estimated  aortic valve area equals 1.3 sqcm (by continuity equation),  aortic valve velocity time integral equals 48 cm,  consistent with moderate aortic stenosis.  3. Severely dilated left atrium.  LA volume index = 84  cc/m2.  4. Moderate concentric left ventricular hypertrophy.  5. Normal left ventricular systolic function.   Endocardial  visualization enhanced with intravenous injection of  Ultrasonic Enhancing Agent (Lumason). Peak left ventricular  outflow tract gradient equals 3 mm Hg, mean gradient is  equal to 2 mm Hg, LVOT velocity time integral equals 18 cm.  6. Right atrial enlargement.  7. Normal tricuspid valve. Moderate tricuspid  regurgitation.  8. Estimated pulmonary artery systolic pressure equals 35  mm Hg, assuming right atrial pressure equals 8 mm Hg,  consistent with borderline pulmonary pressures.    < end of copied text >      ASSESSMENT/PLAN: 89 year old female  with PMhx of HTN, HLD, MDS, afib on Eliquis presents for worsened generalized weakness for the past day in setting of recent covid infection.      - pt. currently with no chest pain or anginal symptoms  - volume status improved - diuretics on hold given MARIELA  - recommend lifelong AC with Eliquis for AF/Stroke prevention if within GOC  - TTE noted with severe MR, mod AS, normal LV function  - Patient and family want conservative medical management therefore no plans for further inpatient cardiac w/u  - R knee pain management per med - xray and LE dopplers pending  - Patient to f/u in our office with Dr. Carbajal after d/c    Ivan Montero PA-C  Pager: [phone]  
Chief Complaint: fu    History of Present Illness:  laying in chair, was not able to bear weight on RLE, no significant pain at rest; no f/c, no cp, no dyspnea, no n/v/abd pain, no bleeding; no BM in a few days      MEDICATIONS  (STANDING):  acetaminophen     Tablet .. 650 milliGRAM(s) Oral every 6 hours  amLODIPine   Tablet 2.5 milliGRAM(s) Oral daily  apixaban 2.5 milliGRAM(s) Oral every 12 hours  atorvastatin 10 milliGRAM(s) Oral at bedtime  diclofenac sodium 1% Gel 4 Gram(s) Topical two times a day  folic acid 1 milliGRAM(s) Oral daily  iron sucrose IVPB 100 milliGRAM(s) IV Intermittent every 24 hours  latanoprost 0.005% Ophthalmic Solution 1 Drop(s) Both EYES at bedtime  levothyroxine 112 MICROGram(s) Oral daily  pantoprazole    Tablet 40 milliGRAM(s) Oral before breakfast    MEDICATIONS  (PRN):  ALBUTerol    90 MICROgram(s) HFA Inhaler 2 Puff(s) Inhalation every 6 hours PRN Shortness of Breath and/or Wheezing  melatonin 3 milliGRAM(s) Oral at bedtime PRN Insomnia  ondansetron Injectable 4 milliGRAM(s) IV Push every 8 hours PRN Nausea and/or Vomiting  oxyCODONE    IR 2.5 milliGRAM(s) Oral every 6 hours PRN Severe Pain (7 - 10)      Allergies    No Known Allergies    Intolerances        Vital Signs Last 24 Hrs  T(C): 36.5 (25 May 2022 11:51), Max: 36.9 (24 May 2022 20:56)  T(F): 97.7 (25 May 2022 11:51), Max: 98.4 (24 May 2022 20:56)  HR: 60 (25 May 2022 11:51) (60 - 84)  BP: 124/74 (25 May 2022 11:51) (124/74 - 155/82)  BP(mean): --  RR: 18 (25 May 2022 11:51) (16 - 18)  SpO2: 97% (25 May 2022 11:51) (95% - 97%)    PHYSICAL EXAM  General: adult in NAD  HEENT: clear oropharynx, anicteric sclera, pink conjunctiva  Neck: supple  CV: normal S1/S2 with no murmur rubs or gallops  Lungs: clear to auscultation, no wheezes, no rales  Abdomen: soft non-tender non-distended, positive bowel sounds  Ext: R knee swelling  Skin: no rashes and no petechiae  Lymph Nodes: No LAD in neck  Neuro: alert and oriented X 3, no focal deficits    LABS:                          9.0    11.19 )-----------( 268      ( 25 May 2022 06:12 )             27.6         Mean Cell Volume : 85.2 fl  Mean Cell Hemoglobin : 27.8 pg  Mean Cell Hemoglobin Concentration : 32.6 gm/dL  Auto Neutrophil # : x  Auto Lymphocyte # : x  Auto Monocyte # : x  Auto Eosinophil # : x  Auto Basophil # : x  Auto Neutrophil % : x  Auto Lymphocyte % : x  Auto Monocyte % : x  Auto Eosinophil % : x  Auto Basophil % : x      Serial CBC's  05-25 @ 06:12  Hct-27.6 / Hgb-9.0 / Plat-268 / RBC-3.24 / WBC-11.19  Serial CBC's  05-24 @ 06:45  Hct-29.8 / Hgb-9.5 / Plat-283 / RBC-3.45 / WBC-9.98  Serial CBC's  05-23 @ 06:28  Hct-37.7 / Hgb-11.1 / Plat-492 / RBC-4.25 / WBC-8.43  Serial CBC's  05-21 @ 22:19  Hct-24.6 / Hgb-8.1 / Plat-236 / RBC-2.87 / WBC-6.82      05-25    133<L>  |  98  |  59<H>  ----------------------------<  103<H>  4.3   |  20<L>  |  1.77<H>    Ca    8.9      25 May 2022 06:11            Ferritin, Serum: 397 ng/mL (05-25 @ 06:12)  Vitamin B12, Serum: 498 pg/mL (05-25 @ 06:12)  Folate, Serum: >20.0 ng/mL (05-25 @ 06:12)  Iron - Total Binding Capacity.: 198 ug/dL (05-25 @ 06:11)          05-25 @ 06:11    ldh1 --  haptoglobin --  MARK--  uric acid8.4      Radiology:        
BERHANE WOODWARD  [id number]    INTERVAL HPI/OVERNIGHT EVENTS: R. knee pain improved, had prednisone 30mg x1 on      PMHx/PSHx/FamHx/SocHx reviewed and no significant changes    REVIEW OF SYSTEMS   - reviewed with patient and  negative other than as above or previously documented.     MEDICATIONS  (STANDING):  acetaminophen     Tablet .. 650 milliGRAM(s) Oral every 6 hours  amLODIPine   Tablet 2.5 milliGRAM(s) Oral daily  apixaban 2.5 milliGRAM(s) Oral every 12 hours  atorvastatin 10 milliGRAM(s) Oral at bedtime  diclofenac sodium 1% Gel 4 Gram(s) Topical two times a day  folic acid 1 milliGRAM(s) Oral daily  furosemide    Tablet 20 milliGRAM(s) Oral daily  iron sucrose IVPB 100 milliGRAM(s) IV Intermittent every 24 hours  latanoprost 0.005% Ophthalmic Solution 1 Drop(s) Both EYES at bedtime  levothyroxine 112 MICROGram(s) Oral daily  pantoprazole    Tablet 40 milliGRAM(s) Oral before breakfast    MEDICATIONS  (PRN):  ALBUTerol    90 MICROgram(s) HFA Inhaler 2 Puff(s) Inhalation every 6 hours PRN Shortness of Breath and/or Wheezing  melatonin 3 milliGRAM(s) Oral at bedtime PRN Insomnia  ondansetron Injectable 4 milliGRAM(s) IV Push every 8 hours PRN Nausea and/or Vomiting  oxyCODONE    IR 2.5 milliGRAM(s) Oral every 6 hours PRN Severe Pain (7 - 10)      Allergies  No Known Allergies  Intolerances      Vital Signs Last 24 Hrs  T(C): 36.5 (26 May 2022 11:53), Max: 36.6 (26 May 2022 04:46)  T(F): 97.7 (26 May 2022 11:53), Max: 97.8 (26 May 2022 04:46)  HR: 72 (26 May 2022 11:53) (72 - 77)  BP: 93/56 (26 May 2022 11:53) (93/56 - 137/78)  BP(mean): --  RR: 18 (26 May 2022 11:53) (18 - 18)  SpO2: 97% (26 May 2022 11:53) (97% - 97%)    Physical Exam:  General: NAD  HEENT: EOMI, MMM  CVS: +S1/S2, RRR, no murmurs/rubs/gallops  Resp: CTA b/l. No crackles/wheezing  GI: Soft, NT/ND +BS  MSK: + large suprapatellar effusion in R. knee, TTP, mildly warm, able to flex/extend, bear weight     Neuro: AAOx3  Skin: no visible rashes    LABS:                        8.5    7.91  )-----------( 305      ( 26 May 2022 05:25 )             26.3     05-    134<L>  |  98  |  66<H>  ----------------------------<  83  4.2   |  20<L>  |  1.83<H>    Ca    8.8      26 May 2022 05:25        Urinalysis Basic - ( 26 May 2022 02:21 )    Color: Yellow / Appearance: Slightly Turbid / S.020 / pH: x  Gluc: x / Ketone: Negative  / Bili: Negative / Urobili: Negative   Blood: x / Protein: 30 mg/dL / Nitrite: Negative   Leuk Esterase: Negative / RBC: 5 /hpf / WBC 0 /HPF   Sq Epi: x / Non Sq Epi: 0 /hpf / Bacteria: Many          RADIOLOGY & ADDITIONAL TESTS:      
C A R D I O L O G Y  **********************************     DATE OF SERVICE: 05-26-22    Patient reports less R knee pain. denies chest pain or shortness of breath.   Review of systems otherwise negative.  	  MEDICATIONS:  MEDICATIONS  (STANDING):  acetaminophen     Tablet .. 650 milliGRAM(s) Oral every 6 hours  amLODIPine   Tablet 2.5 milliGRAM(s) Oral daily  apixaban 2.5 milliGRAM(s) Oral every 12 hours  atorvastatin 10 milliGRAM(s) Oral at bedtime  diclofenac sodium 1% Gel 4 Gram(s) Topical two times a day  folic acid 1 milliGRAM(s) Oral daily  furosemide    Tablet 20 milliGRAM(s) Oral daily  iron sucrose IVPB 100 milliGRAM(s) IV Intermittent every 24 hours  latanoprost 0.005% Ophthalmic Solution 1 Drop(s) Both EYES at bedtime  levothyroxine 112 MICROGram(s) Oral daily  pantoprazole    Tablet 40 milliGRAM(s) Oral before breakfast      LABS:	 	    CARDIAC MARKERS:                                8.5    7.91  )-----------( 305      ( 26 May 2022 05:25 )             26.3     Hemoglobin: 8.5 g/dL (05-26 @ 05:25)  Hemoglobin: 9.0 g/dL (05-25 @ 06:12)  Hemoglobin: 9.5 g/dL (05-24 @ 06:45)  Hemoglobin: 11.1 g/dL (05-23 @ 06:28)  Hemoglobin: 8.1 g/dL (05-21 @ 22:19)      05-26    134<L>  |  98  |  66<H>  ----------------------------<  83  4.2   |  20<L>  |  1.83<H>    Ca    8.8      26 May 2022 05:25      Creatinine Trend: 1.83<--, 1.77<--, 1.61<--, 0.52<--, 1.34<--    COAGS:       proBNP:   Lipid Profile:   HgA1c:   TSH:       PHYSICAL EXAM:  T(C): 36.5 (05-26-22 @ 11:53), Max: 36.6 (05-26-22 @ 04:46)  HR: 72 (05-26-22 @ 11:53) (72 - 77)  BP: 93/56 (05-26-22 @ 11:53) (93/56 - 137/78)  RR: 18 (05-26-22 @ 11:53) (18 - 18)  SpO2: 97% (05-26-22 @ 11:53) (97% - 97%)  Wt(kg): --  I&O's Summary    25 May 2022 07:01  -  26 May 2022 07:00  --------------------------------------------------------  IN: 760 mL / OUT: 600 mL / NET: 160 mL    26 May 2022 07:01  -  26 May 2022 15:18  --------------------------------------------------------  IN: 720 mL / OUT: 400 mL / NET: 320 mL      Gen: Appears well in NAD  HEENT:  (-)icterus (-)pallor  CV: N S1 S2 1/6 LUDMILA (+)2 Pulses B/l  Resp:  Clear to auscultation B/L, normal effort  GI: (+) BS Soft, NT, ND  Lymph:  (-)Edema, (-)obvious lymphadenopathy  Skin: Warm to touch, Normal turgor  Psych: Appropriate mood and affect      TELEMETRY: AF 50-70	      ASSESSMENT/PLAN:  89 y/o female PMH hypertension, MDS, AF on eliquis a/w fatigue after covid    - may d/c tele  - pt. currently with no chest pain or anginal symptoms  - volume status improved - can hold diuretics for MARIELA  - recommend lifelong AC with Eliquis for AF/Stroke prevention if within GOC  - TTE noted with severe MR, mod AS, normal LV function  - Patient and family want conservative medical management therefore no plans for further inpatient cardiac w/u for severe MR  - no further inpatient cardiac workup expected  - Patient to f/u in our office with Dr. Carbajal after d/c    Ivan Montero PA-C  Pager: [phone]  
Chief Complaint: fu    History of Present Illness: feels improved in general; able to bear weight on R knee and ambulate now; no f/c, no cp/dyspnea/cough, no n/v/abd pain, +BM yesterday per daughter, no bleeding reported      MEDICATIONS  (STANDING):  amLODIPine   Tablet 2.5 milliGRAM(s) Oral daily  apixaban 2.5 milliGRAM(s) Oral every 12 hours  atorvastatin 10 milliGRAM(s) Oral at bedtime  diclofenac sodium 1% Gel 4 Gram(s) Topical two times a day  folic acid 1 milliGRAM(s) Oral daily  furosemide    Tablet 20 milliGRAM(s) Oral daily  iron sucrose IVPB 100 milliGRAM(s) IV Intermittent every 24 hours  latanoprost 0.005% Ophthalmic Solution 1 Drop(s) Both EYES at bedtime  levothyroxine 112 MICROGram(s) Oral daily  pantoprazole    Tablet 40 milliGRAM(s) Oral before breakfast  polyethylene glycol 3350 17 Gram(s) Oral daily  senna 1 Tablet(s) Oral daily    MEDICATIONS  (PRN):  ALBUTerol    90 MICROgram(s) HFA Inhaler 2 Puff(s) Inhalation every 6 hours PRN Shortness of Breath and/or Wheezing  melatonin 3 milliGRAM(s) Oral at bedtime PRN Insomnia  ondansetron Injectable 4 milliGRAM(s) IV Push every 8 hours PRN Nausea and/or Vomiting  oxyCODONE    IR 2.5 milliGRAM(s) Oral every 6 hours PRN Severe Pain (7 - 10)      Allergies    No Known Allergies    Intolerances        Vital Signs Last 24 Hrs  T(C): 36.4 (27 May 2022 11:50), Max: 36.5 (27 May 2022 04:12)  T(F): 97.5 (27 May 2022 11:50), Max: 97.7 (27 May 2022 04:12)  HR: 77 (27 May 2022 11:50) (62 - 78)  BP: 144/72 (27 May 2022 11:50) (144/72 - 158/79)  BP(mean): --  RR: 18 (27 May 2022 11:50) (18 - 18)  SpO2: 96% (27 May 2022 11:50) (96% - 96%)    PHYSICAL EXAM  General: adult in NAD  HEENT: clear oropharynx, anicteric sclera, pink conjunctiva  Neck: supple  CV: normal S1/S2 with no murmur rubs or gallops  Lungs: clear to auscultation, no wheezes, no rales  Abdomen: soft non-tender non-distended, no hepatosplenomegaly, positive bowel sounds  Ext: R knee swelling improved  Skin: no rashes and no petechiae  Lymph Nodes: No LAD in neck  Neuro: alert and oriented X 3, no focal deficits    LABS:                          9.3    7.12  )-----------( 321      ( 27 May 2022 05:59 )             28.9         Mean Cell Volume : 85.8 fl  Mean Cell Hemoglobin : 27.6 pg  Mean Cell Hemoglobin Concentration : 32.2 gm/dL  Auto Neutrophil # : x  Auto Lymphocyte # : x  Auto Monocyte # : x  Auto Eosinophil # : x  Auto Basophil # : x  Auto Neutrophil % : x  Auto Lymphocyte % : x  Auto Monocyte % : x  Auto Eosinophil % : x  Auto Basophil % : x      Serial CBC's  05-27 @ 05:59  Hct-28.9 / Hgb-9.3 / Plat-321 / RBC-3.37 / WBC-7.12  Serial CBC's  05-26 @ 05:25  Hct-26.3 / Hgb-8.5 / Plat-305 / RBC-3.07 / WBC-7.91  Serial CBC's  05-25 @ 06:12  Hct-27.6 / Hgb-9.0 / Plat-268 / RBC-3.24 / WBC-11.19  Serial CBC's  05-24 @ 06:45  Hct-29.8 / Hgb-9.5 / Plat-283 / RBC-3.45 / WBC-9.98      05-27    135  |  102  |  67<H>  ----------------------------<  81  4.2   |  19<L>  |  1.53<H>    Ca    9.0      27 May 2022 05:59            Ferritin, Serum: 397 ng/mL (05-25 @ 06:12)  Vitamin B12, Serum: 498 pg/mL (05-25 @ 06:12)  Folate, Serum: >20.0 ng/mL (05-25 @ 06:12)  Iron - Total Binding Capacity.: 198 ug/dL (05-25 @ 06:11)          05-25 @ 06:11    ldh1 --  haptoglobin --  MARK--  uric acid8.4      Radiology:        
C A R D I O L O G Y  **********************************     DATE OF SERVICE: 05-27-22    Resting comfortably in no distress. denies chest pain or shortness of breath.   Review of systems otherwise negative.      MEDICATIONS  (STANDING):  amLODIPine   Tablet 2.5 milliGRAM(s) Oral daily  apixaban 2.5 milliGRAM(s) Oral every 12 hours  atorvastatin 10 milliGRAM(s) Oral at bedtime  diclofenac sodium 1% Gel 4 Gram(s) Topical two times a day  folic acid 1 milliGRAM(s) Oral daily  furosemide    Tablet 20 milliGRAM(s) Oral daily  iron sucrose IVPB 100 milliGRAM(s) IV Intermittent every 24 hours  latanoprost 0.005% Ophthalmic Solution 1 Drop(s) Both EYES at bedtime  levothyroxine 112 MICROGram(s) Oral daily  pantoprazole    Tablet 40 milliGRAM(s) Oral before breakfast  polyethylene glycol 3350 17 Gram(s) Oral daily  senna 1 Tablet(s) Oral daily    MEDICATIONS  (PRN):  ALBUTerol    90 MICROgram(s) HFA Inhaler 2 Puff(s) Inhalation every 6 hours PRN Shortness of Breath and/or Wheezing  melatonin 3 milliGRAM(s) Oral at bedtime PRN Insomnia  ondansetron Injectable 4 milliGRAM(s) IV Push every 8 hours PRN Nausea and/or Vomiting  oxyCODONE    IR 2.5 milliGRAM(s) Oral every 6 hours PRN Severe Pain (7 - 10)      LABS:                          9.3    7.12  )-----------( 321      ( 27 May 2022 05:59 )             28.9     Hemoglobin: 9.3 g/dL (05-27 @ 05:59)  Hemoglobin: 8.5 g/dL (05-26 @ 05:25)  Hemoglobin: 9.0 g/dL (05-25 @ 06:12)  Hemoglobin: 9.5 g/dL (05-24 @ 06:45)  Hemoglobin: 11.1 g/dL (05-23 @ 06:28)    05-27    135  |  102  |  67<H>  ----------------------------<  81  4.2   |  19<L>  |  1.53<H>    Ca    9.0      27 May 2022 05:59      Creatinine Trend: 1.53<--, 1.83<--, 1.77<--, 1.61<--, 0.52<--, 1.34<--             05-26-22 @ 07:01  -  05-27-22 @ 07:00  --------------------------------------------------------  IN: 1300 mL / OUT: 1350 mL / NET: -50 mL    05-27-22 @ 07:01  -  05-27-22 @ 11:20  --------------------------------------------------------  IN: 360 mL / OUT: 200 mL / NET: 160 mL        PHYSICAL EXAM  Vital Signs Last 24 Hrs  T(C): 36.5 (27 May 2022 04:12), Max: 36.5 (26 May 2022 11:53)  T(F): 97.7 (27 May 2022 04:12), Max: 97.7 (26 May 2022 11:53)  HR: 62 (27 May 2022 04:12) (62 - 78)  BP: 158/79 (27 May 2022 04:12) (93/56 - 158/79)  BP(mean): --  RR: 18 (27 May 2022 04:12) (18 - 18)  SpO2: 96% (27 May 2022 04:12) (96% - 97%)        Gen: Appears well in NAD  HEENT:  (-)icterus (-)pallor  CV: N S1 S2 1/6 LUDMILA (+)2 Pulses B/l  Resp:  Clear to auscultation B/L, normal effort  GI: (+) BS Soft, NT, ND  Lymph:  (-)Edema, (-)obvious lymphadenopathy  Skin: Warm to touch, Normal turgor  Psych: Appropriate mood and affect      TELEMETRY: AF 50-70	      ASSESSMENT/PLAN:  89 y/o female PMH hypertension, MDS, AF on eliquis a/w fatigue after covid    - may d/c tele  - pt. currently with no chest pain or anginal symptoms  - volume status improved - can hold diuretics for MARIELA if needed, cr improving  - recommend lifelong AC with Eliquis for AF/Stroke prevention if within GOC  - TTE noted with severe MR, mod AS, normal LV function  - Patient and family want conservative medical management therefore no plans for further inpatient cardiac w/u for severe MR  - no further inpatient cardiac workup planned  - Patient to f/u in our office with Dr. Carbajal after d/c    Ivan Montero PA-C  Pager: [phone]  
CARDIOLOGY ATTENDING    tele: AF 60-80s    she denies palpitations, syncope, nor angina, ROS otherwise -       DATE OF SERVICE - 05-23-22    Review of Systems:   Constitutional: [ ] fevers, [ ] chills.   Skin: [ ] dry skin. [ ] rashes.  Psychiatric: [ ] depression, [ ] anxiety.   Gastrointestinal: [ ] BRBPR, [ ] melena.   Neurological: [ ] confusion. [ ] seizures. [ ] shuffling gait.   Ears,Nose,Mouth and Throat: [ ] ear pain [ ] sore throat.   Eyes: [ ] diplopia.   Respiratory: [ ] hemoptysis. [ ] shortness of breath  Cardiovascular: See HPI above  Hematologic/Lymphatic: [ ] anemia. [ ] painful nodes. [ ] prolonged bleeding.   Genitourinary: [ ] hematuria. [ ] flank pain.   Endocrine: [ ] significant change in weight. [ ] intolerance to heat and cold.     Review of systems [ x] otherwise negative, [ ] otherwise unable to obtain    FH: no family history of sudden cardiac death in first degree relatives    SH: [ ] tobacco, [ ] alcohol, [ ] drugs    acetaminophen     Tablet .. 650 milliGRAM(s) Oral every 6 hours PRN  ALBUTerol    90 MICROgram(s) HFA Inhaler 2 Puff(s) Inhalation every 6 hours PRN  amLODIPine   Tablet 2.5 milliGRAM(s) Oral daily  apixaban 2.5 milliGRAM(s) Oral every 12 hours  atorvastatin 10 milliGRAM(s) Oral at bedtime  folic acid 1 milliGRAM(s) Oral daily  furosemide    Tablet 20 milliGRAM(s) Oral daily  latanoprost 0.005% Ophthalmic Solution 1 Drop(s) Both EYES at bedtime  levothyroxine 112 MICROGram(s) Oral daily  losartan 50 milliGRAM(s) Oral daily  melatonin 3 milliGRAM(s) Oral at bedtime PRN  ondansetron Injectable 4 milliGRAM(s) IV Push every 8 hours PRN  pantoprazole    Tablet 40 milliGRAM(s) Oral before breakfast                            11.1   8.43  )-----------( 492      ( 23 May 2022 06:28 )             37.7       05-23    138  |  101  |  18  ----------------------------<  88  4.4   |  24  |  0.52    Ca    9.4      23 May 2022 06:28  Phos  4.2     05-23  Mg     2.3     05-23    TPro  6.9  /  Alb  3.6  /  TBili  0.2  /  DBili  x   /  AST  12  /  ALT  14  /  AlkPhos  126<H>  05-23            T(C): 36.7 (05-23-22 @ 04:16), Max: 36.8 (05-22-22 @ 20:48)  HR: 87 (05-23-22 @ 09:51) (62 - 87)  BP: 128/61 (05-23-22 @ 09:51) (122/64 - 130/72)  RR: 18 (05-23-22 @ 04:16) (18 - 18)  SpO2: 96% (05-23-22 @ 09:51) (96% - 100%)  Wt(kg): --    I&O's Summary    22 May 2022 07:01  -  23 May 2022 07:00  --------------------------------------------------------  IN: 840 mL / OUT: 400 mL / NET: 440 mL    23 May 2022 07:01  -  23 May 2022 12:09  --------------------------------------------------------  IN: 360 mL / OUT: 0 mL / NET: 360 mL        General: Well nourished in no acute distress. Alert and Oriented * 3.   Head: Normocephalic and atraumatic.   Neck: No JVD. No bruits. Supple. Does not appear to be enlarged.   Cardiovascular: + S1,S2 ; IRR Soft systolic murmur at the left lower sternal border. No rubs noted.    Lungs: CTA b/l. No rhonchi, rales or wheezes.   Abdomen: + BS, soft. Non tender. Non distended. No rebound. No guarding.   Extremities: No clubbing/cyanosis/edema.   Neurologic: Moves all four extremities. Full range of motion.   Skin: Warm and moist. The patient's skin has normal elasticity and good skin turgor.   Psychiatric: Appropriate mood and affect.  Musculoskeletal: Normal range of motion, normal strength        ASSESSMENT/PLAN: 89 year old female  with PMhx of HTN, HLD, MDS, afib on Eliquis presents for worsened generalized weakness for the past day in setting of recent covid infection.      -pt. currently with no chest pain or anginal symptoms  -her symptoms improved after a dose of IV diuretics  -keep net negative  -check TTE  -lifelong ac       Becky Muñoz M.D., [facility]  Cardiac Electrophysiology  [facility]  [address] [place] [address], [address] [place] [address]  [email or web address]    office [phone]  pager [phone]  
CARDIOLOGY ATTENDING    tele: AF 60-90s    she denies palpitations, syncope, nor angina, ROS otherwise -      DATE OF SERVICE - 05-25-22     Review of Systems:   Constitutional: [ ] fevers, [ ] chills.   Skin: [ ] dry skin. [ ] rashes.  Psychiatric: [ ] depression, [ ] anxiety.   Gastrointestinal: [ ] BRBPR, [ ] melena.   Neurological: [ ] confusion. [ ] seizures. [ ] shuffling gait.   Ears,Nose,Mouth and Throat: [ ] ear pain [ ] sore throat.   Eyes: [ ] diplopia.   Respiratory: [ ] hemoptysis. [ ] shortness of breath  Cardiovascular: See HPI above  Hematologic/Lymphatic: [ ] anemia. [ ] painful nodes. [ ] prolonged bleeding.   Genitourinary: [ ] hematuria. [ ] flank pain.   Endocrine: [ ] significant change in weight. [ ] intolerance to heat and cold.     Review of systems [ x] otherwise negative, [ ] otherwise unable to obtain    FH: no family history of sudden cardiac death in first degree relatives    SH: [ ] tobacco, [ ] alcohol, [ ] drugs    acetaminophen     Tablet .. 650 milliGRAM(s) Oral every 6 hours  ALBUTerol    90 MICROgram(s) HFA Inhaler 2 Puff(s) Inhalation every 6 hours PRN  amLODIPine   Tablet 2.5 milliGRAM(s) Oral daily  apixaban 2.5 milliGRAM(s) Oral every 12 hours  atorvastatin 10 milliGRAM(s) Oral at bedtime  diclofenac sodium 1% Gel 4 Gram(s) Topical two times a day  folic acid 1 milliGRAM(s) Oral daily  lactated ringers. 500 milliLiter(s) IV Continuous <Continuous>  latanoprost 0.005% Ophthalmic Solution 1 Drop(s) Both EYES at bedtime  levothyroxine 112 MICROGram(s) Oral daily  melatonin 3 milliGRAM(s) Oral at bedtime PRN  ondansetron Injectable 4 milliGRAM(s) IV Push every 8 hours PRN  oxyCODONE    IR 2.5 milliGRAM(s) Oral every 6 hours PRN  pantoprazole    Tablet 40 milliGRAM(s) Oral before breakfast                            9.0    11.19 )-----------( 268      ( 25 May 2022 06:12 )             27.6       05-25    133<L>  |  98  |  59<H>  ----------------------------<  103<H>  4.3   |  20<L>  |  1.77<H>    Ca    8.9      25 May 2022 06:11              T(C): 36.7 (05-25-22 @ 04:35), Max: 37.1 (05-24-22 @ 11:28)  HR: 84 (05-25-22 @ 04:35) (74 - 84)  BP: 148/94 (05-25-22 @ 04:35) (148/94 - 158/76)  RR: 18 (05-25-22 @ 07:30) (16 - 18)  SpO2: 95% (05-25-22 @ 04:35) (94% - 96%)  Wt(kg): --    I&O's Summary    24 May 2022 07:01  -  25 May 2022 07:00  --------------------------------------------------------  IN: 360 mL / OUT: 400 mL / NET: -40 mL        General: Well nourished in no acute distress. Alert and Oriented * 3.   Head: Normocephalic and atraumatic.   Neck: No JVD. No bruits. Supple. Does not appear to be enlarged.   Cardiovascular: + S1,S2 ; IRR Soft systolic murmur at the left lower sternal border. No rubs noted.    Lungs: CTA b/l. No rhonchi, rales or wheezes.   Abdomen: + BS, soft. Non tender. Non distended. No rebound. No guarding.   Extremities: No clubbing/cyanosis/edema.   Neurologic: Moves all four extremities. Full range of motion.   Skin: Warm and moist. The patient's skin has normal elasticity and good skin turgor.   Psychiatric: Appropriate mood and affect.  Musculoskeletal: Normal range of motion, normal strength      A/P) 89 y/o female PMH hypertension, MDS, AF on eliquis a/w fatigue after covid    - pt. currently with no chest pain or anginal symptoms  - volume status improved - diuretics on hold given MARIELA  - recommend lifelong AC with Eliquis for AF/Stroke prevention   - TTE noted with severe MR, mod AS, normal LV function  - Patient and family want conservative medical management therefore no plans for further inpatient cardiac w/u for severe MR  - Patient to f/u in our office with Dr. Carbajal after d/c  - no further inpatient cardiac workup expected  - may d/c tele      Becky uMñoz M.D., [facility]  Cardiac Electrophysiology  [facility]  [address] [place] [address], [place] [address]  [email or web address]    office [phone]  pager [phone]  
[facility] Division of Hospital Medicine  Kwaku Hutson MD  Pager (ZAN-FATOUMATA, 3K-9A): [phone]  Other Times:  [phone]    Patient is a 89y old  Female who presents with a chief complaint of generalized weakness x few days (25 May 2022 10:25)      SUBJECTIVE / OVERNIGHT EVENTS: No acute events. Reports knee pain is a little better today   ADDITIONAL REVIEW OF SYSTEMS: No fever    MEDICATIONS  (STANDING):  acetaminophen     Tablet .. 650 milliGRAM(s) Oral every 6 hours  amLODIPine   Tablet 2.5 milliGRAM(s) Oral daily  apixaban 2.5 milliGRAM(s) Oral every 12 hours  atorvastatin 10 milliGRAM(s) Oral at bedtime  diclofenac sodium 1% Gel 4 Gram(s) Topical two times a day  folic acid 1 milliGRAM(s) Oral daily  latanoprost 0.005% Ophthalmic Solution 1 Drop(s) Both EYES at bedtime  levothyroxine 112 MICROGram(s) Oral daily  pantoprazole    Tablet 40 milliGRAM(s) Oral before breakfast    MEDICATIONS  (PRN):  ALBUTerol    90 MICROgram(s) HFA Inhaler 2 Puff(s) Inhalation every 6 hours PRN Shortness of Breath and/or Wheezing  melatonin 3 milliGRAM(s) Oral at bedtime PRN Insomnia  ondansetron Injectable 4 milliGRAM(s) IV Push every 8 hours PRN Nausea and/or Vomiting  oxyCODONE    IR 2.5 milliGRAM(s) Oral every 6 hours PRN Severe Pain (7 - 10)      CAPILLARY BLOOD GLUCOSE        I&O's Summary    24 May 2022 07:01  -  25 May 2022 07:00  --------------------------------------------------------  IN: 360 mL / OUT: 400 mL / NET: -40 mL    25 May 2022 07:01  -  25 May 2022 14:18  --------------------------------------------------------  IN: 640 mL / OUT: 0 mL / NET: 640 mL        PHYSICAL EXAM:  Vital Signs Last 24 Hrs  T(C): 36.5 (25 May 2022 11:51), Max: 36.9 (24 May 2022 20:56)  T(F): 97.7 (25 May 2022 11:51), Max: 98.4 (24 May 2022 20:56)  HR: 60 (25 May 2022 11:51) (60 - 84)  BP: 124/74 (25 May 2022 11:51) (124/74 - 155/82)  BP(mean): --  RR: 18 (25 May 2022 11:51) (16 - 18)  SpO2: 97% (25 May 2022 11:51) (95% - 97%)  CONSTITUTIONAL: NAD, well-developed, frail  EYES: EOMI; conjunctiva and sclera clear L eyelid blepharitis w/ slight drainage  ENMT: Moist oral mucosa, no pharyngeal injection or exudates  RESPIRATORY: Normal respiratory effort; lungs are clear to auscultation bilaterally  CARDIOVASCULAR: Regular rate and rhythm, normal S1 and S2, no murmur/rub/gallop; 1+ lower extremity edema  ABDOMEN: Nontender to palpation, normoactive bowel sounds, no rebound/guarding; No hepatosplenomegaly  PSYCH: A+O to person, place, and time; affect appropriate  NEUROLOGY: moving all extremities; no gross sensory deficits   SKIN: No rashes; no palpable lesions  MSK: R knee +effusion, less TTP today    LABS:                        9.0    11.19 )-----------( 268      ( 25 May 2022 06:12 )             27.6     05-25    133<L>  |  98  |  59<H>  ----------------------------<  103<H>  4.3   |  20<L>  |  1.77<H>    Ca    8.9      25 May 2022 06:11                  RADIOLOGY & ADDITIONAL TESTS:  Results Reviewed:   Imaging Personally Reviewed:  Electrocardiogram Personally Reviewed:    COORDINATION OF CARE:  Care Discussed with Consultants/Other Providers [Y/N]:  Prior or Outpatient Records Reviewed [Y/N]:  
[facility] Division of Hospital Medicine  Kwaku Hutson MD  Pager (M-F, 3G-7U): [phone]  Other Times:  [phone]    Patient is a 89y old  Female who presents with a chief complaint of generalized weakness x few days (23 May 2022 12:09)      SUBJECTIVE / OVERNIGHT EVENTS: No acute events. C/o severe R knee pain. No fever. Breathing is improved.  ADDITIONAL REVIEW OF SYSTEMS: No cp    MEDICATIONS  (STANDING):  amLODIPine   Tablet 2.5 milliGRAM(s) Oral daily  apixaban 2.5 milliGRAM(s) Oral every 12 hours  atorvastatin 10 milliGRAM(s) Oral at bedtime  folic acid 1 milliGRAM(s) Oral daily  furosemide    Tablet 20 milliGRAM(s) Oral daily  latanoprost 0.005% Ophthalmic Solution 1 Drop(s) Both EYES at bedtime  levothyroxine 112 MICROGram(s) Oral daily  lidocaine   4% Patch 1 Patch Transdermal every 24 hours  losartan 50 milliGRAM(s) Oral daily  pantoprazole    Tablet 40 milliGRAM(s) Oral before breakfast    MEDICATIONS  (PRN):  acetaminophen     Tablet .. 650 milliGRAM(s) Oral every 6 hours PRN Temp greater or equal to 38C (100.4F), Mild Pain (1 - 3)  ALBUTerol    90 MICROgram(s) HFA Inhaler 2 Puff(s) Inhalation every 6 hours PRN Shortness of Breath and/or Wheezing  diclofenac sodium 1% Gel 4 Gram(s) Topical three times a day PRN arthritis pain  melatonin 3 milliGRAM(s) Oral at bedtime PRN Insomnia  ondansetron Injectable 4 milliGRAM(s) IV Push every 8 hours PRN Nausea and/or Vomiting  oxyCODONE    IR 2.5 milliGRAM(s) Oral every 6 hours PRN Severe Pain (7 - 10)      CAPILLARY BLOOD GLUCOSE        I&O's Summary    22 May 2022 07:  -  23 May 2022 07:00  --------------------------------------------------------  IN: 840 mL / OUT: 400 mL / NET: 440 mL    23 May 2022 07:01  -  23 May 2022 14:21  --------------------------------------------------------  IN: 640 mL / OUT: 0 mL / NET: 640 mL        PHYSICAL EXAM:  Vital Signs Last 24 Hrs  T(C): 36.6 (23 May 2022 12:55), Max: 36.8 (22 May 2022 20:48)  T(F): 97.9 (23 May 2022 12:55), Max: 98.3 (22 May 2022 20:48)  HR: 82 (23 May 2022 12:55) (76 - 87)  BP: 126/67 (23 May 2022 12:55) (122/64 - 130/72)  BP(mean): --  RR: 18 (23 May 2022 12:55) (18 - 18)  SpO2: 98% (23 May 2022 12:55) (96% - 100%)  CONSTITUTIONAL: NAD, well-developed, frail  EYES: EOMI; conjunctiva and sclera clear L eyelid blepharitis w/ slight drainage  ENMT: Moist oral mucosa, no pharyngeal injection or exudates  RESPIRATORY: Normal respiratory effort; lungs are clear to auscultation bilaterally  CARDIOVASCULAR: Regular rate and rhythm, normal S1 and S2, no murmur/rub/gallop; 1+ lower extremity edema  ABDOMEN: Nontender to palpation, normoactive bowel sounds, no rebound/guarding; No hepatosplenomegaly  PSYCH: A+O to person, place, and time; affect appropriate  NEUROLOGY: moving all extremities; no gross sensory deficits   SKIN: No rashes; no palpable lesions  MSK: R knee no erythema, mild effusion, not warm    LABS:                        11.1   8.43  )-----------( 492      ( 23 May 2022 06:28 )             37.7     05-23    138  |  101  |  18  ----------------------------<  88  4.4   |  24  |  0.52    Ca    9.4      23 May 2022 06:28  Phos  4.2     05-  Mg     2.3     05-    TPro  6.9  /  Alb  3.6  /  TBili  0.2  /  DBili  x   /  AST  12  /  ALT  14  /  AlkPhos  126<H>  05-23          Urinalysis Basic - ( 22 May 2022 01:48 )    Color: Light Yellow / Appearance: Clear / S.007 / pH: x  Gluc: x / Ketone: Negative  / Bili: Negative / Urobili: Negative   Blood: x / Protein: Trace / Nitrite: Negative   Leuk Esterase: Negative / RBC: 1 /hpf / WBC 0 /HPF   Sq Epi: x / Non Sq Epi: 0 /hpf / Bacteria: Negative          RADIOLOGY & ADDITIONAL TESTS:  Results Reviewed:   Imaging Personally Reviewed:  Electrocardiogram Personally Reviewed:    COORDINATION OF CARE:  Care Discussed with Consultants/Other Providers [Y/N]:  Prior or Outpatient Records Reviewed [Y/N]:  
[facility] Division of Hospital Medicine  Kwaku Hutson MD  Pager (ZAN-FATOUMATA, 8A-5X): [phone]  Other Times:  [phone]    Patient is a 89y old  Female who presents with a chief complaint of generalized weakness x few days (25 May 2022 15:41)      SUBJECTIVE / OVERNIGHT EVENTS: No acute events. Knee pain is improving, able to ambulate.  ADDITIONAL REVIEW OF SYSTEMS: No cp.    MEDICATIONS  (STANDING):  acetaminophen     Tablet .. 650 milliGRAM(s) Oral every 6 hours  amLODIPine   Tablet 2.5 milliGRAM(s) Oral daily  apixaban 2.5 milliGRAM(s) Oral every 12 hours  atorvastatin 10 milliGRAM(s) Oral at bedtime  diclofenac sodium 1% Gel 4 Gram(s) Topical two times a day  folic acid 1 milliGRAM(s) Oral daily  furosemide    Tablet 20 milliGRAM(s) Oral daily  iron sucrose IVPB 100 milliGRAM(s) IV Intermittent every 24 hours  latanoprost 0.005% Ophthalmic Solution 1 Drop(s) Both EYES at bedtime  levothyroxine 112 MICROGram(s) Oral daily  pantoprazole    Tablet 40 milliGRAM(s) Oral before breakfast    MEDICATIONS  (PRN):  ALBUTerol    90 MICROgram(s) HFA Inhaler 2 Puff(s) Inhalation every 6 hours PRN Shortness of Breath and/or Wheezing  melatonin 3 milliGRAM(s) Oral at bedtime PRN Insomnia  ondansetron Injectable 4 milliGRAM(s) IV Push every 8 hours PRN Nausea and/or Vomiting  oxyCODONE    IR 2.5 milliGRAM(s) Oral every 6 hours PRN Severe Pain (7 - 10)      CAPILLARY BLOOD GLUCOSE        I&O's Summary    25 May 2022 07:  -  26 May 2022 07:00  --------------------------------------------------------  IN: 760 mL / OUT: 600 mL / NET: 160 mL    26 May 2022 07:01  -  26 May 2022 13:25  --------------------------------------------------------  IN: 720 mL / OUT: 400 mL / NET: 320 mL        PHYSICAL EXAM:  Vital Signs Last 24 Hrs  T(C): 36.5 (26 May 2022 11:53), Max: 36.6 (26 May 2022 04:46)  T(F): 97.7 (26 May 2022 11:53), Max: 97.8 (26 May 2022 04:46)  HR: 72 (26 May 2022 11:53) (72 - 77)  BP: 93/56 (26 May 2022 11:53) (93/56 - 137/78)  BP(mean): --  RR: 18 (26 May 2022 11:53) (18 - 18)  SpO2: 97% (26 May 2022 11:53) (97% - 97%)  CONSTITUTIONAL: NAD, well-developed, frail  EYES: EOMI; conjunctiva and sclera clear L eyelid blepharitis w/ slight drainage  ENMT: Moist oral mucosa, no pharyngeal injection or exudates  RESPIRATORY: Normal respiratory effort; lungs are clear to auscultation bilaterally  CARDIOVASCULAR: Regular rate and rhythm, normal S1 and S2, no murmur/rub/gallop; 1+ lower extremity edema  ABDOMEN: Nontender to palpation, normoactive bowel sounds, no rebound/guarding; No hepatosplenomegaly  PSYCH: A+O to person, place, and time; affect appropriate  NEUROLOGY: moving all extremities; no gross sensory deficits   SKIN: No rashes; no palpable lesions  MSK: R knee +effusion, less TTP today    LABS:                        8.5    7.91  )-----------( 305      ( 26 May 2022 05:25 )             26.3     05-    134<L>  |  98  |  66<H>  ----------------------------<  83  4.2   |  20<L>  |  1.83<H>    Ca    8.8      26 May 2022 05:25            Urinalysis Basic - ( 26 May 2022 02:21 )    Color: Yellow / Appearance: Slightly Turbid / S.020 / pH: x  Gluc: x / Ketone: Negative  / Bili: Negative / Urobili: Negative   Blood: x / Protein: 30 mg/dL / Nitrite: Negative   Leuk Esterase: Negative / RBC: 5 /hpf / WBC 0 /HPF   Sq Epi: x / Non Sq Epi: 0 /hpf / Bacteria: Many          RADIOLOGY & ADDITIONAL TESTS:  Results Reviewed:   Imaging Personally Reviewed:  Electrocardiogram Personally Reviewed:    COORDINATION OF CARE:  Care Discussed with Consultants/Other Providers [Y/N]:  Prior or Outpatient Records Reviewed [Y/N]:  
[facility] Division of Hospital Medicine  Kwaku Hutson MD  Pager (M-F, 8Y-2V): [phone]  Other Times:  [phone]    Patient is a 89y old  Female who presents with a chief complaint of generalized weakness x few days (23 May 2022 14:13)      SUBJECTIVE / OVERNIGHT EVENTS: No acute events. C/o severe R knee pain and swelling, worse than ever had before.  ADDITIONAL REVIEW OF SYSTEMS: no cp or shortness of breath    MEDICATIONS  (STANDING):  acetaminophen     Tablet .. 650 milliGRAM(s) Oral every 6 hours  amLODIPine   Tablet 2.5 milliGRAM(s) Oral daily  apixaban 2.5 milliGRAM(s) Oral every 12 hours  atorvastatin 10 milliGRAM(s) Oral at bedtime  diclofenac sodium 1% Gel 4 Gram(s) Topical two times a day  folic acid 1 milliGRAM(s) Oral daily  latanoprost 0.005% Ophthalmic Solution 1 Drop(s) Both EYES at bedtime  levothyroxine 112 MICROGram(s) Oral daily  losartan 50 milliGRAM(s) Oral daily  pantoprazole    Tablet 40 milliGRAM(s) Oral before breakfast    MEDICATIONS  (PRN):  ALBUTerol    90 MICROgram(s) HFA Inhaler 2 Puff(s) Inhalation every 6 hours PRN Shortness of Breath and/or Wheezing  melatonin 3 milliGRAM(s) Oral at bedtime PRN Insomnia  ondansetron Injectable 4 milliGRAM(s) IV Push every 8 hours PRN Nausea and/or Vomiting  oxyCODONE    IR 2.5 milliGRAM(s) Oral every 6 hours PRN Severe Pain (7 - 10)      CAPILLARY BLOOD GLUCOSE        I&O's Summary    23 May 2022 07:01  -  24 May 2022 07:00  --------------------------------------------------------  IN: 640 mL / OUT: 200 mL / NET: 440 mL    24 May 2022 07:01  -  24 May 2022 12:02  --------------------------------------------------------  IN: 360 mL / OUT: 0 mL / NET: 360 mL        PHYSICAL EXAM:  Vital Signs Last 24 Hrs  T(C): 37.1 (24 May 2022 11:28), Max: 37.1 (24 May 2022 11:28)  T(F): 98.8 (24 May 2022 11:28), Max: 98.8 (24 May 2022 11:28)  HR: 74 (24 May 2022 11:28) (73 - 82)  BP: 158/76 (24 May 2022 11:28) (123/73 - 158/76)  BP(mean): --  RR: 16 (24 May 2022 11:28) (16 - 18)  SpO2: 94% (24 May 2022 11:28) (94% - 98%)  CONSTITUTIONAL: NAD, well-developed, frail  EYES: EOMI; conjunctiva and sclera clear L eyelid blepharitis w/ slight drainage  ENMT: Moist oral mucosa, no pharyngeal injection or exudates  RESPIRATORY: Normal respiratory effort; lungs are clear to auscultation bilaterally  CARDIOVASCULAR: Regular rate and rhythm, normal S1 and S2, no murmur/rub/gallop; 1+ lower extremity edema  ABDOMEN: Nontender to palpation, normoactive bowel sounds, no rebound/guarding; No hepatosplenomegaly  PSYCH: A+O to person, place, and time; affect appropriate  NEUROLOGY: moving all extremities; no gross sensory deficits   SKIN: No rashes; no palpable lesions  MSK: R knee +effusion, warm today, extremely TTP    LABS:                        9.5    9.98  )-----------( 283      ( 24 May 2022 06:45 )             29.8     05-24    134<L>  |  98  |  49<H>  ----------------------------<  91  4.0   |  19<L>  |  1.61<H>    Ca    8.9      24 May 2022 06:45  Phos  4.2     05-23  Mg     2.3     05-23    TPro  6.9  /  Alb  3.6  /  TBili  0.2  /  DBili  x   /  AST  12  /  ALT  14  /  AlkPhos  126<H>  05-23                RADIOLOGY & ADDITIONAL TESTS:  Results Reviewed:   Imaging Personally Reviewed:  Electrocardiogram Personally Reviewed:    COORDINATION OF CARE:  Care Discussed with Consultants/Other Providers [Y/N]:  Prior or Outpatient Records Reviewed [Y/N]:

## 2022-05-27 NOTE — DISCHARGE NOTE PROVIDER - NSDCCPCAREPLAN_GEN_ALL_CORE_FT
PRINCIPAL DISCHARGE DIAGNOSIS  Diagnosis: 2019 novel coronavirus disease (COVID-19)  Assessment and Plan of Treatment: - No leukocytosis and afebrile  - Afib RVR presentation can be 2/2 to HF vs. anemia vs. covid infection vs. afib, will breathing improved after extra lasix, now back on home dose  - mildly hypoxic w/ ambulation, no evidence of coronary ischemia , currently oxygenating well on room air, requires no supplementation  Echo severe MR, normal LV function, moderate tricuspid regurg, right atrial enlargement, LE dopplers.      SECONDARY DISCHARGE DIAGNOSES  Diagnosis: Chronic atrial fibrillation  Assessment and Plan of Treatment: Continue Eliquis    Diagnosis: HTN (hypertension)  Assessment and Plan of Treatment: Losartan is HELD on discharge due to soft BP.   Amlodipine was increased on discharge, per nephrology recommendations.   Please follow up with your primary care physician after discharge for continued monitoring and management..    Diagnosis: CHF exacerbation  Assessment and Plan of Treatment: Continue meds as prescribed.

## 2022-05-27 NOTE — PROGRESS NOTE ADULT - ASSESSMENT
89F w/PMhx of HTN, HLD, MDS, afib on Eliquis presents for worsened generalized weakness for the past day, recent covid infection.  
Patient is a 89 year old female  with PMhx of HTN, HLD, MDS, afib on Eliquis presents for worsened generalized weakness for the past day in setting of recent covid infection, now with R knee pain    #Anemia, MDS  - has been stable on Aranesp 200mcg, receiving approx q 6 weeks; last about 8 weeks prior  - initial decreased hg of 8.1 on admission may be false value  - no bleeding; check fecal occult  - iron studies with iron sat 8%, below ideal of 20% for JAVON- will give IV iron  - B12/folate normal  - plan to give Aranesp prior to discharge  - monitor Hg, sl lower    #R knee pain; hx of OA  - diclofenac, tylenol  - bedside aspiration unsuccessful; Rheum following  - for MRI knee    #COVID infection, recent  - s/p Monoclonal Ab  - fatigue now seems improved though pt sedentary with knee pain    #constipation- bowel regimen    d/w NP  d/w daughter at bedside
89 year old female  with PMhx of HTN, HLD, MDS, afib on Eliquis, b/l OA s/p L TKR presents for worsened generalized weakness, cough and SOB for the past day in setting of recent covid infection (tested + on 5/12). Also c/o acute R. knee pain x 3 days,  no trauma in the area. Rheumatology consulted for r/o gout     # monoarticular arthritis - differential:  crystalline arthropathy (gout vs pseudogout)  vs inflammatory OA  - X-ray R. knee showed severe OA with osteophytes, vascular calcification   - exam showed large supra-patella R. knee effusion   - R. knee arthrocentesis attempted today, 2ml of lidocaine injected, unable to drain fluid.  - pain control: continue Voltaren gel, ICE over knee    - MRI showed large R. knee effusion    - S/p prednisone 30mg x1 earlier 5/24, pain now improved , able to bear weight    - would hold off on further steroid, can pursue outpt arthrocentesis    will sign off on the case, please re-consult if status changes     Case discussed with Dr. Nimisha Sherman, PGY-4  Rheumatology Fellow   Pager: [phone]  please enter a full 10 digit number for call back   During weekends, please page on call fellow     
Patient is a 89 year old female  with PMhx of HTN, HLD, MDS, afib on Eliquis presents for worsened generalized weakness for the past day in setting of recent covid infection, now with R knee pain    #Anemia, MDS  - has been stable on Aranesp 200mcg, receiving approx q 6 weeks; last about 8 weeks prior  - initial decreased hg of 8.1 on admission may be false value  - no bleeding; check fecal occult  - iron studies with iron sat 8%, below ideal of 20% for JAVON- receiving IV iron, for 3rd dose today  - B12/folate normal  - s/p Aranesp 200mcg today  - Hg overall stable    #R knee pain; hx of OA  - diclofenac, tylenol  - bedside aspiration unsuccessful; Rheum following  - MRI knee with OA/effusion  - symptoms improved    #COVID infection, recent  - s/p Monoclonal Ab  - fatigue now seems improved though pt sedentary with knee pain    #constipation- bowel regimen    d/w NP  d/w daughter at bedside    pt will FU with Dr. Herrera as outpatient- daughter aware to call for appointment
Patient with recent covid infeciton   attempted arthrocentsis at bedside but no fluid returned  IR input appreciated; MRI recommended  MRI with evidence of severe OA and large joint effusion       Plan: clinically pain improved and patient is able to ambulate  s/p one dose of steroids  recommend PT   hold off on aspiration in light of improving symptoms    Please recall with questions  
89F w/PMhx of HTN, HLD, MDS, afib on Eliquis presents for worsened generalized weakness in setting of recent covid infection. Course c/b severe R knee pain and MARIELA.  
89F w/PMhx of HTN, HLD, MDS, afib on Eliquis presents for worsened generalized weakness for the past day, recent covid infection.  
89F w/PMhx of HTN, HLD, MDS, afib on Eliquis presents for worsened generalized weakness for the past day, recent covid infection.

## 2022-05-27 NOTE — DISCHARGE NOTE NURSING/CASE MANAGEMENT/SOCIAL WORK - NSDCPEFALRISK_GEN_ALL_CORE
For information on Fall & Injury Prevention, visit: [email or web address] OR  [email or web address] OR  https://www.cdc.gov/steadi/patient.html

## 2022-05-27 NOTE — CHART NOTE - NSCHARTNOTEFT_GEN_A_CORE
Patient seen and examined.  Knee pain is much improved and now able to ambulate.  Renal function is improving.  IR/Rheum no longer planning for joint aspiration as MRI suggestive of severe OA.  She requires home PT.  Stable for dc today. 35 min spent coordinating care and planning for discharge. Patient seen and examined.  Knee pain is much improved and now able to ambulate.  Renal function is improving.  IR/Rheum no longer planning for joint aspiration as MRI suggestive of severe OA.  She requires home PT.  Stable for dc today. 35 min spent coordinating care and planning for discharge.    DC diagnoses  #weakness due to COVID19  #MARIELA on CKD3b  #chronic systolic heart failure  #chronic Afib  #MDS

## 2022-05-27 NOTE — CONSULT NOTE ADULT - REASON FOR ADMISSION
generalized weakness x few days

## 2022-05-27 NOTE — CONSULT NOTE ADULT - ASSESSMENT
89y Female with history of HTN, MDS presents with weakness. Nephrology consulted for elevated Scr.    1) MARIELA: likely hemodynamically mediated in setting of ARB, diuretic use and hypotension. Scr now improving off of ARB. UA bland. FeNa low likely due to decreased EABV given severe MR on TTE. Can check bladder scan but would defer further work up given plans for discharge today with improving renal function. Avoid nephrotoxins.     2) CKD-3b: Baseline Scr 1.3-1.4. Defer CKD work up given advanced age. Monitor electrolytes.    3) HTN with CKD: BP uncontrolled. Would increase amlodipine to 5 mg daily. Would continue holding losartan on discharge. Monitor BP.    4) LE edema: Patient appears relatively euvolemic with CXR negative for congestion. Continue with lasix 20 mg PO daily. TTE with severe MR. Monitor UO.      [facility]  Solo Sanchez M.D.  Spencer Torers D.O.  Kori Abraham M.D.  Pita Crawford, LLOYD, ANP-C    Telephone: [phone]  Facsimile: [phone]    [id number] [id number]

## 2022-05-27 NOTE — CHART NOTE - NSCHARTNOTEFT_GEN_A_CORE
Patient medically cleared per. Dr. Hutson.   D/C meds reviewed with Dr. Hutson.   D/C paperwork completed.   Labs stable. VSS.

## 2022-12-02 ENCOUNTER — INPATIENT (INPATIENT)
Facility: HOSPITAL | Age: 87
LOS: 6 days | Discharge: NURSING, REHAB OR LONG-TERM CARE | DRG: 291 | End: 2022-12-09
Attending: INTERNAL MEDICINE | Admitting: INTERNAL MEDICINE
Payer: COMMERCIAL

## 2022-12-02 VITALS
WEIGHT: 160.06 LBS | SYSTOLIC BLOOD PRESSURE: 116 MMHG | DIASTOLIC BLOOD PRESSURE: 86 MMHG | RESPIRATION RATE: 20 BRPM | OXYGEN SATURATION: 97 % | TEMPERATURE: 98 F | HEART RATE: 90 BPM

## 2022-12-02 DIAGNOSIS — I50.9 HEART FAILURE, UNSPECIFIED: ICD-10-CM

## 2022-12-02 LAB
APPEARANCE UR: CLEAR — SIGNIFICANT CHANGE UP
BACTERIA # UR AUTO: NEGATIVE — SIGNIFICANT CHANGE UP
BASE EXCESS BLDV CALC-SCNC: -3.4 MMOL/L — LOW (ref -2–3)
BASOPHILS # BLD AUTO: 0.02 K/UL — SIGNIFICANT CHANGE UP (ref 0–0.2)
BASOPHILS NFR BLD AUTO: 0.3 % — SIGNIFICANT CHANGE UP (ref 0–2)
BILIRUB UR-MCNC: NEGATIVE — SIGNIFICANT CHANGE UP
CA-I SERPL-SCNC: 1.08 MMOL/L — LOW (ref 1.15–1.33)
CHLORIDE BLDV-SCNC: 97 MMOL/L — SIGNIFICANT CHANGE UP (ref 96–108)
CO2 BLDV-SCNC: 23 MMOL/L — SIGNIFICANT CHANGE UP (ref 22–26)
COLOR SPEC: SIGNIFICANT CHANGE UP
DIFF PNL FLD: NEGATIVE — SIGNIFICANT CHANGE UP
EOSINOPHIL # BLD AUTO: 0.11 K/UL — SIGNIFICANT CHANGE UP (ref 0–0.5)
EOSINOPHIL NFR BLD AUTO: 1.4 % — SIGNIFICANT CHANGE UP (ref 0–6)
EPI CELLS # UR: 0 /HPF — SIGNIFICANT CHANGE UP
GAS PNL BLDV: 131 MMOL/L — LOW (ref 136–145)
GAS PNL BLDV: SIGNIFICANT CHANGE UP
GAS PNL BLDV: SIGNIFICANT CHANGE UP
GLUCOSE BLDV-MCNC: 88 MG/DL — SIGNIFICANT CHANGE UP (ref 70–99)
GLUCOSE UR QL: NEGATIVE — SIGNIFICANT CHANGE UP
HCO3 BLDV-SCNC: 22 MMOL/L — SIGNIFICANT CHANGE UP (ref 22–29)
HCT VFR BLD CALC: 29.4 % — LOW (ref 34.5–45)
HCT VFR BLDA CALC: 68 % — CRITICAL HIGH (ref 34.5–46.5)
HGB BLD CALC-MCNC: >20 G/DL — CRITICAL HIGH (ref 11.7–16.1)
HGB BLD-MCNC: 9.4 G/DL — LOW (ref 11.5–15.5)
HYALINE CASTS # UR AUTO: 0 /LPF — SIGNIFICANT CHANGE UP (ref 0–2)
IMM GRANULOCYTES NFR BLD AUTO: 0.4 % — SIGNIFICANT CHANGE UP (ref 0–0.9)
KETONES UR-MCNC: NEGATIVE — SIGNIFICANT CHANGE UP
LACTATE BLDV-MCNC: 1.5 MMOL/L — SIGNIFICANT CHANGE UP (ref 0.5–2)
LEUKOCYTE ESTERASE UR-ACNC: NEGATIVE — SIGNIFICANT CHANGE UP
LYMPHOCYTES # BLD AUTO: 0.76 K/UL — LOW (ref 1–3.3)
LYMPHOCYTES # BLD AUTO: 9.5 % — LOW (ref 13–44)
MCHC RBC-ENTMCNC: 28.3 PG — SIGNIFICANT CHANGE UP (ref 27–34)
MCHC RBC-ENTMCNC: 32 GM/DL — SIGNIFICANT CHANGE UP (ref 32–36)
MCV RBC AUTO: 88.6 FL — SIGNIFICANT CHANGE UP (ref 80–100)
MONOCYTES # BLD AUTO: 0.44 K/UL — SIGNIFICANT CHANGE UP (ref 0–0.9)
MONOCYTES NFR BLD AUTO: 5.5 % — SIGNIFICANT CHANGE UP (ref 2–14)
NEUTROPHILS # BLD AUTO: 6.63 K/UL — SIGNIFICANT CHANGE UP (ref 1.8–7.4)
NEUTROPHILS NFR BLD AUTO: 82.9 % — HIGH (ref 43–77)
NITRITE UR-MCNC: NEGATIVE — SIGNIFICANT CHANGE UP
NRBC # BLD: 0 /100 WBCS — SIGNIFICANT CHANGE UP (ref 0–0)
NT-PROBNP SERPL-SCNC: 5073 PG/ML — HIGH (ref 0–300)
PCO2 BLDV: 38 MMHG — LOW (ref 39–42)
PH BLDV: 7.36 — SIGNIFICANT CHANGE UP (ref 7.32–7.43)
PH UR: 6.5 — SIGNIFICANT CHANGE UP (ref 5–8)
PLATELET # BLD AUTO: 225 K/UL — SIGNIFICANT CHANGE UP (ref 150–400)
PO2 BLDV: 57 MMHG — HIGH (ref 25–45)
POTASSIUM BLDV-SCNC: 3.5 MMOL/L — SIGNIFICANT CHANGE UP (ref 3.5–5.1)
PROT UR-MCNC: 100 — SIGNIFICANT CHANGE UP
RAPID RVP RESULT: SIGNIFICANT CHANGE UP
RBC # BLD: 3.32 M/UL — LOW (ref 3.8–5.2)
RBC # FLD: 15.7 % — HIGH (ref 10.3–14.5)
RBC CASTS # UR COMP ASSIST: 8 /HPF — HIGH (ref 0–4)
SAO2 % BLDV: 87.5 % — SIGNIFICANT CHANGE UP (ref 67–88)
SARS-COV-2 RNA SPEC QL NAA+PROBE: SIGNIFICANT CHANGE UP
SP GR SPEC: 1.01 — LOW (ref 1.01–1.02)
TROPONIN T, HIGH SENSITIVITY RESULT: 27 NG/L — SIGNIFICANT CHANGE UP (ref 0–51)
UROBILINOGEN FLD QL: NEGATIVE — SIGNIFICANT CHANGE UP
WBC # BLD: 7.99 K/UL — SIGNIFICANT CHANGE UP (ref 3.8–10.5)
WBC # FLD AUTO: 7.99 K/UL — SIGNIFICANT CHANGE UP (ref 3.8–10.5)
WBC UR QL: 0 /HPF — SIGNIFICANT CHANGE UP (ref 0–5)

## 2022-12-02 PROCEDURE — 71045 X-RAY EXAM CHEST 1 VIEW: CPT | Mod: 26

## 2022-12-02 PROCEDURE — 99285 EMERGENCY DEPT VISIT HI MDM: CPT

## 2022-12-02 RX ORDER — IPRATROPIUM/ALBUTEROL SULFATE 18-103MCG
3 AEROSOL WITH ADAPTER (GRAM) INHALATION ONCE
Refills: 0 | Status: COMPLETED | OUTPATIENT
Start: 2022-12-02 | End: 2022-12-02

## 2022-12-02 RX ORDER — BIMATOPROST 0.3 MG/ML
1 SOLUTION/ DROPS OPHTHALMIC
Qty: 0 | Refills: 0 | DISCHARGE

## 2022-12-02 RX ORDER — FUROSEMIDE 40 MG
40 TABLET ORAL ONCE
Refills: 0 | Status: COMPLETED | OUTPATIENT
Start: 2022-12-02 | End: 2022-12-02

## 2022-12-02 RX ORDER — FUROSEMIDE 40 MG
40 TABLET ORAL DAILY
Refills: 0 | Status: DISCONTINUED | OUTPATIENT
Start: 2022-12-02 | End: 2022-12-04

## 2022-12-02 RX ORDER — PREDNISOLONE SODIUM PHOSPHATE 1 %
1 DROPS OPHTHALMIC (EYE)
Qty: 0 | Refills: 0 | DISCHARGE

## 2022-12-02 RX ORDER — OMEPRAZOLE 10 MG/1
1 CAPSULE, DELAYED RELEASE ORAL
Qty: 0 | Refills: 0 | DISCHARGE

## 2022-12-02 RX ADMIN — Medication 40 MILLIGRAM(S): at 12:29

## 2022-12-02 RX ADMIN — Medication 3 MILLILITER(S): at 10:50

## 2022-12-02 NOTE — ED PROVIDER NOTE - OBJECTIVE STATEMENT
89 y/o female with PMHx of TOBIAS vila on Eliquis, myelodysplastic syndrome, HTN, HLD presents to the ED complaining of cough and chest congestion x 7 days. Patient states that she feels like she is unable to cough up all of the mucus in her chest. She has not seen her doctor the past week. She has been taking tylenol and robitussin for mild relief at home. Past two days aide has reported patient is urinating more frequently. No reported fevers at home. Aide reports today patient was very weak and unable to walk at home. Normally ambulates without assistance. She has been eating and drinking well at home. No recent sick contacts. No headache, fever, chills, abdominal pain, n/v/d.

## 2022-12-02 NOTE — ED PROVIDER NOTE - ATTENDING APP SHARED VISIT CONTRIBUTION OF CARE
Pt here with daughter with one week of fatigue, dyspnea. No fever. Pt appears nontoxic, stout neck and unable to discern obvious JVD, wheezing b/l, ab soft, nt, +pretibia edema +1 b/l, pulses intact distal.  REctal temp normal.

## 2022-12-02 NOTE — H&P ADULT - CARDIOVASCULAR
normal/regular rate and rhythm/S1 S2 present/no gallops/no rub/no murmur/murmur/pedal edema details…

## 2022-12-02 NOTE — ED PROVIDER NOTE - CLINICAL SUMMARY MEDICAL DECISION MAKING FREE TEXT BOX
Dr. Bowman Note: elderly patient with one week of fatigue, dyspnea, wheezing, afebrile, excess fluid intake, consider HF exacerbation vs pneumonia vs viral syndrome vs metabolic process.

## 2022-12-02 NOTE — ED PROVIDER NOTE - NSICDXPASTSURGICALHX_GEN_ALL_CORE_FT
PAST SURGICAL HISTORY:  Basal Cell Carcinoma of Face 4 yrs ago    Bilateral Cataracts 71 y/o    Carpal Tunnel Syndrome 10 yrs ago    Cystocele 47 yrs ago    History of Total Knee Replacement L 1998    Rectocele 47 yrs ago    S/P Breast Lumpectomy 10 yrs ago    S/P Breast Lumpectomy benign    S/P Cataract Surgery     S/P T&A childhood      S/P TKR (Total Knee Replacement) left    Skin Cancer of face , basal cell   4 yrs ago

## 2022-12-02 NOTE — ED ADULT NURSE NOTE - OBJECTIVE STATEMENT
AAOx3 bibems from home c/o SOB x few days. Patient SPO2 at 96-97% on room air. No airway distress noted. Well appearing at this time. Awaiting provider orders. Rails up x 2 with bed at lowest position.

## 2022-12-02 NOTE — ED ADULT NURSE NOTE - NSIMPLEMENTINTERV_GEN_ALL_ED
Implemented All Fall with Harm Risk Interventions:  [place] to call system. Call bell, personal items and telephone within reach. Instruct patient to call for assistance. Room bathroom lighting operational. Non-slip footwear when patient is off stretcher. Physically safe environment: no spills, clutter or unnecessary equipment. Stretcher in lowest position, wheels locked, appropriate side rails in place. Provide visual cue, wrist band, yellow gown, etc. Monitor gait and stability. Monitor for mental status changes and reorient to person, place, and time. Review medications for side effects contributing to fall risk. Reinforce activity limits and safety measures with patient and family. Provide visual clues: red socks.

## 2022-12-02 NOTE — ED PROVIDER NOTE - PROGRESS NOTE DETAILS
Dr. Bowman Note: reviewed labs, CXR, and old records.  Suspect pt with fluid overload.  No relief with neb.  Pt drinking excess fluids as well.  Lasix 40mg IV given, +UOP.  Offered patient discharge plan with increased lasix; however, pt non-ambulatory, more weak, and lives alone and not good candidate for outpatient care over the weekend.  Not a cdu candidate for above.  For inpatient care.

## 2022-12-02 NOTE — H&P ADULT - ASSESSMENT
89 y/o female with PMHx of TOBIAS vila on Eliquis, myelodysplastic syndrome, HTN, HLD presents to the ED complaining of cough and chest congestion x 7 days. Patient states that she feels like she is unable to cough up all of the mucus in her chest. She has not seen her doctor the past week. She has been taking tylenol and robitussin for mild relief at home. Past two days aide has reported patient is urinating more frequently. No reported fevers at home. Aide reports today patient was very weak and unable to walk at home. Normally ambulates without assistance. She has been eating and drinking well at home. No recent sick contacts. No headache, fever, chills, abdominal pain, n/v/d.    CHF  - iv lasix  - strict Is and Os  - echo    afib  - c/w eliquis  - rate controlled    hypothyrid  - check ITSH  - c/w synthroid

## 2022-12-02 NOTE — ED PROVIDER NOTE - PHYSICAL EXAMINATION
CONSTITUTIONAL: Patient is awake, alert and oriented x 3. Patient is acutely ill appearing, coughing;   HEAD: NCAT  ENT: Airway patent, Nasal mucosa clear.   NECK: Supple,  LUNGS: CTA B/L, (+) wheezes and rhonci upper lung fields > lower   HEART: RRR.+S1S2   ABDOMEN: Soft, non-tender to palpation throughout all four quadrants,   MSK:  FROM upper and lower ext b/l,   SKIN: No rash or lesions  NEURO: No focal deficits,

## 2022-12-03 LAB
ANION GAP SERPL CALC-SCNC: 13 MMOL/L — SIGNIFICANT CHANGE UP (ref 5–17)
BUN SERPL-MCNC: 21 MG/DL — SIGNIFICANT CHANGE UP (ref 7–23)
CALCIUM SERPL-MCNC: 9 MG/DL — SIGNIFICANT CHANGE UP (ref 8.4–10.5)
CHLORIDE SERPL-SCNC: 105 MMOL/L — SIGNIFICANT CHANGE UP (ref 96–108)
CO2 SERPL-SCNC: 22 MMOL/L — SIGNIFICANT CHANGE UP (ref 22–31)
CREAT SERPL-MCNC: 1.31 MG/DL — HIGH (ref 0.5–1.3)
CULTURE RESULTS: SIGNIFICANT CHANGE UP
EGFR: 37 ML/MIN/1.73M2 — LOW
FERRITIN SERPL-MCNC: 246 NG/ML — HIGH (ref 15–150)
FOLATE SERPL-MCNC: >20 NG/ML — SIGNIFICANT CHANGE UP
GLUCOSE SERPL-MCNC: 82 MG/DL — SIGNIFICANT CHANGE UP (ref 70–99)
HCT VFR BLD CALC: 29 % — LOW (ref 34.5–45)
HGB BLD-MCNC: 9 G/DL — LOW (ref 11.5–15.5)
IRON SATN MFR SERPL: 10 % — LOW (ref 14–50)
IRON SATN MFR SERPL: 24 UG/DL — LOW (ref 30–160)
MAGNESIUM SERPL-MCNC: 1.6 MG/DL — SIGNIFICANT CHANGE UP (ref 1.6–2.6)
MCHC RBC-ENTMCNC: 27.6 PG — SIGNIFICANT CHANGE UP (ref 27–34)
MCHC RBC-ENTMCNC: 31 GM/DL — LOW (ref 32–36)
MCV RBC AUTO: 89 FL — SIGNIFICANT CHANGE UP (ref 80–100)
NRBC # BLD: 0 /100 WBCS — SIGNIFICANT CHANGE UP (ref 0–0)
PHOSPHATE SERPL-MCNC: 3.4 MG/DL — SIGNIFICANT CHANGE UP (ref 2.5–4.5)
PLATELET # BLD AUTO: 238 K/UL — SIGNIFICANT CHANGE UP (ref 150–400)
POTASSIUM SERPL-MCNC: 3.8 MMOL/L — SIGNIFICANT CHANGE UP (ref 3.5–5.3)
POTASSIUM SERPL-SCNC: 3.8 MMOL/L — SIGNIFICANT CHANGE UP (ref 3.5–5.3)
RBC # BLD: 3.26 M/UL — LOW (ref 3.8–5.2)
RBC # FLD: 15.9 % — HIGH (ref 10.3–14.5)
SODIUM SERPL-SCNC: 140 MMOL/L — SIGNIFICANT CHANGE UP (ref 135–145)
SPECIMEN SOURCE: SIGNIFICANT CHANGE UP
TIBC SERPL-MCNC: 245 UG/DL — SIGNIFICANT CHANGE UP (ref 220–430)
TSH SERPL-MCNC: 5.5 UIU/ML — HIGH (ref 0.27–4.2)
UIBC SERPL-MCNC: 221 UG/DL — SIGNIFICANT CHANGE UP (ref 110–370)
VIT B12 SERPL-MCNC: 1261 PG/ML — HIGH (ref 232–1245)
WBC # BLD: 7.58 K/UL — SIGNIFICANT CHANGE UP (ref 3.8–10.5)
WBC # FLD AUTO: 7.58 K/UL — SIGNIFICANT CHANGE UP (ref 3.8–10.5)

## 2022-12-03 RX ORDER — CHLORHEXIDINE GLUCONATE 213 G/1000ML
1 SOLUTION TOPICAL DAILY
Refills: 0 | Status: DISCONTINUED | OUTPATIENT
Start: 2022-12-03 | End: 2022-12-09

## 2022-12-03 RX ORDER — APIXABAN 2.5 MG/1
2.5 TABLET, FILM COATED ORAL
Refills: 0 | Status: DISCONTINUED | OUTPATIENT
Start: 2022-12-03 | End: 2022-12-09

## 2022-12-03 RX ORDER — LOSARTAN POTASSIUM 100 MG/1
25 TABLET, FILM COATED ORAL DAILY
Refills: 0 | Status: DISCONTINUED | OUTPATIENT
Start: 2022-12-03 | End: 2022-12-04

## 2022-12-03 RX ORDER — LEVOTHYROXINE SODIUM 125 MCG
112 TABLET ORAL DAILY
Refills: 0 | Status: DISCONTINUED | OUTPATIENT
Start: 2022-12-03 | End: 2022-12-09

## 2022-12-03 RX ORDER — ACETAMINOPHEN 500 MG
650 TABLET ORAL EVERY 6 HOURS
Refills: 0 | Status: DISCONTINUED | OUTPATIENT
Start: 2022-12-03 | End: 2022-12-09

## 2022-12-03 RX ORDER — ATORVASTATIN CALCIUM 80 MG/1
10 TABLET, FILM COATED ORAL AT BEDTIME
Refills: 0 | Status: DISCONTINUED | OUTPATIENT
Start: 2022-12-03 | End: 2022-12-09

## 2022-12-03 RX ORDER — PANTOPRAZOLE SODIUM 20 MG/1
40 TABLET, DELAYED RELEASE ORAL
Refills: 0 | Status: DISCONTINUED | OUTPATIENT
Start: 2022-12-03 | End: 2022-12-09

## 2022-12-03 RX ORDER — LATANOPROST 0.05 MG/ML
1 SOLUTION/ DROPS OPHTHALMIC; TOPICAL AT BEDTIME
Refills: 0 | Status: DISCONTINUED | OUTPATIENT
Start: 2022-12-03 | End: 2022-12-09

## 2022-12-03 RX ORDER — FOLIC ACID 0.8 MG
1 TABLET ORAL DAILY
Refills: 0 | Status: DISCONTINUED | OUTPATIENT
Start: 2022-12-03 | End: 2022-12-09

## 2022-12-03 RX ADMIN — Medication 650 MILLIGRAM(S): at 19:43

## 2022-12-03 RX ADMIN — Medication 650 MILLIGRAM(S): at 10:00

## 2022-12-03 RX ADMIN — Medication 650 MILLIGRAM(S): at 19:18

## 2022-12-03 RX ADMIN — ATORVASTATIN CALCIUM 10 MILLIGRAM(S): 80 TABLET, FILM COATED ORAL at 21:03

## 2022-12-03 RX ADMIN — LOSARTAN POTASSIUM 25 MILLIGRAM(S): 100 TABLET, FILM COATED ORAL at 11:57

## 2022-12-03 RX ADMIN — Medication 40 MILLIGRAM(S): at 05:36

## 2022-12-03 RX ADMIN — LATANOPROST 1 DROP(S): 0.05 SOLUTION/ DROPS OPHTHALMIC; TOPICAL at 21:03

## 2022-12-03 RX ADMIN — PANTOPRAZOLE SODIUM 40 MILLIGRAM(S): 20 TABLET, DELAYED RELEASE ORAL at 11:57

## 2022-12-03 RX ADMIN — Medication 650 MILLIGRAM(S): at 10:35

## 2022-12-03 RX ADMIN — CHLORHEXIDINE GLUCONATE 1 APPLICATION(S): 213 SOLUTION TOPICAL at 11:42

## 2022-12-03 RX ADMIN — Medication 1 MILLIGRAM(S): at 11:57

## 2022-12-03 RX ADMIN — APIXABAN 2.5 MILLIGRAM(S): 2.5 TABLET, FILM COATED ORAL at 16:23

## 2022-12-03 NOTE — PATIENT PROFILE ADULT - FALL HARM RISK - HARM RISK INTERVENTIONS

## 2022-12-03 NOTE — PROGRESS NOTE ADULT - ASSESSMENT
89 y/o female with PMHx of TOBIAS vila on Eliquis, myelodysplastic syndrome, HTN, HLD presents to the ED complaining of cough and chest congestion x 7 days. Patient states that she feels like she is unable to cough up all of the mucus in her chest. She has not seen her doctor the past week. She has been taking tylenol and robitussin for mild relief at home. Past two days aide has reported patient is urinating more frequently. No reported fevers at home. Aide reports today patient was very weak and unable to walk at home. Normally ambulates without assistance. She has been eating and drinking well at home. No recent sick contacts. No headache, fever, chills, abdominal pain, n/v/d.    CHF  - iv lasix  - strict Is and Os  - echo    afib  - c/w eliquis  - rate controlled    hypothyrid  - check TSH  - c/w synthroid

## 2022-12-03 NOTE — CONSULT NOTE ADULT - SUBJECTIVE AND OBJECTIVE BOX
Cardiology  HISTORY OF PRESENT ILLNESS: HPI:  89 y/o female with PMHx of TOBIAS vila on Eliquis, myelodysplastic syndrome, HTN, HLD presents to the ED complaining of cough and chest congestion x 7 days. Patient states that she feels like she is unable to cough up all of the mucus in her chest. She has not seen her doctor the past week. She has been taking tylenol and robitussin for mild relief at home. Past two days aide has reported patient is urinating more frequently. No reported fevers at home. Aide reports today patient was very weak and unable to walk at home. Normally ambulates without assistance. She has been eating and drinking well at home. No recent sick contacts. No headache, fever, chills, abdominal pain, n/v/d. (02 Dec 2022 15:53)    Has been feeling out of breath and fatigued since Thanksgiving, but unable to localize pain or discomfort to any one body part.  Too fatigued to take care of self at home, so referred to hospital by her home aide.  No fevers or chills.  No appetite so hasnt been eating well.  Legs at baseline are mildly swollen, patient states no worse.  States no abdominal distension.  Mild orthopnea.  No palpitations or fainting. A 10 pt ROS is otherwise negative.    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;mylar dysplasia&#x27; treated with &quot; Arinesp&quot; x past 1.5 yrs- last dose 4 months ago  MDS (Myelodysplastic Syndrome)  Skin Cancer  of face , basal cell   4 yrs ago  History of Total Knee Replacement  L 1998  Bilateral Cataracts  71 y/o  S/P Breast Lumpectomy  10 yrs ago  Cystocele  47 yrs ago  Rectocele  47 yrs ago  Carpal Tunnel Syndrome  10 yrs ago  Basal Cell Carcinoma of Face  4 yrs ago  S/P T&amp;A  childhood  S/P TKR (Total Knee Replacement)  left  S/P Breast Lumpectomy  benign  S/P Cataract Surgery      MEDICATIONS  (STANDING):  chlorhexidine 2% Cloths 1 Application(s) Topical daily  furosemide   Injectable 40 milliGRAM(s) IV Push daily    Allergies    No Known Allergies    Intolerances      FAMILY HISTORY:  No pertinent family history in first degree relatives    Non-contributary for premature coronary disease or sudden cardiac death    SOCIAL HISTORY:    [x ] Non-smoker  [ ] Smoker  [ ] Alcohol    PHYSICAL EXAM:  T(C): 36.6 (12-03-22 @ 09:01), Max: 36.9 (12-02-22 @ 16:56)  HR: 82 (12-03-22 @ 09:01) (76 - 99)  BP: 151/81 (12-03-22 @ 09:01) (116/86 - 164/83)  RR: 18 (12-03-22 @ 09:01) (18 - 20)  SpO2: 94% (12-03-22 @ 09:01) (94% - 98%)  Wt(kg): --    Appearance: small frame elderly woman who looks younger than stated age.  in no acute distress.	  HEENT:   Normal oral mucosa, PERRL, EOMI	  Lymphatic: No lymphadenopathy , 1+ LE edema that patient states is chronic.  Cardiovascular: irregular, + JVD, No murmurs , Peripheral pulses palpable 2+ bilaterally  Respiratory: soft bibasilar rales, normal effort 	  Gastrointestinal:  Soft, Non-tender, + BS	  Skin: No rashes, No ecchymoses, No cyanosis, warm to touch  Musculoskeletal: Normal range of motion, normal strength  Psychiatry:  Mood & affect appropriate, oriented x3    TELEMETRY: rate contrlled AFib 	    ECG: AFib  Echo: in office 2021, normal LVEF, severe Mitral regurgitation, severe LA enlargement, mild aortic valve stenosis.  In 5/2022, Aortic valve stenosis progressed to Moderate.  	  LABS:	 	                          9.0    7.58  )-----------( 238      ( 03 Dec 2022 06:53 )             29.0     12-03    140  |  105  |  21  ----------------------------<  82  3.8   |  22  |  1.31<H>    Ca    9.0      03 Dec 2022 06:51  Phos  3.4     12-03  Mg     1.6     12-03    TPro  7.3  /  Alb  3.5  /  TBili  0.3  /  DBili  x   /  AST  29  /  ALT  16  /  AlkPhos  69  12-02    proBNP: Serum Pro-Brain Natriuretic Peptide: 5073 pg/mL (12-02 @ 10:19)    ASSESSMENT/PLAN: 	Ms Lee is a very pleasant 89y Female who presents from home for 1 week of shortness of breath.    Likely has acute-on-chronic diastolic CHF, in the setting of aortic valve stenosis and severe Mitral regurgitation.  Check an echocardiogram.  Continue daily IV lasix.  Replace K to 4-4.5, Mg to 2, to prevent ventricular arrhythmias.    Hx CAD, remote hx of coronary stents.  OK to continue aspirin 81mg daily.    Atrial fibrillation, likely persistent now.  Continue Apixaban 5mg BID for stroke prevention. Her Hgb/Hct are at baseline.  At this time, rate controlled, does not need to restart metoprolol.    Hypertension  Losartan on hold, but this can be continued. Her kidney function is at baseline.    Mitral valve prolapse with severe regurgitation   Patient has previously refused MitraClip.    No inpatient EP procedures are planned for her.  Will follow up results of echo with you.      Clement Dolan M.D.  Cardiac Electrophysiology    office [phone]  pager [phone]

## 2022-12-03 NOTE — PHYSICAL THERAPY INITIAL EVALUATION ADULT - BALANCE TRAINING, PT EVAL
Patent
GOAL: Patient will demonstrate an increase in static/dynamic balance in sitting/standing where deficient by at least 1 grade within 2 weeks to facilitate greater independence during functional mobility and ADL's.

## 2022-12-03 NOTE — PHYSICAL THERAPY INITIAL EVALUATION ADULT - PERTINENT HX OF CURRENT PROBLEM, REHAB EVAL
89 y/o female with PMHx of TOBIAS vila on Eliquis, myelodysplastic syndrome, HTN, HLD presents to the ED complaining of cough and chest congestion x 7 days. Patient states that she feels like she is unable to cough up all of the mucus in her chest. She has not seen her doctor the past week. She has been taking tylenol and robitussin for mild relief at home. Past two days aide has reported patient is urinating more frequently. No reported fevers at home. Aide reports today patient was very weak and unable to walk at home. Normally ambulates without assistance. Hosp course: 12/2 CXR Bibasilar atelectasis. Unchanged cardiomegaly.

## 2022-12-03 NOTE — PROGRESS NOTE ADULT - SUBJECTIVE AND OBJECTIVE BOX
DATE OF SERVICE: 22 @ 11:51    Patient is a 89y old  Female who presents with a chief complaint of sob (03 Dec 2022 09:35)      SUBJECTIVE / OVERNIGHT EVENTS:  No chest pain. No shortness of breath. No complaints. No events overnight.     MEDICATIONS  (STANDING):  apixaban 2.5 milliGRAM(s) Oral two times a day  atorvastatin 10 milliGRAM(s) Oral at bedtime  chlorhexidine 2% Cloths 1 Application(s) Topical daily  folic acid 1 milliGRAM(s) Oral daily  furosemide   Injectable 40 milliGRAM(s) IV Push daily  latanoprost 0.005% Ophthalmic Solution 1 Drop(s) Both EYES at bedtime  levothyroxine 112 MICROGram(s) Oral daily  losartan 25 milliGRAM(s) Oral daily  pantoprazole    Tablet 40 milliGRAM(s) Oral before breakfast    MEDICATIONS  (PRN):  acetaminophen     Tablet .. 650 milliGRAM(s) Oral every 6 hours PRN Mild Pain (1 - 3)      Vital Signs Last 24 Hrs  T(C): 36.6 (03 Dec 2022 09:01), Max: 36.9 (02 Dec 2022 16:56)  T(F): 97.8 (03 Dec 2022 09:01), Max: 98.5 (02 Dec 2022 16:56)  HR: 82 (03 Dec 2022 09:01) (76 - 99)  BP: 151/81 (03 Dec 2022 09:01) (117/84 - 164/83)  BP(mean): --  RR: 18 (03 Dec 2022 09:01) (18 - 20)  SpO2: 94% (03 Dec 2022 09:01) (94% - 98%)    Parameters below as of 03 Dec 2022 09:01  Patient On (Oxygen Delivery Method): room air      CAPILLARY BLOOD GLUCOSE        I&O's Summary    02 Dec 2022 07:01  -  03 Dec 2022 07:00  --------------------------------------------------------  IN: 240 mL / OUT: 100 mL / NET: 140 mL    03 Dec 2022 07:01  -  03 Dec 2022 11:51  --------------------------------------------------------  IN: 240 mL / OUT: 0 mL / NET: 240 mL        PHYSICAL EXAM:  GENERAL: NAD, well-developed  HEAD:  Atraumatic, Normocephalic  EYES: EOMI, PERRLA, conjunctiva and sclera clear  NECK: Supple, No JVD  CHEST/LUNG: Clear to auscultation bilaterally; No wheeze  HEART: Regular rate and rhythm; No murmurs, rubs, or gallops  ABDOMEN: Soft, Nontender, Nondistended; Bowel sounds present  EXTREMITIES:  2+ Peripheral Pulses, No clubbing, cyanosis, or edema  PSYCH: AAOx3  NEUROLOGY: non-focal  SKIN: No rashes or lesions    LABS:                        9.0    7.58  )-----------( 238      ( 03 Dec 2022 06:53 )             29.0     12-03    140  |  105  |  21  ----------------------------<  82  3.8   |  22  |  1.31<H>    Ca    9.0      03 Dec 2022 06:51  Phos  3.4     12-  Mg     1.6     12-    TPro  7.3  /  Alb  3.5  /  TBili  0.3  /  DBili  x   /  AST  29  /  ALT  16  /  AlkPhos  69  12-02          Urinalysis Basic - ( 02 Dec 2022 11:20 )    Color: Light Yellow / Appearance: Clear / S.009 / pH: x  Gluc: x / Ketone: Negative  / Bili: Negative / Urobili: Negative   Blood: x / Protein: 100 / Nitrite: Negative   Leuk Esterase: Negative / RBC: 8 /hpf / WBC 0 /HPF   Sq Epi: x / Non Sq Epi: 0 /hpf / Bacteria: Negative        RADIOLOGY & ADDITIONAL TESTS:    Imaging Personally Reviewed:    Consultant(s) Notes Reviewed:      Care Discussed with Consultants/Other Providers:

## 2022-12-04 LAB
ANION GAP SERPL CALC-SCNC: 15 MMOL/L — SIGNIFICANT CHANGE UP (ref 5–17)
BASE EXCESS BLDV CALC-SCNC: -1.9 MMOL/L — SIGNIFICANT CHANGE UP (ref -2–3)
BUN SERPL-MCNC: 36 MG/DL — HIGH (ref 7–23)
CA-I SERPL-SCNC: 1.11 MMOL/L — LOW (ref 1.15–1.33)
CALCIUM SERPL-MCNC: 9 MG/DL — SIGNIFICANT CHANGE UP (ref 8.4–10.5)
CHLORIDE BLDV-SCNC: 107 MMOL/L — SIGNIFICANT CHANGE UP (ref 96–108)
CHLORIDE SERPL-SCNC: 103 MMOL/L — SIGNIFICANT CHANGE UP (ref 96–108)
CK MB CFR SERPL CALC: 3.1 NG/ML — SIGNIFICANT CHANGE UP (ref 0–3.8)
CK SERPL-CCNC: 57 U/L — SIGNIFICANT CHANGE UP (ref 25–170)
CO2 BLDV-SCNC: 23 MMOL/L — SIGNIFICANT CHANGE UP (ref 22–26)
CO2 SERPL-SCNC: 21 MMOL/L — LOW (ref 22–31)
CREAT SERPL-MCNC: 1.51 MG/DL — HIGH (ref 0.5–1.3)
EGFR: 31 ML/MIN/1.73M2 — LOW
GAS PNL BLDV: 138 MMOL/L — SIGNIFICANT CHANGE UP (ref 136–145)
GAS PNL BLDV: SIGNIFICANT CHANGE UP
GAS PNL BLDV: SIGNIFICANT CHANGE UP
GLUCOSE BLDC GLUCOMTR-MCNC: 106 MG/DL — HIGH (ref 70–99)
GLUCOSE BLDV-MCNC: 113 MG/DL — HIGH (ref 70–99)
GLUCOSE SERPL-MCNC: 97 MG/DL — SIGNIFICANT CHANGE UP (ref 70–99)
HCO3 BLDV-SCNC: 22 MMOL/L — SIGNIFICANT CHANGE UP (ref 22–29)
HCT VFR BLD CALC: 31.7 % — LOW (ref 34.5–45)
HCT VFR BLDA CALC: 29 % — LOW (ref 34.5–46.5)
HGB BLD CALC-MCNC: 9.7 G/DL — LOW (ref 11.7–16.1)
HGB BLD-MCNC: 10 G/DL — LOW (ref 11.5–15.5)
LACTATE BLDV-MCNC: 1.2 MMOL/L — SIGNIFICANT CHANGE UP (ref 0.5–2)
MCHC RBC-ENTMCNC: 27.5 PG — SIGNIFICANT CHANGE UP (ref 27–34)
MCHC RBC-ENTMCNC: 31.5 GM/DL — LOW (ref 32–36)
MCV RBC AUTO: 87.3 FL — SIGNIFICANT CHANGE UP (ref 80–100)
MRSA PCR RESULT.: DETECTED
NRBC # BLD: 0 /100 WBCS — SIGNIFICANT CHANGE UP (ref 0–0)
PCO2 BLDV: 32 MMHG — LOW (ref 39–42)
PH BLDV: 7.44 — HIGH (ref 7.32–7.43)
PLATELET # BLD AUTO: 257 K/UL — SIGNIFICANT CHANGE UP (ref 150–400)
PO2 BLDV: 117 MMHG — HIGH (ref 25–45)
POTASSIUM BLDV-SCNC: 3.3 MMOL/L — LOW (ref 3.5–5.1)
POTASSIUM SERPL-MCNC: 3.5 MMOL/L — SIGNIFICANT CHANGE UP (ref 3.5–5.3)
POTASSIUM SERPL-SCNC: 3.5 MMOL/L — SIGNIFICANT CHANGE UP (ref 3.5–5.3)
RBC # BLD: 3.63 M/UL — LOW (ref 3.8–5.2)
RBC # FLD: 15.8 % — HIGH (ref 10.3–14.5)
S AUREUS DNA NOSE QL NAA+PROBE: DETECTED
SAO2 % BLDV: 99.4 % — HIGH (ref 67–88)
SARS-COV-2 RNA SPEC QL NAA+PROBE: SIGNIFICANT CHANGE UP
SODIUM SERPL-SCNC: 139 MMOL/L — SIGNIFICANT CHANGE UP (ref 135–145)
TROPONIN T, HIGH SENSITIVITY RESULT: 37 NG/L — SIGNIFICANT CHANGE UP (ref 0–51)
TSH SERPL-MCNC: 6.9 UIU/ML — HIGH (ref 0.27–4.2)
WBC # BLD: 8.4 K/UL — SIGNIFICANT CHANGE UP (ref 3.8–10.5)
WBC # FLD AUTO: 8.4 K/UL — SIGNIFICANT CHANGE UP (ref 3.8–10.5)

## 2022-12-04 PROCEDURE — 93306 TTE W/DOPPLER COMPLETE: CPT | Mod: 26

## 2022-12-04 RX ORDER — POLYETHYLENE GLYCOL 3350 17 G/17G
17 POWDER, FOR SOLUTION ORAL DAILY
Refills: 0 | Status: DISCONTINUED | OUTPATIENT
Start: 2022-12-04 | End: 2022-12-09

## 2022-12-04 RX ORDER — IRON SUCROSE 20 MG/ML
200 INJECTION, SOLUTION INTRAVENOUS EVERY 24 HOURS
Refills: 0 | Status: COMPLETED | OUTPATIENT
Start: 2022-12-04 | End: 2022-12-05

## 2022-12-04 RX ORDER — SENNA PLUS 8.6 MG/1
2 TABLET ORAL AT BEDTIME
Refills: 0 | Status: DISCONTINUED | OUTPATIENT
Start: 2022-12-04 | End: 2022-12-09

## 2022-12-04 RX ORDER — LIDOCAINE 4 G/100G
1 CREAM TOPICAL DAILY
Refills: 0 | Status: DISCONTINUED | OUTPATIENT
Start: 2022-12-04 | End: 2022-12-09

## 2022-12-04 RX ADMIN — Medication 650 MILLIGRAM(S): at 19:24

## 2022-12-04 RX ADMIN — ATORVASTATIN CALCIUM 10 MILLIGRAM(S): 80 TABLET, FILM COATED ORAL at 21:19

## 2022-12-04 RX ADMIN — Medication 40 MILLIGRAM(S): at 05:02

## 2022-12-04 RX ADMIN — Medication 112 MICROGRAM(S): at 05:02

## 2022-12-04 RX ADMIN — Medication 650 MILLIGRAM(S): at 06:54

## 2022-12-04 RX ADMIN — CHLORHEXIDINE GLUCONATE 1 APPLICATION(S): 213 SOLUTION TOPICAL at 12:02

## 2022-12-04 RX ADMIN — APIXABAN 2.5 MILLIGRAM(S): 2.5 TABLET, FILM COATED ORAL at 05:03

## 2022-12-04 RX ADMIN — LIDOCAINE 1 PATCH: 4 CREAM TOPICAL at 14:50

## 2022-12-04 RX ADMIN — Medication 650 MILLIGRAM(S): at 07:05

## 2022-12-04 RX ADMIN — Medication 1 MILLIGRAM(S): at 12:08

## 2022-12-04 RX ADMIN — POLYETHYLENE GLYCOL 3350 17 GRAM(S): 17 POWDER, FOR SOLUTION ORAL at 17:10

## 2022-12-04 RX ADMIN — PANTOPRAZOLE SODIUM 40 MILLIGRAM(S): 20 TABLET, DELAYED RELEASE ORAL at 05:03

## 2022-12-04 RX ADMIN — LIDOCAINE 1 PATCH: 4 CREAM TOPICAL at 19:22

## 2022-12-04 RX ADMIN — APIXABAN 2.5 MILLIGRAM(S): 2.5 TABLET, FILM COATED ORAL at 17:10

## 2022-12-04 RX ADMIN — IRON SUCROSE 110 MILLIGRAM(S): 20 INJECTION, SOLUTION INTRAVENOUS at 17:10

## 2022-12-04 RX ADMIN — Medication 650 MILLIGRAM(S): at 21:19

## 2022-12-04 RX ADMIN — LOSARTAN POTASSIUM 25 MILLIGRAM(S): 100 TABLET, FILM COATED ORAL at 05:03

## 2022-12-04 RX ADMIN — Medication 650 MILLIGRAM(S): at 14:00

## 2022-12-04 RX ADMIN — SENNA PLUS 2 TABLET(S): 8.6 TABLET ORAL at 21:19

## 2022-12-04 RX ADMIN — Medication 650 MILLIGRAM(S): at 13:21

## 2022-12-04 RX ADMIN — LATANOPROST 1 DROP(S): 0.05 SOLUTION/ DROPS OPHTHALMIC; TOPICAL at 21:19

## 2022-12-04 NOTE — PROGRESS NOTE ADULT - ASSESSMENT
89 y/o female with PMHx of A. fib on Eliquis, myelodysplastic syndrome, HTN, HLD presents to the ED complaining of cough and chest congestion x 7 days. Patient states that she feels like she is unable to cough up all of the mucus in her chest. She has not seen her doctor the past week. She has been taking tylenol and robitussin for mild relief at home. Past two days aide has reported patient is urinating more frequently. No reported fevers at home. Aide reports today patient was very weak and unable to walk at home. Normally ambulates without assistance. She has been eating and drinking well at home. No recent sick contacts. No headache, fever, chills, abdominal pain, n/v/d.    CHF  - stop lasix  - strict Is and Os  - echo done    MARIELA  - hold lasix  - monitor cre    afib  - c/w Eliquis  - rate controlled    hypothyroid  -  TSH 6.9  - c/w synthroid    cough  - Robitussin    dvt px  - pt is on eliquis   89 y/o female with PMHx of A. fib on Eliquis, myelodysplastic syndrome, HTN, HLD presents to the ED complaining of cough and chest congestion x 7 days. Patient states that she feels like she is unable to cough up all of the mucus in her chest. She has not seen her doctor the past week. She has been taking tylenol and robitussin for mild relief at home. Past two days aide has reported patient is urinating more frequently. No reported fevers at home. Aide reports today patient was very weak and unable to walk at home. Normally ambulates without assistance. She has been eating and drinking well at home. No recent sick contacts. No headache, fever, chills, abdominal pain, n/v/d.    CHF  - stop lasix  - strict Is and Os  - echo done    iron def anemia  - iv iron x 2 days    MARIELA  - hold lasix  - monitor cre    afib  - c/w Eliquis  - rate controlled    hypothyroid  -  TSH 6.9  - c/w synthroid    cough  - Robitussin    constipation  - senna and miralax    dvt px  - pt is on eliquis

## 2022-12-04 NOTE — PROGRESS NOTE ADULT - SUBJECTIVE AND OBJECTIVE BOX
DATE OF SERVICE: 12-04-22 @ 13:33    Patient is a 89y old  Female who presents with a chief complaint of sob (04 Dec 2022 08:26)      SUBJECTIVE / OVERNIGHT EVENTS:  "I dont feel good today" c/o cough    MEDICATIONS  (STANDING):  apixaban 2.5 milliGRAM(s) Oral two times a day  atorvastatin 10 milliGRAM(s) Oral at bedtime  chlorhexidine 2% Cloths 1 Application(s) Topical daily  folic acid 1 milliGRAM(s) Oral daily  iron sucrose IVPB 200 milliGRAM(s) IV Intermittent every 24 hours  latanoprost 0.005% Ophthalmic Solution 1 Drop(s) Both EYES at bedtime  levothyroxine 112 MICROGram(s) Oral daily  pantoprazole    Tablet 40 milliGRAM(s) Oral before breakfast    MEDICATIONS  (PRN):  acetaminophen     Tablet .. 650 milliGRAM(s) Oral every 6 hours PRN Mild Pain (1 - 3)  guaiFENesin Oral Liquid (Sugar-Free) 200 milliGRAM(s) Oral every 6 hours PRN Cough      Vital Signs Last 24 Hrs  T(C): 36.6 (04 Dec 2022 12:36), Max: 36.6 (03 Dec 2022 20:33)  T(F): 97.8 (04 Dec 2022 12:36), Max: 97.8 (03 Dec 2022 20:33)  HR: 71 (04 Dec 2022 12:36) (67 - 85)  BP: 117/64 (04 Dec 2022 12:36) (78/44 - 164/73)  BP(mean): --  RR: 18 (04 Dec 2022 12:36) (18 - 18)  SpO2: 98% (04 Dec 2022 12:36) (94% - 98%)    Parameters below as of 04 Dec 2022 12:36  Patient On (Oxygen Delivery Method): room air      CAPILLARY BLOOD GLUCOSE      POCT Blood Glucose.: 106 mg/dL (04 Dec 2022 07:19)    I&O's Summary    03 Dec 2022 07:01  -  04 Dec 2022 07:00  --------------------------------------------------------  IN: 480 mL / OUT: 0 mL / NET: 480 mL        PHYSICAL EXAM:  GENERAL: NAD, well-developed  HEAD:  Atraumatic, Normocephalic  EYES: EOMI, PERRLA, conjunctiva and sclera clear  NECK: Supple, No JVD  CHEST/LUNG: Clear to auscultation bilaterally; No wheeze  HEART: Regular rate and rhythm; No murmurs, rubs, or gallops  ABDOMEN: Soft, Nontender, Nondistended; Bowel sounds present  EXTREMITIES:  2+ Peripheral Pulses, No clubbing, cyanosis, or edema  PSYCH: AAOx3  NEUROLOGY: non-focal  SKIN: No rashes or lesions    LABS:                        10.0   8.40  )-----------( 257      ( 04 Dec 2022 06:30 )             31.7     12-04    139  |  103  |  36<H>  ----------------------------<  97  3.5   |  21<L>  |  1.51<H>    Ca    9.0      04 Dec 2022 06:32  Phos  3.4     12-03  Mg     1.6     12-03        CARDIAC MARKERS ( 04 Dec 2022 08:21 )  x     / x     / 57 U/L / x     / 3.1 ng/mL    < from: TTE with Doppler (w/Cont) (12.04.22 @ 10:44) >  Dimensions:    Normal Values:  LA:     3.9    2.0 - 4.0 cm  Ao:     3.0    2.0 - 3.8 cm  SEPTUM: 1.3    0.6 - 1.2 cm  PWT:    1.5    0.6 - 1.1 cm  LVIDd:  3.9    3.0 - 5.6 cm  LVIDs:  2.7    1.8 - 4.0 cm  Derived variables:  LVMI: 135 g/m2  RWT: 0.76  Fractional short: 31 %  EF (Visual Estimate): 70 %  Doppler Peak Velocity (m/sec): AoV=3.3  ------------------------------------------------------------------------  Observations:  Mitral Valve: Mitral annular calcification.   Tethered  mitral valve leaflets with normal opening. Severe mitral  regurgitation.  Aortic Valve/Aorta: Calcified trileaflet aortic valve with  decreased opening. Peak transaortic valve gradient equals  44 mm Hg, mean transaortic valve gradient equals 18 mm Hg,  estimated aortic valve area equals 1 sqcm (by continuity  equation), aortic valve velocity time integral equals 50  cm, consistent with moderate aortic stenosis. Minimal  aortic regurgitation.  Peak left ventricular outflow tract  gradient equals 4 mm Hg, mean gradient is equal to 2 mm Hg,  LVOT velocity time integral equals 16 cm.  Aortic Root: 3 cm.  Ascending Aorta: 3.1 cm.  LVOT diameter: 2 cm.  Left Atrium: Severely dilated left atrium.  LA volume index  = 68 cc/m2.  Left Ventricle: Normal left ventricular systolic function.  Moderate concentric left ventricular hypertrophy. Moderate  diastolic dysfunction (Stage II).  Right Heart: Right atrial enlargement. Right ventricular  enlargement with decreased right ventricular systolic  function. Normal tricuspid valve. Moderate tricuspid  regurgitation. Normal pulmonic valve. Minimal pulmonic  regurgitation.  Pericardium/Pleura: Normal pericardium with no pericardial  effusion.  Hemodynamic: Estimated right atrial pressure is 8 mm Hg.  Estimated right ventricular systolic pressure equals 46 mm  Hg, assuming right atrial pressure equals 8 mm Hg,  consistent with mild pulmonary hypertension.  ------------------------------------------------------------------------  Conclusions:  1. Severe mitral regurgitation.  2. Calcified trileaflet aortic valve with decreased  opening. Peak transaortic valve gradient equals 44 mm Hg,  mean transaortic valve gradient equals 18 mm Hg, estimated  aortic valve area equals 1 sqcm (by continuity equation),  aortic valve velocity time integral equals 50 cm,  consistent with moderate aortic stenosis.  3. Severely dilated left atrium.  LA volume index = 68  cc/m2.  4. Moderate concentric left ventricular hypertrophy.  5. Normal left ventricular systolic function.  6. Right atrial enlargement.  7. Right ventricular enlargement with decreased right  ventricular systolic function.  8. Normal tricuspid valve. Moderate tricuspid  regurgitation.  9. Estimated pulmonary artery systolic pressure equals 46  mm Hg, assuming right atrial pressure equals 8 mm Hg,  consistent with mild pulmonary pressures.  *** Compared with echocardiogram of 5/23/2022, no  significant changes noted.    < end of copied text >        RADIOLOGY & ADDITIONAL TESTS:    Imaging Personally Reviewed:    Consultant(s) Notes Reviewed:      Care Discussed with Consultants/Other Providers:

## 2022-12-04 NOTE — RAPID RESPONSE TEAM SUMMARY - NSADDTLFINDINGSRRT_GEN_ALL_CORE
Rapid response team called for symptomatic hypotension. Per bedside nurse patient reported feeling unwell and subsequent blood pressure was 72/34. Additional vitals were T97.9, HR 83, RR 18, SpO2 89% on RA. Put on NRB with improvement of SpO2 to 100%. Subsequent blood pressure measurements were 130/64 and 124/70. . Patient arousable and reported feeling tired, and lethargic. Obtained EKG which was RBBB and unchanged from prior documented EKGs. Weaned back to room air with stable SpO2. Will obtain labs, trops, and VBG. Likely 2/2 receiving bp meds all at once. advised primary team to put hold parameters on bp meds and consider staggering bp meds. expedite TTE

## 2022-12-04 NOTE — PROGRESS NOTE ADULT - SUBJECTIVE AND OBJECTIVE BOX
pt seen and examined, no complaints, ROS - .     acetaminophen     Tablet .. 650 milliGRAM(s) Oral every 6 hours PRN  apixaban 2.5 milliGRAM(s) Oral two times a day  atorvastatin 10 milliGRAM(s) Oral at bedtime  chlorhexidine 2% Cloths 1 Application(s) Topical daily  folic acid 1 milliGRAM(s) Oral daily  furosemide   Injectable 40 milliGRAM(s) IV Push daily  latanoprost 0.005% Ophthalmic Solution 1 Drop(s) Both EYES at bedtime  levothyroxine 112 MICROGram(s) Oral daily  losartan 25 milliGRAM(s) Oral daily  pantoprazole    Tablet 40 milliGRAM(s) Oral before breakfast                            10.0   8.40  )-----------( 257      ( 04 Dec 2022 06:30 )             31.7       Hemoglobin: 10.0 g/dL (12-04 @ 06:30)  Hemoglobin: 9.0 g/dL (12-03 @ 06:53)  Hemoglobin: 9.4 g/dL (12-02 @ 10:19)      12-04    139  |  103  |  36<H>  ----------------------------<  97  3.5   |  21<L>  |  1.51<H>    Ca    9.0      04 Dec 2022 06:32  Phos  3.4     12-03  Mg     1.6     12-03    TPro  7.3  /  Alb  3.5  /  TBili  0.3  /  DBili  x   /  AST  29  /  ALT  16  /  AlkPhos  69  12-02    Creatinine Trend: 1.51<--, 1.31<--, 1.24<--    COAGS:           T(C): 36.4 (12-04-22 @ 04:33), Max: 36.7 (12-03-22 @ 12:14)  HR: 67 (12-04-22 @ 04:33) (67 - 85)  BP: 124/70 (12-04-22 @ 08:00) (78/44 - 164/73)  RR: 18 (12-04-22 @ 04:33) (18 - 18)  SpO2: 97% (12-04-22 @ 04:33) (94% - 97%)  Wt(kg): --    I&O's Summary    03 Dec 2022 07:01  -  04 Dec 2022 07:00  --------------------------------------------------------  IN: 480 mL / OUT: 0 mL / NET: 480 mL      Appearance: small frame elderly woman who looks younger than stated age.  in no acute distress.	  HEENT:   Normal oral mucosa, PERRL, EOMI	  Lymphatic: No lymphadenopathy , 1+ LE edema that patient states is chronic.  Cardiovascular: irregular, + JVD, No murmurs , Peripheral pulses palpable 2+ bilaterally  Respiratory: soft bibasilar rales, normal effort 	  Gastrointestinal:  Soft, Non-tender, + BS	  Skin: No rashes, No ecchymoses, No cyanosis, warm to touch  Musculoskeletal: Normal range of motion, normal strength  Psychiatry:  Mood & affect appropriate, oriented x3    TELEMETRY: rate contrlled AFib 	    ECG: AFib  Echo: in office 2021, normal LVEF, severe Mitral regurgitation, severe LA enlargement, mild aortic valve stenosis.  In 5/2022, Aortic valve stenosis progressed to Moderate.    ASSESSMENT/PLAN: 	Ms Lee is a very pleasant 89y Female who presents from home for 1 week of shortness of breath.    Likely has acute-on-chronic diastolic CHF, in the setting of aortic valve stenosis and severe Mitral regurgitation.  Check an echocardiogram.  Continue daily IV lasix.  Replace K to 4-4.5, Mg to 2, to prevent ventricular arrhythmias.    Hx CAD, remote hx of coronary stents.  OK to continue aspirin 81mg daily.    Atrial fibrillation, likely persistent now.  Continue Apixaban 5mg BID for stroke prevention. Her Hgb/Hct are at baseline.  At this time, rate controlled, does not need to restart metoprolol.    Hypertension  Losartan on hold, but this can be continued. Her kidney function is at baseline.    Mitral valve prolapse with severe regurgitation   Patient has previously refused MitraClip.    No inpatient EP procedures are planned for her.           pt seen and examined,  s/p RRT for hypo tension    s/p IV bolus , BP improved , tele stable     acetaminophen     Tablet .. 650 milliGRAM(s) Oral every 6 hours PRN  apixaban 2.5 milliGRAM(s) Oral two times a day  atorvastatin 10 milliGRAM(s) Oral at bedtime  chlorhexidine 2% Cloths 1 Application(s) Topical daily  folic acid 1 milliGRAM(s) Oral daily  furosemide   Injectable 40 milliGRAM(s) IV Push daily  latanoprost 0.005% Ophthalmic Solution 1 Drop(s) Both EYES at bedtime  levothyroxine 112 MICROGram(s) Oral daily  losartan 25 milliGRAM(s) Oral daily  pantoprazole    Tablet 40 milliGRAM(s) Oral before breakfast                            10.0   8.40  )-----------( 257      ( 04 Dec 2022 06:30 )             31.7       Hemoglobin: 10.0 g/dL (12-04 @ 06:30)  Hemoglobin: 9.0 g/dL (12-03 @ 06:53)  Hemoglobin: 9.4 g/dL (12-02 @ 10:19)      12-04    139  |  103  |  36<H>  ----------------------------<  97  3.5   |  21<L>  |  1.51<H>    Ca    9.0      04 Dec 2022 06:32  Phos  3.4     12-03  Mg     1.6     12-03    TPro  7.3  /  Alb  3.5  /  TBili  0.3  /  DBili  x   /  AST  29  /  ALT  16  /  AlkPhos  69  12-02    Creatinine Trend: 1.51<--, 1.31<--, 1.24<--    COAGS:           T(C): 36.4 (12-04-22 @ 04:33), Max: 36.7 (12-03-22 @ 12:14)  HR: 67 (12-04-22 @ 04:33) (67 - 85)  BP: 124/70 (12-04-22 @ 08:00) (78/44 - 164/73)  RR: 18 (12-04-22 @ 04:33) (18 - 18)  SpO2: 97% (12-04-22 @ 04:33) (94% - 97%)  Wt(kg): --    I&O's Summary    03 Dec 2022 07:01  -  04 Dec 2022 07:00  --------------------------------------------------------  IN: 480 mL / OUT: 0 mL / NET: 480 mL      Appearance: small frame elderly woman who looks younger than stated age.  in no acute distress.	  HEENT:   Normal oral mucosa, PERRL, EOMI	  Lymphatic: No lymphadenopathy , 1+ LE edema that patient states is chronic.  Cardiovascular: irregular, + JVD, No murmurs , Peripheral pulses palpable 2+ bilaterally  Respiratory: soft bibasilar rales, normal effort 	  Gastrointestinal:  Soft, Non-tender, + BS	  Skin: No rashes, No ecchymoses, No cyanosis, warm to touch  Musculoskeletal: Normal range of motion, normal strength  Psychiatry:  Mood & affect appropriate, oriented x3    TELEMETRY: rate contrlled AFib 	    ECG: AFib  Echo: in office 2021, normal LVEF, severe Mitral regurgitation, severe LA enlargement, mild aortic valve stenosis.  In 5/2022, Aortic valve stenosis progressed to Moderate.    ASSESSMENT/PLAN: 	Ms Lee is a very pleasant 89y Female who presents from home for 1 week of shortness of breath.    Likely has acute-on-chronic diastolic CHF, in the setting of aortic valve stenosis and severe Mitral regurgitation.  Check an echocardiogram.  hold today dose of diuretic  Replace K to 4-4.5, Mg to 2, to prevent ventricular arrhythmias.    Hx CAD, remote hx of coronary stents.  OK to continue aspirin 81mg daily.    Atrial fibrillation, likely persistent now.  Continue Apixaban 5mg BID for stroke prevention. Her Hgb/Hct are at baseline.  At this time, rate controlled, does not need to restart metoprolol.    Hypertension  Losartan on hold, but this can be continued. Her kidney function is at baseline.    Mitral valve prolapse with severe regurgitation   Patient has previously refused MitraClip.    No inpatient EP procedures are planned for her.           pt seen and examined,  s/p RRT for hypo tension    s/p IV bolus , BP improved , tele stable     acetaminophen     Tablet .. 650 milliGRAM(s) Oral every 6 hours PRN  apixaban 2.5 milliGRAM(s) Oral two times a day  atorvastatin 10 milliGRAM(s) Oral at bedtime  chlorhexidine 2% Cloths 1 Application(s) Topical daily  folic acid 1 milliGRAM(s) Oral daily  furosemide   Injectable 40 milliGRAM(s) IV Push daily  latanoprost 0.005% Ophthalmic Solution 1 Drop(s) Both EYES at bedtime  levothyroxine 112 MICROGram(s) Oral daily  losartan 25 milliGRAM(s) Oral daily  pantoprazole    Tablet 40 milliGRAM(s) Oral before breakfast                            10.0   8.40  )-----------( 257      ( 04 Dec 2022 06:30 )             31.7       Hemoglobin: 10.0 g/dL (12-04 @ 06:30)  Hemoglobin: 9.0 g/dL (12-03 @ 06:53)  Hemoglobin: 9.4 g/dL (12-02 @ 10:19)      12-04    139  |  103  |  36<H>  ----------------------------<  97  3.5   |  21<L>  |  1.51<H>    Ca    9.0      04 Dec 2022 06:32  Phos  3.4     12-03  Mg     1.6     12-03    TPro  7.3  /  Alb  3.5  /  TBili  0.3  /  DBili  x   /  AST  29  /  ALT  16  /  AlkPhos  69  12-02    Creatinine Trend: 1.51<--, 1.31<--, 1.24<--    COAGS:       T(C): 36.4 (12-04-22 @ 04:33), Max: 36.7 (12-03-22 @ 12:14)  HR: 67 (12-04-22 @ 04:33) (67 - 85)  BP: 124/70 (12-04-22 @ 08:00) (78/44 - 164/73)  RR: 18 (12-04-22 @ 04:33) (18 - 18)  SpO2: 97% (12-04-22 @ 04:33) (94% - 97%)  Wt(kg): --    I&O's Summary    03 Dec 2022 07:01  -  04 Dec 2022 07:00  --------------------------------------------------------  IN: 480 mL / OUT: 0 mL / NET: 480 mL    Appearance: small frame elderly woman who looks younger than stated age.  in no acute distress.	  HEENT:   Normal oral mucosa, PERRL, EOMI	  Lymphatic: No lymphadenopathy , 1+ LE edema that patient states is chronic.  Cardiovascular: irregular, + JVD, No murmurs , Peripheral pulses palpable 2+ bilaterally  Respiratory: soft bibasilar rales, normal effort 	  Gastrointestinal:  Soft, Non-tender, + BS	  Skin: No rashes, No ecchymoses, No cyanosis, warm to touch  Musculoskeletal: Normal range of motion, normal strength  Psychiatry:  Mood & affect appropriate, oriented x3    TELEMETRY: rate contrlled AFib 	    ECG: AFib  Echo: in office 2021, normal LVEF, severe Mitral regurgitation, severe LA enlargement, mild aortic valve stenosis.  In 5/2022, Aortic valve stenosis progressed to Moderate.  Hospital 2022- normal ef, severe MR, essentially unchanged from 5/2022.    ASSESSMENT/PLAN: 	Ms Lee is a very pleasant 89y Female who presents from home for 1 week of shortness of breath.    Likely has acute-on-chronic diastolic CHF, in the setting of aortic valve stenosis and severe Mitral regurgitation.  Check an echocardiogram.  hold today dose of diuretic  Replace K to 4-4.5, Mg to 2, to prevent ventricular arrhythmias.    Hx CAD, remote hx of coronary stents.  OK to continue aspirin 81mg daily.    Atrial fibrillation, likely persistent now.  Continue Apixaban 5mg BID for stroke prevention. Her Hgb/Hct are at baseline.  At this time, rate controlled, does not need to restart metoprolol.    Hypertension  Losartan on hold, but this can be continued. Her kidney function is at baseline.    Mitral valve prolapse with severe regurgitation   Patient has previously refused MitraClip.    No inpatient EP procedures are planned for her.

## 2022-12-04 NOTE — RAPID RESPONSE TEAM SUMMARY - NSSITUATIONBACKGROUNDRRT_GEN_ALL_CORE
89 y/o female with PMHx of A. fib on Eliquis, myelodysplastic syndrome, HTN, HLD presents to the ED complaining of cough and chest congestion x 7 days.

## 2022-12-05 LAB
ANION GAP SERPL CALC-SCNC: 13 MMOL/L — SIGNIFICANT CHANGE UP (ref 5–17)
BUN SERPL-MCNC: 46 MG/DL — HIGH (ref 7–23)
CALCIUM SERPL-MCNC: 8.8 MG/DL — SIGNIFICANT CHANGE UP (ref 8.4–10.5)
CHLORIDE SERPL-SCNC: 101 MMOL/L — SIGNIFICANT CHANGE UP (ref 96–108)
CO2 SERPL-SCNC: 22 MMOL/L — SIGNIFICANT CHANGE UP (ref 22–31)
CREAT SERPL-MCNC: 1.68 MG/DL — HIGH (ref 0.5–1.3)
EGFR: 27 ML/MIN/1.73M2 — LOW
GLUCOSE SERPL-MCNC: 100 MG/DL — HIGH (ref 70–99)
POTASSIUM SERPL-MCNC: 3.7 MMOL/L — SIGNIFICANT CHANGE UP (ref 3.5–5.3)
POTASSIUM SERPL-SCNC: 3.7 MMOL/L — SIGNIFICANT CHANGE UP (ref 3.5–5.3)
SODIUM SERPL-SCNC: 136 MMOL/L — SIGNIFICANT CHANGE UP (ref 135–145)

## 2022-12-05 RX ORDER — ACETAMINOPHEN 500 MG
1000 TABLET ORAL ONCE
Refills: 0 | Status: COMPLETED | OUTPATIENT
Start: 2022-12-05 | End: 2022-12-05

## 2022-12-05 RX ORDER — MUPIROCIN 20 MG/G
1 OINTMENT TOPICAL
Refills: 0 | Status: DISCONTINUED | OUTPATIENT
Start: 2022-12-05 | End: 2022-12-09

## 2022-12-05 RX ORDER — SODIUM CHLORIDE 9 MG/ML
1000 INJECTION INTRAMUSCULAR; INTRAVENOUS; SUBCUTANEOUS
Refills: 0 | Status: DISCONTINUED | OUTPATIENT
Start: 2022-12-05 | End: 2022-12-05

## 2022-12-05 RX ADMIN — LIDOCAINE 1 PATCH: 4 CREAM TOPICAL at 11:49

## 2022-12-05 RX ADMIN — Medication 400 MILLIGRAM(S): at 05:20

## 2022-12-05 RX ADMIN — Medication 400 MILLIGRAM(S): at 16:36

## 2022-12-05 RX ADMIN — Medication 1000 MILLIGRAM(S): at 06:24

## 2022-12-05 RX ADMIN — CHLORHEXIDINE GLUCONATE 1 APPLICATION(S): 213 SOLUTION TOPICAL at 11:48

## 2022-12-05 RX ADMIN — Medication 112 MICROGRAM(S): at 05:20

## 2022-12-05 RX ADMIN — MUPIROCIN 1 APPLICATION(S): 20 OINTMENT TOPICAL at 17:19

## 2022-12-05 RX ADMIN — SODIUM CHLORIDE 60 MILLILITER(S): 9 INJECTION INTRAMUSCULAR; INTRAVENOUS; SUBCUTANEOUS at 10:29

## 2022-12-05 RX ADMIN — MUPIROCIN 1 APPLICATION(S): 20 OINTMENT TOPICAL at 05:59

## 2022-12-05 RX ADMIN — LIDOCAINE 1 PATCH: 4 CREAM TOPICAL at 02:20

## 2022-12-05 RX ADMIN — LIDOCAINE 1 PATCH: 4 CREAM TOPICAL at 23:25

## 2022-12-05 RX ADMIN — Medication 1000 MILLIGRAM(S): at 17:06

## 2022-12-05 RX ADMIN — PANTOPRAZOLE SODIUM 40 MILLIGRAM(S): 20 TABLET, DELAYED RELEASE ORAL at 05:20

## 2022-12-05 RX ADMIN — LATANOPROST 1 DROP(S): 0.05 SOLUTION/ DROPS OPHTHALMIC; TOPICAL at 21:04

## 2022-12-05 RX ADMIN — Medication 1 MILLIGRAM(S): at 11:49

## 2022-12-05 RX ADMIN — LIDOCAINE 1 PATCH: 4 CREAM TOPICAL at 19:30

## 2022-12-05 RX ADMIN — ATORVASTATIN CALCIUM 10 MILLIGRAM(S): 80 TABLET, FILM COATED ORAL at 21:04

## 2022-12-05 RX ADMIN — POLYETHYLENE GLYCOL 3350 17 GRAM(S): 17 POWDER, FOR SOLUTION ORAL at 11:48

## 2022-12-05 RX ADMIN — APIXABAN 2.5 MILLIGRAM(S): 2.5 TABLET, FILM COATED ORAL at 05:20

## 2022-12-05 RX ADMIN — IRON SUCROSE 110 MILLIGRAM(S): 20 INJECTION, SOLUTION INTRAVENOUS at 17:17

## 2022-12-05 RX ADMIN — APIXABAN 2.5 MILLIGRAM(S): 2.5 TABLET, FILM COATED ORAL at 17:19

## 2022-12-05 NOTE — PROGRESS NOTE ADULT - SUBJECTIVE AND OBJECTIVE BOX
DATE OF SERVICE: 12-05-22 @ 12:48    Patient is a 89y old  Female who presents with a chief complaint of sob (05 Dec 2022 11:12)      SUBJECTIVE / OVERNIGHT EVENTS:  " i dont feel good"    MEDICATIONS  (STANDING):  apixaban 2.5 milliGRAM(s) Oral two times a day  atorvastatin 10 milliGRAM(s) Oral at bedtime  chlorhexidine 2% Cloths 1 Application(s) Topical daily  folic acid 1 milliGRAM(s) Oral daily  iron sucrose IVPB 200 milliGRAM(s) IV Intermittent every 24 hours  latanoprost 0.005% Ophthalmic Solution 1 Drop(s) Both EYES at bedtime  levothyroxine 112 MICROGram(s) Oral daily  lidocaine   4% Patch 1 Patch Transdermal daily  mupirocin 2% Nasal 1 Application(s) Both Nostrils two times a day  pantoprazole    Tablet 40 milliGRAM(s) Oral before breakfast  polyethylene glycol 3350 17 Gram(s) Oral daily  senna 2 Tablet(s) Oral at bedtime    MEDICATIONS  (PRN):  acetaminophen     Tablet .. 650 milliGRAM(s) Oral every 6 hours PRN Mild Pain (1 - 3)  guaiFENesin Oral Liquid (Sugar-Free) 200 milliGRAM(s) Oral every 6 hours PRN Cough      Vital Signs Last 24 Hrs  T(C): 36.6 (05 Dec 2022 11:30), Max: 37.1 (04 Dec 2022 20:33)  T(F): 97.9 (05 Dec 2022 11:30), Max: 98.8 (04 Dec 2022 20:33)  HR: 86 (05 Dec 2022 11:30) (69 - 98)  BP: 149/68 (05 Dec 2022 11:30) (138/76 - 158/73)  BP(mean): --  RR: 18 (05 Dec 2022 11:30) (18 - 18)  SpO2: 99% (05 Dec 2022 11:30) (94% - 99%)    Parameters below as of 05 Dec 2022 11:30  Patient On (Oxygen Delivery Method): room air      CAPILLARY BLOOD GLUCOSE        I&O's Summary    04 Dec 2022 07:01  -  05 Dec 2022 07:00  --------------------------------------------------------  IN: 480 mL / OUT: 400 mL / NET: 80 mL        PHYSICAL EXAM:  GENERAL: NAD, well-developed  HEAD:  Atraumatic, Normocephalic  EYES: EOMI, PERRLA, conjunctiva and sclera clear  NECK: Supple, No JVD  CHEST/LUNG: Clear to auscultation bilaterally; No wheeze  HEART: Regular rate and rhythm; No murmurs, rubs, or gallops  ABDOMEN: Soft, Nontender, Nondistended; Bowel sounds present  EXTREMITIES:  2+ Peripheral Pulses, No clubbing, cyanosis, or edema  PSYCH: AAOx3  NEUROLOGY: non-focal  SKIN: No rashes or lesions    LABS:                        10.0   8.40  )-----------( 257      ( 04 Dec 2022 06:30 )             31.7     12-05    136  |  101  |  46<H>  ----------------------------<  100<H>  3.7   |  22  |  1.68<H>    Ca    8.8      05 Dec 2022 06:18        CARDIAC MARKERS ( 04 Dec 2022 08:21 )  x     / x     / 57 U/L / x     / 3.1 ng/mL          RADIOLOGY & ADDITIONAL TESTS:    Imaging Personally Reviewed:    Consultant(s) Notes Reviewed:      Care Discussed with Consultants/Other Providers:

## 2022-12-05 NOTE — CONSULT NOTE ADULT - NS ATTEND AMEND GEN_ALL_CORE FT
noticed with rising cr   + JVD  Lungs decrease bs mild coarse bs   ext no edema    MARIELA   CHF  HTN   anemia     mariela in setting of hypotensive episode likely  and ? due to diuretics in setting of her chf  hold losartan   hold diuretics and will need to restart soon   monitor fluid status closely   trend hb

## 2022-12-05 NOTE — PROGRESS NOTE ADULT - SUBJECTIVE AND OBJECTIVE BOX
DATE OF SERVICE: 12-05-22 @ 12:52    Patient is a 89y old  Female who presents with a chief complaint of sob (05 Dec 2022 12:48)      SUBJECTIVE / OVERNIGHT EVENTS:  pt seen and examined in CTU  pt with chest tube  pt is on NC    MEDICATIONS  (STANDING):  apixaban 2.5 milliGRAM(s) Oral two times a day  atorvastatin 10 milliGRAM(s) Oral at bedtime  chlorhexidine 2% Cloths 1 Application(s) Topical daily  folic acid 1 milliGRAM(s) Oral daily  iron sucrose IVPB 200 milliGRAM(s) IV Intermittent every 24 hours  latanoprost 0.005% Ophthalmic Solution 1 Drop(s) Both EYES at bedtime  levothyroxine 112 MICROGram(s) Oral daily  lidocaine   4% Patch 1 Patch Transdermal daily  mupirocin 2% Nasal 1 Application(s) Both Nostrils two times a day  pantoprazole    Tablet 40 milliGRAM(s) Oral before breakfast  polyethylene glycol 3350 17 Gram(s) Oral daily  senna 2 Tablet(s) Oral at bedtime    MEDICATIONS  (PRN):  acetaminophen     Tablet .. 650 milliGRAM(s) Oral every 6 hours PRN Mild Pain (1 - 3)  guaiFENesin Oral Liquid (Sugar-Free) 200 milliGRAM(s) Oral every 6 hours PRN Cough      Vital Signs Last 24 Hrs  T(C): 36.5 (05 Dec 2022 12:49), Max: 37.1 (04 Dec 2022 20:33)  T(F): 97.7 (05 Dec 2022 12:49), Max: 98.8 (04 Dec 2022 20:33)  HR: 90 (05 Dec 2022 12:49) (69 - 98)  BP: 140/60 (05 Dec 2022 12:49) (138/76 - 158/73)  BP(mean): --  RR: 18 (05 Dec 2022 12:49) (18 - 18)  SpO2: 98% (05 Dec 2022 12:49) (94% - 99%)    Parameters below as of 05 Dec 2022 12:49  Patient On (Oxygen Delivery Method): room air      CAPILLARY BLOOD GLUCOSE        I&O's Summary    04 Dec 2022 07:01  -  05 Dec 2022 07:00  --------------------------------------------------------  IN: 480 mL / OUT: 400 mL / NET: 80 mL        PHYSICAL EXAM:  GENERAL: NAD, well-developed  HEAD:  Atraumatic, Normocephalic  EYES: EOMI, PERRLA, conjunctiva and sclera clear  NECK: Supple, No JVD  CHEST/LUNG: Clear to auscultation bilaterally; No wheeze  HEART: Regular rate and rhythm; No murmurs, rubs, or gallops  ABDOMEN: Soft, Nontender, Nondistended; Bowel sounds present  EXTREMITIES:  2+ Peripheral Pulses, No clubbing, cyanosis, or edema  PSYCH: AAOx3  NEUROLOGY: non-focal  SKIN: No rashes or lesions    LABS:                        10.0   8.40  )-----------( 257      ( 04 Dec 2022 06:30 )             31.7     12-05    136  |  101  |  46<H>  ----------------------------<  100<H>  3.7   |  22  |  1.68<H>    Ca    8.8      05 Dec 2022 06:18        CARDIAC MARKERS ( 04 Dec 2022 08:21 )  x     / x     / 57 U/L / x     / 3.1 ng/mL          RADIOLOGY & ADDITIONAL TESTS:    Imaging Personally Reviewed:    Consultant(s) Notes Reviewed:      Care Discussed with Consultants/Other Providers:

## 2022-12-05 NOTE — CONSULT NOTE ADULT - SUBJECTIVE AND OBJECTIVE BOX
[facility]  [phone]  NEPHROLOGY      INITIAL CONSULT NOTE  --------------------------------------------------------------------------------  HPI:    89 year old female with PMHx of TOBIAS vila on Eliquis, myelodysplastic syndrome, HTN, HLD, moderate AS, severe Mitral regurg presents to the ED complaining of cough and chest congestion x 7 days. In ED was started on IV lasix. Hospital course complicated by rapid response for hypotension 12/4, SBP 70's, which improved without intervention. Noticed with rising creatinine. Renal consult called.     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;mylar dysplasia&#x27; treated with &quot; Arinesp&quot; x past 1.5 yrs- last dose 4 months ago      MDS (Myelodysplastic Syndrome)      Skin Cancer  of face , basal cell   4 yrs ago      History of Total Knee Replacement  L 1998      Bilateral Cataracts  69 y/o      S/P Breast Lumpectomy  10 yrs ago      Cystocele  47 yrs ago      Rectocele  47 yrs ago      Carpal Tunnel Syndrome  10 yrs ago      Basal Cell Carcinoma of Face  4 yrs ago      S/P T&amp;A  childhood        S/P TKR (Total Knee Replacement)  left      S/P Breast Lumpectomy  benign      S/P Cataract Surgery        FAMILY HISTORY:  No pertinent family history in first degree relatives      PAST SOCIAL HISTORY:    ALLERGIES & MEDICATIONS  --------------------------------------------------------------------------------  Allergies    No Known Allergies    Intolerances      Standing Inpatient Medications  apixaban 2.5 milliGRAM(s) Oral two times a day  atorvastatin 10 milliGRAM(s) Oral at bedtime  chlorhexidine 2% Cloths 1 Application(s) Topical daily  folic acid 1 milliGRAM(s) Oral daily  iron sucrose IVPB 200 milliGRAM(s) IV Intermittent every 24 hours  latanoprost 0.005% Ophthalmic Solution 1 Drop(s) Both EYES at bedtime  levothyroxine 112 MICROGram(s) Oral daily  lidocaine   4% Patch 1 Patch Transdermal daily  mupirocin 2% Nasal 1 Application(s) Both Nostrils two times a day  pantoprazole    Tablet 40 milliGRAM(s) Oral before breakfast  polyethylene glycol 3350 17 Gram(s) Oral daily  senna 2 Tablet(s) Oral at bedtime    PRN Inpatient Medications  acetaminophen     Tablet .. 650 milliGRAM(s) Oral every 6 hours PRN  guaiFENesin Oral Liquid (Sugar-Free) 200 milliGRAM(s) Oral every 6 hours PRN      REVIEW OF SYSTEMS  --------------------------------------------------------------------------------  Gen: No fevers/chills   Head/Eyes/Ears/Mouth: No headache;   Respiratory: No dyspnea, cough,   CV: No chest pain,   GI: No abdominal pain, diarrhea, nausea, vomiting,  : No dysuria, decrease urination  MSK: No joint pain/swelling; no back pain  Neuro: No dizziness/lightheadedness, weakness  also with no edema     VITALS/PHYSICAL EXAM  --------------------------------------------------------------------------------  T(C): 36.5 (12-05-22 @ 12:49), Max: 37.1 (12-04-22 @ 20:33)  HR: 90 (12-05-22 @ 12:49) (69 - 98)  BP: 140/60 (12-05-22 @ 12:49) (138/76 - 158/73)  RR: 18 (12-05-22 @ 12:49) (18 - 18)  SpO2: 98% (12-05-22 @ 12:49) (94% - 99%)  Wt(kg): --        12-04-22 @ 07:01  -  12-05-22 @ 07:00  --------------------------------------------------------  IN: 480 mL / OUT: 400 mL / NET: 80 mL    12-05-22 @ 07:01  -  12-05-22 @ 15:04  --------------------------------------------------------  IN: 480 mL / OUT: 200 mL / NET: 280 mL      Physical Exam:  	Gen: Non toxic comfortable appearing   	Pulm: decrease bs, +coarse, no rales, wheezing  	CV: +JVD. RRR, S1S2; no rub  	Back: No CVA tenderness; no sacral edema  	Abd: +BS, soft, nondistended, obese  	: No suprapubic tenderness  	UE: Warm, no cyanosis  no clubbing,  no edema  	LE: Warm, no cyanosis  no clubbing, no edema  	Neuro: alert and oriented. speech coherent   	Skin: Warm, no decrease skin turgor     LABS/STUDIES  --------------------------------------------------------------------------------              10.0   8.40  >-----------<  257      [12-04-22 @ 06:30]              31.7     136  |  101  |  46  ----------------------------<  100      [12-05-22 @ 06:18]  3.7   |  22  |  1.68        Ca     8.8     [12-05-22 @ 06:18]          CK 57      [12-04-22 @ 08:21]    Creatinine Trend:  SCr 1.68 [12-05 @ 06:18]  SCr 1.51 [12-04 @ 06:32]  SCr 1.31 [12-03 @ 06:51]  SCr 1.24 [12-02 @ 10:19]    Urinalysis - [12-02-22 @ 11:20]      Color Light Yellow / Appearance Clear / SG 1.009 / pH 6.5      Gluc Negative / Ketone Negative  / Bili Negative / Urobili Negative       Blood Negative / Protein 100 / Leuk Est Negative / Nitrite Negative      RBC 8 / WBC 0 / Hyaline 0 / Gran  / Sq Epi  / Non Sq Epi 0 / Bacteria Negative      Iron 24, TIBC 245, %sat 10      [12-03-22 @ 06:53]  Ferritin 246      [12-03-22 @ 06:53]  TSH 6.90      [12-04-22 @ 06:33]    < from: Xray Chest 1 View- PORTABLE-Urgent (Xray Chest 1 View- PORTABLE-Urgent .) (12.02.22 @ 12:25) >  ACC: [id number] EXAM:  XR CHEST PORTABLE URGENT 1V                          PROCEDURE DATE:  12/02/2022          INTERPRETATION:  EXAMINATION: XR CHEST URGENT    CLINICAL INDICATION: r/o PNA    TECHNIQUE: Single frontal, portable view of the chest was obtained.    COMPARISON: Chest x-ray 5/1/2022    FINDINGS:    Mild pulmonary vascular congestion.  Bibasilar atelectasis. No pneumothorax. No pleural effusions.  Cardiomegaly.  The visualized osseous structures demonstrate no acute pathology.    IMPRESSION:  Bibasilar atelectasis.  Unchanged cardiomegaly.    --- End of Report ---    < end of copied text >    < from: TTE with Doppler (w/Cont) (12.04.22 @ 10:44) >  Patient name: BERHANE WOODWARD  YOB: 1923   Age: 89 (F)   MR#: [id number]  Study Date: 12/4/2022  [facility]  Study quality: Technically difficult  Referring Physician: Malaika Cheng MD  Blood Pressure: 124/70 mmHg  Height: 142 cm  Weight: 60 kg  BSA: 1.5 m2  ------------------------------------------------------------------------  PROCEDURE: Transthoracic echocardiogram with 2-D, M-Mode  and complete spectral and color flow Doppler. Verbal  consent was obtained for injection of  Ultrasonic Enhancing  Agent following a discussion of risks and benefits.  Following intravenous injection of Ultrasonic Enhancing  Agent, harmonic imaging was performed.  INDICATION: Dyspnea, unspecified (R06.00)  ------------------------------------------------------------------------  Dimensions:    Normal Values:  LA:     3.9    2.0 - 4.0 cm  Ao:     3.0    2.0 - 3.8 cm  SEPTUM: 1.3    0.6 - 1.2 cm  PWT:    1.5    0.6 - 1.1 cm  LVIDd:  3.9    3.0 - 5.6 cm  LVIDs:  2.7    1.8 - 4.0 cm  Derived variables:  LVMI: 135 g/m2  RWT: 0.76  Fractional short: 31 %  EF (Visual Estimate): 70 %  Doppler Peak Velocity (m/sec): AoV=3.3  ------------------------------------------------------------------------  Observations:  Mitral Valve: Mitral annular calcification.   Tethered  mitral valve leaflets with normal opening. Severe mitral  regurgitation.  Aortic Valve/Aorta: Calcified trileaflet aortic valve with  decreased opening. Peak transaortic valve gradient equals  44 mm Hg, mean transaortic valve gradient equals 18 mm Hg,  estimated aortic valve area equals 1 sqcm (by continuity  equation), aortic valve velocity time integral equals 50  cm, consistent with moderate aortic stenosis. Minimal  aortic regurgitation.  Peak left ventricular outflow tract  gradient equals 4 mm Hg, mean gradient is equal to 2 mm Hg,  LVOT velocity time integral equals 16 cm.  Aortic Root: 3 cm.  Ascending Aorta: 3.1 cm.  LVOT diameter: 2 cm.  Left Atrium: Severely dilated left atrium.  LA volume index  = 68 cc/m2.  Left Ventricle: Normal left ventricular systolic function.  Moderate concentric left ventricular hypertrophy. Moderate  diastolic dysfunction (Stage II).  Right Heart: Right atrial enlargement. Right ventricular  enlargement with decreased right ventricular systolic  function. Normal tricuspid valve. Moderate tricuspid  regurgitation. Normal pulmonic valve. Minimal pulmonic  regurgitation.  Pericardium/Pleura: Normal pericardium with no pericardial  effusion.  Hemodynamic: Estimated right atrial pressure is 8 mm Hg.  Estimated right ventricular systolic pressure equals 46 mm  Hg, assuming right atrial pressure equals 8 mm Hg,  consistent with mild pulmonary hypertension.  ------------------------------------------------------------------------  Conclusions:  1. Severe mitral regurgitation.  2. Calcified trileaflet aortic valve with decreased  opening. Peak transaortic valve gradient equals 44 mm Hg,  mean transaortic valve gradient equals 18 mm Hg, estimated  aortic valve area equals 1 sqcm (by continuity equation),  aortic valve velocity time integral equals 50 cm,  consistent with moderate aortic stenosis.  3. Severely dilated left atrium.  LA volume index = 68  cc/m2.  4. Moderate concentric left ventricular hypertrophy.  5. Normal left ventricular systolic function.  6. Right atrial enlargement.  7. Right ventricular enlargement with decreased right  ventricular systolic function.  8. Normal tricuspid valve. Moderate tricuspid  regurgitation.  9. Estimated pulmonary artery systolic pressure equals 46  mm Hg, assuming right atrial pressure equals 8 mm Hg,  consistent with mild pulmonary pressures.  *** Compared with echocardiogram of 5/23/2022, no  significant changes noted.    < end of copied text >     [facility]  [phone]  NEPHROLOGY      INITIAL CONSULT NOTE  --------------------------------------------------------------------------------  HPI:    89 year old female with PMHx of TOBIAS vila on Eliquis, myelodysplastic syndrome, HTN, HLD, moderate AS, severe Mitral regurg presents to the ED complaining of cough and chest congestion x 7 days. In ED was started on IV lasix. Hospital course complicated by rapid response for hypotension 12/4, SBP 70's, which improved without intervention. Noticed with rising creatinine. Renal consult called.     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;mylar dysplasia&#x27; treated with &quot; Arinesp&quot; x past 1.5 yrs- last dose 4 months ago      MDS (Myelodysplastic Syndrome)      Skin Cancer  of face , basal cell   4 yrs ago      History of Total Knee Replacement  L 1998      Bilateral Cataracts  71 y/o      S/P Breast Lumpectomy  10 yrs ago      Cystocele  47 yrs ago      Rectocele  47 yrs ago      Carpal Tunnel Syndrome  10 yrs ago      Basal Cell Carcinoma of Face  4 yrs ago      S/P T&amp;A  childhood        S/P TKR (Total Knee Replacement)  left      S/P Breast Lumpectomy  benign      S/P Cataract Surgery        FAMILY HISTORY:  No pertinent family history in first degree relatives      PAST SOCIAL HISTORY:    ALLERGIES & MEDICATIONS  --------------------------------------------------------------------------------  Allergies    No Known Allergies    Intolerances      Standing Inpatient Medications  apixaban 2.5 milliGRAM(s) Oral two times a day  atorvastatin 10 milliGRAM(s) Oral at bedtime  chlorhexidine 2% Cloths 1 Application(s) Topical daily  folic acid 1 milliGRAM(s) Oral daily  iron sucrose IVPB 200 milliGRAM(s) IV Intermittent every 24 hours  latanoprost 0.005% Ophthalmic Solution 1 Drop(s) Both EYES at bedtime  levothyroxine 112 MICROGram(s) Oral daily  lidocaine   4% Patch 1 Patch Transdermal daily  mupirocin 2% Nasal 1 Application(s) Both Nostrils two times a day  pantoprazole    Tablet 40 milliGRAM(s) Oral before breakfast  polyethylene glycol 3350 17 Gram(s) Oral daily  senna 2 Tablet(s) Oral at bedtime    PRN Inpatient Medications  acetaminophen     Tablet .. 650 milliGRAM(s) Oral every 6 hours PRN  guaiFENesin Oral Liquid (Sugar-Free) 200 milliGRAM(s) Oral every 6 hours PRN      REVIEW OF SYSTEMS  --------------------------------------------------------------------------------  Gen: No fevers/chills   Head/Eyes/Ears/Mouth: No headache;   Respiratory: No dyspnea, cough,   CV: No chest pain,   GI: No abdominal pain, diarrhea, nausea, vomiting,  : No dysuria, decrease urination  MSK: No joint pain/swelling; no back pain  Neuro: No dizziness/lightheadedness, weakness  also with no edema     VITALS/PHYSICAL EXAM  --------------------------------------------------------------------------------  T(C): 36.5 (12-05-22 @ 12:49), Max: 37.1 (12-04-22 @ 20:33)  HR: 90 (12-05-22 @ 12:49) (69 - 98)  BP: 140/60 (12-05-22 @ 12:49) (138/76 - 158/73)  RR: 18 (12-05-22 @ 12:49) (18 - 18)  SpO2: 98% (12-05-22 @ 12:49) (94% - 99%)  Wt(kg): --        12-04-22 @ 07:01  -  12-05-22 @ 07:00  --------------------------------------------------------  IN: 480 mL / OUT: 400 mL / NET: 80 mL    12-05-22 @ 07:01  -  12-05-22 @ 15:04  --------------------------------------------------------  IN: 480 mL / OUT: 200 mL / NET: 280 mL      Physical Exam:  	Gen: Non toxic comfortable appearing   	Pulm: decrease bs, +coarse, no rales, wheezing  	CV: +JVD. RRR, S1S2; no rub  	Back: No CVA tenderness  	Abd: +BS, soft, nondistended, obese  	: No suprapubic tenderness  	UE: Warm, no cyanosis  no clubbing,  no edema  	LE: Warm, no cyanosis  no clubbing, no edema  	Neuro: alert and oriented. speech coherent   	Skin: Warm, no decrease skin turgor     LABS/STUDIES  --------------------------------------------------------------------------------              10.0   8.40  >-----------<  257      [12-04-22 @ 06:30]              31.7     136  |  101  |  46  ----------------------------<  100      [12-05-22 @ 06:18]  3.7   |  22  |  1.68        Ca     8.8     [12-05-22 @ 06:18]          CK 57      [12-04-22 @ 08:21]    Creatinine Trend:  SCr 1.68 [12-05 @ 06:18]  SCr 1.51 [12-04 @ 06:32]  SCr 1.31 [12-03 @ 06:51]  SCr 1.24 [12-02 @ 10:19]    Urinalysis - [12-02-22 @ 11:20]      Color Light Yellow / Appearance Clear / SG 1.009 / pH 6.5      Gluc Negative / Ketone Negative  / Bili Negative / Urobili Negative       Blood Negative / Protein 100 / Leuk Est Negative / Nitrite Negative      RBC 8 / WBC 0 / Hyaline 0 / Gran  / Sq Epi  / Non Sq Epi 0 / Bacteria Negative      Iron 24, TIBC 245, %sat 10      [12-03-22 @ 06:53]  Ferritin 246      [12-03-22 @ 06:53]  TSH 6.90      [12-04-22 @ 06:33]    < from: Xray Chest 1 View- PORTABLE-Urgent (Xray Chest 1 View- PORTABLE-Urgent .) (12.02.22 @ 12:25) >  ACC: [id number] EXAM:  XR CHEST PORTABLE URGENT 1V                          PROCEDURE DATE:  12/02/2022          INTERPRETATION:  EXAMINATION: XR CHEST URGENT    CLINICAL INDICATION: r/o PNA    TECHNIQUE: Single frontal, portable view of the chest was obtained.    COMPARISON: Chest x-ray 5/1/2022    FINDINGS:    Mild pulmonary vascular congestion.  Bibasilar atelectasis. No pneumothorax. No pleural effusions.  Cardiomegaly.  The visualized osseous structures demonstrate no acute pathology.    IMPRESSION:  Bibasilar atelectasis.  Unchanged cardiomegaly.    --- End of Report ---    < end of copied text >    < from: TTE with Doppler (w/Cont) (12.04.22 @ 10:44) >  Patient name: BERHANE WOODWARD  YOB: 1923   Age: 89 (F)   MR#: [id number]  Study Date: 12/4/2022  [facility]  Study quality: Technically difficult  Referring Physician: Malaika Cheng MD  Blood Pressure: 124/70 mmHg  Height: 142 cm  Weight: 60 kg  BSA: 1.5 m2  ------------------------------------------------------------------------  PROCEDURE: Transthoracic echocardiogram with 2-D, M-Mode  and complete spectral and color flow Doppler. Verbal  consent was obtained for injection of  Ultrasonic Enhancing  Agent following a discussion of risks and benefits.  Following intravenous injection of Ultrasonic Enhancing  Agent, harmonic imaging was performed.  INDICATION: Dyspnea, unspecified (R06.00)  ------------------------------------------------------------------------  Dimensions:    Normal Values:  LA:     3.9    2.0 - 4.0 cm  Ao:     3.0    2.0 - 3.8 cm  SEPTUM: 1.3    0.6 - 1.2 cm  PWT:    1.5    0.6 - 1.1 cm  LVIDd:  3.9    3.0 - 5.6 cm  LVIDs:  2.7    1.8 - 4.0 cm  Derived variables:  LVMI: 135 g/m2  RWT: 0.76  Fractional short: 31 %  EF (Visual Estimate): 70 %  Doppler Peak Velocity (m/sec): AoV=3.3  ------------------------------------------------------------------------  Observations:  Mitral Valve: Mitral annular calcification.   Tethered  mitral valve leaflets with normal opening. Severe mitral  regurgitation.  Aortic Valve/Aorta: Calcified trileaflet aortic valve with  decreased opening. Peak transaortic valve gradient equals  44 mm Hg, mean transaortic valve gradient equals 18 mm Hg,  estimated aortic valve area equals 1 sqcm (by continuity  equation), aortic valve velocity time integral equals 50  cm, consistent with moderate aortic stenosis. Minimal  aortic regurgitation.  Peak left ventricular outflow tract  gradient equals 4 mm Hg, mean gradient is equal to 2 mm Hg,  LVOT velocity time integral equals 16 cm.  Aortic Root: 3 cm.  Ascending Aorta: 3.1 cm.  LVOT diameter: 2 cm.  Left Atrium: Severely dilated left atrium.  LA volume index  = 68 cc/m2.  Left Ventricle: Normal left ventricular systolic function.  Moderate concentric left ventricular hypertrophy. Moderate  diastolic dysfunction (Stage II).  Right Heart: Right atrial enlargement. Right ventricular  enlargement with decreased right ventricular systolic  function. Normal tricuspid valve. Moderate tricuspid  regurgitation. Normal pulmonic valve. Minimal pulmonic  regurgitation.  Pericardium/Pleura: Normal pericardium with no pericardial  effusion.  Hemodynamic: Estimated right atrial pressure is 8 mm Hg.  Estimated right ventricular systolic pressure equals 46 mm  Hg, assuming right atrial pressure equals 8 mm Hg,  consistent with mild pulmonary hypertension.  ------------------------------------------------------------------------  Conclusions:  1. Severe mitral regurgitation.  2. Calcified trileaflet aortic valve with decreased  opening. Peak transaortic valve gradient equals 44 mm Hg,  mean transaortic valve gradient equals 18 mm Hg, estimated  aortic valve area equals 1 sqcm (by continuity equation),  aortic valve velocity time integral equals 50 cm,  consistent with moderate aortic stenosis.  3. Severely dilated left atrium.  LA volume index = 68  cc/m2.  4. Moderate concentric left ventricular hypertrophy.  5. Normal left ventricular systolic function.  6. Right atrial enlargement.  7. Right ventricular enlargement with decreased right  ventricular systolic function.  8. Normal tricuspid valve. Moderate tricuspid  regurgitation.  9. Estimated pulmonary artery systolic pressure equals 46  mm Hg, assuming right atrial pressure equals 8 mm Hg,  consistent with mild pulmonary pressures.  *** Compared with echocardiogram of 5/23/2022, no  significant changes noted.    < end of copied text >

## 2022-12-05 NOTE — PROGRESS NOTE ADULT - SUBJECTIVE AND OBJECTIVE BOX
CARDIOLOGY ATTENDING    tele: AF 70-80s    she reports dyspnea has improved, denies palpitations, syncope, nor angina, but still feels weak      DATE OF SERVICE - 12-05-22     Review of Systems:   Constitutional: [ ] fevers, [ ] chills.   Skin: [ ] dry skin. [ ] rashes.  Psychiatric: [ ] depression, [ ] anxiety.   Gastrointestinal: [ ] BRBPR, [ ] melena.   Neurological: [ ] confusion. [ ] seizures. [ ] shuffling gait.   Ears,Nose,Mouth and Throat: [ ] ear pain [ ] sore throat.   Eyes: [ ] diplopia.   Respiratory: [ ] hemoptysis. [ ] shortness of breath  Cardiovascular: See HPI above  Hematologic/Lymphatic: [ ] anemia. [ ] painful nodes. [ ] prolonged bleeding.   Genitourinary: [ ] hematuria. [ ] flank pain.   Endocrine: [ ] significant change in weight. [ ] intolerance to heat and cold.     Review of systems [ x] otherwise negative, [ ] otherwise unable to obtain    FH: no family history of sudden cardiac death in first degree relatives    SH: [ ] tobacco, [ ] alcohol, [ ] drugs    acetaminophen     Tablet .. 650 milliGRAM(s) Oral every 6 hours PRN  apixaban 2.5 milliGRAM(s) Oral two times a day  atorvastatin 10 milliGRAM(s) Oral at bedtime  chlorhexidine 2% Cloths 1 Application(s) Topical daily  folic acid 1 milliGRAM(s) Oral daily  guaiFENesin Oral Liquid (Sugar-Free) 200 milliGRAM(s) Oral every 6 hours PRN  iron sucrose IVPB 200 milliGRAM(s) IV Intermittent every 24 hours  latanoprost 0.005% Ophthalmic Solution 1 Drop(s) Both EYES at bedtime  levothyroxine 112 MICROGram(s) Oral daily  lidocaine   4% Patch 1 Patch Transdermal daily  mupirocin 2% Nasal 1 Application(s) Both Nostrils two times a day  pantoprazole    Tablet 40 milliGRAM(s) Oral before breakfast  polyethylene glycol 3350 17 Gram(s) Oral daily  senna 2 Tablet(s) Oral at bedtime                            10.0   8.40  )-----------( 257      ( 04 Dec 2022 06:30 )             31.7       12-05    136  |  101  |  46<H>  ----------------------------<  100<H>  3.7   |  22  |  1.68<H>    Ca    8.8      05 Dec 2022 06:18        CARDIAC MARKERS ( 04 Dec 2022 08:21 )  x     / x     / 57 U/L / x     / 3.1 ng/mL        T(C): 36.7 (12-05-22 @ 04:57), Max: 37.1 (12-04-22 @ 20:33)  HR: 98 (12-05-22 @ 04:57) (69 - 98)  BP: 158/73 (12-05-22 @ 04:57) (117/64 - 158/73)  RR: 18 (12-05-22 @ 04:57) (18 - 18)  SpO2: 96% (12-05-22 @ 04:57) (94% - 98%)  Wt(kg): --    I&O's Summary    04 Dec 2022 07:01  -  05 Dec 2022 07:00  --------------------------------------------------------  IN: 480 mL / OUT: 400 mL / NET: 80 mL        General: Well nourished in no acute distress. Alert and Oriented * 3.   Head: Normocephalic and atraumatic.   Neck: No JVD. No bruits. Supple. Does not appear to be enlarged.   Cardiovascular: + S1,S2 ; IRR Soft systolic murmur at the left lower sternal border. No rubs noted.    Lungs: CTA b/l. No rhonchi, rales or wheezes.   Abdomen: + BS, soft. Non tender. Non distended. No rebound. No guarding.   Extremities: No clubbing/cyanosis/edema.   Neurologic: Moves all four extremities. Full range of motion.   Skin: Warm and moist. The patient's skin has normal elasticity and good skin turgor.   Psychiatric: Appropriate mood and affect.  Musculoskeletal: Normal range of motion, normal strength      echo: moderate TR, moderate pulmonary hypertension, moderate AS, severe MR, normal LVEF        A/P) 89 y/o female PMH persistent AF (since 2019), hypertension, hyperlipidemia, CAD s/p PCI > 1 year ago, MDS a/w CHFpEF. She refused MitraClip for severe MR    -d/c IVF, f/u renal  -continue eliquis 2.5mg bid for lifelong a/c  -continue lipitor for CAD  -continue synthroid for hypothyroidism  -f/u with [facility] after discharge

## 2022-12-05 NOTE — PROGRESS NOTE ADULT - ASSESSMENT
89 y/o female with PMHx of A. fib on Eliquis, myelodysplastic syndrome, HTN, HLD presents to the ED complaining of cough and chest congestion x 7 days. Patient states that she feels like she is unable to cough up all of the mucus in her chest. She has not seen her doctor the past week. She has been taking tylenol and robitussin for mild relief at home. Past two days aide has reported patient is urinating more frequently. No reported fevers at home. Aide reports today patient was very weak and unable to walk at home. Normally ambulates without assistance. She has been eating and drinking well at home. No recent sick contacts. No headache, fever, chills, abdominal pain, n/v/d.    CHF  - stop lasix  - strict Is and Os  - echo done    iron def anemia  - iv iron x 2 days    MARIELA  - hold lasix  - monitor cre  - nephrology called    afib  - c/w Eliquis  - rate controlled    hypothyroid  -  TSH 6.9  - c/w synthroid    cough  - Robitussin    constipation  - senna and miralax    dvt px  - pt is on eliquis

## 2022-12-06 LAB
ANION GAP SERPL CALC-SCNC: 15 MMOL/L — SIGNIFICANT CHANGE UP (ref 5–17)
BUN SERPL-MCNC: 50 MG/DL — HIGH (ref 7–23)
CALCIUM SERPL-MCNC: 8.5 MG/DL — SIGNIFICANT CHANGE UP (ref 8.4–10.5)
CHLORIDE SERPL-SCNC: 101 MMOL/L — SIGNIFICANT CHANGE UP (ref 96–108)
CO2 SERPL-SCNC: 21 MMOL/L — LOW (ref 22–31)
CREAT SERPL-MCNC: 1.58 MG/DL — HIGH (ref 0.5–1.3)
EGFR: 29 ML/MIN/1.73M2 — LOW
GLUCOSE SERPL-MCNC: 87 MG/DL — SIGNIFICANT CHANGE UP (ref 70–99)
HCT VFR BLD CALC: 26.9 % — LOW (ref 34.5–45)
HGB BLD-MCNC: 8.8 G/DL — LOW (ref 11.5–15.5)
MCHC RBC-ENTMCNC: 28.1 PG — SIGNIFICANT CHANGE UP (ref 27–34)
MCHC RBC-ENTMCNC: 32.7 GM/DL — SIGNIFICANT CHANGE UP (ref 32–36)
MCV RBC AUTO: 85.9 FL — SIGNIFICANT CHANGE UP (ref 80–100)
NRBC # BLD: 0 /100 WBCS — SIGNIFICANT CHANGE UP (ref 0–0)
PLATELET # BLD AUTO: 264 K/UL — SIGNIFICANT CHANGE UP (ref 150–400)
POTASSIUM SERPL-MCNC: 3.7 MMOL/L — SIGNIFICANT CHANGE UP (ref 3.5–5.3)
POTASSIUM SERPL-SCNC: 3.7 MMOL/L — SIGNIFICANT CHANGE UP (ref 3.5–5.3)
RBC # BLD: 3.13 M/UL — LOW (ref 3.8–5.2)
RBC # FLD: 15.9 % — HIGH (ref 10.3–14.5)
SODIUM SERPL-SCNC: 137 MMOL/L — SIGNIFICANT CHANGE UP (ref 135–145)
URATE SERPL-MCNC: 9.5 MG/DL — HIGH (ref 2.5–7)
WBC # BLD: 11.13 K/UL — HIGH (ref 3.8–10.5)
WBC # FLD AUTO: 11.13 K/UL — HIGH (ref 3.8–10.5)

## 2022-12-06 PROCEDURE — 93970 EXTREMITY STUDY: CPT | Mod: 26

## 2022-12-06 RX ORDER — DARBEPOETIN ALFA IN POLYSORBAT 200MCG/0.4
200 PEN INJECTOR (ML) SUBCUTANEOUS ONCE
Refills: 0 | Status: COMPLETED | OUTPATIENT
Start: 2022-12-07 | End: 2022-12-08

## 2022-12-06 RX ORDER — DARBEPOETIN ALFA IN POLYSORBAT 200MCG/0.4
200 PEN INJECTOR (ML) SUBCUTANEOUS ONCE
Refills: 0 | Status: DISCONTINUED | OUTPATIENT
Start: 2022-12-06 | End: 2022-12-06

## 2022-12-06 RX ADMIN — Medication 650 MILLIGRAM(S): at 16:20

## 2022-12-06 RX ADMIN — ATORVASTATIN CALCIUM 10 MILLIGRAM(S): 80 TABLET, FILM COATED ORAL at 22:04

## 2022-12-06 RX ADMIN — Medication 1 MILLIGRAM(S): at 11:21

## 2022-12-06 RX ADMIN — LIDOCAINE 1 PATCH: 4 CREAM TOPICAL at 23:21

## 2022-12-06 RX ADMIN — CHLORHEXIDINE GLUCONATE 1 APPLICATION(S): 213 SOLUTION TOPICAL at 11:25

## 2022-12-06 RX ADMIN — MUPIROCIN 1 APPLICATION(S): 20 OINTMENT TOPICAL at 18:15

## 2022-12-06 RX ADMIN — Medication 112 MICROGRAM(S): at 05:05

## 2022-12-06 RX ADMIN — Medication 650 MILLIGRAM(S): at 09:27

## 2022-12-06 RX ADMIN — MUPIROCIN 1 APPLICATION(S): 20 OINTMENT TOPICAL at 05:05

## 2022-12-06 RX ADMIN — LATANOPROST 1 DROP(S): 0.05 SOLUTION/ DROPS OPHTHALMIC; TOPICAL at 22:05

## 2022-12-06 RX ADMIN — PANTOPRAZOLE SODIUM 40 MILLIGRAM(S): 20 TABLET, DELAYED RELEASE ORAL at 05:05

## 2022-12-06 RX ADMIN — Medication 650 MILLIGRAM(S): at 15:49

## 2022-12-06 RX ADMIN — Medication 650 MILLIGRAM(S): at 09:57

## 2022-12-06 RX ADMIN — APIXABAN 2.5 MILLIGRAM(S): 2.5 TABLET, FILM COATED ORAL at 05:05

## 2022-12-06 RX ADMIN — APIXABAN 2.5 MILLIGRAM(S): 2.5 TABLET, FILM COATED ORAL at 18:14

## 2022-12-06 RX ADMIN — LIDOCAINE 1 PATCH: 4 CREAM TOPICAL at 19:30

## 2022-12-06 RX ADMIN — Medication 20 MILLIGRAM(S): at 18:14

## 2022-12-06 RX ADMIN — SENNA PLUS 2 TABLET(S): 8.6 TABLET ORAL at 22:04

## 2022-12-06 RX ADMIN — LIDOCAINE 1 PATCH: 4 CREAM TOPICAL at 11:21

## 2022-12-06 NOTE — PROGRESS NOTE ADULT - SUBJECTIVE AND OBJECTIVE BOX
[facility]   [phone]  RENAL FOLLOW UP NOTE  --------------------------------------------------------------------------------  Chief Complaint:    24 hour events/subjective:    patient seen and examined  rafat gallegos cp  c/o knee pain    PAST HISTORY  --------------------------------------------------------------------------------  No significant changes to PMH, PSH, FHx, SHx, unless otherwise noted    ALLERGIES & MEDICATIONS  --------------------------------------------------------------------------------  Allergies    No Known Allergies    Intolerances      Standing Inpatient Medications  apixaban 2.5 milliGRAM(s) Oral two times a day  atorvastatin 10 milliGRAM(s) Oral at bedtime  chlorhexidine 2% Cloths 1 Application(s) Topical daily  folic acid 1 milliGRAM(s) Oral daily  latanoprost 0.005% Ophthalmic Solution 1 Drop(s) Both EYES at bedtime  levothyroxine 112 MICROGram(s) Oral daily  lidocaine   4% Patch 1 Patch Transdermal daily  mupirocin 2% Nasal 1 Application(s) Both Nostrils two times a day  pantoprazole    Tablet 40 milliGRAM(s) Oral before breakfast  polyethylene glycol 3350 17 Gram(s) Oral daily  senna 2 Tablet(s) Oral at bedtime    PRN Inpatient Medications  acetaminophen     Tablet .. 650 milliGRAM(s) Oral every 6 hours PRN  guaiFENesin Oral Liquid (Sugar-Free) 200 milliGRAM(s) Oral every 6 hours PRN      REVIEW OF SYSTEMS  --------------------------------------------------------------------------------    Gen: denies fevers/chills,  CVS: denies chest pain/palpitations  Resp: denies SOB/Cough  GI: Denies N/V/Abd pain  : Denies dysuria    VITALS/PHYSICAL EXAM  --------------------------------------------------------------------------------  T(C): 36.9 (12-06-22 @ 11:16), Max: 37 (12-06-22 @ 04:38)  HR: 79 (12-06-22 @ 11:16) (74 - 95)  BP: 139/72 (12-06-22 @ 11:16) (117/57 - 146/74)  RR: 18 (12-06-22 @ 11:16) (17 - 18)  SpO2: 95% (12-06-22 @ 11:16) (94% - 95%)  Wt(kg): --        12-05-22 @ 07:01  -  12-06-22 @ 07:00  --------------------------------------------------------  IN: 720 mL / OUT: 200 mL / NET: 520 mL    12-06-22 @ 07:01  -  12-06-22 @ 15:11  --------------------------------------------------------  IN: 480 mL / OUT: 400 mL / NET: 80 mL      Physical Exam:  	  	Gen: Non toxic comfortable appearing   	Pulm: decrease bs, +coarse, no rales, wheezing  	CV: mild JVD. RRR, S1S2; no rub  	Abd: +BS, soft, nondistended, obese  	: No suprapubic tenderness  	UE: Warm, no cyanosis  no clubbing, no edema  	LE: Warm, no cyanosis  no clubbing, no edema, R knee swelling, + lidocaine patch  	Skin: Warm, no decrease skin turgor     LABS/STUDIES  --------------------------------------------------------------------------------              8.8    11.13 >-----------<  264      [12-06-22 @ 13:37]              26.9     137  |  101  |  50  ----------------------------<  87      [12-06-22 @ 05:21]  3.7   |  21  |  1.58        Ca     8.5     [12-06-22 @ 05:21]            Creatinine Trend:  SCr 1.58 [12-06 @ 05:21]  SCr 1.68 [12-05 @ 06:18]  SCr 1.51 [12-04 @ 06:32]  SCr 1.31 [12-03 @ 06:51]  SCr 1.24 [12-02 @ 10:19]              Urinalysis - [12-02-22 @ 11:20]      Color Light Yellow / Appearance Clear / SG 1.009 / pH 6.5      Gluc Negative / Ketone Negative  / Bili Negative / Urobili Negative       Blood Negative / Protein 100 / Leuk Est Negative / Nitrite Negative      RBC 8 / WBC 0 / Hyaline 0 / Gran  / Sq Epi  / Non Sq Epi 0 / Bacteria Negative      Iron 24, TIBC 245, %sat 10      [12-03-22 @ 06:53]  Ferritin 246      [12-03-22 @ 06:53]  TSH 6.90      [12-04-22 @ 06:33]

## 2022-12-06 NOTE — CONSULT NOTE ADULT - ASSESSMENT
89 year old female with PMHx of A. fib on Eliquis, myelodysplastic syndrome, HTN, HLD, moderate AS, severe Mitral regurg presents to the ED complaining of cough and chest congestion x 7 days. In ED was started on IV lasix. Noticed with rising creatinine.      1- MARIELA on CKD III  2- CHF  3- Anemia  4- HTN    MARIELA in setting of diuresis, and episode of hypotension.  baseline creatinine 1.2-1.5 g/dL from chart review.   hold losartan,   diuretics on hold   trend bp  anemia, with iron deficiency   treated with IV venofer  trend hgb  strict I/O  trend creatinine and electrolytes
89 y/o female with PMHx of A. fib on Eliquis, myelodysplastic syndrome, HTN, HLD presents to the ED complaining of cough and chest congestion admitted with acute on chronic diastolic heart failure c/b rising creatinine, episode of hypotension    #Anemia, normocytic- MDS  - maintained on Aranesp, followed by Dr. Herrera; last dose 10/19- 200 mcg, receives every 3-4 weeks  - last iron levels outpt 11/8 with decreased percent sat and was to be scheduled for IV iron prior to resuming JAVON  - iron studies 12/3 Ferritin 246, fe 24, % sat 10-- now s/p Venofer 200mg iv x 2 doses  - last Hg 12/4 was 10.0  - repeat labs, if Hg <10 and Duplex negative, will give Aranesp as inpatient as plan for DC to [facility]; if patient receives as inpatient, she would not need further dose in Rehab as she receives q 3-4 weeks    #LE edema  - greater in RLE, also with chronic arthritis that knee, will check Duplex to r/o dvt  - diuretics currently on hold with episode of hypotension/ increased creatinine- per Nephrology/cardiology    #acute on chronic diastolic HF, Afib  - on eliquis  - diuretics on hold  - cardiology following    d/w NP, d/w daughter on phone  pt will FU with Dr. Herrera post Rehab

## 2022-12-06 NOTE — PROGRESS NOTE ADULT - ASSESSMENT
89 year old female with PMHx of A. fib on Eliquis, myelodysplastic syndrome, HTN, HLD, moderate AS, severe Mitral regurg presents to the ED complaining of cough and chest congestion x 7 days. In ED was started on IV lasix. Noticed with rising creatinine.      1- MARIELA on CKD III  2- CHF  3- Anemia  4- HTN      MARIELA in setting of diuresis, and episode of hypotension.  baseline creatinine 1.2-1.5 g/dL from chart review.   creatinine improving.  resume diuretics in am  diuretics on hold   R knee pain, check uric acid  trend bp  anemia, with iron deficiency   treated with IV venofer  heme/onc consulted for MDS  trend hgb  strict I/O  trend creatinine and electrolytes

## 2022-12-06 NOTE — PROGRESS NOTE ADULT - ASSESSMENT
89 y/o female with PMHx of ACarlota fib on Eliquis, myelodysplastic syndrome, HTN, HLD presents to the ED complaining of cough and chest congestion x 7 days. Patient states that she feels like she is unable to cough up all of the mucus in her chest. She has not seen her doctor the past week. She has been taking tylenol and robitussin for mild relief at home. Past two days aide has reported patient is urinating more frequently. No reported fevers at home. Aide reports today patient was very weak and unable to walk at home. Normally ambulates without assistance. She has been eating and drinking well at home. No recent sick contacts. No headache, fever, chills, abdominal pain, n/v/d.    CHF  - hold  lasix  - strict Is and Os  - echo done    iron def anemia  - iv iron x 2 days    MARIELA improvred  - hold lasix  - monitor cre  - nephrology following    afib  - c/w Eliquis  - rate controlled    hypothyroid  -  TSH 6.9  - c/w synthroid    cough  - Robitussin    constipation  - senna and miralax    -poss discharge tomorrow    dvt px  - pt is on eliquis

## 2022-12-06 NOTE — CONSULT NOTE ADULT - SUBJECTIVE AND OBJECTIVE BOX
Patient is a 89y old  Female who presents with a chief complaint of sob (06 Dec 2022 12:27)      HPI:  89 y/o female with PMHx of TOBIAS vila on Eliquis, myelodysplastic syndrome, HTN, HLD presents to the ED complaining of cough and chest congestion x 7 days. Patient states that she feels like she is unable to cough up all of the mucus in her chest. She has not seen her doctor the past week. She has been taking tylenol and robitussin for mild relief at home. Past two days aide has reported patient is urinating more frequently. No reported fevers at home. Aide reports today patient was very weak and unable to walk at home. Normally ambulates without assistance. She has been eating and drinking well at home. No recent sick contacts. No headache, fever, chills, abdominal pain, n/v/d. (02 Dec 2022 15:53)    Pt on Aranesp for MDS, recently with lower iron levels and was to receive IV iron-- now s/p Venofer x 2 as inpatient; reports dyspnea/cough improved, no f/c, no cp, no n/v/abd pain, no bleeding, with increased R knee pain (chronic arthritis) and has had difficulty walking. 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;mylar dysplasia&#x27; treated with &quot; Arinesp&quot; x past 1.5 yrs- last dose 4 months ago      MDS (Myelodysplastic Syndrome)      Skin Cancer  of face , basal cell   4 yrs ago      History of Total Knee Replacement  L 1998      Bilateral Cataracts  71 y/o      S/P Breast Lumpectomy  10 yrs ago      Cystocele  47 yrs ago      Rectocele  47 yrs ago      Carpal Tunnel Syndrome  10 yrs ago      Basal Cell Carcinoma of Face  4 yrs ago      S/P T&amp;A  childhood        S/P TKR (Total Knee Replacement)  left      S/P Breast Lumpectomy  benign      S/P Cataract Surgery          SOCIAL HISTORY:  Smoking - Non smoker   Alcohol - Social  Drugs - No drug use    FAMILY HISTORY:  No pertinent family history in first degree relatives    F- CVA, sister- lung cancer    MEDICATIONS  (STANDING):  apixaban 2.5 milliGRAM(s) Oral two times a day  atorvastatin 10 milliGRAM(s) Oral at bedtime  chlorhexidine 2% Cloths 1 Application(s) Topical daily  folic acid 1 milliGRAM(s) Oral daily  latanoprost 0.005% Ophthalmic Solution 1 Drop(s) Both EYES at bedtime  levothyroxine 112 MICROGram(s) Oral daily  lidocaine   4% Patch 1 Patch Transdermal daily  mupirocin 2% Nasal 1 Application(s) Both Nostrils two times a day  pantoprazole    Tablet 40 milliGRAM(s) Oral before breakfast  polyethylene glycol 3350 17 Gram(s) Oral daily  senna 2 Tablet(s) Oral at bedtime    MEDICATIONS  (PRN):  acetaminophen     Tablet .. 650 milliGRAM(s) Oral every 6 hours PRN Mild Pain (1 - 3)  guaiFENesin Oral Liquid (Sugar-Free) 200 milliGRAM(s) Oral every 6 hours PRN Cough      Allergies    No Known Allergies    Intolerances    ROS  gen- no f/c, appetite stable  heent- no sore throat/difficulty swallowing, no uri sx  cv- no chest pain  resp- dyspnea improved, cough improved  gi- no n/v/abd pain, no melena/brbpr  gu- no hematuria  musculosk- chronic R knee pain worse  skin- no rash  neuro- no numbness/tingling  ROS otherwise reviewed and negative    Vital Signs Last 24 Hrs  T(C): 36.9 (06 Dec 2022 11:16), Max: 37 (06 Dec 2022 04:38)  T(F): 98.4 (06 Dec 2022 11:16), Max: 98.6 (06 Dec 2022 04:38)  HR: 79 (06 Dec 2022 11:16) (74 - 95)  BP: 139/72 (06 Dec 2022 11:16) (117/57 - 146/74)  BP(mean): --  RR: 18 (06 Dec 2022 11:16) (17 - 18)  SpO2: 95% (06 Dec 2022 11:16) (94% - 95%)    Parameters below as of 06 Dec 2022 11:16  Patient On (Oxygen Delivery Method): room air        PHYSICAL EXAM  General: adult in NAD  HEENT: clear oropharynx, anicteric sclera, pink conjunctiva  Neck: supple  CV: normal S1/S2   Lungs: clear to auscultation, no wheezes, no rales  Abdomen: soft non-tender non-distended, no hepatosplenomegaly, positive bowel sounds  Ext: RLE sl edema  Skin: no rashes and no petechiae  Lymph Nodes: No LAD in axillae, groin, neck  Neuro: alert and oriented X 3, no focal deficits    LABS:            Serial CBC's  12-04 @ 06:30  Hct-31.7 / Hgb-10.0 / Plat-257 / RBC-3.63 / WBC-8.40  Serial CBC's  12-03 @ 06:53  Hct-29.0 / Hgb-9.0 / Plat-238 / RBC-3.26 / WBC-7.58      12-06    137  |  101  |  50<H>  ----------------------------<  87  3.7   |  21<L>  |  1.58<H>    Ca    8.5      06 Dec 2022 05:21            Ferritin, Serum: 246 ng/mL (12-03 @ 06:53)  Folate, Serum: >20.0 ng/mL (12-03 @ 06:53)  Iron - Total Binding Capacity.: 245 ug/dL (12-03 @ 06:53)  Vitamin B12, Serum: 1261 pg/mL (12-03 @ 06:53)                Radiology:  < from: Xray Chest 1 View- PORTABLE-Urgent (Xray Chest 1 View- PORTABLE-Urgent .) (12.02.22 @ 12:25) >  IMPRESSION:  Bibasilar atelectasis.  Unchanged cardiomegaly.    < end of copied text >

## 2022-12-06 NOTE — PROGRESS NOTE ADULT - ASSESSMENT
Patient seen and examined, agree with above assessment and plan as transcribed above.    - diuretics on hold  - f/u renal fx    Maciej Courtney MD, [facility]  [facility]PER [phone]

## 2022-12-06 NOTE — PROGRESS NOTE ADULT - SUBJECTIVE AND OBJECTIVE BOX
CARDIOLOGY     tele: AF 70-80s    she reports dyspnea has improved, denies palpitations, syncope, nor angina, but still feels weak    DATE OF SERVICE - 12-06-22     Review of Systems:   Constitutional: [ ] fevers, [ ] chills.   Skin: [ ] dry skin. [ ] rashes.  Psychiatric: [ ] depression, [ ] anxiety.   Gastrointestinal: [ ] BRBPR, [ ] melena.   Neurological: [ ] confusion. [ ] seizures. [ ] shuffling gait.   Ears,Nose,Mouth and Throat: [ ] ear pain [ ] sore throat.   Eyes: [ ] diplopia.   Respiratory: [ ] hemoptysis. [ ] shortness of breath  Cardiovascular: See HPI above  Hematologic/Lymphatic: [ ] anemia. [ ] painful nodes. [ ] prolonged bleeding.   Genitourinary: [ ] hematuria. [ ] flank pain.   Endocrine: [ ] significant change in weight. [ ] intolerance to heat and cold.     Review of systems [ x] otherwise negative, [ ] otherwise unable to obtain    FH: no family history of sudden cardiac death in first degree relatives    SH: [ ] tobacco, [ ] alcohol, [ ] drugs    acetaminophen     Tablet .. 650 milliGRAM(s) Oral every 6 hours PRN  apixaban 2.5 milliGRAM(s) Oral two times a day  atorvastatin 10 milliGRAM(s) Oral at bedtime  chlorhexidine 2% Cloths 1 Application(s) Topical daily  folic acid 1 milliGRAM(s) Oral daily  guaiFENesin Oral Liquid (Sugar-Free) 200 milliGRAM(s) Oral every 6 hours PRN  latanoprost 0.005% Ophthalmic Solution 1 Drop(s) Both EYES at bedtime  levothyroxine 112 MICROGram(s) Oral daily  lidocaine   4% Patch 1 Patch Transdermal daily  mupirocin 2% Nasal 1 Application(s) Both Nostrils two times a day  pantoprazole    Tablet 40 milliGRAM(s) Oral before breakfast  polyethylene glycol 3350 17 Gram(s) Oral daily  senna 2 Tablet(s) Oral at bedtime                          8.8    11.13 )-----------( 264      ( 06 Dec 2022 13:37 )             26.9       137  |  101  |  50<H>  ----------------------------<  87  3.7   |  21<L>  |  1.58<H>    Ca    8.5      06 Dec 2022 05:21      CARDIAC MARKERS ( 04 Dec 2022 08:21 )  x     / x     / 57 U/L / x     / 3.1 ng/mL      T(C): 36.9 (12-06-22 @ 11:16), Max: 37 (12-06-22 @ 04:38)  HR: 79 (12-06-22 @ 11:16) (74 - 95)  BP: 139/72 (12-06-22 @ 11:16) (117/57 - 146/74)  RR: 18 (12-06-22 @ 11:16) (17 - 18)  SpO2: 95% (12-06-22 @ 11:16) (94% - 95%)  Wt(kg): --    General: Well nourished in no acute distress. Alert and Oriented * 3.   Head: Normocephalic and atraumatic.   Neck: No JVD. No bruits. Supple. Does not appear to be enlarged.   Cardiovascular: + S1,S2 ; IRR Soft systolic murmur at the left lower sternal border. No rubs noted.    Lungs: CTA b/l. No rhonchi, rales or wheezes.   Abdomen: + BS, soft. Non tender. Non distended. No rebound. No guarding.   Extremities: No clubbing/cyanosis/edema.   Neurologic: Moves all four extremities. Full range of motion.   Skin: Warm and moist. The patient's skin has normal elasticity and good skin turgor.   Psychiatric: Appropriate mood and affect.  Musculoskeletal: Normal range of motion, normal strength      < from: TTE with Doppler (w/Cont) (12.04.22 @ 10:44) >  Conclusions:  1. Severe mitral regurgitation.  2. Calcified trileaflet aortic valve with decreased  opening. Peak transaortic valve gradient equals 44 mm Hg,  mean transaortic valve gradient equals 18 mm Hg, estimated  aortic valve area equals 1 sqcm (by continuity equation),  aortic valve velocity time integral equals 50 cm,  consistent with moderate aortic stenosis.  3. Severely dilated left atrium.  LA volume index = 68  cc/m2.  4. Moderate concentric left ventricular hypertrophy.  5. Normal left ventricular systolic function.  6. Right atrial enlargement.  7. Right ventricular enlargement with decreased right  ventricular systolic function.  8. Normal tricuspid valve. Moderate tricuspid  regurgitation.  9. Estimated pulmonary artery systolic pressure equals 46  mm Hg, assuming right atrial pressure equals 8 mm Hg,  consistent with mild pulmonary pressures.  *** Compared with echocardiogram of 5/23/2022, no  significant changes noted.  ------------------------------------------------------------------------  Confirmed on  12/4/2022 - 12:35:27 by JENNIFER Martínez    < end of copied text >      A/P) 89 y/o female PMH persistent AF (since 2019), hypertension, hyperlipidemia, CAD s/p PCI > 1 year ago, MDS a/w CHFpEF. She refused MitraClip for severe MR    -echo unchanged from prior  -continue eliquis 2.5mg bid for lifelong a/c  -continue lipitor for CAD  -continue synthroid for hypothyroidism  -diuretics held for MARIELA, renal following- plan to resume tomorrow  -f/u with Solomon after discharge, [phone]    Adelina STANLEY  [phone]

## 2022-12-06 NOTE — PROGRESS NOTE ADULT - SUBJECTIVE AND OBJECTIVE BOX
DATE OF SERVICE: 12-06-22 @ 12:27    Patient is a 89y old  Female who presents with a chief complaint of sob (05 Dec 2022 15:03)       SUBJECTIVE / OVERNIGHT EVENTS:  doing better today    MEDICATIONS  (STANDING):  apixaban 2.5 milliGRAM(s) Oral two times a day  atorvastatin 10 milliGRAM(s) Oral at bedtime  chlorhexidine 2% Cloths 1 Application(s) Topical daily  folic acid 1 milliGRAM(s) Oral daily  latanoprost 0.005% Ophthalmic Solution 1 Drop(s) Both EYES at bedtime  levothyroxine 112 MICROGram(s) Oral daily  lidocaine   4% Patch 1 Patch Transdermal daily  mupirocin 2% Nasal 1 Application(s) Both Nostrils two times a day  pantoprazole    Tablet 40 milliGRAM(s) Oral before breakfast  polyethylene glycol 3350 17 Gram(s) Oral daily  senna 2 Tablet(s) Oral at bedtime    MEDICATIONS  (PRN):  acetaminophen     Tablet .. 650 milliGRAM(s) Oral every 6 hours PRN Mild Pain (1 - 3)  guaiFENesin Oral Liquid (Sugar-Free) 200 milliGRAM(s) Oral every 6 hours PRN Cough      Vital Signs Last 24 Hrs  T(C): 36.9 (06 Dec 2022 11:16), Max: 37 (06 Dec 2022 04:38)  T(F): 98.4 (06 Dec 2022 11:16), Max: 98.6 (06 Dec 2022 04:38)  HR: 79 (06 Dec 2022 11:16) (74 - 95)  BP: 139/72 (06 Dec 2022 11:16) (117/57 - 146/74)  BP(mean): --  RR: 18 (06 Dec 2022 11:16) (17 - 18)  SpO2: 95% (06 Dec 2022 11:16) (94% - 98%)    Parameters below as of 06 Dec 2022 11:16  Patient On (Oxygen Delivery Method): room air      CAPILLARY BLOOD GLUCOSE        I&O's Summary    05 Dec 2022 07:01  -  06 Dec 2022 07:00  --------------------------------------------------------  IN: 720 mL / OUT: 200 mL / NET: 520 mL        PHYSICAL EXAM:  GENERAL: NAD, well-developed  HEAD:  Atraumatic, Normocephalic  EYES: EOMI, PERRLA, conjunctiva and sclera clear  NECK: Supple, No JVD  CHEST/LUNG: Clear to auscultation bilaterally; No wheeze  HEART: Regular rate and rhythm; No murmurs, rubs, or gallops  ABDOMEN: Soft, Nontender, Nondistended; Bowel sounds present  EXTREMITIES:  2+ Peripheral Pulses, No clubbing, cyanosis, or edema  PSYCH: AAOx3  NEUROLOGY: non-focal  SKIN: No rashes or lesions    LABS:    12-06    137  |  101  |  50<H>  ----------------------------<  87  3.7   |  21<L>  |  1.58<H>    Ca    8.5      06 Dec 2022 05:21                RADIOLOGY & ADDITIONAL TESTS:    Imaging Personally Reviewed:    Consultant(s) Notes Reviewed:      Care Discussed with Consultants/Other Providers:

## 2022-12-07 LAB
ANION GAP SERPL CALC-SCNC: 15 MMOL/L — SIGNIFICANT CHANGE UP (ref 5–17)
BUN SERPL-MCNC: 51 MG/DL — HIGH (ref 7–23)
CALCIUM SERPL-MCNC: 9.2 MG/DL — SIGNIFICANT CHANGE UP (ref 8.4–10.5)
CHLORIDE SERPL-SCNC: 100 MMOL/L — SIGNIFICANT CHANGE UP (ref 96–108)
CO2 SERPL-SCNC: 20 MMOL/L — LOW (ref 22–31)
CREAT SERPL-MCNC: 1.49 MG/DL — HIGH (ref 0.5–1.3)
CULTURE RESULTS: SIGNIFICANT CHANGE UP
EGFR: 31 ML/MIN/1.73M2 — LOW
GLUCOSE SERPL-MCNC: 127 MG/DL — HIGH (ref 70–99)
HCT VFR BLD CALC: 29 % — LOW (ref 34.5–45)
HGB BLD-MCNC: 9.3 G/DL — LOW (ref 11.5–15.5)
MCHC RBC-ENTMCNC: 27.8 PG — SIGNIFICANT CHANGE UP (ref 27–34)
MCHC RBC-ENTMCNC: 32.1 GM/DL — SIGNIFICANT CHANGE UP (ref 32–36)
MCV RBC AUTO: 86.6 FL — SIGNIFICANT CHANGE UP (ref 80–100)
NRBC # BLD: 0 /100 WBCS — SIGNIFICANT CHANGE UP (ref 0–0)
PLATELET # BLD AUTO: 293 K/UL — SIGNIFICANT CHANGE UP (ref 150–400)
POTASSIUM SERPL-MCNC: 4.2 MMOL/L — SIGNIFICANT CHANGE UP (ref 3.5–5.3)
POTASSIUM SERPL-SCNC: 4.2 MMOL/L — SIGNIFICANT CHANGE UP (ref 3.5–5.3)
RBC # BLD: 3.35 M/UL — LOW (ref 3.8–5.2)
RBC # FLD: 15.9 % — HIGH (ref 10.3–14.5)
SARS-COV-2 RNA SPEC QL NAA+PROBE: SIGNIFICANT CHANGE UP
SODIUM SERPL-SCNC: 135 MMOL/L — SIGNIFICANT CHANGE UP (ref 135–145)
SPECIMEN SOURCE: SIGNIFICANT CHANGE UP
WBC # BLD: 8.72 K/UL — SIGNIFICANT CHANGE UP (ref 3.8–10.5)
WBC # FLD AUTO: 8.72 K/UL — SIGNIFICANT CHANGE UP (ref 3.8–10.5)

## 2022-12-07 RX ADMIN — LIDOCAINE 1 PATCH: 4 CREAM TOPICAL at 11:09

## 2022-12-07 RX ADMIN — Medication 1 MILLIGRAM(S): at 11:10

## 2022-12-07 RX ADMIN — Medication 650 MILLIGRAM(S): at 11:10

## 2022-12-07 RX ADMIN — PANTOPRAZOLE SODIUM 40 MILLIGRAM(S): 20 TABLET, DELAYED RELEASE ORAL at 05:18

## 2022-12-07 RX ADMIN — ATORVASTATIN CALCIUM 10 MILLIGRAM(S): 80 TABLET, FILM COATED ORAL at 21:58

## 2022-12-07 RX ADMIN — LIDOCAINE 1 PATCH: 4 CREAM TOPICAL at 23:09

## 2022-12-07 RX ADMIN — LIDOCAINE 1 PATCH: 4 CREAM TOPICAL at 17:27

## 2022-12-07 RX ADMIN — Medication 650 MILLIGRAM(S): at 07:43

## 2022-12-07 RX ADMIN — MUPIROCIN 1 APPLICATION(S): 20 OINTMENT TOPICAL at 05:18

## 2022-12-07 RX ADMIN — LATANOPROST 1 DROP(S): 0.05 SOLUTION/ DROPS OPHTHALMIC; TOPICAL at 21:58

## 2022-12-07 RX ADMIN — Medication 650 MILLIGRAM(S): at 11:27

## 2022-12-07 RX ADMIN — Medication 20 MILLIGRAM(S): at 12:20

## 2022-12-07 RX ADMIN — APIXABAN 2.5 MILLIGRAM(S): 2.5 TABLET, FILM COATED ORAL at 05:18

## 2022-12-07 RX ADMIN — APIXABAN 2.5 MILLIGRAM(S): 2.5 TABLET, FILM COATED ORAL at 17:26

## 2022-12-07 RX ADMIN — MUPIROCIN 1 APPLICATION(S): 20 OINTMENT TOPICAL at 17:27

## 2022-12-07 RX ADMIN — Medication 112 MICROGRAM(S): at 05:18

## 2022-12-07 RX ADMIN — Medication 650 MILLIGRAM(S): at 18:31

## 2022-12-07 RX ADMIN — CHLORHEXIDINE GLUCONATE 1 APPLICATION(S): 213 SOLUTION TOPICAL at 11:10

## 2022-12-07 NOTE — PROGRESS NOTE ADULT - ASSESSMENT
CARDIOLOGY ATTENDING    Patient seen and examined. Agree with above. Continue eliquis for lifelong a/c, lipitor for CAD and f/u renal.

## 2022-12-07 NOTE — PROGRESS NOTE ADULT - SUBJECTIVE AND OBJECTIVE BOX
CARDIOLOGY     tele: AF 70-90    she denies dyspnea, palpitations, syncope, nor angina, but still feels weak    DATE OF SERVICE - 12-07-22     Review of Systems:   Constitutional: [ ] fevers, [ ] chills.   Skin: [ ] dry skin. [ ] rashes.  Psychiatric: [ ] depression, [ ] anxiety.   Gastrointestinal: [ ] BRBPR, [ ] melena.   Neurological: [ ] confusion. [ ] seizures. [ ] shuffling gait.   Ears,Nose,Mouth and Throat: [ ] ear pain [ ] sore throat.   Eyes: [ ] diplopia.   Respiratory: [ ] hemoptysis. [ ] shortness of breath  Cardiovascular: See HPI above  Hematologic/Lymphatic: [ ] anemia. [ ] painful nodes. [ ] prolonged bleeding.   Genitourinary: [ ] hematuria. [ ] flank pain.   Endocrine: [ ] significant change in weight. [ ] intolerance to heat and cold.     Review of systems [ x] otherwise negative, [ ] otherwise unable to obtain    FH: no family history of sudden cardiac death in first degree relatives    SH: [ ] tobacco, [ ] alcohol, [ ] drugs    MEDICATIONS:  acetaminophen     Tablet .. 650 milliGRAM(s) Oral every 6 hours PRN  apixaban 2.5 milliGRAM(s) Oral two times a day  atorvastatin 10 milliGRAM(s) Oral at bedtime  chlorhexidine 2% Cloths 1 Application(s) Topical daily  darbepoetin Injectable ViaL 200 MICROGram(s) SubCutaneous once  folic acid 1 milliGRAM(s) Oral daily  guaiFENesin Oral Liquid (Sugar-Free) 200 milliGRAM(s) Oral every 6 hours PRN  latanoprost 0.005% Ophthalmic Solution 1 Drop(s) Both EYES at bedtime  levothyroxine 112 MICROGram(s) Oral daily  lidocaine   4% Patch 1 Patch Transdermal daily  methylPREDNISolone sodium succinate Injectable 20 milliGRAM(s) IV Push once  mupirocin 2% Nasal 1 Application(s) Both Nostrils two times a day  pantoprazole    Tablet 40 milliGRAM(s) Oral before breakfast  polyethylene glycol 3350 17 Gram(s) Oral daily  senna 2 Tablet(s) Oral at bedtime      LABS:                        9.3    8.72  )-----------( 293      ( 07 Dec 2022 06:34 )             29.0       Hemoglobin: 9.3 g/dL (12-07 @ 06:34)  Hemoglobin: 8.8 g/dL (12-06 @ 13:37)  Hemoglobin: 10.0 g/dL (12-04 @ 06:30)  Hemoglobin: 9.0 g/dL (12-03 @ 06:53)      12-07    135  |  100  |  51<H>  ----------------------------<  127<H>  4.2   |  20<L>  |  1.49<H>    Ca    9.2      07 Dec 2022 06:35      Creatinine Trend: 1.49<--, 1.58<--, 1.68<--, 1.51<--, 1.31<--, 1.24<--    COAGS:           PHYSICAL EXAM:  T(C): 36.4 (12-07-22 @ 10:48), Max: 36.9 (12-07-22 @ 05:12)  HR: 75 (12-07-22 @ 10:48) (75 - 90)  BP: 146/79 (12-07-22 @ 10:48) (125/74 - 147/70)  RR: 19 (12-07-22 @ 10:48) (18 - 19)  SpO2: 93% (12-07-22 @ 10:48) (93% - 96%)  Wt(kg): --    I&O's Summary    06 Dec 2022 07:01  -  07 Dec 2022 07:00  --------------------------------------------------------  IN: 720 mL / OUT: 1000 mL / NET: -280 mL    07 Dec 2022 07:01  -  07 Dec 2022 11:45  --------------------------------------------------------  IN: 200 mL / OUT: 900 mL / NET: -700 mL        General: Well nourished in no acute distress. Alert and Oriented * 3.   Head: Normocephalic and atraumatic.   Neck: No JVD. No bruits. Supple. Does not appear to be enlarged.   Cardiovascular: + S1,S2 ; IRR Soft systolic murmur at the left lower sternal border. No rubs noted.    Lungs: CTA b/l. No rhonchi, rales or wheezes.   Abdomen: + BS, soft. Non tender. Non distended. No rebound. No guarding.   Extremities: No clubbing/cyanosis/edema.   Neurologic: Moves all four extremities. Full range of motion.   Skin: Warm and moist. The patient's skin has normal elasticity and good skin turgor.   Psychiatric: Appropriate mood and affect.  Musculoskeletal: Normal range of motion, normal strength      < from: TTE with Doppler (w/Cont) (12.04.22 @ 10:44) >  Conclusions:  1. Severe mitral regurgitation.  2. Calcified trileaflet aortic valve with decreased  opening. Peak transaortic valve gradient equals 44 mm Hg,  mean transaortic valve gradient equals 18 mm Hg, estimated  aortic valve area equals 1 sqcm (by continuity equation),  aortic valve velocity time integral equals 50 cm,  consistent with moderate aortic stenosis.  3. Severely dilated left atrium.  LA volume index = 68  cc/m2.  4. Moderate concentric left ventricular hypertrophy.  5. Normal left ventricular systolic function.  6. Right atrial enlargement.  7. Right ventricular enlargement with decreased right  ventricular systolic function.  8. Normal tricuspid valve. Moderate tricuspid  regurgitation.  9. Estimated pulmonary artery systolic pressure equals 46  mm Hg, assuming right atrial pressure equals 8 mm Hg,  consistent with mild pulmonary pressures.  *** Compared with echocardiogram of 5/23/2022, no  significant changes noted.  ------------------------------------------------------------------------  Confirmed on  12/4/2022 - 12:35:27 by JENNIFER Martínez    < end of copied text >      A/P) 89 y/o female PMH persistent AF (since 2019), hypertension, hyperlipidemia, CAD s/p PCI > 1 year ago, MDS a/w CHFpEF. She refused MitraClip for severe MR    -echo unchanged from prior  -continue eliquis 2.5mg bid for lifelong a/c  -continue lipitor for CAD  -continue synthroid for hypothyroidism  -diuretics held for MARIELA, restart when ok with renal  -no further inpatient cardiac w/u planned  -f/u with [facility] after discharge, [phone]    Ivan Montero PA-C  Pager: [phone]

## 2022-12-07 NOTE — PROGRESS NOTE ADULT - SUBJECTIVE AND OBJECTIVE BOX
DATE OF SERVICE: 12-07-22 @ 11:09    Patient is a 89y old  Female who presents with a chief complaint of sob (06 Dec 2022 15:27)      SUBJECTIVE / OVERNIGHT EVENTS:  No chest pain. No shortness of breath. No complaints. No events overnight.   right knee pain is better.    MEDICATIONS  (STANDING):  apixaban 2.5 milliGRAM(s) Oral two times a day  atorvastatin 10 milliGRAM(s) Oral at bedtime  chlorhexidine 2% Cloths 1 Application(s) Topical daily  darbepoetin Injectable ViaL 200 MICROGram(s) SubCutaneous once  folic acid 1 milliGRAM(s) Oral daily  latanoprost 0.005% Ophthalmic Solution 1 Drop(s) Both EYES at bedtime  levothyroxine 112 MICROGram(s) Oral daily  lidocaine   4% Patch 1 Patch Transdermal daily  mupirocin 2% Nasal 1 Application(s) Both Nostrils two times a day  pantoprazole    Tablet 40 milliGRAM(s) Oral before breakfast  polyethylene glycol 3350 17 Gram(s) Oral daily  senna 2 Tablet(s) Oral at bedtime    MEDICATIONS  (PRN):  acetaminophen     Tablet .. 650 milliGRAM(s) Oral every 6 hours PRN Mild Pain (1 - 3)  guaiFENesin Oral Liquid (Sugar-Free) 200 milliGRAM(s) Oral every 6 hours PRN Cough      Vital Signs Last 24 Hrs  T(C): 36.4 (07 Dec 2022 10:48), Max: 36.9 (06 Dec 2022 11:16)  T(F): 97.6 (07 Dec 2022 10:48), Max: 98.4 (06 Dec 2022 11:16)  HR: 75 (07 Dec 2022 10:48) (75 - 90)  BP: 146/79 (07 Dec 2022 10:48) (125/74 - 147/70)  BP(mean): --  RR: 19 (07 Dec 2022 10:48) (18 - 19)  SpO2: 93% (07 Dec 2022 10:48) (93% - 96%)    Parameters below as of 07 Dec 2022 10:48  Patient On (Oxygen Delivery Method): room air      CAPILLARY BLOOD GLUCOSE        I&O's Summary    06 Dec 2022 07:01  -  07 Dec 2022 07:00  --------------------------------------------------------  IN: 720 mL / OUT: 1000 mL / NET: -280 mL    07 Dec 2022 07:01  -  07 Dec 2022 11:09  --------------------------------------------------------  IN: 200 mL / OUT: 0 mL / NET: 200 mL        PHYSICAL EXAM:  GENERAL: NAD, well-developed  HEAD:  Atraumatic, Normocephalic  EYES: EOMI, PERRLA, conjunctiva and sclera clear  NECK: Supple, No JVD  CHEST/LUNG: Clear to auscultation bilaterally; No wheeze  HEART: Regular rate and rhythm; No murmurs, rubs, or gallops  ABDOMEN: Soft, Nontender, Nondistended; Bowel sounds present  EXTREMITIES:  2+ Peripheral Pulses, No clubbing, cyanosis, or edema  PSYCH: AAOx3  NEUROLOGY: non-focal  SKIN: No rashes or lesions    LABS:                        9.3    8.72  )-----------( 293      ( 07 Dec 2022 06:34 )             29.0     12-07    135  |  100  |  51<H>  ----------------------------<  127<H>  4.2   |  20<L>  |  1.49<H>    Ca    9.2      07 Dec 2022 06:35                RADIOLOGY & ADDITIONAL TESTS:    Imaging Personally Reviewed:    Consultant(s) Notes Reviewed:      Care Discussed with Consultants/Other Providers:

## 2022-12-07 NOTE — PROGRESS NOTE ADULT - ASSESSMENT
89 y/o female with PMHx of TOBIAS fib on Eliquis, myelodysplastic syndrome, HTN, HLD presents to the ED complaining of cough and chest congestion x 7 days. Patient states that she feels like she is unable to cough up all of the mucus in her chest. She has not seen her doctor the past week. She has been taking tylenol and robitussin for mild relief at home. Past two days aide has reported patient is urinating more frequently. No reported fevers at home. Aide reports today patient was very weak and unable to walk at home. Normally ambulates without assistance. She has been eating and drinking well at home. No recent sick contacts. No headache, fever, chills, abdominal pain, n/v/d.    poss gout  - solumedrol 20 x 1 today    CHF  - hold  lasix  - strict Is and Os  - echo done    iron def anemia  - iv iron x 2 days    MARIELA improvred  - hold lasix  - monitor cre  - nephrology following    afib  - c/w Eliquis  - rate controlled    hypothyroid  -  TSH 6.9  - c/w synthroid    cough  - Robitussin    constipation  - senna and miralax    -poss discharge tomorrow    dvt px  - pt is on eliquis    d/c planning to [facility]

## 2022-12-08 RX ORDER — FUROSEMIDE 40 MG
20 TABLET ORAL DAILY
Refills: 0 | Status: DISCONTINUED | OUTPATIENT
Start: 2022-12-08 | End: 2022-12-09

## 2022-12-08 RX ORDER — TAMSULOSIN HYDROCHLORIDE 0.4 MG/1
0.4 CAPSULE ORAL AT BEDTIME
Refills: 0 | Status: DISCONTINUED | OUTPATIENT
Start: 2022-12-08 | End: 2022-12-09

## 2022-12-08 RX ADMIN — Medication 650 MILLIGRAM(S): at 23:32

## 2022-12-08 RX ADMIN — Medication 1 MILLIGRAM(S): at 11:06

## 2022-12-08 RX ADMIN — LIDOCAINE 1 PATCH: 4 CREAM TOPICAL at 11:06

## 2022-12-08 RX ADMIN — APIXABAN 2.5 MILLIGRAM(S): 2.5 TABLET, FILM COATED ORAL at 17:40

## 2022-12-08 RX ADMIN — APIXABAN 2.5 MILLIGRAM(S): 2.5 TABLET, FILM COATED ORAL at 05:45

## 2022-12-08 RX ADMIN — Medication 650 MILLIGRAM(S): at 17:41

## 2022-12-08 RX ADMIN — Medication 20 MILLIGRAM(S): at 13:13

## 2022-12-08 RX ADMIN — MUPIROCIN 1 APPLICATION(S): 20 OINTMENT TOPICAL at 05:44

## 2022-12-08 RX ADMIN — Medication 200 MICROGRAM(S): at 17:40

## 2022-12-08 RX ADMIN — Medication 650 MILLIGRAM(S): at 08:53

## 2022-12-08 RX ADMIN — LATANOPROST 1 DROP(S): 0.05 SOLUTION/ DROPS OPHTHALMIC; TOPICAL at 22:04

## 2022-12-08 RX ADMIN — TAMSULOSIN HYDROCHLORIDE 0.4 MILLIGRAM(S): 0.4 CAPSULE ORAL at 22:04

## 2022-12-08 RX ADMIN — MUPIROCIN 1 APPLICATION(S): 20 OINTMENT TOPICAL at 17:41

## 2022-12-08 RX ADMIN — LIDOCAINE 1 PATCH: 4 CREAM TOPICAL at 18:47

## 2022-12-08 RX ADMIN — Medication 650 MILLIGRAM(S): at 09:55

## 2022-12-08 RX ADMIN — PANTOPRAZOLE SODIUM 40 MILLIGRAM(S): 20 TABLET, DELAYED RELEASE ORAL at 05:45

## 2022-12-08 RX ADMIN — ATORVASTATIN CALCIUM 10 MILLIGRAM(S): 80 TABLET, FILM COATED ORAL at 22:04

## 2022-12-08 RX ADMIN — Medication 112 MICROGRAM(S): at 05:44

## 2022-12-08 RX ADMIN — CHLORHEXIDINE GLUCONATE 1 APPLICATION(S): 213 SOLUTION TOPICAL at 11:05

## 2022-12-08 NOTE — PROGRESS NOTE ADULT - SUBJECTIVE AND OBJECTIVE BOX
CARDIOLOGY     tele: AF 60-80s    she feels weak but denies dyspnea, palpitations, syncope, nor angina.    DATE OF SERVICE - 12-08-22     Review of Systems:   Constitutional: [ ] fevers, [ ] chills.   Skin: [ ] dry skin. [ ] rashes.  Psychiatric: [ ] depression, [ ] anxiety.   Gastrointestinal: [ ] BRBPR, [ ] melena.   Neurological: [ ] confusion. [ ] seizures. [ ] shuffling gait.   Ears,Nose,Mouth and Throat: [ ] ear pain [ ] sore throat.   Eyes: [ ] diplopia.   Respiratory: [ ] hemoptysis. [ ] shortness of breath  Cardiovascular: See HPI above  Hematologic/Lymphatic: [ ] anemia. [ ] painful nodes. [ ] prolonged bleeding.   Genitourinary: [ ] hematuria. [ ] flank pain.   Endocrine: [ ] significant change in weight. [ ] intolerance to heat and cold.     Review of systems [ x] otherwise negative, [ ] otherwise unable to obtain    FH: no family history of sudden cardiac death in first degree relatives    SH: [ ] tobacco, [ ] alcohol, [ ] drugs    MEDICATIONS:  acetaminophen     Tablet .. 650 milliGRAM(s) Oral every 6 hours PRN  apixaban 2.5 milliGRAM(s) Oral two times a day  atorvastatin 10 milliGRAM(s) Oral at bedtime  chlorhexidine 2% Cloths 1 Application(s) Topical daily  darbepoetin Injectable ViaL 200 MICROGram(s) SubCutaneous once  folic acid 1 milliGRAM(s) Oral daily  furosemide    Tablet 20 milliGRAM(s) Oral daily  guaiFENesin Oral Liquid (Sugar-Free) 200 milliGRAM(s) Oral every 6 hours PRN  latanoprost 0.005% Ophthalmic Solution 1 Drop(s) Both EYES at bedtime  levothyroxine 112 MICROGram(s) Oral daily  lidocaine   4% Patch 1 Patch Transdermal daily  mupirocin 2% Nasal 1 Application(s) Both Nostrils two times a day  pantoprazole    Tablet 40 milliGRAM(s) Oral before breakfast  polyethylene glycol 3350 17 Gram(s) Oral daily  senna 2 Tablet(s) Oral at bedtime  tamsulosin 0.4 milliGRAM(s) Oral at bedtime      LABS:                        9.3    8.72  )-----------( 293      ( 07 Dec 2022 06:34 )             29.0       Hemoglobin: 9.3 g/dL (12-07 @ 06:34)  Hemoglobin: 8.8 g/dL (12-06 @ 13:37)  Hemoglobin: 10.0 g/dL (12-04 @ 06:30)      12-07    135  |  100  |  51<H>  ----------------------------<  127<H>  4.2   |  20<L>  |  1.49<H>    Ca    9.2      07 Dec 2022 06:35      Creatinine Trend: 1.49<--, 1.58<--, 1.68<--, 1.51<--, 1.31<--, 1.24<--    COAGS:           PHYSICAL EXAM:  T(C): 36.8 (12-08-22 @ 11:03), Max: 36.8 (12-07-22 @ 16:38)  HR: 72 (12-08-22 @ 11:03) (72 - 86)  BP: 156/78 (12-08-22 @ 11:03) (152/83 - 161/82)  RR: 18 (12-08-22 @ 11:03) (18 - 18)  SpO2: 95% (12-08-22 @ 11:03) (94% - 96%)  Wt(kg): --    I&O's Summary    07 Dec 2022 07:01  -  08 Dec 2022 07:00  --------------------------------------------------------  IN: 640 mL / OUT: 1600 mL / NET: -960 mL    08 Dec 2022 07:01  -  08 Dec 2022 13:58  --------------------------------------------------------  IN: 240 mL / OUT: 0 mL / NET: 240 mL          General: Well nourished in no acute distress. Alert and Oriented * 3.   Head: Normocephalic and atraumatic.   Neck: No JVD. No bruits. Supple. Does not appear to be enlarged.   Cardiovascular: + S1,S2 ; IRR Soft systolic murmur at the left lower sternal border. No rubs noted.    Lungs: CTA b/l. No rhonchi, rales or wheezes.   Abdomen: + BS, soft. Non tender. Non distended. No rebound. No guarding.   Extremities: No clubbing/cyanosis/edema.   Neurologic: Moves all four extremities. Full range of motion.   Skin: Warm and moist. The patient's skin has normal elasticity and good skin turgor.   Psychiatric: Appropriate mood and affect.  Musculoskeletal: Normal range of motion, normal strength      < from: TTE with Doppler (w/Cont) (12.04.22 @ 10:44) >  Conclusions:  1. Severe mitral regurgitation.  2. Calcified trileaflet aortic valve with decreased  opening. Peak transaortic valve gradient equals 44 mm Hg,  mean transaortic valve gradient equals 18 mm Hg, estimated  aortic valve area equals 1 sqcm (by continuity equation),  aortic valve velocity time integral equals 50 cm,  consistent with moderate aortic stenosis.  3. Severely dilated left atrium.  LA volume index = 68  cc/m2.  4. Moderate concentric left ventricular hypertrophy.  5. Normal left ventricular systolic function.  6. Right atrial enlargement.  7. Right ventricular enlargement with decreased right  ventricular systolic function.  8. Normal tricuspid valve. Moderate tricuspid  regurgitation.  9. Estimated pulmonary artery systolic pressure equals 46  mm Hg, assuming right atrial pressure equals 8 mm Hg,  consistent with mild pulmonary pressures.  *** Compared with echocardiogram of 5/23/2022, no  significant changes noted.  ------------------------------------------------------------------------  Confirmed on  12/4/2022 - 12:35:27 by JENNIFER Martínez    < end of copied text >      A/P) 89 y/o female PMH persistent AF (since 2019), hypertension, hyperlipidemia, CAD s/p PCI > 1 year ago, MDS a/w CHFpEF. She refused MitraClip for severe MR    -echo unchanged from prior  -continue eliquis 2.5mg bid for lifelong a/c  -continue lipitor for CAD  -continue synthroid for hypothyroidism  -MARIELA improving, PO lasix restarted per renal  -no further inpatient cardiac w/u planned  -f/u with [facility] after discharge, [phone]    Ivan Montero PA-C  Pager: [phone]

## 2022-12-08 NOTE — PROGRESS NOTE ADULT - ASSESSMENT
89 y/o female with PMHx of A. fib on Eliquis, myelodysplastic syndrome, HTN, HLD presents to the ED complaining of cough and chest congestion admitted with acute on chronic diastolic heart failure c/b rising creatinine, episode of hypotension    #Anemia, normocytic- MDS  - maintained on Aranesp, followed by Dr. Herrera; last dose 10/19- 200 mcg, receives every 3-4 weeks  - last iron levels outpt 11/8 with decreased percent sat and was to be scheduled for IV iron prior to resuming JAVON  - iron studies 12/3 Ferritin 246, fe 24, % sat 10-- now s/p Venofer 200mg iv x 2 doses  - Hg with some fluctuation but remains <10-- written for Aranesp 200mcg sc  x1- to receive today; she would not need further dose in Rehab as she receives q 3-4 weeks    #LE edema  - greater in RLE, also with chronic arthritis that knee, will check Duplex to r/o dvt  - diuretics were on hold with episode of hypotension/ increased creatinine- to be restarted; nephrology following    #acute on chronic diastolic HF, Afib  - on eliquis  - lasix to be restarted  - cardiology following    d/w NP  pt will FU with Dr. Herrera post Rehab

## 2022-12-08 NOTE — PROVIDER CONTACT NOTE (OTHER) - BACKGROUND
Admit diagnosis: Heart failure
Dx: CHF exacerbation, 89 y.o. female
patient admitted for heart faliure

## 2022-12-08 NOTE — PROGRESS NOTE ADULT - ASSESSMENT
Agree with above assessment and plan as outlined above.    - No need for further inpatient cardiac work up.      Maciej Courtney MD, [facility]  BEEPER [phone]

## 2022-12-08 NOTE — PROVIDER CONTACT NOTE (OTHER) - SITUATION
Stage 1 pressure injury
blood tinged urine after titus insertion
Patient with 15 second syncopal episode while using bedpan for bowel movement
DISPLAY PLAN FREE TEXT

## 2022-12-08 NOTE — PROGRESS NOTE ADULT - ASSESSMENT
89 year old female with PMHx of TOBIAS fib on Eliquis, myelodysplastic syndrome, HTN, HLD, moderate AS, severe Mitral regurg presents to the ED complaining of cough and chest congestion x 7 days. In ED was started on IV lasix. Noticed with rising creatinine.      1- MARIELA on CKD III  2- CHF  3- Anemia  4- HTN      MARIELA in setting of diuresis, and episode of hypotension.  baseline creatinine 1.2-1.5 g/dL from chart review.   creatinine steady  restart lasix 20 mg po daily  R knee pain, daughter states also has arthritis "bone on bone"  uric acid 9.5 -> pain not relieved with solumedrol yesterday   continue lidocaine patch  trend bp  anemia, with iron deficiency   treated with IV venofer  heme/onc following for MDS  trend hgb  strict I/O  trend creatinine and electrolytes

## 2022-12-08 NOTE — PROGRESS NOTE ADULT - SUBJECTIVE AND OBJECTIVE BOX
DATE OF SERVICE: 12-08-22 @ 15:22    Patient is a 89y old  Female who presents with a chief complaint of sob (08 Dec 2022 15:04)      SUBJECTIVE / OVERNIGHT EVENTS:  pt is retraining urine    MEDICATIONS  (STANDING):  apixaban 2.5 milliGRAM(s) Oral two times a day  atorvastatin 10 milliGRAM(s) Oral at bedtime  chlorhexidine 2% Cloths 1 Application(s) Topical daily  darbepoetin Injectable ViaL 200 MICROGram(s) SubCutaneous once  folic acid 1 milliGRAM(s) Oral daily  furosemide    Tablet 20 milliGRAM(s) Oral daily  latanoprost 0.005% Ophthalmic Solution 1 Drop(s) Both EYES at bedtime  levothyroxine 112 MICROGram(s) Oral daily  lidocaine   4% Patch 1 Patch Transdermal daily  mupirocin 2% Nasal 1 Application(s) Both Nostrils two times a day  pantoprazole    Tablet 40 milliGRAM(s) Oral before breakfast  polyethylene glycol 3350 17 Gram(s) Oral daily  senna 2 Tablet(s) Oral at bedtime  tamsulosin 0.4 milliGRAM(s) Oral at bedtime    MEDICATIONS  (PRN):  acetaminophen     Tablet .. 650 milliGRAM(s) Oral every 6 hours PRN Mild Pain (1 - 3)  guaiFENesin Oral Liquid (Sugar-Free) 200 milliGRAM(s) Oral every 6 hours PRN Cough      Vital Signs Last 24 Hrs  T(C): 36.8 (08 Dec 2022 11:03), Max: 36.8 (07 Dec 2022 16:38)  T(F): 98.2 (08 Dec 2022 11:03), Max: 98.3 (07 Dec 2022 16:38)  HR: 72 (08 Dec 2022 11:03) (72 - 86)  BP: 156/78 (08 Dec 2022 11:03) (152/83 - 161/82)  BP(mean): --  RR: 18 (08 Dec 2022 11:03) (18 - 18)  SpO2: 95% (08 Dec 2022 11:03) (94% - 96%)    Parameters below as of 08 Dec 2022 11:03  Patient On (Oxygen Delivery Method): room air      CAPILLARY BLOOD GLUCOSE        I&O's Summary    07 Dec 2022 07:01  -  08 Dec 2022 07:00  --------------------------------------------------------  IN: 640 mL / OUT: 1600 mL / NET: -960 mL    08 Dec 2022 07:01  -  08 Dec 2022 15:22  --------------------------------------------------------  IN: 480 mL / OUT: 0 mL / NET: 480 mL        PHYSICAL EXAM:  GENERAL: NAD, well-developed  HEAD:  Atraumatic, Normocephalic  EYES: EOMI, PERRLA, conjunctiva and sclera clear  NECK: Supple, No JVD  CHEST/LUNG: Clear to auscultation bilaterally; No wheeze  HEART: Regular rate and rhythm; No murmurs, rubs, or gallops  ABDOMEN: Soft, Nontender, Nondistended; Bowel sounds present  EXTREMITIES:  2+ Peripheral Pulses, No clubbing, cyanosis, or edema  PSYCH: AAOx3  NEUROLOGY: non-focal  SKIN: No rashes or lesions    LABS:                        9.3    8.72  )-----------( 293      ( 07 Dec 2022 06:34 )             29.0     12-07    135  |  100  |  51<H>  ----------------------------<  127<H>  4.2   |  20<L>  |  1.49<H>    Ca    9.2      07 Dec 2022 06:35                RADIOLOGY & ADDITIONAL TESTS:    Imaging Personally Reviewed:    Consultant(s) Notes Reviewed:      Care Discussed with Consultants/Other Providers:

## 2022-12-08 NOTE — PROVIDER CONTACT NOTE (OTHER) - ASSESSMENT
Patient with 15 second syncopal episode while using bedpan for bowel movement. Post episode patient A&O4, patient c/o generalized weakness. Patient denies CP, SOB, lightheadedness. Neuro check WDL. VSS.
Pt A&Ox4, incont x2. Pt has a stage 1 pressure injury to the Left heel and sacrum.
blood tinged urine in titus bag

## 2022-12-08 NOTE — PROGRESS NOTE ADULT - SUBJECTIVE AND OBJECTIVE BOX
[facility]   [phone]  RENAL FOLLOW UP NOTE  --------------------------------------------------------------------------------  Chief Complaint:    24 hour events/subjective:    patient seen and examined  rafat gallegos  c/o LUIS A knee pain  +urinary retention    PAST HISTORY  --------------------------------------------------------------------------------  No significant changes to PMH, PSH, FHx, SHx, unless otherwise noted    ALLERGIES & MEDICATIONS  --------------------------------------------------------------------------------  Allergies    No Known Allergies    Intolerances      Standing Inpatient Medications  apixaban 2.5 milliGRAM(s) Oral two times a day  atorvastatin 10 milliGRAM(s) Oral at bedtime  chlorhexidine 2% Cloths 1 Application(s) Topical daily  darbepoetin Injectable ViaL 200 MICROGram(s) SubCutaneous once  folic acid 1 milliGRAM(s) Oral daily  latanoprost 0.005% Ophthalmic Solution 1 Drop(s) Both EYES at bedtime  levothyroxine 112 MICROGram(s) Oral daily  lidocaine   4% Patch 1 Patch Transdermal daily  mupirocin 2% Nasal 1 Application(s) Both Nostrils two times a day  pantoprazole    Tablet 40 milliGRAM(s) Oral before breakfast  polyethylene glycol 3350 17 Gram(s) Oral daily  senna 2 Tablet(s) Oral at bedtime    PRN Inpatient Medications  acetaminophen     Tablet .. 650 milliGRAM(s) Oral every 6 hours PRN  guaiFENesin Oral Liquid (Sugar-Free) 200 milliGRAM(s) Oral every 6 hours PRN      REVIEW OF SYSTEMS  --------------------------------------------------------------------------------    Gen: denies fevers/chills,  CVS: denies chest pain/palpitations  Resp: denies SOB/Cough+  GI: Denies N/V/Abd pain  : Denies dysuria    VITALS/PHYSICAL EXAM  --------------------------------------------------------------------------------  T(C): 36.8 (12-08-22 @ 11:03), Max: 36.8 (12-07-22 @ 16:38)  HR: 72 (12-08-22 @ 11:03) (72 - 86)  BP: 156/78 (12-08-22 @ 11:03) (152/83 - 161/82)  RR: 18 (12-08-22 @ 11:03) (18 - 18)  SpO2: 95% (12-08-22 @ 11:03) (94% - 96%)  Wt(kg): --        12-07-22 @ 07:01  -  12-08-22 @ 07:00  --------------------------------------------------------  IN: 640 mL / OUT: 1600 mL / NET: -960 mL    12-08-22 @ 07:01  -  12-08-22 @ 11:30  --------------------------------------------------------  IN: 240 mL / OUT: 0 mL / NET: 240 mL      Physical Exam:  	  	Gen: Non toxic comfortable appearing   	Pulm: decrease bs, +coarse, no rales, wheezing  	CV: mild JVD. RRR, S1S2; no rub  	Abd: +BS, soft, nondistended, obese  	: No suprapubic tenderness  	UE: Warm, no cyanosis  no clubbing, no edema  	LE: Warm, no cyanosis  no clubbing, no edema, R knee swelling, + lidocaine patch  	Skin: Warm, no decrease skin turgor     LABS/STUDIES  --------------------------------------------------------------------------------              9.3    8.72  >-----------<  293      [12-07-22 @ 06:34]              29.0     135  |  100  |  51  ----------------------------<  127      [12-07-22 @ 06:35]  4.2   |  20  |  1.49        Ca     9.2     [12-07-22 @ 06:35]            Creatinine Trend:  SCr 1.49 [12-07 @ 06:35]  SCr 1.58 [12-06 @ 05:21]  SCr 1.68 [12-05 @ 06:18]  SCr 1.51 [12-04 @ 06:32]  SCr 1.31 [12-03 @ 06:51]              Urinalysis - [12-02-22 @ 11:20]      Color Light Yellow / Appearance Clear / SG 1.009 / pH 6.5      Gluc Negative / Ketone Negative  / Bili Negative / Urobili Negative       Blood Negative / Protein 100 / Leuk Est Negative / Nitrite Negative      RBC 8 / WBC 0 / Hyaline 0 / Gran  / Sq Epi  / Non Sq Epi 0 / Bacteria Negative      Iron 24, TIBC 245, %sat 10      [12-03-22 @ 06:53]  Ferritin 246      [12-03-22 @ 06:53]  TSH 6.90      [12-04-22 @ 06:33]

## 2022-12-08 NOTE — PROVIDER CONTACT NOTE (OTHER) - REASON
Patient with 15 second syncopal episode while using bedpan for bowel movement
Stage 1 pressure injury
blood tinged urine after titus insertion

## 2022-12-08 NOTE — PROGRESS NOTE ADULT - NS ATTEND AMEND GEN_ALL_CORE FT
resume diuretics tomorrow 12/5.  responding favorably so far but cannot lie down to sleep and still feels some sense of gut congestion/bloating.  otherwise, in good spirits.  replace k to 4-4.5, mg to 2, to prevent muscle cramping and weakness, and ventricular arrhythmias.
CKD III   CHF hx   urinary retention     cr steady   resume lasix 20 mg daily   titus for urinary retention   d/w pt daughter at bedside
cr stabilizing   restart diuretics as of am lasix 20 mg daily   dw pt daughter at bedside

## 2022-12-08 NOTE — PROGRESS NOTE ADULT - SUBJECTIVE AND OBJECTIVE BOX
Chief Complaint:  FU    History of Present Illness:  no f/c, no cp, no dyspnea, no n/v/abd pain, + soft BM this AM- brown, no other bleeding, R leg pain stable      MEDICATIONS  (STANDING):  apixaban 2.5 milliGRAM(s) Oral two times a day  atorvastatin 10 milliGRAM(s) Oral at bedtime  chlorhexidine 2% Cloths 1 Application(s) Topical daily  darbepoetin Injectable ViaL 200 MICROGram(s) SubCutaneous once  folic acid 1 milliGRAM(s) Oral daily  furosemide    Tablet 20 milliGRAM(s) Oral daily  latanoprost 0.005% Ophthalmic Solution 1 Drop(s) Both EYES at bedtime  levothyroxine 112 MICROGram(s) Oral daily  lidocaine   4% Patch 1 Patch Transdermal daily  mupirocin 2% Nasal 1 Application(s) Both Nostrils two times a day  pantoprazole    Tablet 40 milliGRAM(s) Oral before breakfast  polyethylene glycol 3350 17 Gram(s) Oral daily  senna 2 Tablet(s) Oral at bedtime  tamsulosin 0.4 milliGRAM(s) Oral at bedtime    MEDICATIONS  (PRN):  acetaminophen     Tablet .. 650 milliGRAM(s) Oral every 6 hours PRN Mild Pain (1 - 3)  guaiFENesin Oral Liquid (Sugar-Free) 200 milliGRAM(s) Oral every 6 hours PRN Cough      Allergies    No Known Allergies    Intolerances        Vital Signs Last 24 Hrs  T(C): 36.8 (08 Dec 2022 11:03), Max: 36.8 (07 Dec 2022 16:38)  T(F): 98.2 (08 Dec 2022 11:03), Max: 98.3 (07 Dec 2022 16:38)  HR: 72 (08 Dec 2022 11:03) (72 - 86)  BP: 156/78 (08 Dec 2022 11:03) (152/83 - 161/82)  BP(mean): --  RR: 18 (08 Dec 2022 11:03) (18 - 18)  SpO2: 95% (08 Dec 2022 11:03) (94% - 96%)    Parameters below as of 08 Dec 2022 11:03  Patient On (Oxygen Delivery Method): room air        PHYSICAL EXAM  General: adult in NAD  HEENT: clear oropharynx, anicteric sclera, pink conjunctiva  Neck: supple  CV: normal S1/S2   Lungs: clear to auscultation, no wheezes, no rales  Abdomen: soft non-tender non-distended, positive bowel sounds  Ext: sl edema RLE  Skin: no rashes and no petechiae  Lymph Nodes: No LAD in neck  Neuro: alert and oriented X 3, no focal deficits    LABS:                          9.3    8.72  )-----------( 293      ( 07 Dec 2022 06:34 )             29.0         Mean Cell Volume : 86.6 fl  Mean Cell Hemoglobin : 27.8 pg  Mean Cell Hemoglobin Concentration : 32.1 gm/dL  Auto Neutrophil # : x  Auto Lymphocyte # : x  Auto Monocyte # : x  Auto Eosinophil # : x  Auto Basophil # : x  Auto Neutrophil % : x  Auto Lymphocyte % : x  Auto Monocyte % : x  Auto Eosinophil % : x  Auto Basophil % : x      Serial CBC's  12-07 @ 06:34  Hct-29.0 / Hgb-9.3 / Plat-293 / RBC-3.35 / WBC-8.72  Serial CBC's  12-06 @ 13:37  Hct-26.9 / Hgb-8.8 / Plat-264 / RBC-3.13 / WBC-11.13      12-07    135  |  100  |  51<H>  ----------------------------<  127<H>  4.2   |  20<L>  |  1.49<H>    Ca    9.2      07 Dec 2022 06:35            Ferritin, Serum: 246 ng/mL (12-03 @ 06:53)  Folate, Serum: >20.0 ng/mL (12-03 @ 06:53)  Iron - Total Binding Capacity.: 245 ug/dL (12-03 @ 06:53)  Vitamin B12, Serum: 1261 pg/mL (12-03 @ 06:53)          12-06 @ 05:21    ldh1 --  haptoglobin --  MARK--  uric acid9.5      Radiology:      < from: [facility] Duplex Lower Ext Vein Scan, Bilat (12.06.22 @ 14:19) >  IMPRESSION:  No evidence of deep venous thrombosis in either lower extremity.    < end of copied text >

## 2022-12-09 ENCOUNTER — TRANSCRIPTION ENCOUNTER (OUTPATIENT)
Age: 87
End: 2022-12-09

## 2022-12-09 VITALS
OXYGEN SATURATION: 95 % | RESPIRATION RATE: 17 BRPM | SYSTOLIC BLOOD PRESSURE: 130 MMHG | HEART RATE: 74 BPM | DIASTOLIC BLOOD PRESSURE: 75 MMHG | TEMPERATURE: 98 F

## 2022-12-09 LAB
ANION GAP SERPL CALC-SCNC: 12 MMOL/L — SIGNIFICANT CHANGE UP (ref 5–17)
BUN SERPL-MCNC: 58 MG/DL — HIGH (ref 7–23)
CALCIUM SERPL-MCNC: 8.8 MG/DL — SIGNIFICANT CHANGE UP (ref 8.4–10.5)
CHLORIDE SERPL-SCNC: 107 MMOL/L — SIGNIFICANT CHANGE UP (ref 96–108)
CO2 SERPL-SCNC: 21 MMOL/L — LOW (ref 22–31)
CREAT SERPL-MCNC: 1.42 MG/DL — HIGH (ref 0.5–1.3)
EGFR: 33 ML/MIN/1.73M2 — LOW
GLUCOSE SERPL-MCNC: 84 MG/DL — SIGNIFICANT CHANGE UP (ref 70–99)
HCT VFR BLD CALC: 26.9 % — LOW (ref 34.5–45)
HGB BLD-MCNC: 8.5 G/DL — LOW (ref 11.5–15.5)
MCHC RBC-ENTMCNC: 27.8 PG — SIGNIFICANT CHANGE UP (ref 27–34)
MCHC RBC-ENTMCNC: 31.6 GM/DL — LOW (ref 32–36)
MCV RBC AUTO: 87.9 FL — SIGNIFICANT CHANGE UP (ref 80–100)
NRBC # BLD: 0 /100 WBCS — SIGNIFICANT CHANGE UP (ref 0–0)
PLATELET # BLD AUTO: 311 K/UL — SIGNIFICANT CHANGE UP (ref 150–400)
POTASSIUM SERPL-MCNC: 4 MMOL/L — SIGNIFICANT CHANGE UP (ref 3.5–5.3)
POTASSIUM SERPL-SCNC: 4 MMOL/L — SIGNIFICANT CHANGE UP (ref 3.5–5.3)
RBC # BLD: 3.06 M/UL — LOW (ref 3.8–5.2)
RBC # FLD: 16 % — HIGH (ref 10.3–14.5)
SODIUM SERPL-SCNC: 140 MMOL/L — SIGNIFICANT CHANGE UP (ref 135–145)
WBC # BLD: 7.37 K/UL — SIGNIFICANT CHANGE UP (ref 3.8–10.5)
WBC # FLD AUTO: 7.37 K/UL — SIGNIFICANT CHANGE UP (ref 3.8–10.5)

## 2022-12-09 PROCEDURE — 83540 ASSAY OF IRON: CPT

## 2022-12-09 PROCEDURE — 85018 HEMOGLOBIN: CPT

## 2022-12-09 PROCEDURE — 97110 THERAPEUTIC EXERCISES: CPT

## 2022-12-09 PROCEDURE — 82962 GLUCOSE BLOOD TEST: CPT

## 2022-12-09 PROCEDURE — 82947 ASSAY GLUCOSE BLOOD QUANT: CPT

## 2022-12-09 PROCEDURE — 84132 ASSAY OF SERUM POTASSIUM: CPT

## 2022-12-09 PROCEDURE — 82330 ASSAY OF CALCIUM: CPT

## 2022-12-09 PROCEDURE — 80048 BASIC METABOLIC PNL TOTAL CA: CPT

## 2022-12-09 PROCEDURE — 82728 ASSAY OF FERRITIN: CPT

## 2022-12-09 PROCEDURE — 82435 ASSAY OF BLOOD CHLORIDE: CPT

## 2022-12-09 PROCEDURE — 82803 BLOOD GASES ANY COMBINATION: CPT

## 2022-12-09 PROCEDURE — 84443 ASSAY THYROID STIM HORMONE: CPT

## 2022-12-09 PROCEDURE — 36415 COLL VENOUS BLD VENIPUNCTURE: CPT

## 2022-12-09 PROCEDURE — 85014 HEMATOCRIT: CPT

## 2022-12-09 PROCEDURE — 97162 PT EVAL MOD COMPLEX 30 MIN: CPT

## 2022-12-09 PROCEDURE — 80053 COMPREHEN METABOLIC PANEL: CPT

## 2022-12-09 PROCEDURE — 84100 ASSAY OF PHOSPHORUS: CPT

## 2022-12-09 PROCEDURE — 82550 ASSAY OF CK (CPK): CPT

## 2022-12-09 PROCEDURE — U0003: CPT

## 2022-12-09 PROCEDURE — 87040 BLOOD CULTURE FOR BACTERIA: CPT

## 2022-12-09 PROCEDURE — 96374 THER/PROPH/DIAG INJ IV PUSH: CPT

## 2022-12-09 PROCEDURE — 87086 URINE CULTURE/COLONY COUNT: CPT

## 2022-12-09 PROCEDURE — 87640 STAPH A DNA AMP PROBE: CPT

## 2022-12-09 PROCEDURE — 84295 ASSAY OF SERUM SODIUM: CPT

## 2022-12-09 PROCEDURE — 84550 ASSAY OF BLOOD/URIC ACID: CPT

## 2022-12-09 PROCEDURE — 83605 ASSAY OF LACTIC ACID: CPT

## 2022-12-09 PROCEDURE — 93970 EXTREMITY STUDY: CPT

## 2022-12-09 PROCEDURE — 81001 URINALYSIS AUTO W/SCOPE: CPT

## 2022-12-09 PROCEDURE — 83735 ASSAY OF MAGNESIUM: CPT

## 2022-12-09 PROCEDURE — 94640 AIRWAY INHALATION TREATMENT: CPT

## 2022-12-09 PROCEDURE — 83550 IRON BINDING TEST: CPT

## 2022-12-09 PROCEDURE — C8929: CPT

## 2022-12-09 PROCEDURE — 82553 CREATINE MB FRACTION: CPT

## 2022-12-09 PROCEDURE — 97530 THERAPEUTIC ACTIVITIES: CPT

## 2022-12-09 PROCEDURE — U0005: CPT

## 2022-12-09 PROCEDURE — 82607 VITAMIN B-12: CPT

## 2022-12-09 PROCEDURE — 84484 ASSAY OF TROPONIN QUANT: CPT

## 2022-12-09 PROCEDURE — 99285 EMERGENCY DEPT VISIT HI MDM: CPT | Mod: 25

## 2022-12-09 PROCEDURE — 83880 ASSAY OF NATRIURETIC PEPTIDE: CPT

## 2022-12-09 PROCEDURE — 0225U NFCT DS DNA&RNA 21 SARSCOV2: CPT

## 2022-12-09 PROCEDURE — 71045 X-RAY EXAM CHEST 1 VIEW: CPT

## 2022-12-09 PROCEDURE — 82746 ASSAY OF FOLIC ACID SERUM: CPT

## 2022-12-09 PROCEDURE — 85025 COMPLETE CBC W/AUTO DIFF WBC: CPT

## 2022-12-09 PROCEDURE — 87641 MR-STAPH DNA AMP PROBE: CPT

## 2022-12-09 PROCEDURE — 85027 COMPLETE CBC AUTOMATED: CPT

## 2022-12-09 RX ORDER — LEVOTHYROXINE SODIUM 125 MCG
1 TABLET ORAL
Qty: 0 | Refills: 0 | DISCHARGE

## 2022-12-09 RX ORDER — OMEPRAZOLE 10 MG/1
1 CAPSULE, DELAYED RELEASE ORAL
Qty: 0 | Refills: 0 | DISCHARGE

## 2022-12-09 RX ORDER — POLYETHYLENE GLYCOL 3350 17 G/17G
17 POWDER, FOR SOLUTION ORAL
Qty: 0 | Refills: 0 | DISCHARGE
Start: 2022-12-09

## 2022-12-09 RX ORDER — LIDOCAINE 4 G/100G
1 CREAM TOPICAL
Qty: 0 | Refills: 0 | DISCHARGE
Start: 2022-12-09

## 2022-12-09 RX ORDER — DARBEPOETIN ALFA IN POLYSORBAT 200MCG/0.4
1 PEN INJECTOR (ML) SUBCUTANEOUS
Qty: 0 | Refills: 0 | DISCHARGE

## 2022-12-09 RX ORDER — APIXABAN 2.5 MG/1
1 TABLET, FILM COATED ORAL
Qty: 0 | Refills: 0 | DISCHARGE

## 2022-12-09 RX ORDER — FOLIC ACID 0.8 MG
1 TABLET ORAL
Qty: 0 | Refills: 0 | DISCHARGE
Start: 2022-12-09

## 2022-12-09 RX ORDER — ATORVASTATIN CALCIUM 80 MG/1
1 TABLET, FILM COATED ORAL
Qty: 0 | Refills: 0 | DISCHARGE
Start: 2022-12-09

## 2022-12-09 RX ORDER — LATANOPROST 0.05 MG/ML
1 SOLUTION/ DROPS OPHTHALMIC; TOPICAL
Qty: 0 | Refills: 0 | DISCHARGE
Start: 2022-12-09

## 2022-12-09 RX ORDER — FUROSEMIDE 40 MG
1 TABLET ORAL
Qty: 0 | Refills: 0 | DISCHARGE
Start: 2022-12-09

## 2022-12-09 RX ORDER — ACETAMINOPHEN 500 MG
2 TABLET ORAL
Qty: 0 | Refills: 0 | DISCHARGE
Start: 2022-12-09

## 2022-12-09 RX ORDER — BIMATOPROST 0.3 MG/ML
1 SOLUTION/ DROPS OPHTHALMIC
Qty: 0 | Refills: 0 | DISCHARGE

## 2022-12-09 RX ORDER — TAMSULOSIN HYDROCHLORIDE 0.4 MG/1
1 CAPSULE ORAL
Qty: 0 | Refills: 0 | DISCHARGE
Start: 2022-12-09

## 2022-12-09 RX ORDER — FOLIC ACID 0.8 MG
1 TABLET ORAL
Qty: 0 | Refills: 0 | DISCHARGE

## 2022-12-09 RX ORDER — SENNA PLUS 8.6 MG/1
2 TABLET ORAL
Qty: 0 | Refills: 0 | DISCHARGE
Start: 2022-12-09

## 2022-12-09 RX ORDER — FUROSEMIDE 40 MG
1 TABLET ORAL
Qty: 0 | Refills: 0 | DISCHARGE

## 2022-12-09 RX ORDER — LOSARTAN POTASSIUM 100 MG/1
1 TABLET, FILM COATED ORAL
Qty: 0 | Refills: 0 | DISCHARGE

## 2022-12-09 RX ORDER — PANTOPRAZOLE SODIUM 20 MG/1
1 TABLET, DELAYED RELEASE ORAL
Qty: 0 | Refills: 0 | DISCHARGE
Start: 2022-12-09

## 2022-12-09 RX ORDER — APIXABAN 2.5 MG/1
1 TABLET, FILM COATED ORAL
Qty: 0 | Refills: 0 | DISCHARGE
Start: 2022-12-09

## 2022-12-09 RX ORDER — LEVOTHYROXINE SODIUM 125 MCG
1 TABLET ORAL
Qty: 0 | Refills: 0 | DISCHARGE
Start: 2022-12-09

## 2022-12-09 RX ADMIN — MUPIROCIN 1 APPLICATION(S): 20 OINTMENT TOPICAL at 05:56

## 2022-12-09 RX ADMIN — Medication 650 MILLIGRAM(S): at 00:02

## 2022-12-09 RX ADMIN — CHLORHEXIDINE GLUCONATE 1 APPLICATION(S): 213 SOLUTION TOPICAL at 12:16

## 2022-12-09 RX ADMIN — Medication 1 MILLIGRAM(S): at 12:14

## 2022-12-09 RX ADMIN — APIXABAN 2.5 MILLIGRAM(S): 2.5 TABLET, FILM COATED ORAL at 05:57

## 2022-12-09 RX ADMIN — PANTOPRAZOLE SODIUM 40 MILLIGRAM(S): 20 TABLET, DELAYED RELEASE ORAL at 05:56

## 2022-12-09 RX ADMIN — LIDOCAINE 1 PATCH: 4 CREAM TOPICAL at 12:14

## 2022-12-09 RX ADMIN — Medication 112 MICROGRAM(S): at 05:56

## 2022-12-09 RX ADMIN — Medication 20 MILLIGRAM(S): at 05:56

## 2022-12-09 NOTE — PROGRESS NOTE ADULT - ASSESSMENT
89 y/o female with PMHx of TOBIAS fib on Eliquis, myelodysplastic syndrome, HTN, HLD presents to the ED complaining of cough and chest congestion x 7 days. Patient states that she feels like she is unable to cough up all of the mucus in her chest. She has not seen her doctor the past week. She has been taking tylenol and robitussin for mild relief at home. Past two days aide has reported patient is urinating more frequently. No reported fevers at home. Aide reports today patient was very weak and unable to walk at home. Normally ambulates without assistance. She has been eating and drinking well at home. No recent sick contacts. No headache, fever, chills, abdominal pain, n/v/d.    poss gout  - solumedrol 20 x 1 today    urinary retention  - place titus  - start floamax  - TOV at rehab    CHF  - hold  lasix  - strict Is and Os  - echo done    iron def anemia  - iv iron x 2 days    MARIELA improvred  - hold lasix  - monitor cre  - nephrology following    afib  - c/w Eliquis  - rate controlled    hypothyroid  -  TSH 6.9  - c/w synthroid    cough  - Robitussin    constipation  - senna and miralax    -poss discharge tomorrow    dvt px  - pt is on eliquis    d/c planning to [facility] today

## 2022-12-09 NOTE — DISCHARGE NOTE PROVIDER - HOSPITAL COURSE
89 y/o female with PMHx of ACarlota fib on Eliquis, myelodysplastic syndrome, HTN, HLD presents to the ED complaining of cough and chest congestion x 7 days. Patient states that she feels like she is unable to cough up all of the mucus in her chest. She has not seen her doctor the past week. She has been taking tylenol and robitussin for mild relief at home. Past two days aide has reported patient is urinating more frequently. No reported fevers at home. Aide reports today patient was very weak and unable to walk at home. Normally ambulates without assistance. She has been eating and drinking well at home. No recent sick contacts. No headache, fever, chills, abdominal pain, n/v/d.    poss gout  - solumedrol 20 x 1 today    urinary retention  - place titus  - start floamax    CHF  - hold  lasix  - strict Is and Os  - echo done    iron def anemia  - iv iron x 2 days    MARIELA improvred  - hold lasix  - monitor cre  - nephrology following    afib  - c/w Eliquis  - rate controlled    hypothyroid  -  TSH 6.9  - c/w synthroid    cough  - Robitussin    constipation  - senna and miralax    -poss discharge tomorrow    dvt px  - pt is on eliquis    d/c planning to [facility]

## 2022-12-09 NOTE — PROGRESS NOTE ADULT - PROVIDER SPECIALTY LIST ADULT
Cardiology
Heme/Onc
Internal Medicine
Cardiology
Heme/Onc
Internal Medicine
Nephrology
Internal Medicine
Nephrology

## 2022-12-09 NOTE — PROGRESS NOTE ADULT - ASSESSMENT
89 y/o female with PMHx of A. fib on Eliquis, myelodysplastic syndrome, HTN, HLD presents to the ED complaining of cough and chest congestion admitted with acute on chronic diastolic heart failure c/b rising creatinine, episode of hypotension    #Anemia, normocytic- MDS  - maintained on Aranesp, followed by Dr. Herrera; last dose 10/19- 200 mcg, receives every 3-4 weeks  - last iron levels outpt 11/8 with decreased percent sat and was to be scheduled for IV iron prior to resuming JAVON  - iron studies 12/3 Ferritin 246, fe 24, % sat 10-- now s/p Venofer 200mg iv x 2 doses  - Hg with some fluctuation but remains <10-- now s/p Aranesp 200mcg sc  x1 on 12/8/22; she would not need further dose in Rehab as she receives q 3-4 weeks    #LE edema  - greater in RLE, also with chronic arthritis that knee, will check Duplex to r/o dvt  - diuretics were on hold with episode of hypotension/ increased creatinine- to be restarted; nephrology following    #acute on chronic diastolic HF, Afib  - on eliquis  - lasix  restarted  - cardiology following    DC planning per Medicine  pt will FU with Dr. Herrera post Rehab

## 2022-12-09 NOTE — DISCHARGE NOTE PROVIDER - PROVIDER TOKENS
FREE:[LAST:[Stefan],FIRST:[Ronnie],PHONE:[phone]],FAX:[(   )    -],ADDRESS:[PCP]],FREE:[LAST:[Solomon],FIRST:[Pennie],PHONE:[phone]],FAX:[(   )    -],ADDRESS:[Cardiologist]],PROVIDER:[TOKEN:[[id number]],FOLLOWUP:[Routine]]

## 2022-12-09 NOTE — PROGRESS NOTE ADULT - SUBJECTIVE AND OBJECTIVE BOX
DATE OF SERVICE: 12-09-22 @ 15:22    Patient is a 89y old  Female who presents with a chief complaint of sob (09 Dec 2022 13:32)      SUBJECTIVE / OVERNIGHT EVENTS:  No chest pain. No shortness of breath. No complaints. No events overnight.     MEDICATIONS  (STANDING):  apixaban 2.5 milliGRAM(s) Oral two times a day  atorvastatin 10 milliGRAM(s) Oral at bedtime  chlorhexidine 2% Cloths 1 Application(s) Topical daily  folic acid 1 milliGRAM(s) Oral daily  furosemide    Tablet 20 milliGRAM(s) Oral daily  latanoprost 0.005% Ophthalmic Solution 1 Drop(s) Both EYES at bedtime  levothyroxine 112 MICROGram(s) Oral daily  lidocaine   4% Patch 1 Patch Transdermal daily  mupirocin 2% Nasal 1 Application(s) Both Nostrils two times a day  pantoprazole    Tablet 40 milliGRAM(s) Oral before breakfast  polyethylene glycol 3350 17 Gram(s) Oral daily  senna 2 Tablet(s) Oral at bedtime  tamsulosin 0.4 milliGRAM(s) Oral at bedtime    MEDICATIONS  (PRN):  acetaminophen     Tablet .. 650 milliGRAM(s) Oral every 6 hours PRN Mild Pain (1 - 3)  guaiFENesin Oral Liquid (Sugar-Free) 200 milliGRAM(s) Oral every 6 hours PRN Cough      Vital Signs Last 24 Hrs  T(C): 36.8 (09 Dec 2022 11:24), Max: 36.8 (08 Dec 2022 20:26)  T(F): 98.3 (09 Dec 2022 11:24), Max: 98.3 (09 Dec 2022 04:19)  HR: 74 (09 Dec 2022 11:24) (74 - 81)  BP: 130/75 (09 Dec 2022 11:24) (130/75 - 156/69)  BP(mean): --  RR: 17 (09 Dec 2022 11:24) (16 - 18)  SpO2: 95% (09 Dec 2022 11:24) (94% - 96%)    Parameters below as of 09 Dec 2022 11:24  Patient On (Oxygen Delivery Method): room air      CAPILLARY BLOOD GLUCOSE        I&O's Summary    08 Dec 2022 07:01  -  09 Dec 2022 07:00  --------------------------------------------------------  IN: 720 mL / OUT: 1575 mL / NET: -855 mL    09 Dec 2022 07:01  -  09 Dec 2022 15:22  --------------------------------------------------------  IN: 400 mL / OUT: 700 mL / NET: -300 mL        PHYSICAL EXAM:  GENERAL: NAD, well-developed  HEAD:  Atraumatic, Normocephalic  EYES: EOMI, PERRLA, conjunctiva and sclera clear  NECK: Supple, No JVD  CHEST/LUNG: Clear to auscultation bilaterally; No wheeze  HEART: Regular rate and rhythm; No murmurs, rubs, or gallops  ABDOMEN: Soft, Nontender, Nondistended; Bowel sounds present  EXTREMITIES:  2+ Peripheral Pulses, No clubbing, cyanosis, or edema  PSYCH: AAOx3  NEUROLOGY: non-focal  SKIN: No rashes or lesions    LABS:                        8.5    7.37  )-----------( 311      ( 09 Dec 2022 06:01 )             26.9     12-09    140  |  107  |  58<H>  ----------------------------<  84  4.0   |  21<L>  |  1.42<H>    Ca    8.8      09 Dec 2022 06:01                RADIOLOGY & ADDITIONAL TESTS:    Imaging Personally Reviewed:    Consultant(s) Notes Reviewed:      Care Discussed with Consultants/Other Providers:

## 2022-12-09 NOTE — DISCHARGE NOTE NURSING/CASE MANAGEMENT/SOCIAL WORK - NSDCPEFALRISK_GEN_ALL_CORE
For information on Fall & Injury Prevention, visit: [email or web address] OR  [email or web address] OR  https://www.cdc.gov/steadi/patient.html

## 2022-12-09 NOTE — DISCHARGE NOTE PROVIDER - CARE PROVIDERS DIRECT ADDRESSES
,DirectAddress_Unknown,DirectAddress_Unknown,[email or web address]

## 2022-12-09 NOTE — DISCHARGE NOTE NURSING/CASE MANAGEMENT/SOCIAL WORK - PATIENT PORTAL LINK FT
You can access the FollowMyHealth Patient Portal offered by [facility] by registering at the following website: [email or web address]. By joining [employer]’s FollowMyHealth portal, you will also be able to view your health information using other applications (apps) compatible with our system.

## 2022-12-09 NOTE — DISCHARGE NOTE PROVIDER - NSDCCPCAREPLAN_GEN_ALL_CORE_FT
PRINCIPAL DISCHARGE DIAGNOSIS  Diagnosis: Heart failure  Assessment and Plan of Treatment: -echo unchanged from prior  -continue eliquis 2.5mg bid for lifelong anticoagulation  -continue lipitor for CAD  -continue Synthroid for hypothyroidism  -MARIELA improving, PO lasix restarted per renal  -follow up with Solomon after discharge, [phone]      SECONDARY DISCHARGE DIAGNOSES  Diagnosis: Urinary retention  Assessment and Plan of Treatment: Started on Flomax  Trial of void at rehab    Diagnosis: Anemia  Assessment and Plan of Treatment: Hemoglobin with some fluctuation but remains <10-- s/p 1 dose of Aranesp 200mcg; she would not need further dose in rehab as she receives q 3-4 weeks  Follow up with Dr. Herrera post rehab.    Diagnosis: MARIELA (acute kidney injury)  Assessment and Plan of Treatment:   MARIELA in setting of diuresis, and episode of hypotension  Baseline creatinine 1.2-1.5 g/dL from chart review.   Ccreatinine steady  Restart lasix 20 mg oral daily      Diagnosis: Right knee pain  Assessment and Plan of Treatment:   Continue lidocaine patch

## 2022-12-09 NOTE — PROGRESS NOTE ADULT - SUBJECTIVE AND OBJECTIVE BOX
CARDIOLOGY     tele: AF 60-70    she still feels weak but denies dyspnea, palpitations, syncope, nor angina.    DATE OF SERVICE - 12-09-22     Review of Systems:   Constitutional: [ ] fevers, [ ] chills.   Skin: [ ] dry skin. [ ] rashes.  Psychiatric: [ ] depression, [ ] anxiety.   Gastrointestinal: [ ] BRBPR, [ ] melena.   Neurological: [ ] confusion. [ ] seizures. [ ] shuffling gait.   Ears,Nose,Mouth and Throat: [ ] ear pain [ ] sore throat.   Eyes: [ ] diplopia.   Respiratory: [ ] hemoptysis. [ ] shortness of breath  Cardiovascular: See HPI above  Hematologic/Lymphatic: [ ] anemia. [ ] painful nodes. [ ] prolonged bleeding.   Genitourinary: [ ] hematuria. [ ] flank pain.   Endocrine: [ ] significant change in weight. [ ] intolerance to heat and cold.     Review of systems [ x] otherwise negative, [ ] otherwise unable to obtain    FH: no family history of sudden cardiac death in first degree relatives    SH: [ ] tobacco, [ ] alcohol, [ ] drugs    MEDICATIONS:  acetaminophen     Tablet .. 650 milliGRAM(s) Oral every 6 hours PRN  apixaban 2.5 milliGRAM(s) Oral two times a day  atorvastatin 10 milliGRAM(s) Oral at bedtime  chlorhexidine 2% Cloths 1 Application(s) Topical daily  folic acid 1 milliGRAM(s) Oral daily  furosemide    Tablet 20 milliGRAM(s) Oral daily  guaiFENesin Oral Liquid (Sugar-Free) 200 milliGRAM(s) Oral every 6 hours PRN  latanoprost 0.005% Ophthalmic Solution 1 Drop(s) Both EYES at bedtime  levothyroxine 112 MICROGram(s) Oral daily  lidocaine   4% Patch 1 Patch Transdermal daily  mupirocin 2% Nasal 1 Application(s) Both Nostrils two times a day  pantoprazole    Tablet 40 milliGRAM(s) Oral before breakfast  polyethylene glycol 3350 17 Gram(s) Oral daily  senna 2 Tablet(s) Oral at bedtime  tamsulosin 0.4 milliGRAM(s) Oral at bedtime      LABS:                        8.5    7.37  )-----------( 311      ( 09 Dec 2022 06:01 )             26.9       Hemoglobin: 8.5 g/dL (12-09 @ 06:01)  Hemoglobin: 9.3 g/dL (12-07 @ 06:34)  Hemoglobin: 8.8 g/dL (12-06 @ 13:37)      12-09    140  |  107  |  58<H>  ----------------------------<  84  4.0   |  21<L>  |  1.42<H>    Ca    8.8      09 Dec 2022 06:01      Creatinine Trend: 1.42<--, 1.49<--, 1.58<--, 1.68<--, 1.51<--, 1.31<--    COAGS:           PHYSICAL EXAM:  T(C): 36.8 (12-09-22 @ 11:24), Max: 36.8 (12-08-22 @ 20:26)  HR: 74 (12-09-22 @ 11:24) (74 - 81)  BP: 130/75 (12-09-22 @ 11:24) (130/75 - 156/69)  RR: 17 (12-09-22 @ 11:24) (16 - 18)  SpO2: 95% (12-09-22 @ 11:24) (94% - 96%)  Wt(kg): --    I&O's Summary    08 Dec 2022 07:01  -  09 Dec 2022 07:00  --------------------------------------------------------  IN: 720 mL / OUT: 1575 mL / NET: -855 mL    09 Dec 2022 07:01  -  09 Dec 2022 13:03  --------------------------------------------------------  IN: 400 mL / OUT: 0 mL / NET: 400 mL          General: Well nourished in no acute distress. Alert and Oriented * 3.   Head: Normocephalic and atraumatic.   Neck: No JVD. No bruits. Supple. Does not appear to be enlarged.   Cardiovascular: + S1,S2 ; IRR Soft systolic murmur at the left lower sternal border. No rubs noted.    Lungs: CTA b/l. No rhonchi, rales or wheezes.   Abdomen: + BS, soft. Non tender. Non distended. No rebound. No guarding.   Extremities: No clubbing/cyanosis/edema.   Neurologic: Moves all four extremities. Full range of motion.   Skin: Warm and moist. The patient's skin has normal elasticity and good skin turgor.   Psychiatric: Appropriate mood and affect.  Musculoskeletal: Normal range of motion, normal strength      < from: TTE with Doppler (w/Cont) (12.04.22 @ 10:44) >  Conclusions:  1. Severe mitral regurgitation.  2. Calcified trileaflet aortic valve with decreased  opening. Peak transaortic valve gradient equals 44 mm Hg,  mean transaortic valve gradient equals 18 mm Hg, estimated  aortic valve area equals 1 sqcm (by continuity equation),  aortic valve velocity time integral equals 50 cm,  consistent with moderate aortic stenosis.  3. Severely dilated left atrium.  LA volume index = 68  cc/m2.  4. Moderate concentric left ventricular hypertrophy.  5. Normal left ventricular systolic function.  6. Right atrial enlargement.  7. Right ventricular enlargement with decreased right  ventricular systolic function.  8. Normal tricuspid valve. Moderate tricuspid  regurgitation.  9. Estimated pulmonary artery systolic pressure equals 46  mm Hg, assuming right atrial pressure equals 8 mm Hg,  consistent with mild pulmonary pressures.  *** Compared with echocardiogram of 5/23/2022, no  significant changes noted.  ------------------------------------------------------------------------  Confirmed on  12/4/2022 - 12:35:27 by JENNIFER Martínez    < end of copied text >      A/P) 89 y/o female PMH persistent AF (since 2019), hypertension, hyperlipidemia, CAD s/p PCI > 1 year ago, MDS a/w CHFpEF. She refused MitraClip for severe MR    -echo unchanged from prior  -continue eliquis 2.5mg bid for lifelong a/c  -continue lipitor for CAD  -continue synthroid for hypothyroidism  -MARIELA improving, PO lasix restarted per renal  -no further inpatient cardiac w/u planned  -d/c planning per primary team  -f/u with Solomon after discharge, [phone]    Ivan Montero PA-C  Pager: [phone]

## 2022-12-09 NOTE — DISCHARGE NOTE PROVIDER - NSDCMRMEDTOKEN_GEN_ALL_CORE_FT
darbepoetin phani 100 mcg/0.5 mL injectable solution: 1 dose(s) injectable once every 6 weeks    *** gets injections at doctors office  diclofenac 1% topical gel: Apply topically to affected area (right knee) 2 times a day  Eliquis 2.5 mg oral tablet: 1 tab(s) orally 2 times a day  folic acid 1 mg oral tablet: 1 tab(s) orally once a day  Lasix 20 mg oral tablet: 1 tab(s) orally once a day  levothyroxine 112 mcg (0.112 mg) oral tablet: 1 tab(s) orally once a day  losartan 25 mg oral tablet: 1 tab(s) orally once a day  Lumigan 0.01% ophthalmic solution: 1 drop(s) to each affected eye once a day (in the evening)  omeprazole 20 mg oral delayed release tablet: 1 tab(s) orally 2 times a day  pravastatin 40 mg oral tablet: 1 tab(s) orally once a day   acetaminophen 325 mg oral tablet: 2 tab(s) orally every 6 hours, As needed, Mild Pain (1 - 3)  apixaban 2.5 mg oral tablet: 1 tab(s) orally 2 times a day  atorvastatin 10 mg oral tablet: 1 tab(s) orally once a day (at bedtime)  folic acid 1 mg oral tablet: 1 tab(s) orally once a day  furosemide 20 mg oral tablet: 1 tab(s) orally once a day  guaiFENesin 100 mg/5 mL oral liquid: 10 milliliter(s) orally every 6 hours, As needed, Cough  latanoprost 0.005% ophthalmic solution: 1 drop(s) to each affected eye once a day (at bedtime)  levothyroxine 112 mcg (0.112 mg) oral tablet: 1 tab(s) orally once a day  lidocaine 4% topical film: Apply topically to affected area once a day  pantoprazole 40 mg oral delayed release tablet: 1 tab(s) orally once a day (before a meal)  polyethylene glycol 3350 oral powder for reconstitution: 17 gram(s) orally once a day  senna leaf extract oral tablet: 2 tab(s) orally once a day (at bedtime)  tamsulosin 0.4 mg oral capsule: 1 cap(s) orally once a day (at bedtime)

## 2022-12-19 NOTE — DISCHARGE NOTE PROVIDER - NS AS DC PROVIDER CONTACT Y/N MULTI
PHYSICAL THERAPY - DAILY TREATMENT NOTE    Patient Name: Jovana Coleman        Date: 2022  : 1954   yes Patient  Verified  Visit #:   2   of   8  Insurance: Payor: Evan Keller / Plan: [place] MED[place] PART A & B / Product Type: Medicare /      In time: 315 Out time: 355   Total Treatment Time: 40     Medicare/BCBS Time Tracking (below)   Total Timed Codes (min):  40 1:1 Treatment Time:  40     TREATMENT AREA =  Other low back pain [M54.59]    SUBJECTIVE  Pain Level (on 0 to 10 scale):  2  / 10   Medication Changes/New allergies or changes in medical history, any new surgeries or procedures?    no  If yes, update Summary List   Subjective Functional Status/Changes:  []  No changes reported     Patient reports pain on the R side towards the  shoulder blade. Still experiencing some pain around to the front chest wall. More to the right now than compared to the L. The exercised seem to help some. OBJECTIVE  10 min Therapeutic Exercise:  [x]  See flow sheet   Rationale:      increase ROM, increase strength, improve coordination, improve balance, and increase proprioception to improve the patients ability to perform general ADLs with decrease c/o symptoms and with improved functional performance. 15 min Neuromuscular Re-ed: [x]  See flow sheet   Rationale:    increase ROM, increase strength, improve coordination, improve balance, and increase proprioception to improve the patients ability to perform general ADLs with decrease c/o symptoms and with improved functional performance. 15 min Self Care: Differential dx for thoracic spine pain: pinched nerve in T/S versus small rib fracture  Discussed getting T/S xray to rule out pathology  Review of anatomy for T/S   Rationale:    increase ROM, increase strength, improve coordination, improve balance, and increase proprioception to improve the patients ability to perform ADLs with decreased c/o symptoms and improved mobility.      Billed With/As:   [] TE   [] TA   [] Neuro   [] Self Care Patient Education: [] Review HEP    [] Progressed/Changed HEP based on:   [] positioning   [] body mechanics   [] transfers   [] heat/ice application    [] other:      Other Objective/Functional Measures:    No pain with seated AROM rot  Painful laying prone and on R side  TTP along T8 and along R rib angle    Reported pain with coughing, sneezing, etc  Denies N/T or burning     Post Treatment Pain Level (on 0 to 10) scale:   2  / 10     ASSESSMENT  Assessment/Changes in Function:     Patient tolerated today's treatment well. Pt described pain as sharp and painful with increased pressure to the area, and also pain with sudden abrupt movements like sneezing and coughing. Very TTP along T8 SP and also the rib angle and painful with transitions to and from prone. Based on this, advised pt to get an Xray of T/S to rule out pathology like a rib fracture. He tolerated scap stab well today. No sig pain during exercises but just with certain movements. .      []  See Progress Note/Recertification   Patient will continue to benefit from skilled PT services to modify and progress therapeutic interventions, address functional mobility deficits, address ROM deficits, address strength deficits, analyze and address soft tissue restrictions, analyze and cue movement patterns, analyze and modify body mechanics/ergonomics, assess and modify postural abnormalities, and instruct in home and community integration to attain remaining goals.    Progress toward goals / Updated goals:    Slight progress towards STG 2     PLAN  [x]  Upgrade activities as tolerated yes Continue plan of care   []  Discharge due to :    []  Other:      Therapist: Mariya Perez PT    Date: 12/19/2022 Time: 10:47 AM     Future Appointments   Date Time Provider Ivis Guo   12/19/2022  3:15 PM Gemma Bell, PT [facility]   12/23/2022  3:00 PM Khushboo Ray, [address] [facility]   12/29/2022 3:20 PM [place]   1/4/2023  3:30 PM Caitlin Escalante, PT [facility]   1/6/2023  3:30 PM Caitlin Escalante, PT [facility]   1/9/2023  3:30 PM Caitlin Escalante, [facility]   1/18/2023 11:00 AM MD TAMIKA Mcconnell BS AMB Yes

## 2023-01-01 NOTE — PHYSICAL THERAPY INITIAL EVALUATION ADULT - LEVEL OF INDEPENDENCE: SIT/STAND, REHAB EVAL
Mom wants to know what type of lotion she can use on Wyatt,he was outside on Saturday and his skin got small bumps. It did go away once he was brought inside. His kin is dry  
Pt mom calling about some dry skin on legs.  Wondering if she can apply lotion.  Will try baby lotion on dry skin on occassion (ie Aveeno baby lotion or similar brand) and will not place lotion on hands. Pt mom Will call if any further questions or concerns.   Has appt 9/15 for checkup.  Sending FYI to Dr Saunders.   
contact guard

## 2023-02-09 NOTE — ED CDU PROVIDER INITIAL DAY NOTE - EAR
2/9/2023      RE: Nicole Stanley  [address]  [place] [address]       Dear Colleague,    Thank you for the opportunity to participate in the care of your patient, Nicole Stanley, at the [facility] [place] [facility]. Please see a copy of my visit note below.    I am delighted to see Nicole Stanley for follow up of atrial fibrillation. She is here with her husabnd.     The patient is a 73 year old  female with PAF initially diagnosed 4/2018 in setting of influenza B, not symptomatic at that time. [id number] score is 3 for hypertension, age > 65, female. Apixaban started. Recurrent episodes 1/2019 (spontaneous conversion), 4/2019 (cardioverted in ED). Increasing frequency of AF starting April 2021, between April and July had 5 episodes, 5-7 hours, with palpitation/fatigue, Apple Watch says AF -130s. Her SBP at home had been 140-150s on metoprolol XL 25 every day. When I saw her 8/12/2021, I stopped metoprolol and started carvedilol. Discussed adding flecainide if she continues to have AF. She wanted to hold off at that time. I had not seen her back since Sept 2021.     She was in persistent AF mid Dec 2022 with BP in the 80s at home and had dyspnea and fatigue. She went to ED on 12/13/2022 where her BP was 153/102, pulse was 92.  she was cardioverted to sinus.  In January 2023 several recurrent episodes all with spontaneous termination, then persistent from 1/29-2/4/2023.  They were in [place] all January, she knew she was in AF but did not feel as bad as in Dec and did not go to ED.    Home //87.    Past Medical History:  1. Atrial fibrillation. Initial diagnosis 4/2018 in the setting of Influenza B. Spontaneously converted to sinus rhythm. Recurrent AF 4/2019, cardioverted in ED. Recurrent paroxysmal episodes April - July 2021. Cardioverted in ED 12/13/2022.  2. Hypertension.  3. Hyperlipidemia. She had previously been  on simvastatin but had some muscle aches. She is reluctant to add more medications.  4. Hypothyroidism.  5. Cholecystectomy  6. Vein stripping     Allergies:  No Known Allergies    Medications:   Apixaban 5 mg bid  Synthroid 50 mcg every day  Atorvastatin 20 every day  Carvedilol 6.25 bid    Physical examination  Vitals: 156/61, HR 54  BMI= 22 (62 kg)    Constitutional: In general, the patient is a pleasant female in no acute distress.    Targeted cardiovascular exam:  Regular rate and rhythm with ectopy. Normal S1, S2. No murmur, rub, click, or gallop.   Extremities:Pulses are normal bilaterally throughout. No peripheral edema.  Respiratory: Clear to asculation.      I have personally and independently reviewed the following:  Labs:  2022: chol 173 HDL 70, LDL 84, TG 94  2022: cr 0.92, hgb 13, plt 211K, TSH 2.6, cr 0.92    ECHOCARDIOGRAM: 18 (in AF): normal LV/RV, no valvular abnormalities     Bicycle stress echo 2019 (in sinus): exercised for 6:30 min, 90% MPHR achieved, MVO2 113% predicted, normal BP response, no ischemia     EK19:  bpm  2021: sinus  2021: sinus 68 bpm, normal intervals   2022: AF 98 bpm  TODAY 2023: Sinus 59 bpm with PACs and PVCs    Assessment :  Atrial fibrillation, usually paroxysmal. Increasing frequency - new. Occasionally very symptomatic and needed to be cardioverted within 1-2 days. Other episodes not as symptomatic. I again discussed options with her, including AAD and ablations. She would be a good candidate for PVI, but I would try a medication first especially since she has so many trips planned. She is planning on going to [place] between -3/20. Given no h/o ischemic heart disease I suggest adding flecainide 50 bid to carvedilol. Would monitor her with event monitor for the next 2 weeks before she leaves.  [id number]-CSTp score continues to be 3 for age, female, HTN, continue apixaban.    Hypertension. BP high in office  today but I have reviewed her home BP log and they've been stable.    Plan:  Add flecainide 50 bid  Event monitor - she will send transmissions daily.  I will see her back in clinic end of March after she returns from [place]      I spent a total of 20 minutes face to face with  Nicole Stanley during today's office visit. I have spent an additional 20 minutes today on chart review and documentation.    The patient is to return  as above. The patient understood the treatment plan as outlined above.  There were no barriers to learning.      Maile Leonardo MD          ---

## 2023-04-15 NOTE — H&P ADULT - NSHPLABSRESULTS_GEN_ALL_CORE
show
LABS:                        9.4    7.99  )-----------( 225      ( 02 Dec 2022 10:19 )             29.4     12-02    139  |  106  |  20  ----------------------------<  97  4.1   |  19<L>  |  1.24    Ca    9.0      02 Dec 2022 10:19    TPro  7.3  /  Alb  3.5  /  TBili  0.3  /  DBili  x   /  AST  29  /  ALT  16  /  AlkPhos  69  12-02      Urinalysis Basic - ( 02 Dec 2022 11:20 )    Color: Light Yellow / Appearance: Clear / S.009 / pH: x  Gluc: x / Ketone: Negative  / Bili: Negative / Urobili: Negative   Blood: x / Protein: 100 / Nitrite: Negative   Leuk Esterase: Negative / RBC: 8 /hpf / WBC 0 /HPF   Sq Epi: x / Non Sq Epi: 0 /hpf / Bacteria: Negative            RADIOLOGY & ADDITIONAL TESTS:

## 2023-05-02 NOTE — ED ADULT NURSE NOTE - CCCP TRG CHIEF CMPLNT
Internal medicine pREOPERATIVE history and physical     REQUESTING Physician    DR. Carlo Kovacs MD    PRIMARY CARE Physician    Rj Jones MD    HISTORY OF PRESENT ILLNESS    This patient was seen for evaluation of medical conditions prior to upcoming surgery.  Surgery scheduled May 12, 2023 at which time will undergo right knee total arthroscopy for progressive symptoms from arthritis of the knee.    PROBLEM LIST    Patient Active Problem List   Diagnosis   • Type II or unspecified type diabetes mellitus without mention of complication, not stated as uncontrolled   • Obesity, unspecified   • Essential hypertension   • Benign neoplasm of colon   • Esophageal reflux   • Family history of colonic polyps   • Depressive disorder, not elsewhere classified   • Psoriasis   • Generalized osteoarthrosis, involving multiple sites   • Fibromyalgia   • Hypercholesterolemia   • Chronic low back pain   • Primary osteoarthritis of left knee   • Osteoarthritis of right knee   • Right hip impingement syndrome   • Localized primary osteoarthritis of right lower leg   • Gastric outlet obstruction       MEDICAL HISTORY    Past Medical History:   Diagnosis Date   • Anxiety    • Depressive disorder    • Diabetes mellitus (CMD)     diet controlled   • Fibromyalgia    • Fracture     right ankle - tx w/ casting    • GERD    • History of shingles 2018    mild case    • Hypercholesterolemia    • Migraines    • Osteoarthritis of right knee    • Psoriasis    • Right hip pain    • Urinary incontinence    • Varicose veins of both lower extremities    • Vitamin B12 deficiency        FAMILY HISTORY    Family History   Problem Relation Age of Onset   • Arrhythmia Mother         cardioversion   • Osteoarthritis Mother    • COPD Mother    • Depression Mother    • Early death Father    • Aneurysm Father 41        brain    • Hypertension Father    • Depression Sister    • Diabetes Sister    • Diabetes Brother    • Osteoarthritis Maternal Aunt     • Heart disease Maternal Uncle 70   • Osteoarthritis Paternal Uncle    • Cancer Paternal Uncle    • High cholesterol Paternal Uncle    • High blood pressure Paternal Uncle    • Heart disease Maternal Grandmother    • Osteoarthritis Maternal Grandmother    • Osteoarthritis Paternal Grandmother    • High blood pressure Paternal Grandmother    • High cholesterol Paternal Grandmother    • Vision Loss Other        SOCIAL HISTORY    Social History     Tobacco Use   • Smoking status: Former     Current packs/day: 0.00     Average packs/day: 2.0 packs/day for 15.0 years (30.0 pk-yrs)     Types: Cigarettes     Start date: 1984     Quit date: 1999     Years since quittin.3   • Smokeless tobacco: Never   Vaping Use   • Vaping status: never used   Substance Use Topics   • Alcohol use: Yes     Comment: 1 drink per month   • Drug use: Never       SURGICAL HISTORY    Past Surgical History:   Procedure Laterality Date   • Bariatric surgery  ~     lap gastric banding; repositioned port 2013   • Colonoscopy diagnostic  2011   • Esophagogastroduodenoscopy  8-20-15    inflammation   • Hb sclerotherapy single vein Right 2019    Dr Beth    • Hysterectomy  2010   • Lap gastric restrict w remove device &subq  2021    [facility]; Robotic band and port removal   • Plantar fascia surgery Left     left foot   • Shoulder arthroscopy w/ rotator cuff repair Left        CURRENT MEDICATIONS    Current Outpatient Medications   Medication Sig Dispense Refill   • pravastatin (PRAVACHOL) 20 MG tablet TAKE 1 TABLET BY MOUTH ONCE A DAY  AT  NIGHT 90 tablet 1   • phentermine 30 MG capsule Take 30 mg by mouth every morning.     • eletriptan (RELPAX) 40 MG tablet Take 1 tablet by mouth as needed for Migraine. Take 1 tablet by mouth at onset of migraine. May repeat after 2 hours if needed. 10 tablet 5   • ERENumab-aooe (Aimovig) 140 MG/ML injection Inject 1 mL into the skin every 30 days. 1 mL 11   • topiramate  (TOPAMAX) 50 MG tablet Take 2 tablets by mouth daily. 60 tablet 5   • albuterol 108 (90 Base) MCG/ACT inhaler Inhale 2 puffs into the lungs every 4 hours as needed for Shortness of Breath or Wheezing. 8.5 g 0   • amitriptyline (ELAVIL) 100 MG tablet Take 1 tablet by mouth nightly. 90 tablet 3   • DULoxetine (CYMBALTA) 60 MG capsule Take 1 capsule by mouth daily. Take with 30mg to equal 90mg po daily. 90 capsule 3   • DULoxetine (CYMBALTA) 30 MG capsule Take 1 capsule by mouth daily. Take with 60mg to equal 90mg po daily. 90 capsule 3   • tiZANidine (ZANAFLEX) 2 MG tablet Take 1 tablet by mouth 2 times daily as needed for Muscle spasms. 15 tablet 0   • pantoprazole (PROTONIX) 20 MG tablet Take 1 tablet by mouth in the morning and 1 tablet in the evening. 180 tablet 1   • metFORMIN (GLUCOPHAGE-XR) 500 MG 24 hr tablet Take 2 tablets by mouth daily (with breakfast). 180 tablet 1   • traMADol (ULTRAM) 50 MG tablet TAKE 1 TABLET BY MOUTH EVERY 8 HOURS AS NEEDED FOR PAIN 90 tablet 5   • gabapentin (NEURONTIN) 300 MG capsule TAKE 1 CAPSULE BY MOUTH NIGHTLY. 90 capsule 3   • Spacer/Aero-Holding Chambers (AEROCHAMBER) Use as directed 1 each 0   • meloxicam (Mobic) 15 MG tablet Take 1 tablet by mouth daily. 90 tablet 3     No current facility-administered medications for this visit.       ALLERGIES    ALLERGIES:  No Known Allergies    REVIEW OF SYSTEMS    He see Cardiology and got clearance for surgery.  May occasionally have palpitation from PAC or PVC but currently asymptomatic.  She feels in retrospect probably has had this for years or decades.  He is off phentermine recommended avoiding decongestants.  Did discuss could consider additional G LP 1 agonist for weight loss but will rediscuss at her appointment in June to try to aid weight loss.  All other systems reviewed and negative.     PHYSICAL EXAMINATION      VITAL SIGNS:    Visit Vitals  /72   Pulse 81   Temp 97.6 °F (36.4 °C) (Temporal)   Resp 20   Ht 5' 5\"  (1.651 m)   Wt 123.4 kg (272 lb)   SpO2 98%   BMI 45.26 kg/m²      Constitutional:  No acute distress.   HENT:  Normocephalic. Atraumatic. Bilateral external ears normal. Oropharynx moist. No oral exudates. No tonsillar or uvular edema. Nose normal.   Neck: Normal range of motion. No tenderness. Supple. No stridor.    Eyes:  PERRL, EOMI (Pupils equal, round, reactive to light, extraocular movements intact). Conjunctivae normal. No discharge.    Cardiovascular:  Normal rate. Normal rhythm. No murmurs, gallops, or rubs.  No peripheral edema  Respiratory:  No respiratory distress. Normal breath sounds. No rales. No wheezing.    Gastrointestinal:  Bowel sounds normal. Soft. No tenderness. No masses. No pulsatile masses.    Neurologic:  Alert and oriented times 3. Normal motor function.  No focal deficits noted.        LABS    Component      Latest Ref Rng & Units 3/27/2023 4/3/2023   WBC      4.2 - 11.0 K/mcL 7.5    RBC      4.00 - 5.20 mil/mcL 5.43 (H)    HGB      12.0 - 15.5 g/dL 14.3    HCT      36.0 - 46.5 % 47.0 (H)    MCV      78.0 - 100.0 fl 86.6    MCH      26.0 - 34.0 pg 26.3    MCHC      32.0 - 36.5 g/dL 30.4 (L)    RDW-CV      11.0 - 15.0 % 16.5 (H)    RDW-SD      39.0 - 50.0 fL 51.9 (H)    PLT      140 - 450 K/mcL 341    NRBC      <=0 /100 WBC 0    Neutrophil      % 62    LYMPH      % 24    MONO      % 11    EOSIN      % 3    BASO      % 0    Immature Granulocytes      % 0    Absolute Neutrophil      1.8 - 7.7 K/mcL 4.6    Absolute Lymph      1.0 - 4.0 K/mcL 1.8    Absolute Mono      0.3 - 0.9 K/mcL 0.8    Absolute Eos      0.0 - 0.5 K/mcL 0.2    Absolute Baso      0.0 - 0.3 K/mcL 0.0    Absolute Immature Granulocytes      0.0 - 0.2 K/mcL 0.0    Sodium      135 - 145 mmol/L 140    Potassium      3.4 - 5.1 mmol/L 4.3    Chloride      97 - 110 mmol/L 106    CO2      21 - 32 mmol/L 27    ANION GAP      7 - 19 mmol/L 11    Glucose      70 - 99 mg/dL 149 (H)    BUN      6 - 20 mg/dL 15    Creatinine      0.51 -  0.95 mg/dL 0.74    Glomerular Filtration Rate      >=60 >90    BUN/CREATININE RATIO      7 - 25 20    CALCIUM      8.4 - 10.2 mg/dL 9.6    TOTAL BILIRUBIN      0.2 - 1.0 mg/dL 0.2    AST/SGOT      <=37 Units/L 16    ALT/SGPT      <64 Units/L 39    ALK PHOSPHATASE      45 - 117 Units/L 76    Albumin      3.6 - 5.1 g/dL 3.4 (L)    TOTAL PROTEIN      6.4 - 8.2 g/dL 7.7    GLOBULIN      2.0 - 4.0 g/dL 4.3 (H)    A/G Ratio, Serum      1.0 - 2.4 0.8 (L)    COLOR       Straw    CLARITY       Clear    GLUCOSE(URINE)      Negative mg/dL Negative    BILIRUBIN      Negative Negative    KETONES      Negative mg/dL Negative    SPECIFIC GRAVITY      1.005 - 1.030 1.011    BLOOD      Negative Negative    pH      5.0 - 7.0 5.0    PROTEIN(URINE)      Negative mg/dL Negative    UROBILINOGEN      0.2, 1.0 mg/dL 0.2    NITRITE      Negative Negative    LEUKOCYTE ESTERASE      Negative Negative    Squamous EPI'S      None Seen, 1 to 5 /hpf 1 to 5    ERYTHROCYTES, URINALYSIS      None Seen, 1 to 2 /hpf None Seen    LEUKOCYTES, URINALYSIS      None Seen, 1 to 5 /hpf 1 to 5    BACTERIA, URINALYSIS      None Seen /hpf None Seen    HYALINE CASTS, URINALYSIS      None Seen, 1 to 5 /lpf 1 to 5    MUCOUS       Present    PROTIME      9.7 - 11.8 sec 9.8    INR       0.9    S Aureus PCR      Not Detected Not Detected    S AUREUS/MRSA PCR      Not Detected Not Detected    PTT      22 - 30 sec 25    GLYCOHEMOGLOBIN A1C      4.5 - 5.6 % 6.8 (H)    TSH      0.350 - 5.000 mcUnits/mL  2.682   MAGNESIUM      1.7 - 2.4 mg/dL  2.2       Encounter Date: 03/27/23   Electrocardiogram 12-Lead   Result Value    Ventricular Rate EKG/Min (BPM) 90    Atrial Rate (BPM) 90    NJ-Interval (MSEC) 130    QRS-Interval (MSEC) 92    QT-Interval (MSEC) 356    QTc 436    P Axis (Degrees) 34    T Axis (Degrees) 75    REPORT TEXT      Sinus rhythm  with occasional  premature ventricular complexes  and  premature atrial complexes  Nonspecific ST and T wave  abnormality  Abnormal ECG  When compared with ECG of  21-MAY-2019 14:03,  premature ventricular complexes  are now  present  premature atrial complexes  are now  present  Confirmed by DAYTON CARRASQUILLO DO ([id number]) on 3/28/2023 7:33:33 AM         Result Information    Date and Time: Exam End: 3/9/2023 16:53 Status: Final result Provider Status: Reviewed   Priority: Routine            Study Result    Narrative & Impression   XR CHEST PA AND LATERAL     HISTORY:  . Acute cough     COMPARISON: 3/16/2022     TECHNIQUE:  Frontal and lateral views of the chest.     FINDINGS:     There is poor inspiration.     The cardiomediastinal contour and pulmonary vasculature are within normal  limits. No focal consolidation. No pleural effusion or pneumothorax.     The visualized osseous structures demonstrate no acute abnormality.           IMPRESSION:     1.  No evidence for active disease in the chest.             Nuclear stress test April 14,2023:    FINAL IMPRESSION:     1) Nuclear images demonstrated: Subdiagphragmatic attenuation.                                      No evidence of reversible ischemia with stress. No fixed defects.      2) Postexercise global LV ejection fraction was calculated at 50%.     3) overall conclusion : No evidence of reversible ischemia.      4) The resting and stress electrocardiographic interpretation is reported separately.        Vamsi Gómez MD  Cardiology         Stress test  Order: [id number]   Status: Final result      Visible to patient: Yes (seen)      Next appt: 05/30/2023 at 11:30 AM in [facility] (Salome Tam, PT)      Dx: Cardiac LV ejection fraction 21-40%      0 Result Notes        Impression        NUCLEAR MEDICINE PERFUSION RESTING STRESS TEST     Indication for Testing:  New Cardiomyopathy     Clinical History:   Paula Alonzo is a 56 year old female with no known coronary artery disease .   Known cardiac risk factors include: hypertension, diabetes, dyslipidemia  and former smoker   Previous cardiac interventions: none     PROCEDURE   Medications Given: Regadenoson: 0.4 mg.     Stress Protocol:   Due to the patient's inability to exercise, a standard regadenoson non-walking stress test was performed. The patient was injected with 4 mg of regadenoson over 15-20 seconds. Subsequently, the patient was injected with Tc99m sestamibi intravenously. The patient was monitored for an additional 6 minutes before the test was terminated.     Heart Rate Response - Rest  80 bpm    After regadenoson  106 bpm     Blood Pressure Response - Rest  143/87 mm/Hg   After regadenoson  141/81 mm/Hg     Symptoms present during the test - none     ECG (electrocardiogram) FINDINGS   Resting supine 12 lead ECG:  Normal sinus rhythm no acute ischemic change   Baseline stress 12 lead ECG: Unchanged from the resting ECG   Stress ECG:  No ischemic changes noted     IMPRESSION   Nonischemic response electrocardiographically   Nonischemic response subjectively   Myocardial perfusion scan pending.       Roxana Joel MD   04/17/23                ECHOCARDIOGRAM 4/12/23  Final Impressions  Globally reduced systolic function at 40%.  Moderate increased left ventricular wall thickness.  Grade I diastolic dysfunction of the left ventricle (impaired relaxation pattern).  Abnormal LV Global longitudinal strain -12.9 %.  Trivial-mild mitral valve regurgitation.  RVSP could not be calculated due to incomplete tricuspid regurgitation velocity profile.  No pericardial effusion.      Assessment/plan:     56 year old female with planned surgery as above.      1. Preop examination    2. Primary osteoarthritis of right knee    3. Type 2 diabetes mellitus without complication, without long-term current use of insulin (CMD)    4. Essential hypertension    5. Gastroesophageal reflux disease, unspecified whether esophagitis present    6. Fibromyalgia    7. Migraine without status migrainosus, not intractable, unspecified  migraine type        The patient is at low risk for any potential complications from the proposed procedure and no further workup is recommended.  The patient's medical conditions are currently stable.  She should avoid aspirin and NSAIDs 1 week prior his surgeon.  Can get DVT prophylaxis per choice of surgeon.    Cardiology Preop assessment:  Per Dr Irizarry:   \" PRE OP EVALUATION   Patient is moderate risk to have intermediate risk surgery- right knee. No contraindications to surgery such as recent MI, decompensated CHF, tachyarrhythmias or severe obstructive valve disease. METs > 4. Patient is not a diabetic, no CRF and no history of CVA.\"       Thank you for allowing me to participate in the care of this patient.    A copy of this consultation will be sent to the referring physician, Dr. Carlo Kovacs MD             SOB

## 2023-10-03 NOTE — ED ADULT TRIAGE NOTE - ESI TRIAGE ACUITY LEVEL, MLM
SSRI (Selective Serotonin Reuptake Inhibitors) Adult Refill Protocol - 12 Month Protocol Passed         Medication:   sertraline (ZOLOFT) 100 MG tablet 90 tablet 1 3/28/2023 --    Sig: TAKE 1 TABLET BY MOUTH EVERY DAY        Last office visit date: 11/4/22    Next appointment scheduled?: No; sent to PCP nurse pool to schedule appointment and proceed with refill as appropriate     Number of refills given: 0     2

## 2023-10-27 ENCOUNTER — EMERGENCY (EMERGENCY)
Facility: HOSPITAL | Age: 88
LOS: 1 days | Discharge: HOME | End: 2023-10-27
Attending: EMERGENCY MEDICINE
Payer: COMMERCIAL

## 2023-10-27 VITALS
HEART RATE: 60 BPM | OXYGEN SATURATION: 96 % | TEMPERATURE: 98 F | RESPIRATION RATE: 20 BRPM | DIASTOLIC BLOOD PRESSURE: 90 MMHG | SYSTOLIC BLOOD PRESSURE: 164 MMHG

## 2023-10-27 VITALS
HEART RATE: 64 BPM | HEIGHT: 56 IN | SYSTOLIC BLOOD PRESSURE: 179 MMHG | TEMPERATURE: 98 F | WEIGHT: 119.93 LBS | DIASTOLIC BLOOD PRESSURE: 85 MMHG | RESPIRATION RATE: 20 BRPM | OXYGEN SATURATION: 97 %

## 2023-10-27 PROCEDURE — 70450 CT HEAD/BRAIN W/O DYE: CPT | Mod: 26,MA

## 2023-10-27 PROCEDURE — 71045 X-RAY EXAM CHEST 1 VIEW: CPT

## 2023-10-27 PROCEDURE — 73080 X-RAY EXAM OF ELBOW: CPT

## 2023-10-27 PROCEDURE — 72170 X-RAY EXAM OF PELVIS: CPT | Mod: 26

## 2023-10-27 PROCEDURE — 72125 CT NECK SPINE W/O DYE: CPT | Mod: 26,MA

## 2023-10-27 PROCEDURE — 99284 EMERGENCY DEPT VISIT MOD MDM: CPT | Mod: 25

## 2023-10-27 PROCEDURE — 72125 CT NECK SPINE W/O DYE: CPT | Mod: MA

## 2023-10-27 PROCEDURE — 70450 CT HEAD/BRAIN W/O DYE: CPT | Mod: MA

## 2023-10-27 PROCEDURE — 72170 X-RAY EXAM OF PELVIS: CPT

## 2023-10-27 PROCEDURE — 71045 X-RAY EXAM CHEST 1 VIEW: CPT | Mod: 26

## 2023-10-27 PROCEDURE — 73080 X-RAY EXAM OF ELBOW: CPT | Mod: 26,RT

## 2023-10-27 PROCEDURE — 99284 EMERGENCY DEPT VISIT MOD MDM: CPT

## 2023-10-27 NOTE — ED PROVIDER NOTE - PHYSICAL EXAMINATION
GEN: Patient awake and alert. No acute distress.  Head: normocephalic, ecchymosis over right forehead with TTP. No palpable skull fx.   Neck: Nontender, full ROM. No LAD.  Eyes: PERRLA b/l. EOMI, no scleral icterus, no conjunctival injection. Moist mucous membranes.  CARDIAC: Normal S1, S2. No murmur, rubs, or gallops. No peripheral edema noted.  PULM: CTA B/L no wheeze, rales or rhonchi. No signs of respiratory distress, no accessory muscle usage or nasal flaring.  ABD: Soft, nontender, nondistended. No rebound, no involuntary guarding. BS x 4, no auscultated bruit. No HSM appreciated.  : No CVA tenderness, no suprapubic tenderness.  MSK: Moving all extremities. 5/5 strength and full ROM in all extremities. No obvious deformity.  NEURO: A&Ox3, no focal neurological deficits.  SKIN: warm, dry, no rash, no open wounds. No urticaria.

## 2023-10-27 NOTE — ED PROVIDER NOTE - PROGRESS NOTE DETAILS
Enrike Farias MD PGY1: Patient reassessed, stable. CTs and XRs negative TBDC. Patient and daughter agreeable to plan.

## 2023-10-27 NOTE — ED PROVIDER NOTE - CARE PLAN
1 Principal Discharge DX:	Closed head injury with concussion, without loss of consciousness, initial encounter  Secondary Diagnosis:	Contusion of right elbow, initial encounter

## 2023-10-27 NOTE — ED ADULT NURSE NOTE - DOES PATIENT HAVE ADVANCE DIRECTIVE
unk Xenograft Text: The defect edges were debeveled with a #15 scalpel blade.  Given the location of the defect, shape of the defect and the proximity to free margins a xenograft was deemed most appropriate.  The graft was then trimmed to fit the size of the defect.  The graft was then placed in the primary defect and oriented appropriately.

## 2023-10-27 NOTE — ED PROVIDER NOTE - OBJECTIVE STATEMENT
89-year-old female past medical history of A-fib on Eliquis, myelodysplastic syndrome, HTN, HLD, CAD status post stents presents emergency department status post mechanical slip and fall in the shower, witnessed.  Per family member at bedside aide was supervising while the patient was showering and she was moving too quickly and slipped and fell.  Hit her forehead and right elbow against the wall and tub. EMS was called and able to help lift the patient who is able to bear weight and stand.  No loss of consciousness.  No active bleeding or hemorrhage.  Positive anticoagulation.  No preceding or active chest pain, shortness of breath, abdominal pain, N/V/D, urinary symptoms or change in bowel movements.  No fever/chills. Denies neck or back pain. Full ROM neck at scene reported.

## 2023-10-27 NOTE — ED ADULT TRIAGE NOTE - CHIEF COMPLAINT QUOTE
Unwitnessed fall while in the shower; "I slipped off of my shower bench." C/O head pain, right elbow pain. On Eliquis

## 2023-10-27 NOTE — ED PROVIDER NOTE - NSFOLLOWUPINSTRUCTIONS_ED_ALL_ED_FT
You were seen in the ER after a mechanical fall. Your xrays were negative for any fractures and dislocations. Your head and neck CT scans were normal.    You may have lingering pain and can take the following medications:    You may take 500-1000 mg acetaminophen every 6 hours, as needed for pain.  You may take 600 mg ibuprofen every 8 hours, with food, as needed for pain.    Follow up with your primary physician in 1-2 days. If needed call [phone] to find a primary care physician or call  [phone] to schedule an appointment with the general medicine.    1. TAKE ALL MEDICATIONS AS DIRECTED.    2. FOR PAIN OR FEVER YOU CAN TAKE IBUPROFEN (MOTRIN, ADVIL) OR ACETAMINOPHEN (TYLENOL) AS NEEDED, AS DIRECTED ON PACKAGING.  3. FOLLOW UP WITH YOUR PRIMARY DOCTOR WITHIN 5 DAYS AS DIRECTED.  4. IF YOU HAD LABS OR IMAGING DONE, YOU WERE GIVEN COPIES OF ALL LABS AND/OR IMAGING RESULTS FROM YOUR ER VISIT--PLEASE TAKE THEM WITH YOU TO YOUR FOLLOW UP APPOINTMENTS.  5. RETURN TO THE ER FOR ANY WORSENING SYMPTOMS OR CONCERNS..  ----------------------------------------------------------------------------------------------------------------   Concussion    WHAT YOU NEED TO KNOW:    What is a concussion? A concussion is a mild brain injury. It is usually caused by a bump or blow to the head from a fall, a motor vehicle crash, or a sports injury. Being shaken forcefully may also cause a concussion.    What are the signs and symptoms of a concussion? Symptoms may happen right away, or they may develop days after the concussion:    Headache    Dizziness, loss of balance, or blurry vision    Nausea or vomiting    A change in mood, such as restlessness or irritability    Trouble thinking, remembering things, or concentrating    Ringing in the ears    Drowsiness or decreased energy    Changes in your normal sleeping pattern  How is a concussion diagnosed? Your healthcare provider will examine you and ask about your symptoms. Tell your provider when and how you were injured. You may need any of the following:    A neurologic exam is also called neuro signs, neuro checks, or neuro status. A neurologic exam can show healthcare providers how well your brain works after your injury. Healthcare providers will check how your pupils react to light. They may check your memory and how easily you wake up. Your hand grasp and balance may also be tested.    CT or MRI pictures may be taken of your head. You may be given contrast liquid to help the pictures show up better. Tell the healthcare provider if you have ever had an allergic reaction to contrast liquid. Do not enter the MRI room with anything metal. Metal can cause serious injury. Tell the healthcare provider if you have any metal in or on your body.  How is a concussion managed? Usually no treatment is needed for a mild concussion. Concussion symptoms usually go away within 10 days, but they may last longer. The following may be recommended to manage your symptoms:    Rest from physical and mental activities as directed. Mental activities are those that require thinking, concentration, and attention. You will need to rest until your symptoms are gone. Rest will allow you to recover from your concussion. Ask your healthcare provider when you can return to work and other daily activities.    Have someone stay with you for the first 24 hours after your injury. Your healthcare provider should be contacted if your symptoms get worse, or you develop new symptoms.    Do not participate in sports and physical activities until your healthcare provider says it is okay. These can make your symptoms worse or lead to another concussion. Your healthcare provider will tell you when it is okay for you to return to sports or physical activities. Ask for more information about sports concussions.    Do not use heavy machinery or drive for 24 hours after your injury, or as directed. This can be dangerous and cause a serious accident. Your healthcare provider will tell you when it is safe for you to return to these activities.    Pain medicine may help relieve headache pain. Do not use NSAIDs or aspirin. These can increase your risk for bleeding. Your provider may recommend acetaminophen. Ask how much to take and how often to take it. Follow directions. Read the labels of all other medicines you are using to see if they also contain acetaminophen, or ask your doctor or pharmacist. Acetaminophen can cause liver damage if not taken correctly.  How can I help prevent another concussion?    Wear protective sports equipment that fits properly. Helmets help decrease your risk for a serious brain injury. Talk to your healthcare provider about ways you can lower your risk for a concussion if you play sports.    Wear your seatbelt every time you travel. This helps lower your risk for a head injury if you are in a car accident.  Where can I get more information?    [employer]  [address]  [place], [place]  Phone: 1-628.345.1286  Phone: [phone]  Web Address: http://www.[employer]  Call your local emergency number (911 in the [place]), or have someone call if:    You cannot be woken.    You have a seizure, increasing confusion, or a change in personality.    Your speech becomes slurred.  When should I seek immediate care?    You have sudden or new vision problems.    One of your pupils is bigger than the other.    You have a severe headache that does not go away.    You have arm or leg weakness, numbness, or new problems with coordination.    You have blood or clear fluid coming out of the ears or nose.    You cannot stop vomiting.  When should I call my doctor?    You have nausea or are vomiting.    You feel more sleepy than usual.    Your symptoms get worse.    Your symptoms last longer than 6 weeks.    You have questions or concerns about your condition or care.

## 2023-10-27 NOTE — ED PROVIDER NOTE - PATIENT PORTAL LINK FT
You can access the FollowMyHealth Patient Portal offered by [facility] by registering at the following website: [email or web address]. By joining [facility]’s FollowMyHealth portal, you will also be able to view your health information using other applications (apps) compatible with our system.

## 2023-10-27 NOTE — ED PROVIDER NOTE - ATTENDING CONTRIBUTION TO CARE
------------ATTENDING NOTE------------  pt w/ daughter brought to ED by EMS for slipping in shower, pt EMS/aide pt was in shower and slipped and hit forehead on wall and R elbow, pt was caught/helped down by aide, on EMS arrival w/ contusion to forehead but able to stand/walk w/ assistance ------------ATTENDING NOTE------------  pt w/ daughter brought to ED by EMS for slipping in shower, pt EMS/aide pt was in shower and slipped and hit forehead on wall and R elbow, pt was caught/helped down by aide, on EMS arrival w/ contusion to forehead but able to stand/walk w/ assistance, otherwise at her baseline, in depth shared decision making w/ daughter to limit w/u to imaging, ED sign out 1600 pending full imaging and close reassessments / walk for likely dc home w/ close outpt fu if all wnl.  - Fransisco Oneil MD   ------------------------------------------------

## 2023-10-27 NOTE — ED ADULT NURSE NOTE - OBJECTIVE STATEMENT
89 y/o F PMH Afib on Eliquis, HTN, HLD, osteoarthritis presented to ED via EMS s/p witnessed mechanical fall at home this morning, per EMS pt was being bathed by private aid, slipped off of shower chair and fell onto floor of shower, + head strike, - LOC, pt was assisted off of floor by EMS, pt able to bear weight with a few steps. On arrival to ED, hematoma on right side of face, laceration on right elbow. Per family, pt A&Ox3 at baseline, was A&Ox2 with EMS. On assessment in ED pt A&Ox3, breathing spontaneously and unlabored on room air, moving extremities appropriately. Pt denies chest pain, sob, dizziness, blurry vision, fevers, chills at this time. Appropriate safety measures in place, stretcher locked in lowest position, family at bedside.

## 2023-10-27 NOTE — ED ADULT NURSE REASSESSMENT NOTE - NS ED NURSE REASSESS COMMENT FT1
Pt cleaned and changed with RN and ED tech, pt denies any pain or discomfort with movement or at rest, states she feels well family at bedside.

## 2024-03-09 NOTE — ED PROVIDER NOTE - INTERNATIONAL TRAVEL
No Gilles Hurtado.  Internal Medicine  [address], [address]  [place] [address]  Phone: [phone]  Fax: [phone]  Follow Up Time: 1-3 Days   Gilles Hurtado.  Internal Medicine  [address], [address]  [place] [address]  Phone: [phone]  Fax: [phone]  Follow Up Time: 1-3 Days    YOUR NEUROLOGIST,   Phone: (   )    -  Fax: (   )    -  Follow Up Time: 1-3 Days    Giovanny Tinoco  Neurology  [address] [place] [id number]  Phone: [phone]  Fax: [phone]  Follow Up Time: 1-3 Days

## 2024-03-15 NOTE — PROGRESS NOTE ADULT - SUBJECTIVE AND OBJECTIVE BOX
Chief Complaint:   FU    History of Present Illness:   seated in chair, tired, dyspnea improved; no f/c, no cp, no dyspnea, no n/v/abd pain, no bleeding      MEDICATIONS  (STANDING):  apixaban 2.5 milliGRAM(s) Oral two times a day  atorvastatin 10 milliGRAM(s) Oral at bedtime  chlorhexidine 2% Cloths 1 Application(s) Topical daily  folic acid 1 milliGRAM(s) Oral daily  furosemide    Tablet 20 milliGRAM(s) Oral daily  latanoprost 0.005% Ophthalmic Solution 1 Drop(s) Both EYES at bedtime  levothyroxine 112 MICROGram(s) Oral daily  lidocaine   4% Patch 1 Patch Transdermal daily  mupirocin 2% Nasal 1 Application(s) Both Nostrils two times a day  pantoprazole    Tablet 40 milliGRAM(s) Oral before breakfast  polyethylene glycol 3350 17 Gram(s) Oral daily  senna 2 Tablet(s) Oral at bedtime  tamsulosin 0.4 milliGRAM(s) Oral at bedtime    MEDICATIONS  (PRN):  acetaminophen     Tablet .. 650 milliGRAM(s) Oral every 6 hours PRN Mild Pain (1 - 3)  guaiFENesin Oral Liquid (Sugar-Free) 200 milliGRAM(s) Oral every 6 hours PRN Cough      Allergies    No Known Allergies    Intolerances        Vital Signs Last 24 Hrs  T(C): 36.8 (09 Dec 2022 11:24), Max: 36.8 (08 Dec 2022 20:26)  T(F): 98.3 (09 Dec 2022 11:24), Max: 98.3 (09 Dec 2022 04:19)  HR: 74 (09 Dec 2022 11:24) (74 - 81)  BP: 130/75 (09 Dec 2022 11:24) (130/75 - 156/69)  BP(mean): --  RR: 17 (09 Dec 2022 11:24) (16 - 18)  SpO2: 95% (09 Dec 2022 11:24) (94% - 96%)    Parameters below as of 09 Dec 2022 11:24  Patient On (Oxygen Delivery Method): room air        PHYSICAL EXAM  General: adult in NAD  HEENT: clear oropharynx, anicteric sclera, pink conjunctiva  Neck: supple  CV: normal S1/S2   Lungs: clear to auscultation, no wheezes, no rales  Abdomen: soft non-tender obese, positive bowel sounds  Ext: RLE edema improved  Skin: no rashes and no petechiae  Lymph Nodes: No LAD in neck  Neuro: alert and oriented X 3, no focal deficits    LABS:                          8.5    7.37  )-----------( 311      ( 09 Dec 2022 06:01 )             26.9         Mean Cell Volume : 87.9 fl  Mean Cell Hemoglobin : 27.8 pg  Mean Cell Hemoglobin Concentration : 31.6 gm/dL  Auto Neutrophil # : x  Auto Lymphocyte # : x  Auto Monocyte # : x  Auto Eosinophil # : x  Auto Basophil # : x  Auto Neutrophil % : x  Auto Lymphocyte % : x  Auto Monocyte % : x  Auto Eosinophil % : x  Auto Basophil % : x      Serial CBC's  12-09 @ 06:01  Hct-26.9 / Hgb-8.5 / Plat-311 / RBC-3.06 / WBC-7.37  Serial CBC's  12-07 @ 06:34  Hct-29.0 / Hgb-9.3 / Plat-293 / RBC-3.35 / WBC-8.72  Serial CBC's  12-06 @ 13:37  Hct-26.9 / Hgb-8.8 / Plat-264 / RBC-3.13 / WBC-11.13      12-09    140  |  107  |  58<H>  ----------------------------<  84  4.0   |  21<L>  |  1.42<H>    Ca    8.8      09 Dec 2022 06:01            Ferritin, Serum: 246 ng/mL (12-03 @ 06:53)  Folate, Serum: >20.0 ng/mL (12-03 @ 06:53)  Iron - Total Binding Capacity.: 245 ug/dL (12-03 @ 06:53)  Vitamin B12, Serum: 1261 pg/mL (12-03 @ 06:53)          12-06 @ 05:21    ldh1 --  haptoglobin --  MARK--  uric acid9.5      Radiology:         .

## 2024-04-10 ENCOUNTER — INPATIENT (INPATIENT)
Facility: HOSPITAL | Age: 89
LOS: 10 days | Discharge: NURSING, REHAB OR LONG-TERM CARE | DRG: 690 | End: 2024-04-21
Attending: INTERNAL MEDICINE | Admitting: INTERNAL MEDICINE
Payer: COMMERCIAL

## 2024-04-10 VITALS
SYSTOLIC BLOOD PRESSURE: 196 MMHG | TEMPERATURE: 97 F | DIASTOLIC BLOOD PRESSURE: 87 MMHG | HEART RATE: 56 BPM | RESPIRATION RATE: 18 BRPM | OXYGEN SATURATION: 96 %

## 2024-04-10 DIAGNOSIS — N39.0 URINARY TRACT INFECTION, SITE NOT SPECIFIED: ICD-10-CM

## 2024-04-10 LAB
ALBUMIN SERPL ELPH-MCNC: 3.7 G/DL — SIGNIFICANT CHANGE UP (ref 3.3–5)
ALP SERPL-CCNC: 77 U/L — SIGNIFICANT CHANGE UP (ref 40–120)
ALT FLD-CCNC: 25 U/L — SIGNIFICANT CHANGE UP (ref 10–45)
ANION GAP SERPL CALC-SCNC: 15 MMOL/L — SIGNIFICANT CHANGE UP (ref 5–17)
APPEARANCE UR: CLEAR — SIGNIFICANT CHANGE UP
APTT BLD: 39 SEC — HIGH (ref 24.5–35.6)
AST SERPL-CCNC: 38 U/L — SIGNIFICANT CHANGE UP (ref 10–40)
BACTERIA # UR AUTO: ABNORMAL /HPF
BASOPHILS # BLD AUTO: 0.04 K/UL — SIGNIFICANT CHANGE UP (ref 0–0.2)
BASOPHILS NFR BLD AUTO: 0.8 % — SIGNIFICANT CHANGE UP (ref 0–2)
BILIRUB SERPL-MCNC: 1.1 MG/DL — SIGNIFICANT CHANGE UP (ref 0.2–1.2)
BILIRUB UR-MCNC: NEGATIVE — SIGNIFICANT CHANGE UP
BUN SERPL-MCNC: 34 MG/DL — HIGH (ref 7–23)
CALCIUM SERPL-MCNC: 9.8 MG/DL — SIGNIFICANT CHANGE UP (ref 8.4–10.5)
CAST: 0 /LPF — SIGNIFICANT CHANGE UP (ref 0–4)
CHLORIDE SERPL-SCNC: 108 MMOL/L — SIGNIFICANT CHANGE UP (ref 96–108)
CO2 SERPL-SCNC: 20 MMOL/L — LOW (ref 22–31)
COLOR SPEC: YELLOW — SIGNIFICANT CHANGE UP
CREAT SERPL-MCNC: 1.45 MG/DL — HIGH (ref 0.5–1.3)
DIFF PNL FLD: ABNORMAL
EGFR: 32 ML/MIN/1.73M2 — LOW
EOSINOPHIL # BLD AUTO: 0.25 K/UL — SIGNIFICANT CHANGE UP (ref 0–0.5)
EOSINOPHIL NFR BLD AUTO: 5 % — SIGNIFICANT CHANGE UP (ref 0–6)
FLUAV AG NPH QL: SIGNIFICANT CHANGE UP
FLUBV AG NPH QL: SIGNIFICANT CHANGE UP
GLUCOSE SERPL-MCNC: 98 MG/DL — SIGNIFICANT CHANGE UP (ref 70–99)
GLUCOSE UR QL: NEGATIVE MG/DL — SIGNIFICANT CHANGE UP
HCT VFR BLD CALC: 43.5 % — SIGNIFICANT CHANGE UP (ref 34.5–45)
HGB BLD-MCNC: 12.9 G/DL — SIGNIFICANT CHANGE UP (ref 11.5–15.5)
IMM GRANULOCYTES NFR BLD AUTO: 0.2 % — SIGNIFICANT CHANGE UP (ref 0–0.9)
INR BLD: 1.45 RATIO — HIGH (ref 0.85–1.18)
KETONES UR-MCNC: NEGATIVE MG/DL — SIGNIFICANT CHANGE UP
LEUKOCYTE ESTERASE UR-ACNC: ABNORMAL
LYMPHOCYTES # BLD AUTO: 1 K/UL — SIGNIFICANT CHANGE UP (ref 1–3.3)
LYMPHOCYTES # BLD AUTO: 20 % — SIGNIFICANT CHANGE UP (ref 13–44)
MAGNESIUM SERPL-MCNC: 1.6 MG/DL — SIGNIFICANT CHANGE UP (ref 1.6–2.6)
MCHC RBC-ENTMCNC: 28 PG — SIGNIFICANT CHANGE UP (ref 27–34)
MCHC RBC-ENTMCNC: 29.7 GM/DL — LOW (ref 32–36)
MCV RBC AUTO: 94.4 FL — SIGNIFICANT CHANGE UP (ref 80–100)
MONOCYTES # BLD AUTO: 0.44 K/UL — SIGNIFICANT CHANGE UP (ref 0–0.9)
MONOCYTES NFR BLD AUTO: 8.8 % — SIGNIFICANT CHANGE UP (ref 2–14)
NEUTROPHILS # BLD AUTO: 3.27 K/UL — SIGNIFICANT CHANGE UP (ref 1.8–7.4)
NEUTROPHILS NFR BLD AUTO: 65.2 % — SIGNIFICANT CHANGE UP (ref 43–77)
NITRITE UR-MCNC: POSITIVE
NRBC # BLD: 0 /100 WBCS — SIGNIFICANT CHANGE UP (ref 0–0)
NT-PROBNP SERPL-SCNC: HIGH PG/ML (ref 0–300)
PH UR: 6.5 — SIGNIFICANT CHANGE UP (ref 5–8)
PLATELET # BLD AUTO: 177 K/UL — SIGNIFICANT CHANGE UP (ref 150–400)
POTASSIUM SERPL-MCNC: 4.1 MMOL/L — SIGNIFICANT CHANGE UP (ref 3.5–5.3)
POTASSIUM SERPL-SCNC: 4.1 MMOL/L — SIGNIFICANT CHANGE UP (ref 3.5–5.3)
PROT SERPL-MCNC: 7.6 G/DL — SIGNIFICANT CHANGE UP (ref 6–8.3)
PROT UR-MCNC: 30 MG/DL
PROTHROM AB SERPL-ACNC: 15.1 SEC — HIGH (ref 9.5–13)
RBC # BLD: 4.61 M/UL — SIGNIFICANT CHANGE UP (ref 3.8–5.2)
RBC # FLD: 18.9 % — HIGH (ref 10.3–14.5)
RBC CASTS # UR COMP ASSIST: 4 /HPF — SIGNIFICANT CHANGE UP (ref 0–4)
RSV RNA NPH QL NAA+NON-PROBE: SIGNIFICANT CHANGE UP
SARS-COV-2 RNA SPEC QL NAA+PROBE: SIGNIFICANT CHANGE UP
SODIUM SERPL-SCNC: 143 MMOL/L — SIGNIFICANT CHANGE UP (ref 135–145)
SP GR SPEC: 1.01 — SIGNIFICANT CHANGE UP (ref 1–1.03)
SQUAMOUS # UR AUTO: 0 /HPF — SIGNIFICANT CHANGE UP (ref 0–5)
TROPONIN T, HIGH SENSITIVITY RESULT: 38 NG/L — SIGNIFICANT CHANGE UP (ref 0–51)
TROPONIN T, HIGH SENSITIVITY RESULT: 39 NG/L — SIGNIFICANT CHANGE UP (ref 0–51)
UROBILINOGEN FLD QL: 1 MG/DL — SIGNIFICANT CHANGE UP (ref 0.2–1)
WBC # BLD: 5.01 K/UL — SIGNIFICANT CHANGE UP (ref 3.8–10.5)
WBC # FLD AUTO: 5.01 K/UL — SIGNIFICANT CHANGE UP (ref 3.8–10.5)
WBC UR QL: 2 /HPF — SIGNIFICANT CHANGE UP (ref 0–5)

## 2024-04-10 PROCEDURE — 72170 X-RAY EXAM OF PELVIS: CPT | Mod: 26

## 2024-04-10 PROCEDURE — 71045 X-RAY EXAM CHEST 1 VIEW: CPT | Mod: 26

## 2024-04-10 PROCEDURE — 76604 US EXAM CHEST: CPT | Mod: 26

## 2024-04-10 PROCEDURE — 72125 CT NECK SPINE W/O DYE: CPT | Mod: 26,MC

## 2024-04-10 PROCEDURE — 12002 RPR S/N/AX/GEN/TRNK2.6-7.5CM: CPT

## 2024-04-10 PROCEDURE — 99285 EMERGENCY DEPT VISIT HI MDM: CPT | Mod: 25

## 2024-04-10 PROCEDURE — 93308 TTE F-UP OR LMTD: CPT | Mod: 26

## 2024-04-10 PROCEDURE — 70450 CT HEAD/BRAIN W/O DYE: CPT | Mod: 26,MC

## 2024-04-10 RX ORDER — CEFTRIAXONE 500 MG/1
1000 INJECTION, POWDER, FOR SOLUTION INTRAMUSCULAR; INTRAVENOUS ONCE
Refills: 0 | Status: COMPLETED | OUTPATIENT
Start: 2024-04-10 | End: 2024-04-10

## 2024-04-10 RX ORDER — ACETAMINOPHEN 500 MG
2 TABLET ORAL
Refills: 0 | DISCHARGE

## 2024-04-10 RX ORDER — METOPROLOL TARTRATE 50 MG
1 TABLET ORAL
Refills: 0 | DISCHARGE

## 2024-04-10 RX ORDER — LANOLIN ALCOHOL/MO/W.PET/CERES
3 CREAM (GRAM) TOPICAL AT BEDTIME
Refills: 0 | Status: DISCONTINUED | OUTPATIENT
Start: 2024-04-10 | End: 2024-04-16

## 2024-04-10 RX ORDER — OMEPRAZOLE 10 MG/1
1 CAPSULE, DELAYED RELEASE ORAL
Refills: 0 | DISCHARGE

## 2024-04-10 RX ORDER — CEFTRIAXONE 500 MG/1
1000 INJECTION, POWDER, FOR SOLUTION INTRAMUSCULAR; INTRAVENOUS EVERY 24 HOURS
Refills: 0 | Status: DISCONTINUED | OUTPATIENT
Start: 2024-04-10 | End: 2024-04-16

## 2024-04-10 RX ORDER — BIMATOPROST 0.3 MG/ML
1 SOLUTION/ DROPS OPHTHALMIC
Refills: 0 | DISCHARGE

## 2024-04-10 RX ORDER — FUROSEMIDE 40 MG
40 TABLET ORAL ONCE
Refills: 0 | Status: COMPLETED | OUTPATIENT
Start: 2024-04-10 | End: 2024-04-10

## 2024-04-10 RX ORDER — LEVOTHYROXINE SODIUM 125 MCG
1 TABLET ORAL
Refills: 0 | DISCHARGE

## 2024-04-10 RX ADMIN — Medication 40 MILLIGRAM(S): at 08:27

## 2024-04-10 RX ADMIN — Medication 3 MILLIGRAM(S): at 22:07

## 2024-04-10 RX ADMIN — CEFTRIAXONE 100 MILLIGRAM(S): 500 INJECTION, POWDER, FOR SOLUTION INTRAMUSCULAR; INTRAVENOUS at 10:53

## 2024-04-10 NOTE — ED PROVIDER NOTE - MUSCULOSKELETAL, MLM
Spine appears normal, range of motion is not limited, no muscle or joint tenderness. 2+ b/l LE edema.

## 2024-04-10 NOTE — ED PROVIDER NOTE - PROGRESS NOTE DETAILS
lac repaired using staples. Pt w/ UTI, generalized weakness. Endorsed to Dr. Mays for admission. - Anderson Arias PA-C

## 2024-04-10 NOTE — CONSULT NOTE ADULT - ASSESSMENT
89 y/o female with pmhx AF (since 2019), hypertension, hyperlipidemia, CAD s/p remote PCI, MDS, HFpEF, and severe MR refused MitraClip in past s/p Fall. Patient reports mechanical trip and fall. Denies LOC/syncope. Patient was given extra dose of Lasix last week as she has worsening lower extremity edema associated with SOB. No hx chest pain, palpitations, dizziness, or syncope.CT head Laceration of Left parietal scalp area CT C spine shows  Degenarative changes    (10 Apr 2024 14:48)      CHRONIC KIDNEY DISEASE, STAGE 3a :   Serum creatinine is stable at 1.42, approximating a GFR of 42 ml/min.   There is no progression.  No uremic symptoms. No evidence of  worsening  Anemia. Fluid status stable.   Will continue to avoid nephrotoxic drugs.  Patient remains asymptomatic.  Continue current therapy.    BP monitoring,continue current  meds, low salt diet,followup with PMD in 1-2 weeks      ANEMIA PLAN:  Anemia of chronic disease:  is controlled   We will monitor Iron stores, B12 and RBC folate .      lower ext edema on lasix 
Patient seen and examined, agree with above assessment and plan as transcribed above.    - mild volume overload improved with IV lasix in the ER  - Abx per med  resume PO Lasix tomorrow     Maciej Courtney MD, [facility]  BEEPER [phone]

## 2024-04-10 NOTE — H&P ADULT - ASSESSMENT
CT head Laceration of Left parietal scalp area   CT C spine shows  Degenarative changes  89 y/o female with pmhx AF (since 2019), hypertension, hyperlipidemia, CAD s/p remote PCI, MDS, HFpEF, and severe MR refused MitraClip in past s/p Fall.     CT head Laceration of Left parietal scalp area   CT C spine shows  Degenarative changes     # Fall s/p Laceration of Scalp area   Wound care   Orthostatics   PT eval     #Acute on Chronic HFpEF Exacerbation  s/p Lasix   Cards consulted   Resume po meds     # UTI iv Ceftriaxone   Follow up Urine cx     # A fib Eliquis from Tomorrow   Hold it today s/p Fall with Laceration     MR / Aortic Stenosis out patient follow up     #CAD s/p stent Aspirin   # Hypothyroidism Continuer with Synthroid

## 2024-04-10 NOTE — ED PROVIDER NOTE - ATTENDING APP SHARED VISIT CONTRIBUTION OF CARE
89-year-old with history of atrial fibrillation on Eliquis, CHF, presenting status post fall this morning hitting her left temporal region on the bedside table.  Unwitnessed fall with the aid home who came and evaluated her shortly afterwards.  Patient's had some slight memory issues over the past month but acutely yesterday was forgetting where she placed some things in her purse and cell phone etc.  Patient was noted to have some mild increased work of breathing per her daughter and at bedside of the past few days as well.  Of note, patient has had an increase in her Lasix to 40 mg but the home health aides have been holding the dosage secondary to patient feeling dizzy when she takes it.  There is a 3 to 4 cm linear laceration to the left temporal region, impression alert to name situation, place but has difficulty with time, trachea midline, no tachycardia, irregularly irregular rate and rhythm, crackles and decreased breath sounds in the right lung base, abdomen soft no tenderness no rebound and no guarding, bilateral lower extremity edema +2-3.  Patient cooperative  Attending Emergency Medicine Physician- Kwasi Short MD, FACEP: In this physician's medical judgement based on clinical history and physical exam the patient's signs and symptoms lead to differential diagnoses which includes but is not limited to: Fall with head strike, URI, pleural effusion, worsening fluid overload, ich  Historical features, symptoms, and clinical exam not consistent with: Sepsis, ACS  Labs were ordered and independently reviewed by me.  EKG was ordered and independently reviewed by me. Independent interpretation by myself: 73bpm, qtc 517, no stemi  Imaging was ordered and reviewed by me.  Independent interpretation by myself: right sided pleural effusion on lung ultrasound; TTE- TR moderate to severe present, MR mild present, dilated atria and limited ivc variation  Appropriate medications for the patient's presenting complaints were ordered, and effects were reassessed.   Patient's records including prior hospital visit, med and medical history were reviewed.   History assisted by patient's daughter Diane  Will follow up on labs, therapeutics, imaging, reassess and disposition as clinically indicated.  *The above represents an initial assessment/impression. Please refer to my progress notes below for potential changes in patient clinical course*  Escalation to admission/observation was considered.    Portions of this note have been documented using voice recognition software. As a result, errors may occur in the transcription process. Effort has been made to correct all grammatical and transcription error, but some may have been missed which may produce sporadic inaccurate transcription or nonsensical phrases.

## 2024-04-10 NOTE — ED PROVIDER NOTE - CARE PLAN
Principal Discharge DX:	Acute UTI  Secondary Diagnosis:	Head trauma  Secondary Diagnosis:	Scalp laceration  Secondary Diagnosis:	Acute systolic congestive heart failure   1

## 2024-04-10 NOTE — ED ADULT NURSE NOTE - BREATH SOUNDS, MLM
To room with mother c/o cough that worsened over the weekend. She reported to mother that her  legs felt achy starting Monday. She also reports episodes twice a day she feels a sharp pain in toes and then she has a cold sensation. She states it is now in right thumb during these spells. Mother states there is no change in temperature or  color when she has these episodes.    Wheezes

## 2024-04-10 NOTE — ED PROVIDER NOTE - ENMT, MLM
Head with 4.0 cm vertically oriented laceration spanning the left parietal scalp down to temporal scalp, minimal oozing blood.  No exposed bone. No periorbital or posterior auricular ecchymosis. Airway patent, Mouth with dry mucous membranes.

## 2024-04-10 NOTE — PATIENT PROFILE ADULT - FUNCTIONAL ASSESSMENT - BASIC MOBILITY 6.
26
 2-calculated by average/Not able to assess (calculate score using [facility] averaging method)

## 2024-04-10 NOTE — H&P ADULT - HISTORY OF PRESENT ILLNESS
89 y/o female with pmhx AF (since 2019), hypertension, hyperlipidemia, CAD s/p remote PCI, MDS, HFpEF, and severe MR refused MitraClip in past s/p Fall.   Patient reports mechanical trip and fall. Denies LOC/syncope.   Patient was given extra dose of Lasix last week as she has worsening lower extremity edema associated with SOB.   No hx chest pain, palpitations, dizziness, or syncope.      CT head Laceration of Left parietal scalp area   CT C spine shows  Degenarative changes

## 2024-04-10 NOTE — ED ADULT NURSE NOTE - OBJECTIVE STATEMENT
89 y/o F PMH HTN, HLD, GERD, osteoarthritis, Afib on Eliquis presented to ED GAY s/p unwitnessed fall at home. Per family at bedside, pt uses bedside commode at home, pt was getting out of bed to use it when she fell, hit the left temporal area of head on night stand, lac noted to area, dressed by EMS. Pt states she knows she fell, does not remember if she slipped or lost consciousness, pt additionally states she "felt a little unwell yesterday" family states they noticed some labored breathing last night. On assessment, pt A&Ox2 to self and place, did not know the year, respirations even, tachypneic, wheezes heard on expiration, moving all extremities appropriately, pt lower extremities appear swollen, abdomen slightly distended, no tenderness on palpation. Pt denies any dizziness or vision changes, N/V, numbness or tingling. Pt is on CM, afib, appropriate safety measures in place with stretcher locked in lowest position.     Pt as well as family at bedside declined straight cath for urine sample, pt placed on purewick at this time.

## 2024-04-10 NOTE — ED PROVIDER NOTE - OBJECTIVE STATEMENT
89 yo female PMHx A-fib on Eliquis, HTN, HLD, GERD, MDS presents ED from home for unwitnessed fall.  Patient uses bedside commode at home and was getting out of bed attempting to use it when she fell, hit left side of her head on nightstand with resultant laceration.  Daughter at bedside lives with her, does states she has had gradual decline in some minor memory details of late and did notice some labored breathing last night. Pt endorses feeling a little unwell yesterday. Currently denies complaints. Denies cp, fever/chills, uri sxs, urinary sxs, abd pain, n/v/d. 89 yo female PMHx A-fib on Eliquis, HTN, HLD, GERD, MDS presents ED from home for unwitnessed fall.  Patient uses bedside commode at home and was getting out of bed attempting to use it when she fell, hit left side of her head on nightstand with resultant laceration.  Daughter at bedside lives with her, does states she has had gradual decline in some minor memory details of late and did notice some labored breathing last night. Pt endorses feeling a little unwell yesterday. Currently denies complaints. Denies cp, fever/chills, uri sxs, urinary sxs, abd pain, n/v/d.  Tdap up-to-date per daughter.

## 2024-04-11 LAB
ANION GAP SERPL CALC-SCNC: 15 MMOL/L — SIGNIFICANT CHANGE UP (ref 5–17)
BUN SERPL-MCNC: 29 MG/DL — HIGH (ref 7–23)
CALCIUM SERPL-MCNC: 9.8 MG/DL — SIGNIFICANT CHANGE UP (ref 8.4–10.5)
CHLORIDE SERPL-SCNC: 110 MMOL/L — HIGH (ref 96–108)
CO2 SERPL-SCNC: 22 MMOL/L — SIGNIFICANT CHANGE UP (ref 22–31)
CREAT SERPL-MCNC: 1.3 MG/DL — SIGNIFICANT CHANGE UP (ref 0.5–1.3)
EGFR: 37 ML/MIN/1.73M2 — LOW
GLUCOSE SERPL-MCNC: 87 MG/DL — SIGNIFICANT CHANGE UP (ref 70–99)
HCT VFR BLD CALC: 42.9 % — SIGNIFICANT CHANGE UP (ref 34.5–45)
HGB BLD-MCNC: 12.6 G/DL — SIGNIFICANT CHANGE UP (ref 11.5–15.5)
MCHC RBC-ENTMCNC: 27.9 PG — SIGNIFICANT CHANGE UP (ref 27–34)
MCHC RBC-ENTMCNC: 29.4 GM/DL — LOW (ref 32–36)
MCV RBC AUTO: 94.9 FL — SIGNIFICANT CHANGE UP (ref 80–100)
NRBC # BLD: 0 /100 WBCS — SIGNIFICANT CHANGE UP (ref 0–0)
PLATELET # BLD AUTO: 180 K/UL — SIGNIFICANT CHANGE UP (ref 150–400)
POTASSIUM SERPL-MCNC: 3.7 MMOL/L — SIGNIFICANT CHANGE UP (ref 3.5–5.3)
POTASSIUM SERPL-SCNC: 3.7 MMOL/L — SIGNIFICANT CHANGE UP (ref 3.5–5.3)
RBC # BLD: 4.52 M/UL — SIGNIFICANT CHANGE UP (ref 3.8–5.2)
RBC # FLD: 18.6 % — HIGH (ref 10.3–14.5)
SODIUM SERPL-SCNC: 147 MMOL/L — HIGH (ref 135–145)
WBC # BLD: 7.08 K/UL — SIGNIFICANT CHANGE UP (ref 3.8–10.5)
WBC # FLD AUTO: 7.08 K/UL — SIGNIFICANT CHANGE UP (ref 3.8–10.5)

## 2024-04-11 PROCEDURE — 73030 X-RAY EXAM OF SHOULDER: CPT | Mod: 26,LT

## 2024-04-11 RX ORDER — FOLIC ACID 0.8 MG
1 TABLET ORAL DAILY
Refills: 0 | Status: DISCONTINUED | OUTPATIENT
Start: 2024-04-11 | End: 2024-04-21

## 2024-04-11 RX ORDER — LIDOCAINE 4 G/100G
1 CREAM TOPICAL ONCE
Refills: 0 | Status: COMPLETED | OUTPATIENT
Start: 2024-04-11 | End: 2024-04-11

## 2024-04-11 RX ORDER — ACETAMINOPHEN 500 MG
650 TABLET ORAL ONCE
Refills: 0 | Status: COMPLETED | OUTPATIENT
Start: 2024-04-11 | End: 2024-04-11

## 2024-04-11 RX ORDER — FUROSEMIDE 40 MG
20 TABLET ORAL DAILY
Refills: 0 | Status: DISCONTINUED | OUTPATIENT
Start: 2024-04-11 | End: 2024-04-12

## 2024-04-11 RX ORDER — ATORVASTATIN CALCIUM 80 MG/1
10 TABLET, FILM COATED ORAL AT BEDTIME
Refills: 0 | Status: DISCONTINUED | OUTPATIENT
Start: 2024-04-11 | End: 2024-04-21

## 2024-04-11 RX ADMIN — ATORVASTATIN CALCIUM 10 MILLIGRAM(S): 80 TABLET, FILM COATED ORAL at 21:25

## 2024-04-11 RX ADMIN — Medication 650 MILLIGRAM(S): at 12:08

## 2024-04-11 RX ADMIN — Medication 650 MILLIGRAM(S): at 12:15

## 2024-04-11 RX ADMIN — LIDOCAINE 1 PATCH: 4 CREAM TOPICAL at 12:08

## 2024-04-11 RX ADMIN — LIDOCAINE 1 PATCH: 4 CREAM TOPICAL at 17:37

## 2024-04-11 RX ADMIN — Medication 1 MILLIGRAM(S): at 12:08

## 2024-04-11 RX ADMIN — CEFTRIAXONE 100 MILLIGRAM(S): 500 INJECTION, POWDER, FOR SOLUTION INTRAMUSCULAR; INTRAVENOUS at 12:08

## 2024-04-11 RX ADMIN — Medication 20 MILLIGRAM(S): at 05:19

## 2024-04-11 RX ADMIN — Medication 3 MILLIGRAM(S): at 21:25

## 2024-04-11 NOTE — PHYSICAL THERAPY INITIAL EVALUATION ADULT - NS ASR WT BEARING DETAIL LUE
Pt with h/o fall. c/o L shoulder pain. consulted Fredi PA VIA  teams for WB  clearance. L shoulder x ray ordered. Will maintain NWB LUE until x ray results up / clearance obtained/nonweight-bearing

## 2024-04-11 NOTE — DIETITIAN INITIAL EVALUATION ADULT - NSFNSGIIOFT_GEN_A_CORE
Patient reports no nausea/vomiting; no diarrhea or constipation; last BM 4/9 per flowsheets; bowel regimen: none

## 2024-04-11 NOTE — DIETITIAN INITIAL EVALUATION ADULT - SIGNS/SYMPTOMS
patient with deep tissue injury per Wound Care patient with poor PO intake in setting of acuteness of illness

## 2024-04-11 NOTE — DIETITIAN INITIAL EVALUATION ADULT - ADD RECOMMEND
-Recommend liberalizing DASH diet to Low Sodium in setting of age and poor PO intake.  -Recommend adding nephro-maria fernanda for wound healing pending no medical contraindications.  -Monitor PO intake, diet, weight, labs, skin, GI symptoms, and BM regularity.  -RD remains available upon request and will follow up per protocol.  -Monitor for malnutrition. -Recommend liberalizing DASH diet to Low Sodium in setting of age and poor PO intake.  -RD to add Mighty Shake 2x/day (400 jim, 14 Gm protein) to assist with PO intake.  -Recommend adding nephro-maria fernanda for wound healing pending no medical contraindications.  -Monitor PO intake, diet, weight, labs, skin, GI symptoms, and BM regularity.  -RD remains available upon request and will follow up per protocol.  -Monitor for malnutrition.

## 2024-04-11 NOTE — PHYSICAL THERAPY INITIAL EVALUATION ADULT - RANGE OF MOTION EXAMINATION, REHAB EVAL
L shoulder ROM n/a./bilateral upper extremity ROM was WFL (within functional limits)/bilateral lower extremity ROM was WFL (within functional limits)/deficits as listed below

## 2024-04-11 NOTE — DIETITIAN INITIAL EVALUATION ADULT - ORAL INTAKE PTA/DIET HISTORY
Patient unsure of intake PTA, lethargic and falling asleep at time of visit. Unclear if patient following dietary restrictions PTA. Patient confirms no known food allergies.

## 2024-04-11 NOTE — DIETITIAN INITIAL EVALUATION ADULT - PERTINENT MEDS FT
MEDICATIONS  (STANDING):  atorvastatin 10 milliGRAM(s) Oral at bedtime  cefTRIAXone   IVPB 1000 milliGRAM(s) IV Intermittent every 24 hours  folic acid 1 milliGRAM(s) Oral daily  furosemide    Tablet 20 milliGRAM(s) Oral daily  melatonin 3 milliGRAM(s) Oral at bedtime

## 2024-04-11 NOTE — PROGRESS NOTE ADULT - SUBJECTIVE AND OBJECTIVE BOX
C A R D I O L O G Y  **********************************     DATE OF SERVICE: 04-11-24    Patient seen earlier c/o L shoulder pain. Reports SOB improved s/p IV lasix yesterday. denies chest pain.  Review of systems otherwise negative.  	  MEDICATIONS:  MEDICATIONS  (STANDING):  atorvastatin 10 milliGRAM(s) Oral at bedtime  cefTRIAXone   IVPB 1000 milliGRAM(s) IV Intermittent every 24 hours  folic acid 1 milliGRAM(s) Oral daily  furosemide    Tablet 20 milliGRAM(s) Oral daily  melatonin 3 milliGRAM(s) Oral at bedtime      LABS:	 	    CARDIAC MARKERS:                                12.6   7.08  )-----------( 180      ( 11 Apr 2024 07:12 )             42.9     Hemoglobin: 12.6 g/dL (04-11 @ 07:12)  Hemoglobin: 12.9 g/dL (04-10 @ 07:52)      04-11    147<H>  |  110<H>  |  29<H>  ----------------------------<  87  3.7   |  22  |  1.30    Ca    9.8      11 Apr 2024 07:15  Mg     1.6     04-10    TPro  7.6  /  Alb  3.7  /  TBili  1.1  /  DBili  x   /  AST  38  /  ALT  25  /  AlkPhos  77  04-10    Creatinine Trend: 1.30<--, 1.45<--    COAGS:       proBNP:   Lipid Profile:   HgA1c:   TSH:       PHYSICAL EXAM:  T(C): 36.6 (04-11-24 @ 12:16), Max: 36.7 (04-10-24 @ 16:30)  HR: 63 (04-11-24 @ 12:16) (60 - 75)  BP: 151/82 (04-11-24 @ 12:16) (144/61 - 169/75)  RR: 18 (04-11-24 @ 12:16) (14 - 19)  SpO2: 94% (04-11-24 @ 12:16) (94% - 95%)  Wt(kg): --  I&O's Summary    10 Apr 2024 07:01  -  11 Apr 2024 07:00  --------------------------------------------------------  IN: 300 mL / OUT: 200 mL / NET: 100 mL    11 Apr 2024 07:01  -  11 Apr 2024 15:03  --------------------------------------------------------  IN: 480 mL / OUT: 300 mL / NET: 180 mL      Height (cm): 142.2 (04-10 @ 21:04)  Weight (kg): 54.2 (04-10 @ 21:04)  BMI (kg/m2): 26.8 (04-10 @ 21:04)  BSA (m2): 1.43 (04-10 @ 21:04)    Gen: NAD  HEENT:  (-)icterus (-)pallor  CV: N S1 S2 1/6 LUDMILA (+)2 Pulses B/l  Resp:  Clear to auscultation B/L, normal effort  GI: (+) BS Soft, NT, ND  Lymph:  (-)Edema, (-)obvious lymphadenopathy  Skin: Warm to touch, Normal turgor  Psych: Appropriate mood and affect      TELEMETRY: None	    ECG: Afib, RBBB - unchanged     ASSESSMENT/PLAN: Patient is a 89 y/o female well known to our office with PMH of persistent AF (since 2019), hypertension, hyperlipidemia, CAD s/p remote PCI, MDS, HFpEF, and severe MR refused MitraClip in past who presented s/p fall found to have UTI and mild acute on chronic HFpEF exacerbation. Cardiology consulted for further evaluation.     #s/p fall  - Denies LOC/Syncope  - CT Head with no hemorrhage  - Tele was stable in ED, AF with no events no longer on tele  - Abx for UTI per med  - F/u L shoulder xray    #Acute on Chronic HFpEF Exacerbation  - Volume status improved s/p IV lasix  - Continue home PO maintenance lasix 20mg daily  - Patient with known severe MR and at least mod AS but has refused further intervention and repeat echos in office therefore no repeat cardiac testing planned at this time    #Persistent Afib  - Restart Eliquis if no contraindications    - No further inpatient cardiac w/u planned  - Patient has f/u with Dr. Carbajal on 8/2 at 1:30 PM    Ivan Montero PA-C  Pager: [phone]

## 2024-04-11 NOTE — PHYSICAL THERAPY INITIAL EVALUATION ADULT - TRANSFER TRAINING, PT EVAL
GOAL: Pt will perform sit to/from stand transfers contact guard assist with appropriate AD within 2-3 weeks. GOAL: Pt will perform sit to/from stand transfers contact guard assist using RW within 2-3 weeks.

## 2024-04-11 NOTE — PHYSICAL THERAPY INITIAL EVALUATION ADULT - ADDITIONAL COMMENTS
Patient  lives alone in pvt house with HHA 12 hrs/ day. 1 step  to enter. 1 step inside.  Patient ambulated with rolling walker independent. pt owns rw, standard cane , rollator, w/c, bed side comode at home.

## 2024-04-11 NOTE — DIETITIAN INITIAL EVALUATION ADULT - OTHER INFO
Patient reports she  "lost a lot" of weight PTA, unable to specify UBW, time frame of weight loss, or amount of weight lost.   Dosing weight: 119.4lb (4/10, bed).  RD obtained bed weight: 118.4lb/53.7kg (4/11).  IBW+10%: 101.2lb, %IBW: 117% based on RD obtained current weight.  Weight history per [facility]: 122lb (2/1/24), 119.9lb (10/28/23), 125lb (6/15/23), 160.1lb (12/3/22), 143lb (9/14/21).  Weight appears downtrending x 2-3 years. RD to continue to monitor weight trends as available/able.     -Ordered for IV antibiotics for UTI.  -CKD3a per Nephrology. Sodium elevated today, potassium WDL, no phosphorus available to trend. Low eGFR noted.  -Ordered for folic acid.  -Ordered for lasix.

## 2024-04-11 NOTE — PROGRESS NOTE ADULT - SUBJECTIVE AND OBJECTIVE BOX
Patient is a 89y old  Female who presents with a chief complaint of s/p Fall (11 Apr 2024 18:26)      SUBJECTIVE / OVERNIGHT EVENTS: no events     MEDICATIONS  (STANDING):  atorvastatin 10 milliGRAM(s) Oral at bedtime  cefTRIAXone   IVPB 1000 milliGRAM(s) IV Intermittent every 24 hours  folic acid 1 milliGRAM(s) Oral daily  furosemide    Tablet 20 milliGRAM(s) Oral daily  melatonin 3 milliGRAM(s) Oral at bedtime    MEDICATIONS  (PRN):      CAPILLARY BLOOD GLUCOSE        I&O's Summary    10 Apr 2024 07:01  -  11 Apr 2024 07:00  --------------------------------------------------------  IN: 300 mL / OUT: 200 mL / NET: 100 mL    11 Apr 2024 07:01  -  11 Apr 2024 23:30  --------------------------------------------------------  IN: 480 mL / OUT: 300 mL / NET: 180 mL      T(C): 36.3 (04-11-24 @ 21:13), Max: 36.6 (04-11-24 @ 12:16)  HR: 60 (04-11-24 @ 21:13) (60 - 63)  BP: 148/66 (04-11-24 @ 21:13) (148/66 - 151/82)  RR: 18 (04-11-24 @ 21:13) (18 - 18)  SpO2: 95% (04-11-24 @ 21:13) (94% - 95%)    PHYSICAL EXAM:  GENERAL: NAD, well-developed  HEAD:  Atraumatic, Normocephalic  EYES: EOMI, PERRLA, conjunctiva and sclera clear  NECK: Supple, No JVD  CHEST/LUNG: Clear to auscultation bilaterally; No wheeze  HEART: Regular rate and rhythm; No murmurs, rubs, or gallops  ABDOMEN: Soft, Nontender, Nondistended; Bowel sounds present  EXTREMITIES:  2+ Peripheral Pulses, No clubbing, cyanosis, or edema  PSYCH: AAOx3  NEUROLOGY: non-focal  SKIN: No rashes or lesions    LABS:                        12.6   7.08  )-----------( 180      ( 11 Apr 2024 07:12 )             42.9     04-11    147<H>  |  110<H>  |  29<H>  ----------------------------<  87  3.7   |  22  |  1.30    Ca    9.8      11 Apr 2024 07:15  Mg     1.6     04-10    TPro  7.6  /  Alb  3.7  /  TBili  1.1  /  DBili  x   /  AST  38  /  ALT  25  /  AlkPhos  77  04-10    PT/INR - ( 10 Apr 2024 07:52 )   PT: 15.1 sec;   INR: 1.45 ratio         PTT - ( 10 Apr 2024 07:52 )  PTT:39.0 sec      Urinalysis Basic - ( 11 Apr 2024 07:15 )    Color: x / Appearance: x / SG: x / pH: x  Gluc: 87 mg/dL / Ketone: x  / Bili: x / Urobili: x   Blood: x / Protein: x / Nitrite: x   Leuk Esterase: x / RBC: x / WBC x   Sq Epi: x / Non Sq Epi: x / Bacteria: x        RADIOLOGY & ADDITIONAL TESTS:    Imaging Personally Reviewed:    Consultant(s) Notes Reviewed:      Care Discussed with Consultants/Other Providers:

## 2024-04-11 NOTE — PHYSICAL THERAPY INITIAL EVALUATION ADULT - NSPTDMEREC_GEN_A_CORE
DME to be updated pending X ray L shoulder results + functional progress. Pt owns necessary DME to function at home.

## 2024-04-11 NOTE — DIETITIAN INITIAL EVALUATION ADULT - OTHER CALCULATIONS
Defer fluid needs to team.  Estimated nutrient needs based on RD obtained current weight 118.4lb, with consideration for age, CKD, and skin integrity

## 2024-04-11 NOTE — PHYSICAL THERAPY INITIAL EVALUATION ADULT - PERTINENT HX OF CURRENT PROBLEM, REHAB EVAL
89 yo female PMHx A-fib on Eliquis, HTN, HLD, GERD, MDS presents ED from home for unwitnessed fall.  Patient uses bedside commode at home and was getting out of bed attempting to use it when she fell, hit left side of her head on nightstand with resultant laceration.  Daughter at bedside lives with her, does states she has had gradual decline in some minor memory details of late and did notice some labored breathing last night. Pt endorses feeling a little unwell yesterday. CT head Laceration of Left parietal scalp area. CT C spine shows  Degenarative changes 89 yo female PMHx A-fib on Eliquis, HTN, HLD, GERD, MDS presents ED from home for unwitnessed fall.  Patient uses bedside commode at home and was getting out of bed attempting to use it when she fell, hit left side of her head on nightstand with resultant laceration.  Daughter at bedside lives with her, does states she has had gradual decline in some minor memory details of late and did notice some labored breathing last night. Pt endorses feeling a little unwell yesterday. CT head Laceration of Left parietal scalp area. CT C spine shows  Degenarative changes. x ray pelvis- No acute fx. 89 yo female PMHx A-fib on Eliquis, HTN, HLD, GERD, MDS presents ED from home for unwitnessed fall.  Patient uses bedside commode at home and was getting out of bed attempting to use it when she fell, hit left side of her head on nightstand with resultant laceration.  Daughter at bedside lives with her, does states she has had gradual decline in some minor memory details of late and did notice some labored breathing last night. Pt endorses feeling a little unwell yesterday. CT head Laceration of Left parietal scalp area. CT C spine shows  Degenarative changes. x ray pelvis- No acute fx. X ray L shoulder- flattening of the left humeral head with sclerosis. No fracture or dislocation is seen. L ue WBAT maintained.

## 2024-04-11 NOTE — ADVANCED PRACTICE NURSE CONSULT - RECOMMEDATIONS
Impression  Bilateral sacrum/ buttock deep tissue pressure injury     cameron rectal incontinence dermatitis     Recommendations   1. Bilateral sacrum/ buttock deep tissue pressure injury     cameron rectal incontinence dermatitis   Topical therapy- sacral/bilateral buttocks- cleanse w/incontinent cleanser, pat dry & apply janis cream  twice daily & prn soiling . Monitor for changes .  2. Right and left heel  Elevate heels; apply Complete Cair air fluidized boots; ensure that the soles of the feet are not resting on the foot board of the bed.  3  Incontinent management - incontinent cleanser, pads,  cameron care  BID  4. Maintain on an alternating air with low air loss surface   5. Turn & reposition every 2 hr; Use positioning pillow to turn and reposition, soft pillow between bony prominences; continue measures to decrease friction/shear/pressure.  6. Nutrition optimization.  7. Place  waffle cushion when out of bed to chair .   plan of care reviewed with covering staff Fransisco Nova (SARINA)

## 2024-04-11 NOTE — PROGRESS NOTE ADULT - SUBJECTIVE AND OBJECTIVE BOX
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" x past 1.5 yrs- last dose 4 months ago      MDS (Myelodysplastic Syndrome)      Skin Cancer  of face , basal cell   4 yrs ago      History of Total Knee Replacement  L 1998      Bilateral Cataracts  71 y/o      S/P Breast Lumpectomy  10 yrs ago      Cystocele  47 yrs ago      Rectocele  47 yrs ago      Carpal Tunnel Syndrome  10 yrs ago      Basal Cell Carcinoma of Face  4 yrs ago      S/P T&A  childhood        S/P TKR (Total Knee Replacement)  left      S/P Breast Lumpectomy  benign      S/P Cataract Surgery          MEDICATIONS  (STANDING):  cefTRIAXone   IVPB 1000 milliGRAM(s) IV Intermittent every 24 hours      Allergies    No Known Allergies    Intolerances        SOCIAL HISTORY:  Denies ETOh,Smoking,     FAMILY HISTORY:      REVIEW OF SYSTEMS:    CONSTITUTIONAL: No weakness, fevers or chills  RESPIRATORY: No cough, wheezing, hemoptysis; pos  shortness of breath  CARDIOVASCULAR: No chest pain or palpitations  GASTROINTESTINAL: No abdominal or epigastric pain. No nausea, vomiting,     No diarrhea or constipation. No melena   GENITOURINARY: No dysuria, frequency or hematuria                          12.6   7.08  )-----------( 180      ( 11 Apr 2024 07:12 )             42.9       CBC Full  -  ( 11 Apr 2024 07:12 )  WBC Count : 7.08 K/uL  RBC Count : 4.52 M/uL  Hemoglobin : 12.6 g/dL  Hematocrit : 42.9 %  Platelet Count - Automated : 180 K/uL  Mean Cell Volume : 94.9 fl  Mean Cell Hemoglobin : 27.9 pg  Mean Cell Hemoglobin Concentration : 29.4 gm/dL  Auto Neutrophil # : x  Auto Lymphocyte # : x  Auto Monocyte # : x  Auto Eosinophil # : x  Auto Basophil # : x  Auto Neutrophil % : x  Auto Lymphocyte % : x  Auto Monocyte % : x  Auto Eosinophil % : x  Auto Basophil % : x      04-11    147<H>  |  110<H>  |  29<H>  ----------------------------<  87  3.7   |  22  |  1.30    Ca    9.8      11 Apr 2024 07:15  Mg     1.6     04-10    TPro  7.6  /  Alb  3.7  /  TBili  1.1  /  DBili  x   /  AST  38  /  ALT  25  /  AlkPhos  77  04-10      CAPILLARY BLOOD GLUCOSE          Vital Signs Last 24 Hrs  T(C): 36.6 (11 Apr 2024 12:16), Max: 36.6 (10 Apr 2024 21:04)  T(F): 97.9 (11 Apr 2024 12:16), Max: 97.9 (11 Apr 2024 12:16)  HR: 63 (11 Apr 2024 12:16) (60 - 63)  BP: 151/82 (11 Apr 2024 12:16) (144/61 - 169/75)  BP(mean): --  RR: 18 (11 Apr 2024 12:16) (18 - 19)  SpO2: 94% (11 Apr 2024 12:16) (94% - 95%)    Parameters below as of 11 Apr 2024 12:16  Patient On (Oxygen Delivery Method): room air        Urinalysis Basic - ( 11 Apr 2024 07:15 )    Color: x / Appearance: x / SG: x / pH: x  Gluc: 87 mg/dL / Ketone: x  / Bili: x / Urobili: x   Blood: x / Protein: x / Nitrite: x   Leuk Esterase: x / RBC: x / WBC x   Sq Epi: x / Non Sq Epi: x / Bacteria: x        PT/INR - ( 10 Apr 2024 07:52 )   PT: 15.1 sec;   INR: 1.45 ratio         PTT - ( 10 Apr 2024 07:52 )  PTT:39.0 sec    PHYSICAL EXAM:    Constitutional: NAD  HEENT: conjunctive   clear   Neck:  No JVD  Respiratory: CTAB  Cardiovascular: S1 and S2  Gastrointestinal: BS+, soft, NT/ND  Extremities: pos  peripheral edema  Neurological: , no focal deficits  Psychiatric: Normal mood, normal affect  : No Beckwith  Skin: dry   Access: Not applicable

## 2024-04-11 NOTE — PHYSICAL THERAPY INITIAL EVALUATION ADULT - GAIT TRAINING, PT EVAL
GOAL: Pt will ambulate 45'   contact guard assist with appropriate AD  in 2-3 wks. GOAL: Pt will ambulate 45'   contact guard assist using RW   in 2-3 wks.

## 2024-04-11 NOTE — DIETITIAN INITIAL EVALUATION ADULT - PERTINENT LABORATORY DATA
04-11    147<H>  |  110<H>  |  29<H>  ----------------------------<  87  3.7   |  22  |  1.30    Ca    9.8      11 Apr 2024 07:15  Mg     1.6     04-10    TPro  7.6  /  Alb  3.7  /  TBili  1.1  /  DBili  x   /  AST  38  /  ALT  25  /  AlkPhos  77  04-10

## 2024-04-11 NOTE — ADVANCED PRACTICE NURSE CONSULT - ASSESSMENT
arrived on unit, patient was found lying in a low air loss pressure redistribution support surface style bed. The patient  is unable to turn independently and staff assistance x 1was provided. Once turned, incontinence of urine  was apparent and cameron care was provided.  able to view her skin. pureWick present on patient to help divert urine.   Cameron rectal area with erythema and indurated consistent with incontinence dermatitis.  bilateral sacrum/ buttock there is  non- blanchable deep red  discoloration and hyperpigmentation  noted to measure approximately 6 cm x 6 cm x 0cm.  consistent with a deep tissue injury,  present on admission.  patient  was educated  on the importance  for turning  and positioning  every 2 hours. The use of waffle cushion when out of bed  to chair,  and to shift her Weight every 2 hours while in chair. The importance a keeping her  skin clean and dry and  to offload feet/heels ,  and optimal nutrition.

## 2024-04-11 NOTE — DIETITIAN INITIAL EVALUATION ADULT - REASON
patient lethargic, falling asleep, unable to consent
Continue with Lipitor daily   Lipid profile in am, low cholesterol diet recommended

## 2024-04-11 NOTE — ADVANCED PRACTICE NURSE CONSULT - REASON FOR CONSULT
Consult to assess patient skin initiated by RN for bilateral buttock, deep tissue injury, present on admission.      History of Present Illness:  Reason for Admission: s/p Fall  History of Present Illness:   89 y/o female with pmhx AF (since 2019), hypertension, hyperlipidemia, CAD s/p remote PCI, MDS, HFpEF, and severe MR refused MitraClip in past s/p Fall.   Patient reports mechanical trip and fall. Denies LOC/syncope.   Patient was given extra dose of Lasix last week as she has worsening lower extremity edema associated with SOB.   No hx chest pain, palpitations, dizziness, or syncope.      CT head Laceration of Left parietal scalp area   CT C spine shows  Degenarative changes

## 2024-04-11 NOTE — DIETITIAN INITIAL EVALUATION ADULT - PERSON TAUGHT/METHOD
Discussed pt on the importance of consuming a diet adequate in protein and micronutrients for wound healing/skin integrity, patient likely did not retain in setting of lethargy. Left menu at bedside for patient/family/visitors./verbal instruction/patient instructed

## 2024-04-12 LAB
ANION GAP SERPL CALC-SCNC: 12 MMOL/L — SIGNIFICANT CHANGE UP (ref 5–17)
BUN SERPL-MCNC: 34 MG/DL — HIGH (ref 7–23)
CALCIUM SERPL-MCNC: 9.4 MG/DL — SIGNIFICANT CHANGE UP (ref 8.4–10.5)
CHLORIDE SERPL-SCNC: 108 MMOL/L — SIGNIFICANT CHANGE UP (ref 96–108)
CO2 SERPL-SCNC: 25 MMOL/L — SIGNIFICANT CHANGE UP (ref 22–31)
CREAT SERPL-MCNC: 1.74 MG/DL — HIGH (ref 0.5–1.3)
EGFR: 26 ML/MIN/1.73M2 — LOW
GLUCOSE SERPL-MCNC: 84 MG/DL — SIGNIFICANT CHANGE UP (ref 70–99)
HCT VFR BLD CALC: 40.6 % — SIGNIFICANT CHANGE UP (ref 34.5–45)
HGB BLD-MCNC: 12.2 G/DL — SIGNIFICANT CHANGE UP (ref 11.5–15.5)
MAGNESIUM SERPL-MCNC: 1.6 MG/DL — SIGNIFICANT CHANGE UP (ref 1.6–2.6)
MCHC RBC-ENTMCNC: 28.3 PG — SIGNIFICANT CHANGE UP (ref 27–34)
MCHC RBC-ENTMCNC: 30 GM/DL — LOW (ref 32–36)
MCV RBC AUTO: 94.2 FL — SIGNIFICANT CHANGE UP (ref 80–100)
NRBC # BLD: 0 /100 WBCS — SIGNIFICANT CHANGE UP (ref 0–0)
PHOSPHATE SERPL-MCNC: 3.5 MG/DL — SIGNIFICANT CHANGE UP (ref 2.5–4.5)
PLATELET # BLD AUTO: 167 K/UL — SIGNIFICANT CHANGE UP (ref 150–400)
POTASSIUM SERPL-MCNC: 4 MMOL/L — SIGNIFICANT CHANGE UP (ref 3.5–5.3)
POTASSIUM SERPL-SCNC: 4 MMOL/L — SIGNIFICANT CHANGE UP (ref 3.5–5.3)
RBC # BLD: 4.31 M/UL — SIGNIFICANT CHANGE UP (ref 3.8–5.2)
RBC # FLD: 18.6 % — HIGH (ref 10.3–14.5)
SODIUM SERPL-SCNC: 145 MMOL/L — SIGNIFICANT CHANGE UP (ref 135–145)
WBC # BLD: 8.31 K/UL — SIGNIFICANT CHANGE UP (ref 3.8–10.5)
WBC # FLD AUTO: 8.31 K/UL — SIGNIFICANT CHANGE UP (ref 3.8–10.5)

## 2024-04-12 RX ORDER — LATANOPROST 0.05 MG/ML
1 SOLUTION/ DROPS OPHTHALMIC; TOPICAL AT BEDTIME
Refills: 0 | Status: DISCONTINUED | OUTPATIENT
Start: 2024-04-12 | End: 2024-04-21

## 2024-04-12 RX ORDER — APIXABAN 2.5 MG/1
2.5 TABLET, FILM COATED ORAL
Refills: 0 | Status: DISCONTINUED | OUTPATIENT
Start: 2024-04-12 | End: 2024-04-16

## 2024-04-12 RX ORDER — LEVOTHYROXINE SODIUM 125 MCG
125 TABLET ORAL DAILY
Refills: 0 | Status: DISCONTINUED | OUTPATIENT
Start: 2024-04-12 | End: 2024-04-21

## 2024-04-12 RX ORDER — SODIUM CHLORIDE 9 MG/ML
1000 INJECTION, SOLUTION INTRAVENOUS
Refills: 0 | Status: DISCONTINUED | OUTPATIENT
Start: 2024-04-12 | End: 2024-04-16

## 2024-04-12 RX ORDER — METOPROLOL TARTRATE 50 MG
25 TABLET ORAL DAILY
Refills: 0 | Status: DISCONTINUED | OUTPATIENT
Start: 2024-04-12 | End: 2024-04-21

## 2024-04-12 RX ORDER — ACETAMINOPHEN 500 MG
650 TABLET ORAL ONCE
Refills: 0 | Status: COMPLETED | OUTPATIENT
Start: 2024-04-12 | End: 2024-04-12

## 2024-04-12 RX ADMIN — APIXABAN 2.5 MILLIGRAM(S): 2.5 TABLET, FILM COATED ORAL at 17:28

## 2024-04-12 RX ADMIN — Medication 3 MILLIGRAM(S): at 21:45

## 2024-04-12 RX ADMIN — CEFTRIAXONE 100 MILLIGRAM(S): 500 INJECTION, POWDER, FOR SOLUTION INTRAMUSCULAR; INTRAVENOUS at 12:24

## 2024-04-12 RX ADMIN — LATANOPROST 1 DROP(S): 0.05 SOLUTION/ DROPS OPHTHALMIC; TOPICAL at 21:46

## 2024-04-12 RX ADMIN — ATORVASTATIN CALCIUM 10 MILLIGRAM(S): 80 TABLET, FILM COATED ORAL at 21:45

## 2024-04-12 RX ADMIN — LIDOCAINE 1 PATCH: 4 CREAM TOPICAL at 00:00

## 2024-04-12 RX ADMIN — Medication 20 MILLIGRAM(S): at 05:18

## 2024-04-12 RX ADMIN — Medication 1 MILLIGRAM(S): at 12:24

## 2024-04-12 RX ADMIN — SODIUM CHLORIDE 45 MILLILITER(S): 9 INJECTION, SOLUTION INTRAVENOUS at 19:51

## 2024-04-12 NOTE — PROGRESS NOTE ADULT - SUBJECTIVE AND OBJECTIVE BOX
C A R D I O L O G Y  **********************************     DATE OF SERVICE: 04-12-24    Patient c/o L shoulder pain. Dyspnea improved. denies chest pain.  Review of symptoms otherwise negative.    MEDICATIONS:  apixaban 2.5 milliGRAM(s) Oral two times a day  atorvastatin 10 milliGRAM(s) Oral at bedtime  cefTRIAXone   IVPB 1000 milliGRAM(s) IV Intermittent every 24 hours  folic acid 1 milliGRAM(s) Oral daily  latanoprost 0.005% Ophthalmic Solution 1 Drop(s) Both EYES at bedtime  levothyroxine 125 MICROGram(s) Oral daily  melatonin 3 milliGRAM(s) Oral at bedtime  metoprolol succinate ER 25 milliGRAM(s) Oral daily      LABS:                        12.2   8.31  )-----------( 167      ( 12 Apr 2024 06:57 )             40.6       Hemoglobin: 12.2 g/dL (04-12 @ 06:57)  Hemoglobin: 12.6 g/dL (04-11 @ 07:12)  Hemoglobin: 12.9 g/dL (04-10 @ 07:52)      04-12    145  |  108  |  34<H>  ----------------------------<  84  4.0   |  25  |  1.74<H>    Ca    9.4      12 Apr 2024 06:57  Phos  3.5     04-12  Mg     1.6     04-12      Creatinine Trend: 1.74<--, 1.30<--, 1.45<--    COAGS:           PHYSICAL EXAM:  T(C): 36.4 (04-12-24 @ 04:24), Max: 36.4 (04-12-24 @ 04:24)  HR: 81 (04-12-24 @ 04:24) (60 - 81)  BP: 139/72 (04-12-24 @ 04:24) (139/72 - 148/66)  RR: 18 (04-12-24 @ 04:24) (18 - 18)  SpO2: 95% (04-12-24 @ 04:24) (95% - 95%)  Wt(kg): --    I&O's Summary    11 Apr 2024 07:01  -  12 Apr 2024 07:00  --------------------------------------------------------  IN: 780 mL / OUT: 650 mL / NET: 130 mL        Gen: NAD  HEENT:  (-)icterus (-)pallor  CV: N S1 S2 1/6 LUDMILA (+)2 Pulses B/l  Resp:  Clear to auscultation B/L, normal effort  GI: (+) BS Soft, NT, ND  Lymph:  (-)Edema, (-)obvious lymphadenopathy  Skin: Warm to touch, Normal turgor  Psych: Appropriate mood and affect      TELEMETRY: None	    ECG: Afib, RBBB - unchanged     ASSESSMENT/PLAN: Patient is a 89 y/o female well known to our office with PMH of persistent AF (since 2019), hypertension, hyperlipidemia, CAD s/p remote PCI, MDS, HFpEF, and severe MR refused MitraClip in past who presented s/p fall found to have UTI and mild acute on chronic HFpEF exacerbation. Cardiology consulted for further evaluation.     #s/p fall  - Denies LOC/Syncope  - CT Head with no hemorrhage  - Tele was stable in ED, AF with no events no longer on tele  - Abx for UTI per med  - L shoulder xray with no fracture or dislocation    #Acute on Chronic HFpEF Exacerbation  - Volume status improved s/p IV lasix  - Hold home PO maintenance lasix 20mg daily given MARIELA  - Patient with known severe MR and at least mod AS but has refused further intervention and repeat echos in office therefore no repeat cardiac testing planned at this time    #Persistent Afib  - Continue Eliquis 2.5mg BID if no contraindications    - No further inpatient cardiac w/u planned  - Patient has f/u with Dr. Carbajal on 8/2 at 1:30 PM    Ivan Montero PA-C  Pager: [phone]

## 2024-04-12 NOTE — PROGRESS NOTE ADULT - SUBJECTIVE AND OBJECTIVE BOX
Patient is a 89y old  Female who presents with a chief complaint of s/p Fall (11 Apr 2024 18:26)      SUBJECTIVE / OVERNIGHT EVENTS: no events     T(C): 36.4 (04-12-24 @ 04:24), Max: 36.4 (04-12-24 @ 04:24)  HR: 81 (04-12-24 @ 04:24) (81 - 81)  BP: 139/72 (04-12-24 @ 04:24) (139/72 - 139/72)  RR: 18 (04-12-24 @ 04:24) (18 - 18)  SpO2: 95% (04-12-24 @ 04:24) (95% - 95%)      MEDICATIONS  (STANDING):  apixaban 2.5 milliGRAM(s) Oral two times a day  atorvastatin 10 milliGRAM(s) Oral at bedtime  cefTRIAXone   IVPB 1000 milliGRAM(s) IV Intermittent every 24 hours  folic acid 1 milliGRAM(s) Oral daily  latanoprost 0.005% Ophthalmic Solution 1 Drop(s) Both EYES at bedtime  levothyroxine 125 MICROGram(s) Oral daily  melatonin 3 milliGRAM(s) Oral at bedtime  metoprolol succinate ER 25 milliGRAM(s) Oral daily    MEDICATIONS  (PRN):    PHYSICAL EXAM:  GENERAL: NAD, well-developed  HEAD:  Atraumatic, Normocephalic  EYES: EOMI, conjunctiva and sclera clear  NECK: Supple, No JVD  CHEST/LUNG: Clear to auscultation bilaterally; No wheeze  HEART: Regular rate and rhythm; No murmurs, rubs, or gallops  ABDOMEN: Soft, Nontender, Nondistended; Bowel sounds present  EXTREMITIES:  2+ Peripheral Pulses, No clubbing, cyanosis, or edema  PSYCH: AAOx3  NEUROLOGY: non-focal  SKIN: No rashes or lesions                          12.2   8.31  )-----------( 167      ( 12 Apr 2024 06:57 )             40.6               145|108|34<84  4.0|25|1.74  9.4,1.6,3.5  04-12 @ 06:57        RADIOLOGY & ADDITIONAL TESTS:    Imaging Personally Reviewed:    Consultant(s) Notes Reviewed:      Care Discussed with Consultants/Other Providers:

## 2024-04-12 NOTE — PROGRESS NOTE ADULT - SUBJECTIVE AND OBJECTIVE BOX
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" x past 1.5 yrs- last dose 4 months ago      MDS (Myelodysplastic Syndrome)      Skin Cancer  of face , basal cell   4 yrs ago      History of Total Knee Replacement  L 1998      Bilateral Cataracts  71 y/o      S/P Breast Lumpectomy  10 yrs ago      Cystocele  47 yrs ago      Rectocele  47 yrs ago      Carpal Tunnel Syndrome  10 yrs ago      Basal Cell Carcinoma of Face  4 yrs ago      S/P T&A  childhood        S/P TKR (Total Knee Replacement)  left      S/P Breast Lumpectomy  benign      S/P Cataract Surgery          MEDICATIONS  (STANDING):  cefTRIAXone   IVPB 1000 milliGRAM(s) IV Intermittent every 24 hours      Allergies    No Known Allergies    Intolerances        SOCIAL HISTORY:  Denies ETOh,Smoking,     FAMILY HISTORY:      REVIEW OF SYSTEMS:    CONSTITUTIONAL: No weakness, fevers or chills  RESPIRATORY: No cough, wheezing, hemoptysis; pos  shortness of breath  CARDIOVASCULAR: No chest pain or palpitations  GASTROINTESTINAL: No abdominal or epigastric pain. No nausea, vomiting,                          12.2   8.31  )-----------( 167      ( 12 Apr 2024 06:57 )             40.6       CBC Full  -  ( 12 Apr 2024 06:57 )  WBC Count : 8.31 K/uL  RBC Count : 4.31 M/uL  Hemoglobin : 12.2 g/dL  Hematocrit : 40.6 %  Platelet Count - Automated : 167 K/uL  Mean Cell Volume : 94.2 fl  Mean Cell Hemoglobin : 28.3 pg  Mean Cell Hemoglobin Concentration : 30.0 gm/dL  Auto Neutrophil # : x  Auto Lymphocyte # : x  Auto Monocyte # : x  Auto Eosinophil # : x  Auto Basophil # : x  Auto Neutrophil % : x  Auto Lymphocyte % : x  Auto Monocyte % : x  Auto Eosinophil % : x  Auto Basophil % : x      04-12    145  |  108  |  34<H>  ----------------------------<  84  4.0   |  25  |  1.74<H>    Ca    9.4      12 Apr 2024 06:57  Phos  3.5     04-12  Mg     1.6     04-12        CAPILLARY BLOOD GLUCOSE          Vital Signs Last 24 Hrs  T(C): 36.7 (12 Apr 2024 12:42), Max: 36.7 (12 Apr 2024 12:42)  T(F): 98 (12 Apr 2024 12:42), Max: 98 (12 Apr 2024 12:42)  HR: 77 (12 Apr 2024 12:42) (60 - 81)  BP: 153/62 (12 Apr 2024 12:42) (139/72 - 153/62)  BP(mean): --  RR: 18 (12 Apr 2024 12:42) (18 - 18)  SpO2: 94% (12 Apr 2024 12:42) (94% - 95%)    Parameters below as of 12 Apr 2024 12:42  Patient On (Oxygen Delivery Method): room air        Urinalysis Basic - ( 12 Apr 2024 06:57 )    Color: x / Appearance: x / SG: x / pH: x  Gluc: 84 mg/dL / Ketone: x  / Bili: x / Urobili: x   Blood: x / Protein: x / Nitrite: x   Leuk Esterase: x / RBC: x / WBC x   Sq Epi: x / Non Sq Epi: x / Bacteria: x                PHYSICAL EXAM:    Constitutional: NAD  HEENT: conjunctive   clear   Neck:  No JVD  Respiratory: CTAB  Cardiovascular: S1 and S2  Gastrointestinal: BS+, soft, NT/ND  Extremities: pos  peripheral edema  Neurological: , no focal deficits  Psychiatric: Normal mood, normal affect  : No Beckwith  Skin: dry   Access: Not applicable

## 2024-04-13 LAB
-  AMOXICILLIN/CLAVULANIC ACID: SIGNIFICANT CHANGE UP
-  AMPICILLIN/SULBACTAM: SIGNIFICANT CHANGE UP
-  AMPICILLIN: SIGNIFICANT CHANGE UP
-  AZTREONAM: SIGNIFICANT CHANGE UP
-  CEFAZOLIN: SIGNIFICANT CHANGE UP
-  CEFEPIME: SIGNIFICANT CHANGE UP
-  CEFOXITIN: SIGNIFICANT CHANGE UP
-  CEFTRIAXONE: SIGNIFICANT CHANGE UP
-  CEFUROXIME: SIGNIFICANT CHANGE UP
-  CIPROFLOXACIN: SIGNIFICANT CHANGE UP
-  ERTAPENEM: SIGNIFICANT CHANGE UP
-  GENTAMICIN: SIGNIFICANT CHANGE UP
-  IMIPENEM: SIGNIFICANT CHANGE UP
-  LEVOFLOXACIN: SIGNIFICANT CHANGE UP
-  MEROPENEM: SIGNIFICANT CHANGE UP
-  NITROFURANTOIN: SIGNIFICANT CHANGE UP
-  PIPERACILLIN/TAZOBACTAM: SIGNIFICANT CHANGE UP
-  TOBRAMYCIN: SIGNIFICANT CHANGE UP
-  TRIMETHOPRIM/SULFAMETHOXAZOLE: SIGNIFICANT CHANGE UP
ANION GAP SERPL CALC-SCNC: 18 MMOL/L — HIGH (ref 5–17)
BUN SERPL-MCNC: 33 MG/DL — HIGH (ref 7–23)
CALCIUM SERPL-MCNC: 9.2 MG/DL — SIGNIFICANT CHANGE UP (ref 8.4–10.5)
CHLORIDE SERPL-SCNC: 102 MMOL/L — SIGNIFICANT CHANGE UP (ref 96–108)
CO2 SERPL-SCNC: 20 MMOL/L — LOW (ref 22–31)
CREAT SERPL-MCNC: 1.5 MG/DL — HIGH (ref 0.5–1.3)
CULTURE RESULTS: ABNORMAL
EGFR: 31 ML/MIN/1.73M2 — LOW
GLUCOSE SERPL-MCNC: 85 MG/DL — SIGNIFICANT CHANGE UP (ref 70–99)
HCT VFR BLD CALC: 38.8 % — SIGNIFICANT CHANGE UP (ref 34.5–45)
HGB BLD-MCNC: 12.1 G/DL — SIGNIFICANT CHANGE UP (ref 11.5–15.5)
MAGNESIUM SERPL-MCNC: 1.4 MG/DL — LOW (ref 1.6–2.6)
MCHC RBC-ENTMCNC: 28.7 PG — SIGNIFICANT CHANGE UP (ref 27–34)
MCHC RBC-ENTMCNC: 31.2 GM/DL — LOW (ref 32–36)
MCV RBC AUTO: 91.9 FL — SIGNIFICANT CHANGE UP (ref 80–100)
METHOD TYPE: SIGNIFICANT CHANGE UP
NRBC # BLD: 0 /100 WBCS — SIGNIFICANT CHANGE UP (ref 0–0)
ORGANISM # SPEC MICROSCOPIC CNT: ABNORMAL
ORGANISM # SPEC MICROSCOPIC CNT: ABNORMAL
PHOSPHATE SERPL-MCNC: 3.2 MG/DL — SIGNIFICANT CHANGE UP (ref 2.5–4.5)
PLATELET # BLD AUTO: 180 K/UL — SIGNIFICANT CHANGE UP (ref 150–400)
POTASSIUM SERPL-MCNC: 3.7 MMOL/L — SIGNIFICANT CHANGE UP (ref 3.5–5.3)
POTASSIUM SERPL-SCNC: 3.7 MMOL/L — SIGNIFICANT CHANGE UP (ref 3.5–5.3)
RBC # BLD: 4.22 M/UL — SIGNIFICANT CHANGE UP (ref 3.8–5.2)
RBC # FLD: 18 % — HIGH (ref 10.3–14.5)
SODIUM SERPL-SCNC: 140 MMOL/L — SIGNIFICANT CHANGE UP (ref 135–145)
SPECIMEN SOURCE: SIGNIFICANT CHANGE UP
WBC # BLD: 9.91 K/UL — SIGNIFICANT CHANGE UP (ref 3.8–10.5)
WBC # FLD AUTO: 9.91 K/UL — SIGNIFICANT CHANGE UP (ref 3.8–10.5)

## 2024-04-13 PROCEDURE — 73080 X-RAY EXAM OF ELBOW: CPT | Mod: 26,LT

## 2024-04-13 PROCEDURE — 73130 X-RAY EXAM OF HAND: CPT | Mod: 26,LT

## 2024-04-13 RX ORDER — MAGNESIUM SULFATE 500 MG/ML
2 VIAL (ML) INJECTION ONCE
Refills: 0 | Status: COMPLETED | OUTPATIENT
Start: 2024-04-13 | End: 2024-04-13

## 2024-04-13 RX ADMIN — SODIUM CHLORIDE 45 MILLILITER(S): 9 INJECTION, SOLUTION INTRAVENOUS at 10:29

## 2024-04-13 RX ADMIN — Medication 3 MILLIGRAM(S): at 22:14

## 2024-04-13 RX ADMIN — CEFTRIAXONE 100 MILLIGRAM(S): 500 INJECTION, POWDER, FOR SOLUTION INTRAMUSCULAR; INTRAVENOUS at 12:33

## 2024-04-13 RX ADMIN — SODIUM CHLORIDE 45 MILLILITER(S): 9 INJECTION, SOLUTION INTRAVENOUS at 05:22

## 2024-04-13 RX ADMIN — Medication 25 GRAM(S): at 09:28

## 2024-04-13 RX ADMIN — LATANOPROST 1 DROP(S): 0.05 SOLUTION/ DROPS OPHTHALMIC; TOPICAL at 22:14

## 2024-04-13 RX ADMIN — APIXABAN 2.5 MILLIGRAM(S): 2.5 TABLET, FILM COATED ORAL at 17:37

## 2024-04-13 RX ADMIN — ATORVASTATIN CALCIUM 10 MILLIGRAM(S): 80 TABLET, FILM COATED ORAL at 22:14

## 2024-04-13 RX ADMIN — Medication 25 MILLIGRAM(S): at 05:22

## 2024-04-13 RX ADMIN — Medication 1 MILLIGRAM(S): at 11:15

## 2024-04-13 RX ADMIN — Medication 125 MICROGRAM(S): at 05:22

## 2024-04-13 RX ADMIN — APIXABAN 2.5 MILLIGRAM(S): 2.5 TABLET, FILM COATED ORAL at 05:22

## 2024-04-13 NOTE — PROGRESS NOTE ADULT - SUBJECTIVE AND OBJECTIVE BOX
Patient is a 89y old  Female who presents with a chief complaint of s/p Fall (11 Apr 2024 18:26)      SUBJECTIVE / OVERNIGHT EVENTS: no events     T(C): 36.8 (04-13-24 @ 21:36), Max: 36.8 (04-13-24 @ 12:17)  HR: 78 (04-13-24 @ 21:36) (78 - 84)  BP: 139/70 (04-13-24 @ 21:36) (139/70 - 157/92)  RR: 18 (04-13-24 @ 21:36) (18 - 18)  SpO2: 93% (04-13-24 @ 21:36) (93% - 94%)    MEDICATIONS  (STANDING):  apixaban 2.5 milliGRAM(s) Oral two times a day  atorvastatin 10 milliGRAM(s) Oral at bedtime  cefTRIAXone   IVPB 1000 milliGRAM(s) IV Intermittent every 24 hours  folic acid 1 milliGRAM(s) Oral daily  latanoprost 0.005% Ophthalmic Solution 1 Drop(s) Both EYES at bedtime  levothyroxine 125 MICROGram(s) Oral daily  melatonin 3 milliGRAM(s) Oral at bedtime  metoprolol succinate ER 25 milliGRAM(s) Oral daily  sodium chloride 0.45%. 1000 milliLiter(s) (45 mL/Hr) IV Continuous <Continuous>    MEDICATIONS  (PRN):                PHYSICAL EXAM:  GENERAL: NAD, well-developed  HEAD:  Atraumatic, Normocephalic  EYES: EOMI, conjunctiva and sclera clear  NECK: Supple, No JVD  CHEST/LUNG: Clear to auscultation bilaterally; No wheeze  HEART: Regular rate and rhythm; No murmurs, rubs, or gallops  ABDOMEN: Soft, Nontender, Nondistended; Bowel sounds present  EXTREMITIES:  2+ Peripheral Pulses, No clubbing, cyanosis, or edema  PSYCH: AAOx3  NEUROLOGY: non-focal  SKIN: No rashes or lesions                          12.2   8.31  )-----------( 167      ( 12 Apr 2024 06:57 )             40.6               145|108|34<84  4.0|25|1.74  9.4,1.6,3.5  04-12 @ 06:57        RADIOLOGY & ADDITIONAL TESTS:    Imaging Personally Reviewed:    Consultant(s) Notes Reviewed:      Care Discussed with Consultants/Other Providers:

## 2024-04-13 NOTE — CHART NOTE - NSCHARTNOTEFT_GEN_A_CORE
Pt concerned about left hand pain and swelling.  Earlier today the name band was tight on her wrist and removed, left hand elevated and provided little relief.      Exam:  Left hand with mild swelling to fingers  No erythema noted   Chronic appearing joint malformations but no obvious isolated swelling  Pt unable fully grasp with left hand  Sensation intact in all fingers  Left hand warm to touch but she just had a warm pack on the hand  Left radial pulse clearly palpable     A/P:  Given that pt present on admission with a fall, concern for trauma exists, will get left hand Xrays  Consider possible DVT.  However, pt on Eliquis   Consider infection  Pain management  Monitor

## 2024-04-13 NOTE — PROGRESS NOTE ADULT - SUBJECTIVE AND OBJECTIVE BOX
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" x past 1.5 yrs- last dose 4 months ago      MDS (Myelodysplastic Syndrome)      Skin Cancer  of face , basal cell   4 yrs ago      History of Total Knee Replacement  L 1998      Bilateral Cataracts  69 y/o      S/P Breast Lumpectomy  10 yrs ago      Cystocele  47 yrs ago      Rectocele  47 yrs ago      Carpal Tunnel Syndrome  10 yrs ago      Basal Cell Carcinoma of Face  4 yrs ago      S/P T&A  childhood        S/P TKR (Total Knee Replacement)  left      S/P Breast Lumpectomy  benign      S/P Cataract Surgery          MEDICATIONS  (STANDING):  cefTRIAXone   IVPB 1000 milliGRAM(s) IV Intermittent every 24 hours      Allergies    No Known Allergies    Intolerances        SOCIAL HISTORY:  Denies ETOh,Smoking,     FAMILY HISTORY:      REVIEW OF SYSTEMS:    CONSTITUTIONAL: No weakness, fevers or chills  RESPIRATORY: No cough, wheezing, hemoptysis; pos  shortness of breath  CARDIOVASCULAR: No chest pain or palpitations  GASTROINTESTINAL: No abdominal or epigastric pain. No nausea, vomiting,                                           12.1   9.91  )-----------( 180      ( 13 Apr 2024 07:18 )             38.8       CBC Full  -  ( 13 Apr 2024 07:18 )  WBC Count : 9.91 K/uL  RBC Count : 4.22 M/uL  Hemoglobin : 12.1 g/dL  Hematocrit : 38.8 %  Platelet Count - Automated : 180 K/uL  Mean Cell Volume : 91.9 fl  Mean Cell Hemoglobin : 28.7 pg  Mean Cell Hemoglobin Concentration : 31.2 gm/dL  Auto Neutrophil # : x  Auto Lymphocyte # : x  Auto Monocyte # : x  Auto Eosinophil # : x  Auto Basophil # : x  Auto Neutrophil % : x  Auto Lymphocyte % : x  Auto Monocyte % : x  Auto Eosinophil % : x  Auto Basophil % : x      04-13    140  |  102  |  33<H>  ----------------------------<  85  3.7   |  20<L>  |  1.50<H>    Ca    9.2      13 Apr 2024 07:17  Phos  3.2     04-13  Mg     1.4     04-13        CAPILLARY BLOOD GLUCOSE          Vital Signs Last 24 Hrs  T(C): 36.8 (13 Apr 2024 12:17), Max: 36.8 (13 Apr 2024 12:17)  T(F): 98.2 (13 Apr 2024 12:17), Max: 98.2 (13 Apr 2024 12:17)  HR: 84 (13 Apr 2024 12:17) (58 - 95)  BP: 157/92 (13 Apr 2024 12:17) (153/85 - 167/65)  BP(mean): --  RR: 18 (13 Apr 2024 12:17) (18 - 18)  SpO2: 94% (13 Apr 2024 12:17) (94% - 95%)    Parameters below as of 13 Apr 2024 12:17  Patient On (Oxygen Delivery Method): room air        Urinalysis Basic - ( 13 Apr 2024 07:17 )    Color: x / Appearance: x / SG: x / pH: x  Gluc: 85 mg/dL / Ketone: x  / Bili: x / Urobili: x   Blood: x / Protein: x / Nitrite: x   Leuk Esterase: x / RBC: x / WBC x   Sq Epi: x / Non Sq Epi: x / Bacteria: x              PHYSICAL EXAM:    Constitutional: NAD  HEENT: conjunctive   clear   Neck:  No JVD  Respiratory: CTAB  Cardiovascular: S1 and S2  Gastrointestinal: BS+, soft, NT/ND  Extremities: pos  peripheral edema  Neurological: , no focal deficits  Psychiatric: Normal mood, normal affect  : No Beckwith  Skin: dry   Access: Not applicable

## 2024-04-13 NOTE — PROGRESS NOTE ADULT - SUBJECTIVE AND OBJECTIVE BOX
C A R D I O L O G Y  **********************************     DATE OF SERVICE: 04-13-24     pt seen and examined, no complaints, ROS - .        apixaban 2.5 milliGRAM(s) Oral two times a day  atorvastatin 10 milliGRAM(s) Oral at bedtime  cefTRIAXone   IVPB 1000 milliGRAM(s) IV Intermittent every 24 hours  folic acid 1 milliGRAM(s) Oral daily  latanoprost 0.005% Ophthalmic Solution 1 Drop(s) Both EYES at bedtime  levothyroxine 125 MICROGram(s) Oral daily  melatonin 3 milliGRAM(s) Oral at bedtime  metoprolol succinate ER 25 milliGRAM(s) Oral daily  sodium chloride 0.45%. 1000 milliLiter(s) IV Continuous <Continuous>                            12.1   9.91  )-----------( 180      ( 13 Apr 2024 07:18 )             38.8       Hemoglobin: 12.1 g/dL (04-13 @ 07:18)  Hemoglobin: 12.2 g/dL (04-12 @ 06:57)  Hemoglobin: 12.6 g/dL (04-11 @ 07:12)  Hemoglobin: 12.9 g/dL (04-10 @ 07:52)      04-13    140  |  102  |  33<H>  ----------------------------<  85  3.7   |  20<L>  |  1.50<H>    Ca    9.2      13 Apr 2024 07:17  Phos  3.2     04-13  Mg     1.4     04-13      Creatinine Trend: 1.50<--, 1.74<--, 1.30<--, 1.45<--    COAGS:           T(C): 36.7 (04-13-24 @ 05:05), Max: 36.7 (04-12-24 @ 12:42)  HR: 95 (04-13-24 @ 05:05) (58 - 95)  BP: 167/65 (04-13-24 @ 05:05) (153/62 - 167/65)  RR: 18 (04-13-24 @ 05:05) (18 - 18)  SpO2: 95% (04-13-24 @ 05:05) (94% - 95%)  Wt(kg): --    I&O's Summary    12 Apr 2024 07:01  -  13 Apr 2024 07:00  --------------------------------------------------------  IN: 835 mL / OUT: 200 mL / NET: 635 mL    13 Apr 2024 07:01  -  13 Apr 2024 10:18  --------------------------------------------------------  IN: 240 mL / OUT: 0 mL / NET: 240 mL        Gen: NAD  HEENT:  (-)icterus (-)pallor  CV: N S1 S2 1/6 LUDMILA (+)2 Pulses B/l  Resp:  Clear to auscultation B/L, normal effort  GI: (+) BS Soft, NT, ND  Lymph:  (-)Edema, (-)obvious lymphadenopathy  Skin: Warm to touch, Normal turgor  Psych: Appropriate mood and affect      TELEMETRY: None	    ECG: Afib, RBBB - unchanged     ASSESSMENT/PLAN: Patient is a 89 y/o female well known to our office with PMH of persistent AF (since 2019), hypertension, hyperlipidemia, CAD s/p remote PCI, MDS, HFpEF, and severe MR refused MitraClip in past who presented s/p fall found to have UTI and mild acute on chronic HFpEF exacerbation. Cardiology consulted for further evaluation.     #s/p fall  - Denies LOC/Syncope  - CT Head with no hemorrhage  - Tele was stable in ED, AF with no events no longer on tele  - Abx for UTI per med  - L shoulder xray with no fracture or dislocation    #Acute on Chronic HFpEF Exacerbation  - Volume status improved s/p IV lasix  - Hold home PO maintenance lasix 20mg daily given MARIELA  - Patient with known severe MR and at least mod AS but has refused further intervention and repeat echos in office therefore no repeat cardiac testing planned at this time    #Persistent Afib  - Continue Eliquis 2.5mg BID if no contraindications    - No further inpatient cardiac w/u planned  - Patient has f/u with Dr. Carbajal on 8/2 at 1:30 PM

## 2024-04-14 LAB
ANION GAP SERPL CALC-SCNC: 15 MMOL/L — SIGNIFICANT CHANGE UP (ref 5–17)
BUN SERPL-MCNC: 39 MG/DL — HIGH (ref 7–23)
CALCIUM SERPL-MCNC: 8.8 MG/DL — SIGNIFICANT CHANGE UP (ref 8.4–10.5)
CHLORIDE SERPL-SCNC: 101 MMOL/L — SIGNIFICANT CHANGE UP (ref 96–108)
CO2 SERPL-SCNC: 20 MMOL/L — LOW (ref 22–31)
CREAT SERPL-MCNC: 1.48 MG/DL — HIGH (ref 0.5–1.3)
EGFR: 31 ML/MIN/1.73M2 — LOW
GLUCOSE BLDC GLUCOMTR-MCNC: 79 MG/DL — SIGNIFICANT CHANGE UP (ref 70–99)
GLUCOSE SERPL-MCNC: 68 MG/DL — LOW (ref 70–99)
MAGNESIUM SERPL-MCNC: 2.1 MG/DL — SIGNIFICANT CHANGE UP (ref 1.6–2.6)
POTASSIUM SERPL-MCNC: 4.1 MMOL/L — SIGNIFICANT CHANGE UP (ref 3.5–5.3)
POTASSIUM SERPL-SCNC: 4.1 MMOL/L — SIGNIFICANT CHANGE UP (ref 3.5–5.3)
SODIUM SERPL-SCNC: 136 MMOL/L — SIGNIFICANT CHANGE UP (ref 135–145)

## 2024-04-14 PROCEDURE — 73201 CT UPPER EXTREMITY W/DYE: CPT | Mod: 26,LT

## 2024-04-14 RX ADMIN — APIXABAN 2.5 MILLIGRAM(S): 2.5 TABLET, FILM COATED ORAL at 17:21

## 2024-04-14 RX ADMIN — ATORVASTATIN CALCIUM 10 MILLIGRAM(S): 80 TABLET, FILM COATED ORAL at 21:15

## 2024-04-14 RX ADMIN — Medication 1 MILLIGRAM(S): at 11:51

## 2024-04-14 RX ADMIN — CEFTRIAXONE 100 MILLIGRAM(S): 500 INJECTION, POWDER, FOR SOLUTION INTRAMUSCULAR; INTRAVENOUS at 11:50

## 2024-04-14 RX ADMIN — Medication 125 MICROGRAM(S): at 06:03

## 2024-04-14 RX ADMIN — SODIUM CHLORIDE 45 MILLILITER(S): 9 INJECTION, SOLUTION INTRAVENOUS at 21:17

## 2024-04-14 RX ADMIN — LATANOPROST 1 DROP(S): 0.05 SOLUTION/ DROPS OPHTHALMIC; TOPICAL at 21:15

## 2024-04-14 RX ADMIN — Medication 3 MILLIGRAM(S): at 21:15

## 2024-04-14 RX ADMIN — APIXABAN 2.5 MILLIGRAM(S): 2.5 TABLET, FILM COATED ORAL at 06:04

## 2024-04-14 RX ADMIN — Medication 25 MILLIGRAM(S): at 06:03

## 2024-04-14 NOTE — PROGRESS NOTE ADULT - NS ATTEND AMEND GEN_ALL_CORE FT
looks euvolemic now.  plan to resume home dose of PO lasix before discharge.  no angina or palpitations.  refuses invasive assessment of valves due to advanced age.  we agree w/ plan for conservative medical therapy to manage her cardiorespiratory symptoms.
no overnight issues, will follow with you.

## 2024-04-14 NOTE — PROGRESS NOTE ADULT - SUBJECTIVE AND OBJECTIVE BOX
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" x past 1.5 yrs- last dose 4 months ago      MDS (Myelodysplastic Syndrome)      Skin Cancer  of face , basal cell   4 yrs ago      History of Total Knee Replacement  L 1998      Bilateral Cataracts  69 y/o      S/P Breast Lumpectomy  10 yrs ago      Cystocele  47 yrs ago      Rectocele  47 yrs ago      Carpal Tunnel Syndrome  10 yrs ago      Basal Cell Carcinoma of Face  4 yrs ago      S/P T&A  childhood        S/P TKR (Total Knee Replacement)  left      S/P Breast Lumpectomy  benign      S/P Cataract Surgery          MEDICATIONS  (STANDING):  cefTRIAXone   IVPB 1000 milliGRAM(s) IV Intermittent every 24 hours      Allergies    No Known Allergies    Intolerances        SOCIAL HISTORY:  Denies ETOh,Smoking,     FAMILY HISTORY:      REVIEW OF SYSTEMS:    CONSTITUTIONAL: No weakness, fevers or chills  RESPIRATORY: No cough, wheezing, hemoptysis; pos  shortness of breath  CARDIOVASCULAR: No chest pain or palpitations  GASTROINTESTINAL: No abdominal or epigastric pain. No nausea, vomiting,                                              12.1   9.91  )-----------( 180      ( 13 Apr 2024 07:18 )             38.8       CBC Full  -  ( 13 Apr 2024 07:18 )  WBC Count : 9.91 K/uL  RBC Count : 4.22 M/uL  Hemoglobin : 12.1 g/dL  Hematocrit : 38.8 %  Platelet Count - Automated : 180 K/uL  Mean Cell Volume : 91.9 fl  Mean Cell Hemoglobin : 28.7 pg  Mean Cell Hemoglobin Concentration : 31.2 gm/dL  Auto Neutrophil # : x  Auto Lymphocyte # : x  Auto Monocyte # : x  Auto Eosinophil # : x  Auto Basophil # : x  Auto Neutrophil % : x  Auto Lymphocyte % : x  Auto Monocyte % : x  Auto Eosinophil % : x  Auto Basophil % : x      04-14    136  |  101  |  39<H>  ----------------------------<  68<L>  4.1   |  20<L>  |  1.48<H>    Ca    8.8      14 Apr 2024 08:20  Phos  3.2     04-13  Mg     2.1     04-14        CAPILLARY BLOOD GLUCOSE      POCT Blood Glucose.: 79 mg/dL (14 Apr 2024 09:02)      Vital Signs Last 24 Hrs  T(C): 36.7 (14 Apr 2024 12:43), Max: 36.8 (13 Apr 2024 21:36)  T(F): 98 (14 Apr 2024 12:43), Max: 98.2 (13 Apr 2024 21:36)  HR: 75 (14 Apr 2024 12:43) (75 - 82)  BP: 136/87 (14 Apr 2024 12:43) (125/58 - 139/70)  BP(mean): --  RR: 18 (14 Apr 2024 12:43) (18 - 18)  SpO2: 95% (14 Apr 2024 12:43) (92% - 95%)    Parameters below as of 14 Apr 2024 12:43  Patient On (Oxygen Delivery Method): room air        Urinalysis Basic - ( 14 Apr 2024 08:20 )    Color: x / Appearance: x / SG: x / pH: x  Gluc: 68 mg/dL / Ketone: x  / Bili: x / Urobili: x   Blood: x / Protein: x / Nitrite: x   Leuk Esterase: x / RBC: x / WBC x   Sq Epi: x / Non Sq Epi: x / Bacteria: x                  PHYSICAL EXAM:    Constitutional: NAD  HEENT: conjunctive   clear   Neck:  No JVD  Respiratory: CTAB  Cardiovascular: S1 and S2  Gastrointestinal: BS+, soft, NT/ND  Extremities: pos  peripheral edema  Neurological: , no focal deficits  Psychiatric: Normal mood, normal affect  : No Beckwith  Skin: dry   Access: Not applicable

## 2024-04-14 NOTE — PROGRESS NOTE ADULT - SUBJECTIVE AND OBJECTIVE BOX
Patient is a 89y old  Female who presents with a chief complaint of s/p Fall (11 Apr 2024 18:26)      SUBJECTIVE / OVERNIGHT EVENTS: no events     T(C): 36.8 (04-14-24 @ 22:20), Max: 36.8 (04-14-24 @ 22:20)  HR: 91 (04-14-24 @ 22:20) (75 - 91)  BP: 130/59 (04-14-24 @ 22:20) (130/59 - 136/87)  RR: 18 (04-14-24 @ 22:20) (18 - 18)  SpO2: 92% (04-14-24 @ 22:20) (92% - 95%)        MEDICATIONS  (STANDING):  apixaban 2.5 milliGRAM(s) Oral two times a day  atorvastatin 10 milliGRAM(s) Oral at bedtime  cefTRIAXone   IVPB 1000 milliGRAM(s) IV Intermittent every 24 hours  folic acid 1 milliGRAM(s) Oral daily  latanoprost 0.005% Ophthalmic Solution 1 Drop(s) Both EYES at bedtime  levothyroxine 125 MICROGram(s) Oral daily  melatonin 3 milliGRAM(s) Oral at bedtime  metoprolol succinate ER 25 milliGRAM(s) Oral daily  sodium chloride 0.45%. 1000 milliLiter(s) (45 mL/Hr) IV Continuous <Continuous>    MEDICATIONS  (PRN):              PHYSICAL EXAM:  GENERAL: NAD, well-developed  HEAD:  Atraumatic, Normocephalic  EYES: EOMI, conjunctiva and sclera clear  NECK: Supple, No JVD  CHEST/LUNG: Clear to auscultation bilaterally; No wheeze  HEART: Regular rate and rhythm; No murmurs, rubs, or gallops  ABDOMEN: Soft, Nontender, Nondistended; Bowel sounds present  EXTREMITIES:  2+ Peripheral Pulses, No clubbing, cyanosis, or edema  PSYCH: AAOx3  NEUROLOGY: non-focal  SKIN: No rashes or lesions                                               12.1   9.91  )-----------( 180      ( 13 Apr 2024 07:18 )             38.8               136|101|39<68  4.1|20|1.48  8.8,2.1,--  04-14 @ 08:20  :    Imaging Personally Reviewed:    Consultant(s) Notes Reviewed:      Care Discussed with Consultants/Other Providers:

## 2024-04-15 LAB
ANION GAP SERPL CALC-SCNC: 15 MMOL/L — SIGNIFICANT CHANGE UP (ref 5–17)
BUN SERPL-MCNC: 42 MG/DL — HIGH (ref 7–23)
CALCIUM SERPL-MCNC: 8.6 MG/DL — SIGNIFICANT CHANGE UP (ref 8.4–10.5)
CHLORIDE SERPL-SCNC: 102 MMOL/L — SIGNIFICANT CHANGE UP (ref 96–108)
CO2 SERPL-SCNC: 19 MMOL/L — LOW (ref 22–31)
CREAT SERPL-MCNC: 1.49 MG/DL — HIGH (ref 0.5–1.3)
CRP SERPL-MCNC: 285 MG/L — HIGH (ref 0–4)
EGFR: 31 ML/MIN/1.73M2 — LOW
GLUCOSE SERPL-MCNC: 81 MG/DL — SIGNIFICANT CHANGE UP (ref 70–99)
POTASSIUM SERPL-MCNC: 4.1 MMOL/L — SIGNIFICANT CHANGE UP (ref 3.5–5.3)
POTASSIUM SERPL-SCNC: 4.1 MMOL/L — SIGNIFICANT CHANGE UP (ref 3.5–5.3)
SODIUM SERPL-SCNC: 136 MMOL/L — SIGNIFICANT CHANGE UP (ref 135–145)

## 2024-04-15 PROCEDURE — 99222 1ST HOSP IP/OBS MODERATE 55: CPT

## 2024-04-15 PROCEDURE — 73562 X-RAY EXAM OF KNEE 3: CPT | Mod: 26,50

## 2024-04-15 RX ADMIN — Medication 1 MILLIGRAM(S): at 11:22

## 2024-04-15 RX ADMIN — SODIUM CHLORIDE 45 MILLILITER(S): 9 INJECTION, SOLUTION INTRAVENOUS at 21:55

## 2024-04-15 RX ADMIN — APIXABAN 2.5 MILLIGRAM(S): 2.5 TABLET, FILM COATED ORAL at 05:38

## 2024-04-15 RX ADMIN — Medication 125 MICROGRAM(S): at 05:38

## 2024-04-15 RX ADMIN — ATORVASTATIN CALCIUM 10 MILLIGRAM(S): 80 TABLET, FILM COATED ORAL at 21:55

## 2024-04-15 RX ADMIN — Medication 3 MILLIGRAM(S): at 21:54

## 2024-04-15 RX ADMIN — Medication 25 MILLIGRAM(S): at 05:38

## 2024-04-15 RX ADMIN — CEFTRIAXONE 100 MILLIGRAM(S): 500 INJECTION, POWDER, FOR SOLUTION INTRAMUSCULAR; INTRAVENOUS at 11:22

## 2024-04-15 RX ADMIN — APIXABAN 2.5 MILLIGRAM(S): 2.5 TABLET, FILM COATED ORAL at 17:14

## 2024-04-15 RX ADMIN — LATANOPROST 1 DROP(S): 0.05 SOLUTION/ DROPS OPHTHALMIC; TOPICAL at 21:54

## 2024-04-15 NOTE — BH CONSULTATION LIAISON ASSESSMENT NOTE - SUMMARY
89 year old WF with change in mental status consistent with a delirium. The etiology of her delirium is multifactorial and includes UTI, heart failure, MARIELA.

## 2024-04-15 NOTE — BH CONSULTATION LIAISON ASSESSMENT NOTE - DESCRIPTION
Lives alone but has aids. Retired . No cigarets or illicit drug use. Alcohol is limited to one glass of wine about every week or two. No history of abuse of alcohol. Has six childresn.

## 2024-04-15 NOTE — BH CONSULTATION LIAISON ASSESSMENT NOTE - HPI (INCLUDE ILLNESS QUALITY, SEVERITY, DURATION, TIMING, CONTEXT, MODIFYING FACTORS, ASSOCIATED SIGNS AND SYMPTOMS)
The pt. is a 89 year old WF with no prior psychiatric or substance abuse history per son. She was admitted here on 4/10/24 for fall resulting in laceration of left parietal scalp and found to have UTI and acute on chronic heart failure. The pt's son says her memory at baseline is good yet she stopped driving a car about 8 years ago and family pays bills for her. No agitation here. Son says that confusion began after coming here and he and family feels confusion is from her antibiotic.

## 2024-04-15 NOTE — BH CONSULTATION LIAISON ASSESSMENT NOTE - NSBHCHARTREVIEWLAB_PSY_A_CORE FT
04-15    136  |  102  |  42<H>  ----------------------------<  81  4.1   |  19<L>  |  1.49<H>    Ca    8.6      15 Apr 2024 07:14  Mg     2.1     04-14  cbWBC Count: 9.91 K/uL  RBC Count: 4.22 M/uL  Hemoglobin: 12.1 g/dL  Hematocrit: 38.8 %  Mean Cell Volume: 91.9 fl  Mean Cell Hemoglobin: 28.7 pg  Mean Cell Hemoglobin Conc: 31.2 gm/dL  Red Cell Distrib Width: 18.0 %  Platelet Count - Automated: 180 K/uL< from: CT Head No Cont (04.10.24 @ 08:30) >    CT HEAD:  No acute hemorrhage or calvarial fracture.  Laceration of the left parietal scalp soft tissues.    CT CERVICAL SPINE:  No acute fracture or traumatic subluxation.  Multi-level degenerative changes.      < end of copied text >

## 2024-04-15 NOTE — PROGRESS NOTE ADULT - SUBJECTIVE AND OBJECTIVE BOX
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" x past 1.5 yrs- last dose 4 months ago      MDS (Myelodysplastic Syndrome)      Skin Cancer  of face , basal cell   4 yrs ago      History of Total Knee Replacement  L 1998      Bilateral Cataracts  69 y/o      S/P Breast Lumpectomy  10 yrs ago      Cystocele  47 yrs ago      Rectocele  47 yrs ago      Carpal Tunnel Syndrome  10 yrs ago      Basal Cell Carcinoma of Face  4 yrs ago      S/P T&A  childhood        S/P TKR (Total Knee Replacement)  left      S/P Breast Lumpectomy  benign      S/P Cataract Surgery          MEDICATIONS  (STANDING):  cefTRIAXone   IVPB 1000 milliGRAM(s) IV Intermittent every 24 hours      Allergies    No Known Allergies    Intolerances        SOCIAL HISTORY:  Denies ETOh,Smoking,     FAMILY HISTORY:      REVIEW OF SYSTEMS:    CONSTITUTIONAL: No weakness, fevers or chills  RESPIRATORY: No cough, wheezing, hemoptysis; pos  shortness of breath  CARDIOVASCULAR: No chest pain or palpitations  GASTROINTESTINAL: No abdominal or epigastric pain. No nausea, vomiting,                                                04-15    136  |  102  |  42<H>  ----------------------------<  81  4.1   |  19<L>  |  1.49<H>    Ca    8.6      15 Apr 2024 07:14  Mg     2.1     04-14        CAPILLARY BLOOD GLUCOSE          Vital Signs Last 24 Hrs  T(C): 36.7 (15 Apr 2024 13:04), Max: 36.8 (14 Apr 2024 22:20)  T(F): 98 (15 Apr 2024 13:04), Max: 98.3 (14 Apr 2024 22:20)  HR: 73 (15 Apr 2024 13:04) (73 - 91)  BP: 145/74 (15 Apr 2024 13:04) (126/74 - 152/58)  BP(mean): --  RR: 18 (15 Apr 2024 13:04) (18 - 18)  SpO2: 93% (15 Apr 2024 13:04) (92% - 93%)    Parameters below as of 15 Apr 2024 13:04  Patient On (Oxygen Delivery Method): room air        Urinalysis Basic - ( 15 Apr 2024 07:14 )    Color: x / Appearance: x / SG: x / pH: x  Gluc: 81 mg/dL / Ketone: x  / Bili: x / Urobili: x   Blood: x / Protein: x / Nitrite: x   Leuk Esterase: x / RBC: x / WBC x   Sq Epi: x / Non Sq Epi: x / Bacteria: x                    PHYSICAL EXAM:    Constitutional: NAD  HEENT: conjunctive   clear   Neck:  No JVD  Respiratory: CTAB  Cardiovascular: S1 and S2  Gastrointestinal: BS+, soft, NT/ND  Extremities: pos  peripheral edema  Neurological: , no focal deficits  Psychiatric: Normal mood, normal affect  : No Beckwith  Skin: dry   Access: Not applicable

## 2024-04-15 NOTE — PROGRESS NOTE ADULT - SUBJECTIVE AND OBJECTIVE BOX
Patient is a 89y old  Female who presents with a chief complaint of s/p Fall (11 Apr 2024 18:26)      SUBJECTIVE / OVERNIGHT EVENTS: no events     T(C): 36.8 (04-15-24 @ 04:37), Max: 36.8 (04-15-24 @ 04:37)  HR: 89 (04-15-24 @ 04:37) (89 - 89)  BP: 126/74 (04-15-24 @ 05:37) (126/74 - 152/58)  RR: 18 (04-15-24 @ 04:37) (18 - 18)  SpO2: 93% (04-15-24 @ 04:37) (93% - 93%)\      MEDICATIONS  (STANDING):  apixaban 2.5 milliGRAM(s) Oral two times a day  atorvastatin 10 milliGRAM(s) Oral at bedtime  cefTRIAXone   IVPB 1000 milliGRAM(s) IV Intermittent every 24 hours  folic acid 1 milliGRAM(s) Oral daily  latanoprost 0.005% Ophthalmic Solution 1 Drop(s) Both EYES at bedtime  levothyroxine 125 MICROGram(s) Oral daily  melatonin 3 milliGRAM(s) Oral at bedtime  metoprolol succinate ER 25 milliGRAM(s) Oral daily  sodium chloride 0.45%. 1000 milliLiter(s) (45 mL/Hr) IV Continuous <Continuous>    MEDICATIONS  (PRN):              PHYSICAL EXAM:  GENERAL: NAD, well-developed  HEAD:  Atraumatic, Normocephalic  EYES: EOMI, conjunctiva and sclera clear  NECK: Supple, No JVD  CHEST/LUNG: Clear to auscultation bilaterally; No wheeze  HEART: Regular rate and rhythm; No murmurs, rubs, or gallops  ABDOMEN: Soft, Nontender, Nondistended; Bowel sounds present  EXTREMITIES:  2+ Peripheral Pulses, No clubbing, cyanosis, or edema  PSYCH: AAOx3  NEUROLOGY: non-focal  SKIN: No rashes or lesions                         136|102|42<81  4.1|19|1.49  8.6,--,--  04-15 @ 07:14                                      Imaging Personally Reviewed:    Consultant(s) Notes Reviewed:      Care Discussed with Consultants/Other Providers:

## 2024-04-15 NOTE — CONSULT NOTE ADULT - SUBJECTIVE AND OBJECTIVE BOX
C A R D I O L O G Y  *********************    DATE OF SERVICE: 04-10-24    HISTORY OF PRESENT ILLNESS: HPI:  Patient is a 89 y/o female well known to our office with PMH of persistent AF (since 2019), hypertension, hyperlipidemia, CAD s/p remote PCI, MDS, HFpEF, and severe MR refused MitraClip in past who presented s/p fall found to have UTI and mild acute on chronic HFpEF exacerbation. Cardiology consulted for further evaluation. Patient reports mechanical trip and fall. Denies LOC/syncope. She also has had complaints for SOB. Daughter reports giving extra PO lasix last week for worsening LE edema. Denies chest pain, palpitations, dizziness, or syncope.    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" x past 1.5 yrs- last dose 4 months ago      MDS (Myelodysplastic Syndrome)      Skin Cancer  of face , basal cell   4 yrs ago      History of Total Knee Replacement  L 1998      Bilateral Cataracts  69 y/o      S/P Breast Lumpectomy  10 yrs ago      Cystocele  47 yrs ago      Rectocele  47 yrs ago      Carpal Tunnel Syndrome  10 yrs ago      Basal Cell Carcinoma of Face  4 yrs ago      S/P T&A  childhood        S/P TKR (Total Knee Replacement)  left      S/P Breast Lumpectomy  benign      S/P Cataract Surgery              MEDICATIONS:  MEDICATIONS  (STANDING):      Allergies    No Known Allergies    Intolerances        FAMILY HISTORY:    Non-contributary for premature coronary disease or sudden cardiac death    SOCIAL HISTORY:    [x ] Non-smoker  [ ] Smoker  [ ] Alcohol    FLU VACCINE THIS YEAR STARTS IN AUGUST:  [ ] Yes    [ ] No    IF OVER 65 HAVE YOU EVER HAD A PNA VACCINE:  [ ] Yes    [ ] No       [ ] N/A      REVIEW OF SYSTEMS:  [ ]chest pain  [ x ]shortness of breath  [  ]palpitations  [  ]syncope  [ ]near syncope [ ]upper extremity weakness   [ ] lower extremity weakness  [  ]diplopia  [  ]altered mental status   [  ]fevers  [ ]chills [ ]nausea  [ ]vomiting  [  ]dysphagia    [ ]abdominal pain  [ ]melena  [ ]BRBPR    [  ]epistaxis  [  ]rash    [ ]lower extremity edema        [X] All others negative	  [ ] Unable to obtain      LABS:	 	    CARDIAC MARKERS:                              12.9   5.01  )-----------( 177      ( 10 Apr 2024 07:52 )             43.5     Hb Trend: 12.9<--    04-10    143  |  108  |  34<H>  ----------------------------<  98  4.1   |  20<L>  |  1.45<H>    Ca    9.8      10 Apr 2024 07:52  Mg     1.6     04-10    TPro  7.6  /  Alb  3.7  /  TBili  1.1  /  DBili  x   /  AST  38  /  ALT  25  /  AlkPhos  77  04-10    Creatinine Trend: 1.45<--    Coags:  PT/INR - ( 10 Apr 2024 07:52 )   PT: 15.1 sec;   INR: 1.45 ratio         PTT - ( 10 Apr 2024 07:52 )  PTT:39.0 sec    proBNP:   Lipid Profile:   HgA1c:   TSH:         PHYSICAL EXAM:  T(C): 36.6 (04-10-24 @ 12:32), Max: 36.6 (04-10-24 @ 12:32)  HR: 77 (04-10-24 @ 12:32) (56 - 77)  BP: 145/61 (04-10-24 @ 12:32) (145/61 - 196/87)  RR: 18 (04-10-24 @ 12:32) (16 - 18)  SpO2: 95% (04-10-24 @ 12:32) (95% - 96%)  Wt(kg): --     I&O's Summary      Gen: NAD  HEENT:  (-)icterus (-)pallor  CV: N S1 S2 2/6 LUDMILA (+)2 Pulses B/l  Resp:  Clear to auscultation B/L, normal effort  GI: (+) BS Soft, NT, ND  Lymph:  (-)Edema, (-)obvious lymphadenopathy  Skin: Warm to touch, Normal turgor  Psych: Appropriate mood and affect      TELEMETRY: AF 80s	      ECG: Afib, RBBB - unchanged 	    RADIOLOGY:         CXR: < from: Xray Chest 1 View AP/PA (04.10.24 @ 08:25) >  IMPRESSION:  Clear lungs. No displaced bony abnormality.    --- End of Report ---    < end of copied text >      ASSESSMENT/PLAN: Patient is a 89 y/o female well known to our office with PMH of persistent AF (since 2019), hypertension, hyperlipidemia, CAD s/p remote PCI, MDS, HFpEF, and severe MR refused MitraClip in past who presented s/p fall found to have UTI and mild acute on chronic HFpEF exacerbation. Cardiology consulted for further evaluation.     #s/p fall  - Denies LOC/Syncope  - CT Head with no hemorrhage  - Tele stable in AF, no events  - Abx for UTI per med    #Acute on Chronic HFpEF Exacerbation  - s/p IV lasix in ED with good urine output  - Can restart home PO maintenance lasix tomorrow  - Patient with known severe MR and at least mod AS but has refused further intervention and repeat echos in office therefore no repeat cardiac testing planned at this time    #Persistent Afib  - Restart Eliquis if no contraindications    - No further inpatient cardiac w/u planned  - Patient has f/u with Dr. Carbaajl on 8/2 at 1:30 PM    Ivan Montero PA-C  Pager: [phone]    
  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" x past 1.5 yrs- last dose 4 months ago      MDS (Myelodysplastic Syndrome)      Skin Cancer  of face , basal cell   4 yrs ago      History of Total Knee Replacement  L       Bilateral Cataracts  69 y/o      S/P Breast Lumpectomy  10 yrs ago      Cystocele  47 yrs ago      Rectocele  47 yrs ago      Carpal Tunnel Syndrome  10 yrs ago      Basal Cell Carcinoma of Face  4 yrs ago      S/P T&A  childhood        S/P TKR (Total Knee Replacement)  left      S/P Breast Lumpectomy  benign      S/P Cataract Surgery          MEDICATIONS  (STANDING):  cefTRIAXone   IVPB 1000 milliGRAM(s) IV Intermittent every 24 hours      Allergies    No Known Allergies    Intolerances        SOCIAL HISTORY:  Denies ETOh,Smoking,     FAMILY HISTORY:      REVIEW OF SYSTEMS:    CONSTITUTIONAL: No weakness, fevers or chills  RESPIRATORY: No cough, wheezing, hemoptysis; pos  shortness of breath  CARDIOVASCULAR: No chest pain or palpitations  GASTROINTESTINAL: No abdominal or epigastric pain. No nausea, vomiting,     No diarrhea or constipation. No melena   GENITOURINARY: No dysuria, frequency or hematuria      VITAL:  T(C): , Max: 36.7 (04-10-24 @ 14:48)  T(F): , Max: 98 (04-10-24 @ 14:48)  HR: 75 (04-10-24 @ 16:30)  BP: 154/56 (04-10-24 @ 16:30)  BP(mean): 81 (04-10-24 @ 16:30)  RR: 14 (04-10-24 @ 16:30)  SpO2: 95% (04-10-24 @ 16:30)  Wt(kg): --    I and O's:        PHYSICAL EXAM:    Constitutional: NAD  HEENT: conjunctive   clear   Neck:  No JVD  Respiratory: CTAB  Cardiovascular: S1 and S2  Gastrointestinal: BS+, soft, NT/ND  Extremities: pos  peripheral edema  Neurological: , no focal deficits  Psychiatric: Normal mood, normal affect  : No Beckwith  Skin: dry   Access: Not applicable    LABS:                        12.9   5.01  )-----------( 177      ( 10 Apr 2024 07:52 )             43.5     04-10    143  |  108  |  34<H>  ----------------------------<  98  4.1   |  20<L>  |  1.45<H>    Ca    9.8      10 Apr 2024 07:52  Mg     1.6     -10    TPro  7.6  /  Alb  3.7  /  TBili  1.1  /  DBili  x   /  AST  38  /  ALT  25  /  AlkPhos  77  04-10      Urine Studies:  Urinalysis Basic - ( 10 Apr 2024 10:19 )    Color: Yellow / Appearance: Clear / S.010 / pH: x  Gluc: x / Ketone: Negative mg/dL  / Bili: Negative / Urobili: 1.0 mg/dL   Blood: x / Protein: 30 mg/dL / Nitrite: Positive   Leuk Esterase: Trace / RBC: 4 /HPF / WBC 2 /HPF   Sq Epi: x / Non Sq Epi: 0 /HPF / Bacteria: Many /HPF            RADIOLOGY & ADDITIONAL STUDIES:            MEDICATIONS  (STANDING):  cefTRIAXone   IVPB 1000 milliGRAM(s) IV Intermittent every 24 hours      
Consult Note: Orthopedic Surgery    Patient is a 89F community-ambulator without assistive devices who presented to the [facility] ED after an unwitnessed mechanical fall on 4/10 with a chief complaint of Left shoulder and Right knee pain. Patient states that she does not recall the details of the fall other than the scalp laceration she sustained and for which she received treatment in the ED. Patient is uncertain of whether or not she sustained a loss of consciousness or any additional traumas. Localizing mild pain to the Left shoulder and moderate to severe pain to the Left knee, denies radiation of pain elsewhere. States inability to ambulate following the injury, but adds that she is a minimal ambulator with assistive devices (rolling walker) at baseline, and adds that she is mostly confined to her wheelchair due to weakness in the bilateral lower extremities. Denies any numbness or tingling in the Right upper extremity or Left lower extremity. Denies having any other pain elsewhere. Denies any previous orthopaedic history aside from a remote Left total knee replacement. Denies fevers, chills, headaches, chest pain, shortness of breath, nausea, vomiting, or any additional orthopaedic concerns or complaints at time of current encounter.    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' treated with " Arinesp" x past 1.5 yrs- last dose 4 months ago  MDS (Myelodysplastic Syndrome)  Skin Cancerof face , basal cell   4 yrs ago  History of Total Knee Replacement  L 1998  Bilateral Cataracts  69 y/o  S/P Breast Lumpectomy  10 yrs ago  Cystocele  47 yrs ago  Rectocele  47 yrs ago  Carpal Tunnel Syndrome  10 yrs ago  Basal Cell Carcinoma of Face  4 yrs ago      ALLERGIES:  No Known Allergies      VITAL SIGNS:  T(C): 36.7 (04-15-24 @ 13:04), Max: 36.8 (04-14-24 @ 22:20)  HR: 73 (04-15-24 @ 13:04) (73 - 91)  BP: 145/74 (04-15-24 @ 13:04) (126/74 - 152/58)  RR: 18 (04-15-24 @ 13:04) (18 - 18)  SpO2: 93% (04-15-24 @ 13:04) (92% - 93%)      PHYSICAL EXAM  Gen: NAD, Resting comfortably    LUE:  Skin intact, no erythema or ecchymosis.   No bony tenderness to palpation.   + Ax/Musc/Rad/Uln/Med/AIN/PIN.   + SILT C5-T1.    + Radial pulse.    AROM/PROM (Forward Flexion): 45/60  AROM/PROM (Abduction): 45/60  No micromotion tenderness.   Compartments soft and compressible.   No calf tenderness.     RLE:  Painful swelling of the pre-and supra-patellar knee noted, accompanied by palpable effusion and mild warmth with no erythema or ecchymoses.   Otherwise, no bony tenderness to palpation throughout the extremity.     + EHL/FHL/TA/GSC.   + SILT L3-S1.   + DP.   AROM/PROM: 10-80 degrees with pain throughout range of motion.   Compartments soft and compressible.   No calf tenderness.       SECONDARY SURVEY  LLE/RUE: No TTP over bony prominences, SILT, palpable pulses, full/painless range of motion, compartments soft. Ecchymosis with minimal accompanying pain noted over the Left patella.   Spine: No bony tenderness. No palpable step-offs.     IMAGING:    CT Left Shoulder:  < from: CT Shoulder w/ IV Cont, Left (04.14.24 @ 14:00) >  IMPRESSION:  Severe left glenohumeral arthrosis with large effusion and   chondrocalcinosis. No acute displaced fracture.  < end of copied text >    XR Left Shoulder:  < from: Xray Shoulder 2 Views, Left (04.11.24 @ 15:45) >  IMPRESSION:  Flattening of the humeral heads with sclerosis suggesting chronic avascular necrosis with flattening.   < end of copied text >      ASSESSMENT:  Patient is a 89y Female with a Left shoulder effusion on CT with suspicion for osteoarthritic effusion; patient also with Right knee swelling consistent with Right knee hematoma after recent fall on Eliquis versus Right knee septic arthritis.     PLAN:  - Follow-up XR Bilateral knees.   - Follow-up ESR/CRP.   - Follow-up Blood Cultures.   - Will re-evaluate candidacy for Right knee arthrocentesis contingent on bilateral knee x-rays.   - No indication for Left shoulder arthrocentesis at present time.   - Pain control.   - DVT Ppx" Per Primary Team.   - PT/OT.   - Ice and elevate as tolerated.   - No acute orthopaedic surgical intervention indicated at this time; Will re-evaluate contingent on knee imaging as above.   - Discussed with Dr. Contreras who is aware of and in agreement with the above plan.

## 2024-04-15 NOTE — CONSULT NOTE ADULT - ATTENDING COMMENTS
Left shoulder and knee pain.  Severe arthritis in shoulder and knee joint.  Diminished ROM c/w severe arthrosis.  ESR/CRP unreliable due to concurrent UTI.  Minimal concern for septic arthritis.  Likely arthritic flair due to UTI.  Mobilize OOB. WBAT. Left shoulder and knee pain.  Severe arthritis in shoulder and knee joint.  Diminished ROM c/w severe arthrosis.  ESR/CRP unreliable due to concurrent UTI.  Minimal concern for septic arthritis.  Likely arthritic flair due to trauma.  CT knee to R/o occult fx.  Mobilize OOB. WBAT.

## 2024-04-15 NOTE — BH CONSULTATION LIAISON ASSESSMENT NOTE - ORIENTATION
She says it is now August, cannot name year, says place is "[place]" and does not know the name of the hospital.

## 2024-04-15 NOTE — PROGRESS NOTE ADULT - SUBJECTIVE AND OBJECTIVE BOX
C A R D I O L O G Y  **********************************     DATE OF SERVICE: 04-15-24    Patient c/o knee pain. Reports L shoulder pain improving. Dyspnea improved. denies chest pain. Review of symptoms otherwise negative.    MEDICATIONS:  apixaban 2.5 milliGRAM(s) Oral two times a day  atorvastatin 10 milliGRAM(s) Oral at bedtime  cefTRIAXone   IVPB 1000 milliGRAM(s) IV Intermittent every 24 hours  folic acid 1 milliGRAM(s) Oral daily  latanoprost 0.005% Ophthalmic Solution 1 Drop(s) Both EYES at bedtime  levothyroxine 125 MICROGram(s) Oral daily  melatonin 3 milliGRAM(s) Oral at bedtime  metoprolol succinate ER 25 milliGRAM(s) Oral daily  sodium chloride 0.45%. 1000 milliLiter(s) IV Continuous <Continuous>      LABS:      Hemoglobin: 12.1 g/dL (04-13 @ 07:18)  Hemoglobin: 12.2 g/dL (04-12 @ 06:57)  Hemoglobin: 12.6 g/dL (04-11 @ 07:12)      04-15    136  |  102  |  42<H>  ----------------------------<  81  4.1   |  19<L>  |  1.49<H>    Ca    8.6      15 Apr 2024 07:14  Mg     2.1     04-14      Creatinine Trend: 1.49<--, 1.48<--, 1.50<--, 1.74<--, 1.30<--, 1.45<--    COAGS:           PHYSICAL EXAM:  T(C): 36.8 (04-15-24 @ 04:37), Max: 36.8 (04-14-24 @ 22:20)  HR: 89 (04-15-24 @ 04:37) (89 - 91)  BP: 126/74 (04-15-24 @ 05:37) (126/74 - 152/58)  RR: 18 (04-15-24 @ 04:37) (18 - 18)  SpO2: 93% (04-15-24 @ 04:37) (92% - 93%)  Wt(kg): --    I&O's Summary    14 Apr 2024 07:01  -  15 Apr 2024 07:00  --------------------------------------------------------  IN: 480 mL / OUT: 1250 mL / NET: -770 mL    15 Apr 2024 07:01  -  15 Apr 2024 12:56  --------------------------------------------------------  IN: 120 mL / OUT: 0 mL / NET: 120 mL        Gen: NAD  HEENT:  (-)icterus (-)pallor  CV: N S1 S2 1/6 LUDMILA (+)2 Pulses B/l  Resp:  Clear to auscultation B/L, normal effort  GI: (+) BS Soft, NT, ND  Lymph:  (-)Edema, (-)obvious lymphadenopathy  Skin: Warm to touch, Normal turgor  Psych: Appropriate mood and affect      TELEMETRY: None	    ECG: Afib, RBBB - unchanged     ASSESSMENT/PLAN: Patient is a 89 y/o female well known to our office with PMH of persistent AF (since 2019), hypertension, hyperlipidemia, CAD s/p remote PCI, MDS, HFpEF, and severe MR refused MitraClip in past who presented s/p fall found to have UTI and mild acute on chronic HFpEF exacerbation. Cardiology consulted for further evaluation.     #s/p fall  - Denies LOC/Syncope  - CT Head with no hemorrhage  - Tele was stable in ED, AF with no events no longer on tele  - Abx for UTI per med  - L shoulder xray with no fracture or dislocation. CT L shoulder with severe arthrosis w/large effusion. Management per med    #Acute on Chronic HFpEF Exacerbation  - Volume status improved s/p IV lasix  - Hold home PO maintenance lasix 20mg daily given MARIELA  - Patient with known severe MR and at least mod AS but has refused further intervention and repeat echos in office therefore no repeat cardiac testing planned at this time    #Persistent Afib  - Continue Eliquis 2.5mg BID if no contraindications    - No further inpatient cardiac w/u planned  - Patient has f/u with Dr. Carbajal on 8/2 at 1:30 PM     Ivan Montero PA-C  Pager: [phone]

## 2024-04-16 LAB
ANION GAP SERPL CALC-SCNC: 14 MMOL/L — SIGNIFICANT CHANGE UP (ref 5–17)
BUN SERPL-MCNC: 53 MG/DL — HIGH (ref 7–23)
CALCIUM SERPL-MCNC: 8.6 MG/DL — SIGNIFICANT CHANGE UP (ref 8.4–10.5)
CHLORIDE SERPL-SCNC: 100 MMOL/L — SIGNIFICANT CHANGE UP (ref 96–108)
CO2 SERPL-SCNC: 19 MMOL/L — LOW (ref 22–31)
CREAT SERPL-MCNC: 1.74 MG/DL — HIGH (ref 0.5–1.3)
EGFR: 26 ML/MIN/1.73M2 — LOW
ERYTHROCYTE [SEDIMENTATION RATE] IN BLOOD: 105 MM/HR — HIGH (ref 0–20)
GLUCOSE BLDC GLUCOMTR-MCNC: 104 MG/DL — HIGH (ref 70–99)
GLUCOSE SERPL-MCNC: 66 MG/DL — LOW (ref 70–99)
HCT VFR BLD CALC: 33.5 % — LOW (ref 34.5–45)
HGB BLD-MCNC: 10.5 G/DL — LOW (ref 11.5–15.5)
MCHC RBC-ENTMCNC: 28.5 PG — SIGNIFICANT CHANGE UP (ref 27–34)
MCHC RBC-ENTMCNC: 31.3 GM/DL — LOW (ref 32–36)
MCV RBC AUTO: 90.8 FL — SIGNIFICANT CHANGE UP (ref 80–100)
NRBC # BLD: 0 /100 WBCS — SIGNIFICANT CHANGE UP (ref 0–0)
PLATELET # BLD AUTO: 217 K/UL — SIGNIFICANT CHANGE UP (ref 150–400)
POTASSIUM SERPL-MCNC: 4.1 MMOL/L — SIGNIFICANT CHANGE UP (ref 3.5–5.3)
POTASSIUM SERPL-SCNC: 4.1 MMOL/L — SIGNIFICANT CHANGE UP (ref 3.5–5.3)
RBC # BLD: 3.69 M/UL — LOW (ref 3.8–5.2)
RBC # FLD: 17.1 % — HIGH (ref 10.3–14.5)
SODIUM SERPL-SCNC: 133 MMOL/L — LOW (ref 135–145)
WBC # BLD: 6.92 K/UL — SIGNIFICANT CHANGE UP (ref 3.8–10.5)
WBC # FLD AUTO: 6.92 K/UL — SIGNIFICANT CHANGE UP (ref 3.8–10.5)

## 2024-04-16 PROCEDURE — 73700 CT LOWER EXTREMITY W/O DYE: CPT | Mod: 26,RT

## 2024-04-16 PROCEDURE — 76377 3D RENDER W/INTRP POSTPROCES: CPT | Mod: 26

## 2024-04-16 RX ORDER — ACETAMINOPHEN 500 MG
1000 TABLET ORAL ONCE
Refills: 0 | Status: COMPLETED | OUTPATIENT
Start: 2024-04-16 | End: 2024-04-16

## 2024-04-16 RX ORDER — LANOLIN ALCOHOL/MO/W.PET/CERES
3 CREAM (GRAM) TOPICAL AT BEDTIME
Refills: 0 | Status: DISCONTINUED | OUTPATIENT
Start: 2024-04-16 | End: 2024-04-21

## 2024-04-16 RX ORDER — APIXABAN 2.5 MG/1
2.5 TABLET, FILM COATED ORAL EVERY 12 HOURS
Refills: 0 | Status: DISCONTINUED | OUTPATIENT
Start: 2024-04-16 | End: 2024-04-21

## 2024-04-16 RX ORDER — SODIUM CHLORIDE 9 MG/ML
1000 INJECTION INTRAMUSCULAR; INTRAVENOUS; SUBCUTANEOUS
Refills: 0 | Status: DISCONTINUED | OUTPATIENT
Start: 2024-04-16 | End: 2024-04-18

## 2024-04-16 RX ADMIN — ATORVASTATIN CALCIUM 10 MILLIGRAM(S): 80 TABLET, FILM COATED ORAL at 21:26

## 2024-04-16 RX ADMIN — Medication 1 MILLIGRAM(S): at 11:22

## 2024-04-16 RX ADMIN — APIXABAN 2.5 MILLIGRAM(S): 2.5 TABLET, FILM COATED ORAL at 21:26

## 2024-04-16 RX ADMIN — SODIUM CHLORIDE 45 MILLILITER(S): 9 INJECTION, SOLUTION INTRAVENOUS at 10:17

## 2024-04-16 RX ADMIN — Medication 400 MILLIGRAM(S): at 05:27

## 2024-04-16 RX ADMIN — Medication 25 MILLIGRAM(S): at 05:32

## 2024-04-16 RX ADMIN — Medication 125 MICROGRAM(S): at 05:31

## 2024-04-16 RX ADMIN — CEFTRIAXONE 100 MILLIGRAM(S): 500 INJECTION, POWDER, FOR SOLUTION INTRAMUSCULAR; INTRAVENOUS at 11:17

## 2024-04-16 RX ADMIN — LATANOPROST 1 DROP(S): 0.05 SOLUTION/ DROPS OPHTHALMIC; TOPICAL at 21:26

## 2024-04-16 RX ADMIN — APIXABAN 2.5 MILLIGRAM(S): 2.5 TABLET, FILM COATED ORAL at 10:14

## 2024-04-16 RX ADMIN — SODIUM CHLORIDE 50 MILLILITER(S): 9 INJECTION INTRAMUSCULAR; INTRAVENOUS; SUBCUTANEOUS at 21:26

## 2024-04-16 RX ADMIN — SODIUM CHLORIDE 50 MILLILITER(S): 9 INJECTION INTRAMUSCULAR; INTRAVENOUS; SUBCUTANEOUS at 12:52

## 2024-04-16 NOTE — PROGRESS NOTE ADULT - SUBJECTIVE AND OBJECTIVE BOX
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" x past 1.5 yrs- last dose 4 months ago      MDS (Myelodysplastic Syndrome)      Skin Cancer  of face , basal cell   4 yrs ago      History of Total Knee Replacement  L 1998      Bilateral Cataracts  69 y/o      S/P Breast Lumpectomy  10 yrs ago      Cystocele  47 yrs ago      Rectocele  47 yrs ago      Carpal Tunnel Syndrome  10 yrs ago      Basal Cell Carcinoma of Face  4 yrs ago      S/P T&A  childhood        S/P TKR (Total Knee Replacement)  left      S/P Breast Lumpectomy  benign      S/P Cataract Surgery                                10.5   6.92  )-----------( 217      ( 16 Apr 2024 07:02 )             33.5       CBC Full  -  ( 16 Apr 2024 07:02 )  WBC Count : 6.92 K/uL  RBC Count : 3.69 M/uL  Hemoglobin : 10.5 g/dL  Hematocrit : 33.5 %  Platelet Count - Automated : 217 K/uL  Mean Cell Volume : 90.8 fl  Mean Cell Hemoglobin : 28.5 pg  Mean Cell Hemoglobin Concentration : 31.3 gm/dL  Auto Neutrophil # : x  Auto Lymphocyte # : x  Auto Monocyte # : x  Auto Eosinophil # : x  Auto Basophil # : x  Auto Neutrophil % : x  Auto Lymphocyte % : x  Auto Monocyte % : x  Auto Eosinophil % : x  Auto Basophil % : x      04-16    133<L>  |  100  |  53<H>  ----------------------------<  66<L>  4.1   |  19<L>  |  1.74<H>    Ca    8.6      16 Apr 2024 07:04        CAPILLARY BLOOD GLUCOSE      POCT Blood Glucose.: 104 mg/dL (16 Apr 2024 10:32)      Vital Signs Last 24 Hrs  T(C): 36.5 (16 Apr 2024 12:17), Max: 36.8 (15 Apr 2024 22:06)  T(F): 97.7 (16 Apr 2024 12:17), Max: 98.2 (15 Apr 2024 22:06)  HR: 97 (16 Apr 2024 12:17) (70 - 97)  BP: 143/84 (16 Apr 2024 12:17) (127/61 - 146/59)  BP(mean): --  RR: 18 (16 Apr 2024 12:17) (18 - 18)  SpO2: 96% (16 Apr 2024 12:17) (93% - 96%)    Parameters below as of 16 Apr 2024 12:17  Patient On (Oxygen Delivery Method): room air        Urinalysis Basic - ( 16 Apr 2024 07:04 )    Color: x / Appearance: x / SG: x / pH: x  Gluc: 66 mg/dL / Ketone: x  / Bili: x / Urobili: x   Blood: x / Protein: x / Nitrite: x   Leuk Esterase: x / RBC: x / WBC x   Sq Epi: x / Non Sq Epi: x / Bacteria: x        MEDICATIONS  (STANDING):  cefTRIAXone   IVPB 1000 milliGRAM(s) IV Intermittent every 24 hours      Allergies    No Known Allergies    Intolerances        SOCIAL HISTORY:  Denies ETOh,Smoking,     FAMILY HISTORY:      REVIEW OF SYSTEMS:    CONSTITUTIONAL: No weakness, fevers or chills  RESPIRATORY: No cough, wheezing, hemoptysis; pos  shortness of breath  CARDIOVASCULAR: No chest pain or palpitations  GASTROINTESTINAL: No abdominal or epigastric pain. No nausea, vomiting,                                                                    10.5   6.92  )-----------( 217      ( 16 Apr 2024 07:02 )             33.5       CBC Full  -  ( 16 Apr 2024 07:02 )  WBC Count : 6.92 K/uL  RBC Count : 3.69 M/uL  Hemoglobin : 10.5 g/dL  Hematocrit : 33.5 %  Platelet Count - Automated : 217 K/uL  Mean Cell Volume : 90.8 fl  Mean Cell Hemoglobin : 28.5 pg  Mean Cell Hemoglobin Concentration : 31.3 gm/dL  Auto Neutrophil # : x  Auto Lymphocyte # : x  Auto Monocyte # : x  Auto Eosinophil # : x  Auto Basophil # : x  Auto Neutrophil % : x  Auto Lymphocyte % : x  Auto Monocyte % : x  Auto Eosinophil % : x  Auto Basophil % : x      04-16    133<L>  |  100  |  53<H>  ----------------------------<  66<L>  4.1   |  19<L>  |  1.74<H>    Ca    8.6      16 Apr 2024 07:04        CAPILLARY BLOOD GLUCOSE      POCT Blood Glucose.: 104 mg/dL (16 Apr 2024 10:32)      Vital Signs Last 24 Hrs  T(C): 36.5 (16 Apr 2024 12:17), Max: 36.8 (15 Apr 2024 22:06)  T(F): 97.7 (16 Apr 2024 12:17), Max: 98.2 (15 Apr 2024 22:06)  HR: 97 (16 Apr 2024 12:17) (70 - 97)  BP: 143/84 (16 Apr 2024 12:17) (127/61 - 146/59)  BP(mean): --  RR: 18 (16 Apr 2024 12:17) (18 - 18)  SpO2: 96% (16 Apr 2024 12:17) (93% - 96%)    Parameters below as of 16 Apr 2024 12:17  Patient On (Oxygen Delivery Method): room air        Urinalysis Basic - ( 16 Apr 2024 07:04 )    Color: x / Appearance: x / SG: x / pH: x  Gluc: 66 mg/dL / Ketone: x  / Bili: x / Urobili: x   Blood: x / Protein: x / Nitrite: x   Leuk Esterase: x / RBC: x / WBC x   Sq Epi: x / Non Sq Epi: x / Bacteria: x                  PHYSICAL EXAM:    Constitutional: NAD  HEENT: conjunctive   clear   Neck:  No JVD  Respiratory: CTAB  Cardiovascular: S1 and S2  Gastrointestinal: BS+, soft, NT/ND  Extremities: pos  peripheral edema  Neurological: , no focal deficits  Psychiatric: Normal mood, normal affect  : No Beckwith  Skin: dry   Access: Not applicable

## 2024-04-16 NOTE — PROGRESS NOTE ADULT - ATTENDING COMMENTS
Based on exam and imaging, joint pain most likely arthritic flair due to trauma in setting of severe arthrosis.  CT to r/o occult fx. OOB.  No indication for arthrocentesis at this time. Limited ROM c/w trauma and arthrosis

## 2024-04-16 NOTE — PROGRESS NOTE ADULT - SUBJECTIVE AND OBJECTIVE BOX
Patient is a 89y old  Female who presents with a chief complaint of s/p Fall (16 Apr 2024 13:37)      INTERVAL HPI/OVERNIGHT EVENTS: seen and examined , want to go home   T(C): 36.6 (04-16-24 @ 21:20), Max: 36.8 (04-15-24 @ 22:06)  HR: 73 (04-16-24 @ 21:20) (70 - 97)  BP: 152/85 (04-16-24 @ 21:20) (127/61 - 152/85)  RR: 18 (04-16-24 @ 21:20) (18 - 18)  SpO2: 94% (04-16-24 @ 21:20) (93% - 96%)  Wt(kg): --  I&O's Summary    15 Apr 2024 07:01  -  16 Apr 2024 07:00  --------------------------------------------------------  IN: 900 mL / OUT: 300 mL / NET: 600 mL    16 Apr 2024 07:01  -  16 Apr 2024 21:25  --------------------------------------------------------  IN: 340 mL / OUT: 650 mL / NET: -310 mL        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" x past 1.5 yrs- last dose 4 months ago      MDS (Myelodysplastic Syndrome)      Skin Cancer  of face , basal cell   4 yrs ago      History of Total Knee Replacement  L 1998      Bilateral Cataracts  69 y/o      S/P Breast Lumpectomy  10 yrs ago      Cystocele  47 yrs ago      Rectocele  47 yrs ago      Carpal Tunnel Syndrome  10 yrs ago      Basal Cell Carcinoma of Face  4 yrs ago      S/P T&A  childhood        S/P TKR (Total Knee Replacement)  left      S/P Breast Lumpectomy  benign      S/P Cataract Surgery          SOCIAL HISTORY  Alcohol:  Tobacco:  Illicit substance use:    FAMILY HISTORY:    REVIEW OF SYSTEMS:  CONSTITUTIONAL: No fever, weight loss, or fatigue  EYES: No eye pain, visual disturbances, or discharge  ENMT:  No difficulty hearing, tinnitus, vertigo; No sinus or throat pain  NECK: No pain or stiffness  RESPIRATORY: No cough, wheezing, chills or hemoptysis; No shortness of breath  CARDIOVASCULAR: No chest pain, palpitations, dizziness, or leg swelling  GASTROINTESTINAL: No abdominal or epigastric pain. No nausea, vomiting, or hematemesis; No diarrhea or constipation. No melena or hematochezia.  GENITOURINARY: No dysuria, frequency, hematuria, or incontinence  NEUROLOGICAL: No headaches, memory loss, loss of strength, numbness, or tremors  SKIN: No itching, burning, rashes, or lesions   LYMPH NODES: No enlarged glands  ENDOCRINE: No heat or cold intolerance; No hair loss  MUSCULOSKELETAL: No joint pain or swelling; No muscle, back, or extremity pain  PSYCHIATRIC: No depression, anxiety, mood swings, or difficulty sleeping  HEME/LYMPH: No easy bruising, or bleeding gums  ALLERY AND IMMUNOLOGIC: No hives or eczema    RADIOLOGY & ADDITIONAL TESTS:    Imaging Personally Reviewed:  [ ] YES  [ ] NO    Consultant(s) Notes Reviewed:  [ ] YES  [ ] NO    PHYSICAL EXAM:  GENERAL: NAD, well-groomed, well-developed  HEAD:  Atraumatic, Normocephalic  EYES: EOMI, PERRLA, conjunctiva and sclera clear  ENMT: No tonsillar erythema, exudates, or enlargement; Moist mucous membranes, Good dentition, No lesions  NECK: Supple, No JVD, Normal thyroid  NERVOUS SYSTEM:  Alert & Oriented X3, Good concentration; Motor Strength 5/5 B/L upper and lower extremities; DTRs 2+ intact and symmetric  CHEST/LUNG: Clear to percussion bilaterally; No rales, rhonchi, wheezing, or rubs  HEART: Regular rate and rhythm; No murmurs, rubs, or gallops  ABDOMEN: Soft, Nontender, Nondistended; Bowel sounds present  EXTREMITIES:  2+ Peripheral Pulses, No clubbing, cyanosis, or edema  LYMPH: No lymphadenopathy noted  SKIN: No rashes or lesions    LABS:                        10.5   6.92  )-----------( 217      ( 16 Apr 2024 07:02 )             33.5     04-16    133<L>  |  100  |  53<H>  ----------------------------<  66<L>  4.1   |  19<L>  |  1.74<H>    Ca    8.6      16 Apr 2024 07:04        Urinalysis Basic - ( 16 Apr 2024 07:04 )    Color: x / Appearance: x / SG: x / pH: x  Gluc: 66 mg/dL / Ketone: x  / Bili: x / Urobili: x   Blood: x / Protein: x / Nitrite: x   Leuk Esterase: x / RBC: x / WBC x   Sq Epi: x / Non Sq Epi: x / Bacteria: x      CAPILLARY BLOOD GLUCOSE      POCT Blood Glucose.: 104 mg/dL (16 Apr 2024 10:32)        Urinalysis Basic - ( 16 Apr 2024 07:04 )    Color: x / Appearance: x / SG: x / pH: x  Gluc: 66 mg/dL / Ketone: x  / Bili: x / Urobili: x   Blood: x / Protein: x / Nitrite: x   Leuk Esterase: x / RBC: x / WBC x   Sq Epi: x / Non Sq Epi: x / Bacteria: x        MEDICATIONS  (STANDING):  apixaban 2.5 milliGRAM(s) Oral every 12 hours  atorvastatin 10 milliGRAM(s) Oral at bedtime  folic acid 1 milliGRAM(s) Oral daily  latanoprost 0.005% Ophthalmic Solution 1 Drop(s) Both EYES at bedtime  levothyroxine 125 MICROGram(s) Oral daily  metoprolol succinate ER 25 milliGRAM(s) Oral daily  sodium chloride 0.9%. 1000 milliLiter(s) (50 mL/Hr) IV Continuous <Continuous>    MEDICATIONS  (PRN):  melatonin 3 milliGRAM(s) Oral at bedtime PRN Insomnia      Care Discussed with Consultants/Other Providers [ ] YES  [ ] NO

## 2024-04-16 NOTE — PROGRESS NOTE ADULT - SUBJECTIVE AND OBJECTIVE BOX
C A R D I O [facility] O [facility]  **********************************     DATE OF SERVICE: 04-16-24    Patient has R knee pain pending CT. denies chest pain or shortness of breath.   Review of symptoms otherwise negative.    MEDICATIONS:  apixaban 2.5 milliGRAM(s) Oral every 12 hours  atorvastatin 10 milliGRAM(s) Oral at bedtime  cefTRIAXone   IVPB 1000 milliGRAM(s) IV Intermittent every 24 hours  folic acid 1 milliGRAM(s) Oral daily  latanoprost 0.005% Ophthalmic Solution 1 Drop(s) Both EYES at bedtime  levothyroxine 125 MICROGram(s) Oral daily  melatonin 3 milliGRAM(s) Oral at bedtime PRN  metoprolol succinate ER 25 milliGRAM(s) Oral daily  sodium chloride 0.9%. 1000 milliLiter(s) IV Continuous <Continuous>      LABS:                        10.5   6.92  )-----------( 217      ( 16 Apr 2024 07:02 )             33.5       Hemoglobin: 10.5 g/dL (04-16 @ 07:02)  Hemoglobin: 12.1 g/dL (04-13 @ 07:18)  Hemoglobin: 12.2 g/dL (04-12 @ 06:57)      04-16    133<L>  |  100  |  53<H>  ----------------------------<  66<L>  4.1   |  19<L>  |  1.74<H>    Ca    8.6      16 Apr 2024 07:04      Creatinine Trend: 1.74<--, 1.49<--, 1.48<--, 1.50<--, 1.74<--, 1.30<--    COAGS:           PHYSICAL EXAM:  T(C): 36.5 (04-16-24 @ 12:17), Max: 36.8 (04-15-24 @ 22:06)  HR: 97 (04-16-24 @ 12:17) (70 - 97)  BP: 143/84 (04-16-24 @ 12:17) (127/61 - 146/59)  RR: 18 (04-16-24 @ 12:17) (18 - 18)  SpO2: 96% (04-16-24 @ 12:17) (93% - 96%)  Wt(kg): --    I&O's Summary    15 Apr 2024 07:01  -  16 Apr 2024 07:00  --------------------------------------------------------  IN: 900 mL / OUT: 300 mL / NET: 600 mL    16 Apr 2024 07:01  -  16 Apr 2024 13:37  --------------------------------------------------------  IN: 0 mL / OUT: 550 mL / NET: -550 mL        Gen: NAD  HEENT:  (-)icterus (-)pallor  CV: N S1 S2 1/6 LUDMILA (+)2 Pulses B/l  Resp:  Clear to auscultation B/L, normal effort  GI: (+) BS Soft, NT, ND  Lymph:  (-)Edema, (-)obvious lymphadenopathy  Skin: Warm to touch, Normal turgor  Psych: Appropriate mood and affect      TELEMETRY: None	    ECG: Afib, RBBB - unchanged     ASSESSMENT/PLAN: Patient is a 89 y/o female well known to our office with PMH of persistent AF (since 2019), hypertension, hyperlipidemia, CAD s/p remote PCI, MDS, HFpEF, and severe MR refused MitraClip in past who presented s/p fall found to have UTI and mild acute on chronic HFpEF exacerbation. Cardiology consulted for further evaluation.     #s/p fall  - Denies LOC/Syncope  - CT Head with no hemorrhage  - Tele was stable in ED, AF with no events no longer on tele  - Abx for UTI per med  - L shoulder xray with no fracture or dislocation. CT L shoulder with severe arthrosis w/large effusion. Management per med/ortho. Now pending CT R knee    #Acute on Chronic HFpEF Exacerbation  - Volume status improved s/p IV lasix  - Hold home PO maintenance lasix 20mg daily given MARIELA. Renal f/u noted  - Patient with known severe MR and at least mod AS but has refused further intervention and repeat echos in office therefore no repeat cardiac testing planned at this time    #Persistent Afib  - Continue Eliquis 2.5mg BID if no contraindications    - No further inpatient cardiac w/u planned  - Patient has f/u with Dr. Carbajal on 8/2 at 1:30 PM     Ivan Montero PA-C  Pager: [phone]

## 2024-04-16 NOTE — PROGRESS NOTE ADULT - SUBJECTIVE AND OBJECTIVE BOX
Progress Note: Orthopedic Surgery    Patient interviewed and examined at bedside, appears fatigued but in no acute distress. Patient states pain is stable from prior examination yesterday. Patient denies fevers, chills, headaches, chest pain, or shortness of breath.       VITAL SIGNS:  T(C): 36.6 (16 Apr 2024 04:32), Max: 36.8 (15 Apr 2024 22:06)  T(F): 97.9 (16 Apr 2024 04:32), Max: 98.2 (15 Apr 2024 22:06)  HR: 70 (16 Apr 2024 04:32) (70 - 73)  BP: 134/67 (16 Apr 2024 05:14) (127/61 - 146/59)  RR: 18 (16 Apr 2024 04:32) (18 - 18)  SpO2: 93% (16 Apr 2024 04:32) (93% - 93%)  O2 Parameters below as of 16 Apr 2024 04:32  Patient On (Oxygen Delivery Method): room air      LABS:    04-15    136  |  102  |  42<H>  ----------------------------<  81  4.1   |  19<L>  |  1.49<H>    Ca    8.6      15 Apr 2024 07:14  Mg     2.1     04-14          PHYSICAL EXAM  Gen: NAD, Resting comfortably    LUE:  Skin intact, no erythema or ecchymosis.   No bony tenderness to palpation.   + Ax/Musc/Rad/Uln/Med/AIN/PIN.   + SILT C5-T1.    + Radial pulse.    AROM/PROM (Forward Flexion): 45/60  AROM/PROM (Abduction): 45/60  No micromotion tenderness.   Compartments soft and compressible.   No calf tenderness.     RLE:  Painful swelling of the pre-and supra-patellar knee noted, accompanied by palpable effusion and mild warmth with no erythema or ecchymoses.   Otherwise, no bony tenderness to palpation throughout the extremity.     + EHL/FHL/TA/GSC.   + SILT L3-S1.   + DP.   PROM: 10-90 degrees with pain throughout range of motion; mild improvement relative to previous examination.   Compartments soft and compressible.   No calf tenderness.       IMAGING:    CT Left Shoulder:  < from: CT Shoulder w/ IV Cont, Left (04.14.24 @ 14:00) >  IMPRESSION:  Severe left glenohumeral arthrosis with large effusion and   chondrocalcinosis. No acute displaced fracture.  < end of copied text >    XR Left Shoulder:  < from: Xray Shoulder 2 Views, Left (04.11.24 @ 15:45) >  IMPRESSION:  Flattening of the humeral heads with sclerosis suggesting chronic avascular necrosis with flattening.   < end of copied text >      ASSESSMENT:  Patient is a 89y Female with a Left shoulder effusion on CT with suspicion for osteoarthritic effusion; patient also with Right knee swelling consistent with Right knee hematoma after recent fall on Eliquis versus Right knee septic arthritis.     PLAN:  - Follow-up XR Bilateral knees official read.   - Follow-up ESR/CRP.   - Follow-up Blood Cultures.   - Will re-evaluate candidacy for Right knee arthrocentesis contingent on bilateral knee x-rays: NPO except meds and holding morning Eliquis for potential knee aspiration pending discussion with attending surgeon this morning.    - No indication for Left shoulder arthrocentesis at present time.   - Pain control.   - DVT Ppx" Per Primary Team.   - PT/OT.   - Ice and elevate as tolerated.   - No acute orthopaedic surgical intervention indicated at this time; Will re-evaluate contingent on knee imaging and discussion with attending as above.  - Will discuss with Dr. Contreras and advise of any changes to the above plan.          Progress Note: Orthopedic Surgery    Patient interviewed and examined at bedside, appears fatigued but in no acute distress. Patient states pain is stable from prior examination yesterday. Patient denies fevers, chills, headaches, chest pain, or shortness of breath.       VITAL SIGNS:  T(C): 36.6 (16 Apr 2024 04:32), Max: 36.8 (15 Apr 2024 22:06)  T(F): 97.9 (16 Apr 2024 04:32), Max: 98.2 (15 Apr 2024 22:06)  HR: 70 (16 Apr 2024 04:32) (70 - 73)  BP: 134/67 (16 Apr 2024 05:14) (127/61 - 146/59)  RR: 18 (16 Apr 2024 04:32) (18 - 18)  SpO2: 93% (16 Apr 2024 04:32) (93% - 93%)  O2 Parameters below as of 16 Apr 2024 04:32  Patient On (Oxygen Delivery Method): room air      LABS:    04-15    136  |  102  |  42<H>  ----------------------------<  81  4.1   |  19<L>  |  1.49<H>    Ca    8.6      15 Apr 2024 07:14  Mg     2.1     04-14          PHYSICAL EXAM  Gen: NAD, Resting comfortably    LUE:  Skin intact, no erythema or ecchymosis.   No bony tenderness to palpation.   + Ax/Musc/Rad/Uln/Med/AIN/PIN.   + SILT C5-T1.    + Radial pulse.    AROM/PROM (Forward Flexion): 45/60  AROM/PROM (Abduction): 45/60  No micromotion tenderness.   Compartments soft and compressible.   No calf tenderness.     RLE:  Painful swelling of the pre-and supra-patellar knee noted, accompanied by palpable effusion and mild warmth with no erythema or ecchymoses.   Otherwise, no bony tenderness to palpation throughout the extremity.     + EHL/FHL/TA/GSC.   + SILT L3-S1.   + DP.   PROM: 10-90 degrees with pain throughout range of motion; mild improvement relative to previous examination.   Compartments soft and compressible.   No calf tenderness.       IMAGING:    CT Left Shoulder:  < from: CT Shoulder w/ IV Cont, Left (04.14.24 @ 14:00) >  IMPRESSION:  Severe left glenohumeral arthrosis with large effusion and   chondrocalcinosis. No acute displaced fracture.  < end of copied text >    XR Left Shoulder:  < from: Xray Shoulder 2 Views, Left (04.11.24 @ 15:45) >  IMPRESSION:  Flattening of the humeral heads with sclerosis suggesting chronic avascular necrosis with flattening.   < end of copied text >      ASSESSMENT:  Patient is a 89y Female with a Left shoulder effusion on CT with suspicion for osteoarthritic effusion; patient also with Right knee swelling consistent with Right knee hematoma after recent fall on Eliquis versus lower suspicion for Right knee septic arthritis.     PLAN:  - Follow-up CT Right knee to rule out occult fracture.   - Follow-up XR Bilateral knees official read.   - Follow-up ESR/CRP: 105/285.   - Follow-up Blood Cultures.   - No plan for Right knee arthrocentesis at present.   - No indication for Left shoulder arthrocentesis at present time.   - Pain control.   - DVT Ppx: Per Primary Team; Patient may resume Eliquis this morning from Orthopedic standpoint.   - PT/OT.   - Ice and elevate as tolerated.   - No acute orthopaedic surgical intervention indicated at this time; Will re-evaluate contingent on knee imaging.  - Discussed with Dr. Contreras who is aware of and in agreement with the above plan.

## 2024-04-17 LAB
ANION GAP SERPL CALC-SCNC: 12 MMOL/L — SIGNIFICANT CHANGE UP (ref 5–17)
BUN SERPL-MCNC: 64 MG/DL — HIGH (ref 7–23)
CALCIUM SERPL-MCNC: 8.9 MG/DL — SIGNIFICANT CHANGE UP (ref 8.4–10.5)
CHLORIDE SERPL-SCNC: 104 MMOL/L — SIGNIFICANT CHANGE UP (ref 96–108)
CO2 SERPL-SCNC: 18 MMOL/L — LOW (ref 22–31)
CREAT SERPL-MCNC: 1.74 MG/DL — HIGH (ref 0.5–1.3)
EGFR: 26 ML/MIN/1.73M2 — LOW
GLUCOSE SERPL-MCNC: 77 MG/DL — SIGNIFICANT CHANGE UP (ref 70–99)
HCT VFR BLD CALC: 34.2 % — LOW (ref 34.5–45)
HGB BLD-MCNC: 10.9 G/DL — LOW (ref 11.5–15.5)
MCHC RBC-ENTMCNC: 28.7 PG — SIGNIFICANT CHANGE UP (ref 27–34)
MCHC RBC-ENTMCNC: 31.9 GM/DL — LOW (ref 32–36)
MCV RBC AUTO: 90 FL — SIGNIFICANT CHANGE UP (ref 80–100)
NRBC # BLD: 0 /100 WBCS — SIGNIFICANT CHANGE UP (ref 0–0)
PLATELET # BLD AUTO: 264 K/UL — SIGNIFICANT CHANGE UP (ref 150–400)
POTASSIUM SERPL-MCNC: 4 MMOL/L — SIGNIFICANT CHANGE UP (ref 3.5–5.3)
POTASSIUM SERPL-SCNC: 4 MMOL/L — SIGNIFICANT CHANGE UP (ref 3.5–5.3)
RBC # BLD: 3.8 M/UL — SIGNIFICANT CHANGE UP (ref 3.8–5.2)
RBC # FLD: 16.9 % — HIGH (ref 10.3–14.5)
SODIUM SERPL-SCNC: 134 MMOL/L — LOW (ref 135–145)
WBC # BLD: 6.22 K/UL — SIGNIFICANT CHANGE UP (ref 3.8–10.5)
WBC # FLD AUTO: 6.22 K/UL — SIGNIFICANT CHANGE UP (ref 3.8–10.5)

## 2024-04-17 RX ADMIN — Medication 3 MILLIGRAM(S): at 23:47

## 2024-04-17 RX ADMIN — APIXABAN 2.5 MILLIGRAM(S): 2.5 TABLET, FILM COATED ORAL at 09:36

## 2024-04-17 RX ADMIN — Medication 25 MILLIGRAM(S): at 05:40

## 2024-04-17 RX ADMIN — SODIUM CHLORIDE 50 MILLILITER(S): 9 INJECTION INTRAMUSCULAR; INTRAVENOUS; SUBCUTANEOUS at 09:37

## 2024-04-17 RX ADMIN — Medication 1 MILLIGRAM(S): at 12:06

## 2024-04-17 RX ADMIN — LATANOPROST 1 DROP(S): 0.05 SOLUTION/ DROPS OPHTHALMIC; TOPICAL at 22:07

## 2024-04-17 RX ADMIN — SODIUM CHLORIDE 50 MILLILITER(S): 9 INJECTION INTRAMUSCULAR; INTRAVENOUS; SUBCUTANEOUS at 05:40

## 2024-04-17 RX ADMIN — ATORVASTATIN CALCIUM 10 MILLIGRAM(S): 80 TABLET, FILM COATED ORAL at 22:07

## 2024-04-17 RX ADMIN — APIXABAN 2.5 MILLIGRAM(S): 2.5 TABLET, FILM COATED ORAL at 22:07

## 2024-04-17 RX ADMIN — Medication 125 MICROGRAM(S): at 05:40

## 2024-04-17 NOTE — PROGRESS NOTE ADULT - SUBJECTIVE AND OBJECTIVE BOX
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" x past 1.5 yrs- last dose 4 months ago      MDS (Myelodysplastic Syndrome)      Skin Cancer  of face , basal cell   4 yrs ago      History of Total Knee Replacement  L 1998      Bilateral Cataracts  69 y/o      S/P Breast Lumpectomy  10 yrs ago      Cystocele  47 yrs ago      Rectocele  47 yrs ago      Carpal Tunnel Syndrome  10 yrs ago      Basal Cell Carcinoma of Face  4 yrs ago      S/P T&A  childhood        S/P TKR (Total Knee Replacement)  left      S/P Breast Lumpectomy  benign      S/P Cataract Surgery                                    MEDICATIONS  (STANDING):  cefTRIAXone   IVPB 1000 milliGRAM(s) IV Intermittent every 24 hours      Allergies    No Known Allergies    Intolerances        SOCIAL HISTORY:  Denies ETOh,Smoking,     FAMILY HISTORY:      REVIEW OF SYSTEMS:    CONSTITUTIONAL: No weakness, fevers or chills  RESPIRATORY: No cough, wheezing, hemoptysis; pos  shortness of breath  CARDIOVASCULAR: No chest pain or palpitations  GASTROINTESTINAL: No abdominal or epigastric pain. No nausea, vomiting,                                                                             10.9   6.22  )-----------( 264      ( 17 Apr 2024 07:11 )             34.2       CBC Full  -  ( 17 Apr 2024 07:11 )  WBC Count : 6.22 K/uL  RBC Count : 3.80 M/uL  Hemoglobin : 10.9 g/dL  Hematocrit : 34.2 %  Platelet Count - Automated : 264 K/uL  Mean Cell Volume : 90.0 fl  Mean Cell Hemoglobin : 28.7 pg  Mean Cell Hemoglobin Concentration : 31.9 gm/dL  Auto Neutrophil # : x  Auto Lymphocyte # : x  Auto Monocyte # : x  Auto Eosinophil # : x  Auto Basophil # : x  Auto Neutrophil % : x  Auto Lymphocyte % : x  Auto Monocyte % : x  Auto Eosinophil % : x  Auto Basophil % : x      04-17    134<L>  |  104  |  64<H>  ----------------------------<  77  4.0   |  18<L>  |  1.74<H>    Ca    8.9      17 Apr 2024 07:12        CAPILLARY BLOOD GLUCOSE          Vital Signs Last 24 Hrs  T(C): 36.6 (17 Apr 2024 12:29), Max: 36.6 (16 Apr 2024 21:20)  T(F): 97.8 (17 Apr 2024 12:29), Max: 97.9 (16 Apr 2024 21:20)  HR: 74 (17 Apr 2024 15:35) (73 - 82)  BP: 152/69 (17 Apr 2024 15:35) (139/68 - 152/85)  BP(mean): --  RR: 18 (17 Apr 2024 15:35) (18 - 18)  SpO2: 95% (17 Apr 2024 15:35) (92% - 95%)    Parameters below as of 17 Apr 2024 15:35  Patient On (Oxygen Delivery Method): room air        Urinalysis Basic - ( 17 Apr 2024 07:12 )    Color: x / Appearance: x / SG: x / pH: x  Gluc: 77 mg/dL / Ketone: x  / Bili: x / Urobili: x   Blood: x / Protein: x / Nitrite: x   Leuk Esterase: x / RBC: x / WBC x   Sq Epi: x / Non Sq Epi: x / Bacteria: x                PHYSICAL EXAM:    Constitutional: NAD  HEENT: conjunctive   clear   Neck:  No JVD  Respiratory: CTAB  Cardiovascular: S1 and S2  Gastrointestinal: BS+, soft, NT/ND  Extremities: pos  peripheral edema  Neurological: , no focal deficits  Psychiatric: Normal mood, normal affect  : No Beckwith  Skin: dry   Access: Not applicable

## 2024-04-17 NOTE — PROGRESS NOTE ADULT - SUBJECTIVE AND OBJECTIVE BOX
C A R D I O L O G Y  **********************************     DATE OF SERVICE: 04-17-24    Patient reports R knee pain improving. Dyspnea improved. denies chest pain.   Review of symptoms otherwise negative.    MEDICATIONS:  apixaban 2.5 milliGRAM(s) Oral every 12 hours  atorvastatin 10 milliGRAM(s) Oral at bedtime  folic acid 1 milliGRAM(s) Oral daily  latanoprost 0.005% Ophthalmic Solution 1 Drop(s) Both EYES at bedtime  levothyroxine 125 MICROGram(s) Oral daily  melatonin 3 milliGRAM(s) Oral at bedtime PRN  metoprolol succinate ER 25 milliGRAM(s) Oral daily  sodium chloride 0.9%. 1000 milliLiter(s) IV Continuous <Continuous>      LABS:                        10.9   6.22  )-----------( 264      ( 17 Apr 2024 07:11 )             34.2       Hemoglobin: 10.9 g/dL (04-17 @ 07:11)  Hemoglobin: 10.5 g/dL (04-16 @ 07:02)  Hemoglobin: 12.1 g/dL (04-13 @ 07:18)      04-17    134<L>  |  104  |  64<H>  ----------------------------<  77  4.0   |  18<L>  |  1.74<H>    Ca    8.9      17 Apr 2024 07:12      Creatinine Trend: 1.74<--, 1.74<--, 1.49<--, 1.48<--, 1.50<--, 1.74<--    COAGS:           PHYSICAL EXAM:  T(C): 36.5 (04-17-24 @ 05:16), Max: 36.6 (04-16-24 @ 21:20)  HR: 79 (04-17-24 @ 05:16) (73 - 97)  BP: 139/68 (04-17-24 @ 05:16) (139/68 - 152/85)  RR: 18 (04-17-24 @ 05:16) (18 - 18)  SpO2: 95% (04-17-24 @ 05:16) (94% - 96%)  Wt(kg): --    I&O's Summary    16 Apr 2024 07:01  -  17 Apr 2024 07:00  --------------------------------------------------------  IN: 940 mL / OUT: 650 mL / NET: 290 mL    17 Apr 2024 07:01  -  17 Apr 2024 11:49  --------------------------------------------------------  IN: 120 mL / OUT: 150 mL / NET: -30 mL        Gen: NAD  HEENT:  (-)icterus (-)pallor  CV: N S1 S2 1/6 LUDMILA (+)2 Pulses B/l  Resp:  Clear to auscultation B/L, normal effort  GI: (+) BS Soft, NT, ND  Lymph:  (-)Edema, (-)obvious lymphadenopathy  Skin: Warm to touch, Normal turgor  Psych: Appropriate mood and affect      TELEMETRY: None	    ECG: Afib, RBBB - unchanged     ASSESSMENT/PLAN: Patient is a 89 y/o female well known to our office with PMH of persistent AF (since 2019), hypertension, hyperlipidemia, CAD s/p remote PCI, MDS, HFpEF, and severe MR refused MitraClip in past who presented s/p fall found to have UTI and mild acute on chronic HFpEF exacerbation. Cardiology consulted for further evaluation.     #s/p fall  - Denies LOC/Syncope  - CT Head with no hemorrhage  - Tele was stable in ED, AF with no events no longer on tele  - s/p Abx for UTI per med  - L shoulder xray with no fracture or dislocation. CT L shoulder with severe arthrosis w/large effusion. CT R knee with no fracture. Management per med/ortho.    #Acute on Chronic HFpEF Exacerbation  - Volume status improved s/p IV lasix  - Hold home PO maintenance lasix 20mg daily given MARIELA. Renal f/u noted  - Patient with known severe MR and at least mod AS but has refused further intervention and repeat echos in office therefore no repeat cardiac testing planned at this time    #Persistent Afib  - Continue Eliquis 2.5mg BID if no contraindications    - No further inpatient cardiac w/u planned  - Patient has f/u with Dr. Carbajal on 8/2 at 1:30 PM     Ivan Montero PA-C  Pager: [phone]

## 2024-04-17 NOTE — PROGRESS NOTE ADULT - SUBJECTIVE AND OBJECTIVE BOX
Patient is a 89y old  Female who presents with a chief complaint of s/p Fall (16 Apr 2024 13:37)      INTERVAL HPI/OVERNIGHT EVENTS: seen and examined , want to go home   T(C): 36.6 (04-16-24 @ 21:20), Max: 36.8 (04-15-24 @ 22:06)  HR: 73 (04-16-24 @ 21:20) (70 - 97)  BP: 152/85 (04-16-24 @ 21:20) (127/61 - 152/85)  RR: 18 (04-16-24 @ 21:20) (18 - 18)  SpO2: 94% (04-16-24 @ 21:20) (93% - 96%)  Wt(kg): --  I&O's Summary    15 Apr 2024 07:01  -  16 Apr 2024 07:00  --------------------------------------------------------  IN: 900 mL / OUT: 300 mL / NET: 600 mL    16 Apr 2024 07:01  -  16 Apr 2024 21:25  --------------------------------------------------------  IN: 340 mL / OUT: 650 mL / NET: -310 mL        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" x past 1.5 yrs- last dose 4 months ago      MDS (Myelodysplastic Syndrome)      Skin Cancer  of face , basal cell   4 yrs ago      History of Total Knee Replacement  L 1998      Bilateral Cataracts  71 y/o      S/P Breast Lumpectomy  10 yrs ago      Cystocele  47 yrs ago      Rectocele  47 yrs ago      Carpal Tunnel Syndrome  10 yrs ago      Basal Cell Carcinoma of Face  4 yrs ago      S/P T&A  childhood        S/P TKR (Total Knee Replacement)  left      S/P Breast Lumpectomy  benign      S/P Cataract Surgery          SOCIAL HISTORY  Alcohol:  Tobacco:  Illicit substance use:    FAMILY HISTORY:    REVIEW OF SYSTEMS:  CONSTITUTIONAL: No fever, weight loss, or fatigue  EYES: No eye pain, visual disturbances, or discharge  ENMT:  No difficulty hearing, tinnitus, vertigo; No sinus or throat pain  NECK: No pain or stiffness  RESPIRATORY: No cough, wheezing, chills or hemoptysis; No shortness of breath  CARDIOVASCULAR: No chest pain, palpitations, dizziness, or leg swelling  GASTROINTESTINAL: No abdominal or epigastric pain. No nausea, vomiting, or hematemesis; No diarrhea or constipation. No melena or hematochezia.  GENITOURINARY: No dysuria, frequency, hematuria, or incontinence  NEUROLOGICAL: No headaches, memory loss, loss of strength, numbness, or tremors  SKIN: No itching, burning, rashes, or lesions   LYMPH NODES: No enlarged glands  ENDOCRINE: No heat or cold intolerance; No hair loss  MUSCULOSKELETAL: No joint pain or swelling; No muscle, back, or extremity pain  PSYCHIATRIC: No depression, anxiety, mood swings, or difficulty sleeping  HEME/LYMPH: No easy bruising, or bleeding gums  ALLERY AND IMMUNOLOGIC: No hives or eczema    RADIOLOGY & ADDITIONAL TESTS:    Imaging Personally Reviewed:  [ ] YES  [ ] NO    Consultant(s) Notes Reviewed:  [ ] YES  [ ] NO    PHYSICAL EXAM:  GENERAL: NAD, well-groomed, well-developed  HEAD:  Atraumatic, Normocephalic  EYES: EOMI, PERRLA, conjunctiva and sclera clear  ENMT: No tonsillar erythema, exudates, or enlargement; Moist mucous membranes, Good dentition, No lesions  NECK: Supple, No JVD, Normal thyroid  NERVOUS SYSTEM:  Alert & Oriented X3, Good concentration; Motor Strength 5/5 B/L upper and lower extremities; DTRs 2+ intact and symmetric  CHEST/LUNG: Clear to percussion bilaterally; No rales, rhonchi, wheezing, or rubs  HEART: Regular rate and rhythm; No murmurs, rubs, or gallops  ABDOMEN: Soft, Nontender, Nondistended; Bowel sounds present  EXTREMITIES:  2+ Peripheral Pulses, No clubbing, cyanosis, or edema  LYMPH: No lymphadenopathy noted  SKIN: No rashes or lesions    LABS:                        10.5   6.92  )-----------( 217      ( 16 Apr 2024 07:02 )             33.5     04-16    133<L>  |  100  |  53<H>  ----------------------------<  66<L>  4.1   |  19<L>  |  1.74<H>    Ca    8.6      16 Apr 2024 07:04        Urinalysis Basic - ( 16 Apr 2024 07:04 )    Color: x / Appearance: x / SG: x / pH: x  Gluc: 66 mg/dL / Ketone: x  / Bili: x / Urobili: x   Blood: x / Protein: x / Nitrite: x   Leuk Esterase: x / RBC: x / WBC x   Sq Epi: x / Non Sq Epi: x / Bacteria: x      CAPILLARY BLOOD GLUCOSE      POCT Blood Glucose.: 104 mg/dL (16 Apr 2024 10:32)        Urinalysis Basic - ( 16 Apr 2024 07:04 )    Color: x / Appearance: x / SG: x / pH: x  Gluc: 66 mg/dL / Ketone: x  / Bili: x / Urobili: x   Blood: x / Protein: x / Nitrite: x   Leuk Esterase: x / RBC: x / WBC x   Sq Epi: x / Non Sq Epi: x / Bacteria: x        MEDICATIONS  (STANDING):  apixaban 2.5 milliGRAM(s) Oral every 12 hours  atorvastatin 10 milliGRAM(s) Oral at bedtime  folic acid 1 milliGRAM(s) Oral daily  latanoprost 0.005% Ophthalmic Solution 1 Drop(s) Both EYES at bedtime  levothyroxine 125 MICROGram(s) Oral daily  metoprolol succinate ER 25 milliGRAM(s) Oral daily  sodium chloride 0.9%. 1000 milliLiter(s) (50 mL/Hr) IV Continuous <Continuous>    MEDICATIONS  (PRN):  melatonin 3 milliGRAM(s) Oral at bedtime PRN Insomnia      Care Discussed with Consultants/Other Providers [ ] YES  [ ] NO

## 2024-04-18 ENCOUNTER — TRANSCRIPTION ENCOUNTER (OUTPATIENT)
Age: 89
End: 2024-04-18

## 2024-04-18 LAB
ANION GAP SERPL CALC-SCNC: 14 MMOL/L — SIGNIFICANT CHANGE UP (ref 5–17)
BUN SERPL-MCNC: 61 MG/DL — HIGH (ref 7–23)
CALCIUM SERPL-MCNC: 9 MG/DL — SIGNIFICANT CHANGE UP (ref 8.4–10.5)
CHLORIDE SERPL-SCNC: 107 MMOL/L — SIGNIFICANT CHANGE UP (ref 96–108)
CO2 SERPL-SCNC: 16 MMOL/L — LOW (ref 22–31)
CREAT SERPL-MCNC: 1.51 MG/DL — HIGH (ref 0.5–1.3)
EGFR: 31 ML/MIN/1.73M2 — LOW
GI PCR PANEL: SIGNIFICANT CHANGE UP
GLUCOSE SERPL-MCNC: 78 MG/DL — SIGNIFICANT CHANGE UP (ref 70–99)
HCT VFR BLD CALC: 33 % — LOW (ref 34.5–45)
HGB BLD-MCNC: 10.4 G/DL — LOW (ref 11.5–15.5)
MCHC RBC-ENTMCNC: 29 PG — SIGNIFICANT CHANGE UP (ref 27–34)
MCHC RBC-ENTMCNC: 31.5 GM/DL — LOW (ref 32–36)
MCV RBC AUTO: 91.9 FL — SIGNIFICANT CHANGE UP (ref 80–100)
NRBC # BLD: 0 /100 WBCS — SIGNIFICANT CHANGE UP (ref 0–0)
PLATELET # BLD AUTO: 281 K/UL — SIGNIFICANT CHANGE UP (ref 150–400)
POTASSIUM SERPL-MCNC: 4 MMOL/L — SIGNIFICANT CHANGE UP (ref 3.5–5.3)
POTASSIUM SERPL-SCNC: 4 MMOL/L — SIGNIFICANT CHANGE UP (ref 3.5–5.3)
RBC # BLD: 3.59 M/UL — LOW (ref 3.8–5.2)
RBC # FLD: 16.8 % — HIGH (ref 10.3–14.5)
SODIUM SERPL-SCNC: 137 MMOL/L — SIGNIFICANT CHANGE UP (ref 135–145)
WBC # BLD: 6.19 K/UL — SIGNIFICANT CHANGE UP (ref 3.8–10.5)
WBC # FLD AUTO: 6.19 K/UL — SIGNIFICANT CHANGE UP (ref 3.8–10.5)

## 2024-04-18 PROCEDURE — 99231 SBSQ HOSP IP/OBS SF/LOW 25: CPT | Mod: GC

## 2024-04-18 RX ORDER — LANOLIN ALCOHOL/MO/W.PET/CERES
1 CREAM (GRAM) TOPICAL
Qty: 0 | Refills: 0 | DISCHARGE
Start: 2024-04-18

## 2024-04-18 RX ORDER — LATANOPROST 0.05 MG/ML
1 SOLUTION/ DROPS OPHTHALMIC; TOPICAL
Qty: 0 | Refills: 0 | DISCHARGE
Start: 2024-04-18

## 2024-04-18 RX ORDER — HALOPERIDOL DECANOATE 100 MG/ML
1 INJECTION INTRAMUSCULAR EVERY 8 HOURS
Refills: 0 | Status: DISCONTINUED | OUTPATIENT
Start: 2024-04-18 | End: 2024-04-19

## 2024-04-18 RX ADMIN — Medication 3 MILLIGRAM(S): at 22:09

## 2024-04-18 RX ADMIN — Medication 125 MICROGRAM(S): at 05:15

## 2024-04-18 RX ADMIN — APIXABAN 2.5 MILLIGRAM(S): 2.5 TABLET, FILM COATED ORAL at 09:16

## 2024-04-18 RX ADMIN — ATORVASTATIN CALCIUM 10 MILLIGRAM(S): 80 TABLET, FILM COATED ORAL at 21:20

## 2024-04-18 RX ADMIN — Medication 25 MILLIGRAM(S): at 05:15

## 2024-04-18 RX ADMIN — APIXABAN 2.5 MILLIGRAM(S): 2.5 TABLET, FILM COATED ORAL at 21:20

## 2024-04-18 RX ADMIN — SODIUM CHLORIDE 50 MILLILITER(S): 9 INJECTION INTRAMUSCULAR; INTRAVENOUS; SUBCUTANEOUS at 04:30

## 2024-04-18 RX ADMIN — LATANOPROST 1 DROP(S): 0.05 SOLUTION/ DROPS OPHTHALMIC; TOPICAL at 21:20

## 2024-04-18 RX ADMIN — SODIUM CHLORIDE 50 MILLILITER(S): 9 INJECTION INTRAMUSCULAR; INTRAVENOUS; SUBCUTANEOUS at 09:17

## 2024-04-18 NOTE — PROGRESS NOTE ADULT - SUBJECTIVE AND OBJECTIVE BOX
C A R D I O L O G Y  **********************************     DATE OF SERVICE: 04-18-24    Patient reports R knee pain. Dyspnea improved. denies chest pain.  Review of symptoms otherwise negative.    MEDICATIONS:  apixaban 2.5 milliGRAM(s) Oral every 12 hours  atorvastatin 10 milliGRAM(s) Oral at bedtime  folic acid 1 milliGRAM(s) Oral daily  haloperidol    Injectable 1 milliGRAM(s) IV Push every 8 hours PRN  latanoprost 0.005% Ophthalmic Solution 1 Drop(s) Both EYES at bedtime  levothyroxine 125 MICROGram(s) Oral daily  melatonin 3 milliGRAM(s) Oral at bedtime PRN  metoprolol succinate ER 25 milliGRAM(s) Oral daily      LABS:                        10.4   6.19  )-----------( 281      ( 18 Apr 2024 07:30 )             33.0       Hemoglobin: 10.4 g/dL (04-18 @ 07:30)  Hemoglobin: 10.9 g/dL (04-17 @ 07:11)  Hemoglobin: 10.5 g/dL (04-16 @ 07:02)      04-18    137  |  107  |  61<H>  ----------------------------<  78  4.0   |  16<L>  |  1.51<H>    Ca    9.0      18 Apr 2024 07:30      Creatinine Trend: 1.51<--, 1.74<--, 1.74<--, 1.49<--, 1.48<--, 1.50<--    COAGS:           PHYSICAL EXAM:  T(C): 36.3 (04-18-24 @ 12:22), Max: 36.7 (04-17-24 @ 21:25)  HR: 65 (04-18-24 @ 12:22) (65 - 74)  BP: 156/83 (04-18-24 @ 12:22) (132/61 - 156/83)  RR: 18 (04-18-24 @ 12:22) (18 - 18)  SpO2: 95% (04-18-24 @ 12:22) (92% - 95%)  Wt(kg): --    I&O's Summary    17 Apr 2024 07:01  -  18 Apr 2024 07:00  --------------------------------------------------------  IN: 360 mL / OUT: 1000 mL / NET: -640 mL    18 Apr 2024 07:01  -  18 Apr 2024 14:25  --------------------------------------------------------  IN: 240 mL / OUT: 600 mL / NET: -360 mL          Gen: NAD  HEENT:  (-)icterus (-)pallor  CV: N S1 S2 1/6 LUDMILA (+)2 Pulses B/l  Resp:  Clear to auscultation B/L, normal effort  GI: (+) BS Soft, NT, ND  Lymph:  (-)Edema, (-)obvious lymphadenopathy  Skin: Warm to touch, Normal turgor  Psych: Appropriate mood and affect      TELEMETRY: None	    ECG: Afib, RBBB - unchanged     ASSESSMENT/PLAN: Patient is a 89 y/o female well known to our office with PMH of persistent AF (since 2019), hypertension, hyperlipidemia, CAD s/p remote PCI, MDS, HFpEF, and severe MR refused MitraClip in past who presented s/p fall found to have UTI and mild acute on chronic HFpEF exacerbation. Cardiology consulted for further evaluation.     #s/p fall  - Denies LOC/Syncope  - CT Head with no hemorrhage  - Tele was stable in ED, AF with no events no longer on tele  - s/p Abx for UTI per med  - L shoulder xray with no fracture or dislocation. CT L shoulder with severe arthrosis w/large effusion. CT R knee with no fracture. Management per med/ortho.    #Acute on Chronic HFpEF Exacerbation  - Volume status improved s/p IV lasix  - Hold home PO maintenance lasix 20mg daily given MARIELA. Renal f/u noted - trend cr  - Patient with known severe MR and at least mod AS but has refused further intervention and repeat echos in office therefore no repeat cardiac testing planned at this time    #Persistent Afib  - Continue Eliquis 2.5mg BID if no contraindications    - No further inpatient cardiac w/u planned  - Patient has f/u with Dr. Carbajal on 8/2 at 1:30 PM     Ivan Montero PA-C  Pager: [phone]

## 2024-04-18 NOTE — DISCHARGE NOTE PROVIDER - HOSPITAL COURSE
HPI:  89 y/o female with pmhx AF (since 2019), hypertension, hyperlipidemia, CAD s/p remote PCI, MDS, HFpEF, and severe MR refused MitraClip in past s/p Fall.   Patient reports mechanical trip and fall. Denies LOC/syncope.   Patient was given extra dose of Lasix last week as she has worsening lower extremity edema associated with SOB.   No hx chest pain, palpitations, dizziness, or syncope.      Hospital Course:  Patient presented to the ED s/p fall with head lac. Lac was repaired and CTH was negative for bleed. CT spine showed age-related degenerative changes. Patient also was found to have left shoulder effusion and right knee hematoma for which ortho was consulted, and did not recommend any intervention at this time. Orthostatics were negative. Patient was found to have an MARIELA and UTI and was treated with IVF and CTX. Home PO Lasix was held. Staples used to repair head lac were removed on 4/17. PT recc CHEN upon dc once medically cleared.      Important Medication Changes and Reason:  See medication reconciliation.      Active or Pending Issues Requiring Follow-up:  PCP  Cardio  Nephro      Advanced Directives:   [ ] Full code  [X] DNR  [ ] Hospice      Discharge Diagnoses:  Fall  Scalp Laceration  MARIELA  UTI

## 2024-04-18 NOTE — DISCHARGE NOTE PROVIDER - CARE PROVIDER_API CALL
Pennie Carbajal  Cardiovascular Disease  [address], [address]  [place] [address]  Phone: [phone]  Fax: [phone]  Established Patient  Follow Up Time: 1 week    Pahlavan, Mohsen  Nephrology  [address], [address]  [place] [address]  Phone: [phone]  Fax: [phone]  Follow Up Time: 1 week   Pennie Carbajal  Cardiovascular Disease  [address] [facility] [address], [address]  [place] [address]  Phone: [phone]  Fax: [phone]  Established Patient  Follow Up Time: 1 week    Pahlavan, Mohsen  Nephrology  [address], [address]  [place] [address]  Phone: [phone]  Fax: [phone]  Follow Up Time: 1 week    Martins-Welch, Diana Claudette  Hospice/Palliative Medicine  [address] [place] [address]  Phone: [phone]  Fax: [phone]  Established Patient  Follow Up Time: 2 weeks

## 2024-04-18 NOTE — DISCHARGE NOTE PROVIDER - NSFOLLOWUPCLINICS_GEN_ALL_ED_FT
[facility]  Neurology  [address] [place] [address] [address]rd [place] [address]  Phone: [phone]  Fax:   Follow Up Time: 1 month     [facility]  Neurology  [address] - [address]rd [place] [address]  Phone: [phone]  Fax:   Follow Up Time: 1 month    [facility]  Orthopedic Surgery  [address] [facility] [facility], 3rd & 4th [place] [address]  Phone: [phone]  Fax:

## 2024-04-18 NOTE — DISCHARGE NOTE PROVIDER - CARE PROVIDERS DIRECT ADDRESSES
,DirectAddress_[email or web address]

## 2024-04-18 NOTE — DISCHARGE NOTE PROVIDER - NSDCFUADDAPPT_GEN_ALL_CORE_FT
APPTS ARE READY TO BE MADE: [X] YES    Best Family or Patient Contact (if needed):    Additional Information about above appointments (if needed):    1: Please follow up with primary care (Dr. Ronnie Childers).  2: Please follow up with cardiology.  3: Please follow up with nephrology.    Other comments or requests:    APPTS ARE READY TO BE MADE: [X] YES    Best Family or Patient Contact (if needed):    Additional Information about above appointments (if needed):    1: Please follow up with primary care (Dr. Ronnie Childers).  2: Please follow up with cardiology.  3: Please follow up with nephrology.  4: Please call palliative care as outpatient, if this is still your desire.    Other comments or requests:    APPTS ARE READY TO BE MADE: [X] YES    Best Family or Patient Contact (if needed):    Additional Information about above appointments (if needed):    1: Please follow up with primary care (Dr. Ronnie Childers).  2: Please follow up with cardiology.  3: Please follow up with nephrology.  4: Please call palliative care as outpatient, if this is still your desire.  5: Consider outpatient dementia work up, once delirium is resolved.    Other comments or requests:    APPTS ARE READY TO BE MADE: [X] YES    Best Family or Patient Contact (if needed):    Additional Information about above appointments (if needed):    1: Please follow up with primary care (Dr. Ronnie Childers).  2: Please follow up with cardiology.  3: Please follow up with nephrology.  4: Please call palliative care as outpatient, if this is still your desire.  5: Consider outpatient dementia work up, once delirium is resolved.    Other comments or requests:   Patient is being discharged to rehab. Patient/caregiver will arrange follow up appointments.

## 2024-04-18 NOTE — DISCHARGE NOTE PROVIDER - NSDCMRMEDTOKEN_GEN_ALL_CORE_FT
apixaban 2.5 mg oral tablet: 1 tab(s) orally 2 times a day  atorvastatin 10 mg oral tablet: 1 tab(s) orally once a day (at bedtime)  folic acid 1 mg oral tablet: 1 tab(s) orally once a day  furosemide 20 mg oral tablet: 1 tab(s) orally once a day  latanoprost 0.005% ophthalmic solution: 1 drop(s) to each affected eye once a day (at bedtime)  levothyroxine 125 mcg (0.125 mg) oral tablet: 1 tab(s) orally once a day  Lumigan 0.01% ophthalmic solution: 1 drop(s) in each affected eye once a day (at bedtime)  melatonin 3 mg oral tablet: 1 tab(s) orally once a day (at bedtime) As needed Insomnia  metoprolol succinate 25 mg oral tablet, extended release: 1 tab(s) orally once a day after breakfast  omeprazole 20 mg oral delayed release capsule: 1 cap(s) orally once a day  One A Day Women&#x27;s Vitamin tablet: 1 tablet orally once a day  Tylenol Arthritis Caplet 650 mg oral tablet, extended release: 2 tab(s) orally 2 times a day

## 2024-04-18 NOTE — DISCHARGE NOTE PROVIDER - NSDCCPCAREPLAN_GEN_ALL_CORE_FT
PRINCIPAL DISCHARGE DIAGNOSIS  Diagnosis: Acute UTI  Assessment and Plan of Treatment: HOME CARE INSTRUCTIONS  Treated with IV Ceftriaxone.  Drink enough water and fluids to keep your urine clear or pale yellow.  Avoid caffeine, tea, and carbonated beverages. They tend to irritate your bladder.  Empty your bladder often. Avoid holding urine for long periods of time.  After a bowel movement, women should cleanse from front to back. Use each tissue only once.  SEEK MEDICAL CARE IF:  You have back pain.  You develop a fever.  Your symptoms do not begin to resolve within 3 days.  SEEK IMMEDIATE MEDICAL CARE IF:  You have severe back pain or lower abdominal pain.  You develop chills.  You have nausea or vomiting.  You have continued burning or discomfort with urination.  Please follow up with your primary care doctor.        SECONDARY DISCHARGE DIAGNOSES  Diagnosis: Scalp laceration  Assessment and Plan of Treatment: RESOLVED.  Stapled on 4/10. All staples removed 4/17.  Keep area clean.  Please follow up with primary care.    Diagnosis: Fall  Assessment and Plan of Treatment: Change position slowly.  Use ambulation aids as needed.  Use non-skid socks or foot wear.  Ensure walking paths are clear of clutter and well-lit.  Please follow up with primary care.  Seek medical care if you sustain another fall.    Diagnosis: MARIELA (acute kidney injury)  Assessment and Plan of Treatment: Stay well-hydrated.  Take medications as prescribed.  Please follow up with primary care and nephrology.  Please seek medical care for any new or worsening symptoms.     PRINCIPAL DISCHARGE DIAGNOSIS  Diagnosis: Acute UTI  Assessment and Plan of Treatment: HOME CARE INSTRUCTIONS  Treated with IV Ceftriaxone.  Drink enough water and fluids to keep your urine clear or pale yellow.  Avoid caffeine, tea, and carbonated beverages. They tend to irritate your bladder.  Empty your bladder often. Avoid holding urine for long periods of time.  After a bowel movement, women should cleanse from front to back. Use each tissue only once.  SEEK MEDICAL CARE IF:  You have back pain.  You develop a fever.  Your symptoms do not begin to resolve within 3 days.  SEEK IMMEDIATE MEDICAL CARE IF:  You have severe back pain or lower abdominal pain.  You develop chills.  You have nausea or vomiting.  You have continued burning or discomfort with urination.  Please follow up with your primary care doctor.        SECONDARY DISCHARGE DIAGNOSES  Diagnosis: Scalp laceration  Assessment and Plan of Treatment: RESOLVED.  Stapled on 4/10. All staples removed 4/17.  Keep area clean.  Please follow up with primary care.    Diagnosis: Fall  Assessment and Plan of Treatment: Change position slowly.  Use ambulation aids as needed.  Use non-skid socks or foot wear.  Ensure walking paths are clear of clutter and well-lit.  Please follow up with primary care.  Seek medical care if you sustain another fall.    Diagnosis: MARIELA (acute kidney injury)  Assessment and Plan of Treatment: Stay well-hydrated.  Take medications as prescribed.  Please follow up with primary care and nephrology.  Please seek medical care for any new or worsening symptoms.    Diagnosis: Congestive heart failure (CHF)  Assessment and Plan of Treatment: MR/Aortic stenosis outpatint follow up   f/u with Dr Carbajal on 8/2 at 1:30 PM   Weigh yourself daily.  If you gain 3lbs in 3 days, or 5lbs in a week call your Health Care Provider.  Do not eat or drink foods containing more than 2000mg of salt (sodium) in your diet every day.  Call your Health Care Provider if you have any swelling or increased swelling in your feet, ankles, and/or stomach.  Take all of your medication as directed.  If you become dizzy call your Health Care Provider.      Diagnosis: Effusion, left shoulder  Assessment and Plan of Treatment: noted on CT   No acute orthopaedic surgical intervention indicated at this time      Diagnosis: Hematoma of right knee region  Assessment and Plan of Treatment: No acute orthopaedic surgical intervention indicated at this time

## 2024-04-18 NOTE — PROGRESS NOTE ADULT - SUBJECTIVE AND OBJECTIVE BOX
Patient is a 89y old  Female who presents with a chief complaint of s/p Fall (16 Apr 2024 13:37)      INTERVAL HPI/OVERNIGHT EVENTS: seen and examined , want to go home   T(C): 37.1 (04-18-24 @ 21:04), Max: 37.1 (04-18-24 @ 21:04)  HR: 70 (04-18-24 @ 21:04) (65 - 70)  BP: 146/77 (04-18-24 @ 21:04) (146/77 - 156/83)  RR: 17 (04-18-24 @ 21:04) (17 - 18)  SpO2: 96% (04-18-24 @ 21:04) (95% - 96%)      MEDICATIONS  (STANDING):  apixaban 2.5 milliGRAM(s) Oral every 12 hours  atorvastatin 10 milliGRAM(s) Oral at bedtime  folic acid 1 milliGRAM(s) Oral daily  latanoprost 0.005% Ophthalmic Solution 1 Drop(s) Both EYES at bedtime  levothyroxine 125 MICROGram(s) Oral daily  metoprolol succinate ER 25 milliGRAM(s) Oral daily    MEDICATIONS  (PRN):  haloperidol    Injectable 1 milliGRAM(s) IV Push every 8 hours PRN agitation  melatonin 3 milliGRAM(s) Oral at bedtime PRN Insomnia                Chronic Osteoarthritis      Hypothyroidism      HTN (Hypertension)      Hypercholesterolemia      Chronic Glaucoma  R eye      GERD (Gastroesophageal Reflux Disease)      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" x past 1.5 yrs- last dose 4 months ago      MDS (Myelodysplastic Syndrome)      Skin Cancer  of face , basal cell   4 yrs ago      History of Total Knee Replacement  L 1998      Bilateral Cataracts  69 y/o      S/P Breast Lumpectomy  10 yrs ago      Cystocele  47 yrs ago      Rectocele  47 yrs ago      Carpal Tunnel Syndrome  10 yrs ago      Basal Cell Carcinoma of Face  4 yrs ago      S/P T&A  childhood        S/P TKR (Total Knee Replacement)  left      S/P Breast Lumpectomy  benign      S/P Cataract Surgery          SOCIAL HISTORY  Alcohol:  Tobacco:  Illicit substance use:    FAMILY HISTORY:    REVIEW OF SYSTEMS:  CONSTITUTIONAL: No fever, weight loss, or fatigue  EYES: No eye pain, visual disturbances, or discharge  ENMT:  No difficulty hearing, tinnitus, vertigo; No sinus or throat pain  NECK: No pain or stiffness  RESPIRATORY: No cough, wheezing, chills or hemoptysis; No shortness of breath  CARDIOVASCULAR: No chest pain, palpitations, dizziness, or leg swelling  GASTROINTESTINAL: No abdominal or epigastric pain. No nausea, vomiting, or hematemesis; No diarrhea or constipation. No melena or hematochezia.  GENITOURINARY: No dysuria, frequency, hematuria, or incontinence  NEUROLOGICAL: No headaches, memory loss, loss of strength, numbness, or tremors  SKIN: No itching, burning, rashes, or lesions   LYMPH NODES: No enlarged glands  ENDOCRINE: No heat or cold intolerance; No hair loss  MUSCULOSKELETAL: No joint pain or swelling; No muscle, back, or extremity pain  PSYCHIATRIC: No depression, anxiety, mood swings, or difficulty sleeping  HEME/LYMPH: No easy bruising, or bleeding gums  ALLERY AND IMMUNOLOGIC: No hives or eczema    RADIOLOGY & ADDITIONAL TESTS:    Imaging Personally Reviewed:  [ ] YES  [ ] NO    Consultant(s) Notes Reviewed:  [ ] YES  [ ] NO    PHYSICAL EXAM:  GENERAL: NAD, well-groomed, well-developed  HEAD:  Atraumatic, Normocephalic  NECK: Supple, No JVD, Normal thyroid  NERVOUS SYSTEM:  Alert & Oriented X3, Good concentration; Motor Strength 5/5 B/L upper and lower extremities; DTRs 2+ intact and symmetric  CHEST/LUNG: Clear to percussion bilaterally; No rales, rhonchi, wheezing, or rubs  HEART: Regular rate and rhythm; No murmurs, rubs, or gallops  ABDOMEN: Soft, Nontender, Nondistended; Bowel sounds present  EXTREMITIES:  2+ Peripheral Pulses, No clubbing, cyanosis, or edema  LYMPH: No lymphadenopathy noted  SKIN: No rashes or lesions                          10.4   6.19  )-----------( 281      ( 18 Apr 2024 07:30 )             33.0               137|107|61<78  4.0|16|1.51  9.0,--,--  04-18 @ 07:30

## 2024-04-18 NOTE — DISCHARGE NOTE PROVIDER - PROVIDER TOKENS
PROVIDER:[TOKEN:[[id number]],FOLLOWUP:[1 week],ESTABLISHEDPATIENT:[T]],PROVIDER:[TOKEN:[[id number]],FOLLOWUP:[1 week]] PROVIDER:[TOKEN:[[id number]],FOLLOWUP:[1 week],ESTABLISHEDPATIENT:[T]],PROVIDER:[TOKEN:[[id number]],FOLLOWUP:[1 week]],PROVIDER:[TOKEN:[[id number]],FOLLOWUP:[2 weeks],ESTABLISHEDPATIENT:[T]]

## 2024-04-19 LAB
ANION GAP SERPL CALC-SCNC: 12 MMOL/L — SIGNIFICANT CHANGE UP (ref 5–17)
BUN SERPL-MCNC: 50 MG/DL — HIGH (ref 7–23)
CALCIUM SERPL-MCNC: 9.1 MG/DL — SIGNIFICANT CHANGE UP (ref 8.4–10.5)
CHLORIDE SERPL-SCNC: 111 MMOL/L — HIGH (ref 96–108)
CO2 SERPL-SCNC: 18 MMOL/L — LOW (ref 22–31)
CREAT SERPL-MCNC: 1.23 MG/DL — SIGNIFICANT CHANGE UP (ref 0.5–1.3)
EGFR: 39 ML/MIN/1.73M2 — LOW
FOLATE SERPL-MCNC: >20 NG/ML — SIGNIFICANT CHANGE UP
GLUCOSE SERPL-MCNC: 81 MG/DL — SIGNIFICANT CHANGE UP (ref 70–99)
HCT VFR BLD CALC: 34.2 % — LOW (ref 34.5–45)
HGB BLD-MCNC: 10.6 G/DL — LOW (ref 11.5–15.5)
IRON SATN MFR SERPL: 21 UG/DL — LOW (ref 30–160)
LACTATE SERPL-SCNC: 0.9 MMOL/L — SIGNIFICANT CHANGE UP (ref 0.5–2)
MCHC RBC-ENTMCNC: 28.7 PG — SIGNIFICANT CHANGE UP (ref 27–34)
MCHC RBC-ENTMCNC: 31 GM/DL — LOW (ref 32–36)
MCV RBC AUTO: 92.7 FL — SIGNIFICANT CHANGE UP (ref 80–100)
NRBC # BLD: 0 /100 WBCS — SIGNIFICANT CHANGE UP (ref 0–0)
OB PNL STL: POSITIVE
PLATELET # BLD AUTO: 294 K/UL — SIGNIFICANT CHANGE UP (ref 150–400)
POTASSIUM SERPL-MCNC: 3.9 MMOL/L — SIGNIFICANT CHANGE UP (ref 3.5–5.3)
POTASSIUM SERPL-SCNC: 3.9 MMOL/L — SIGNIFICANT CHANGE UP (ref 3.5–5.3)
RBC # BLD: 3.69 M/UL — LOW (ref 3.8–5.2)
RBC # FLD: 17 % — HIGH (ref 10.3–14.5)
SARS-COV-2 RNA SPEC QL NAA+PROBE: SIGNIFICANT CHANGE UP
SODIUM SERPL-SCNC: 141 MMOL/L — SIGNIFICANT CHANGE UP (ref 135–145)
VIT B12 SERPL-MCNC: 1040 PG/ML — SIGNIFICANT CHANGE UP (ref 232–1245)
WBC # BLD: 7.15 K/UL — SIGNIFICANT CHANGE UP (ref 3.8–10.5)
WBC # FLD AUTO: 7.15 K/UL — SIGNIFICANT CHANGE UP (ref 3.8–10.5)

## 2024-04-19 RX ADMIN — ATORVASTATIN CALCIUM 10 MILLIGRAM(S): 80 TABLET, FILM COATED ORAL at 21:39

## 2024-04-19 RX ADMIN — APIXABAN 2.5 MILLIGRAM(S): 2.5 TABLET, FILM COATED ORAL at 13:05

## 2024-04-19 RX ADMIN — Medication 1 MILLIGRAM(S): at 13:05

## 2024-04-19 RX ADMIN — Medication 25 MILLIGRAM(S): at 05:45

## 2024-04-19 RX ADMIN — LATANOPROST 1 DROP(S): 0.05 SOLUTION/ DROPS OPHTHALMIC; TOPICAL at 21:39

## 2024-04-19 RX ADMIN — APIXABAN 2.5 MILLIGRAM(S): 2.5 TABLET, FILM COATED ORAL at 21:39

## 2024-04-19 RX ADMIN — Medication 125 MICROGRAM(S): at 05:45

## 2024-04-19 NOTE — BH CONSULTATION LIAISON PROGRESS NOTE - NSBHASSESSMENTFT_PSY_ALL_CORE
Delirium (UTI, heart failure, less likely antibiotic)  Rule out baseline MCI, dementia
Delirium (secondary to UTI, acute on chronic heart failure)  Rule out MCI or dementia at baseline
Delirium (UTI, heart failure, MARIELA)  Rule out dementia given STM loss

## 2024-04-19 NOTE — BH CONSULTATION LIAISON PROGRESS NOTE - NSBHFUPINTERVALHXFT_PSY_A_CORE
The pt. has not been agitated but still confused. Stools are Guiac positive but asymptomatic per NP. She is awaiting rehab placement. 
No agitation but remains disoriented. Off Rocephin. Awaiting rehab. 
No report of agitation per RN. Eats and sleeps well.

## 2024-04-19 NOTE — BH CONSULTATION LIAISON PROGRESS NOTE - NSBHCONSULTFOLLOWAFTERCARE_PSY_A_CORE FT
Refill was given on 10/20/23.
[facility] outpatient psychiatry ([phone])
At rehab, the psychiatrist there can follow the pt.. 
The psychiatrist at the rehab facility should follow the pt. while she is there.

## 2024-04-19 NOTE — BH CONSULTATION LIAISON PROGRESS NOTE - NSBHCHARTREVIEWLAB_PSY_A_CORE FT
CBC Full  -  ( 17 Apr 2024 07:11 )  WBC Count : 6.22 K/uL  Hemoglobin : 10.9 g/dL  Hematocrit : 34.2 %  Platelet Count - Automated : 264 K/uL  Mean Cell Volume : 90.0 fl  Mean Cell Hemoglobin : 28.7 pg  Mean Cell Hemoglobin Concentration : 31.9 gm/dL  Auto Neutrophil # : x  Auto Lymphocyte # : x  Auto Monocyte # : x  Auto Eosinophil # : x  Auto Basophil # : x  Auto Neutrophil % : x  Auto Lymphocyte % : x  Auto Monocyte % : x  Auto Eosinophil % : x  Auto Basophil % : x    04-17    134<L>  |  104  |  64<H>  ----------------------------<  77  4.0   |  18<L>  |  1.74<H>    Ca    8.9      17 Apr 2024 07:12    
CBC Full  -  ( 16 Apr 2024 07:02 )  WBC Count : 6.92 K/uL  Hemoglobin : 10.5 g/dL  Hematocrit : 33.5 %  Platelet Count - Automated : 217 K/uL  Mean Cell Volume : 90.8 fl  Mean Cell Hemoglobin : 28.5 pg  Mean Cell Hemoglobin Concentration : 31.3 gm/dL  Auto Neutrophil # : x  Auto Lymphocyte # : x  Auto Monocyte # : x  Auto Eosinophil # : x  Auto Basophil # : x  Auto Neutrophil % : x  Auto Lymphocyte % : x  Auto Monocyte % : x  Auto Eosinophil % : x  Auto Basophil % : x    04-16    133<L>  |  100  |  53<H>  ----------------------------<  66<L>  4.1   |  19<L>  |  1.74<H>    Ca    8.6      16 Apr 2024 07:04    
CBC Full  -  ( 19 Apr 2024 07:15 )  WBC Count : 7.15 K/uL  Hemoglobin : 10.6 g/dL  Hematocrit : 34.2 %  Platelet Count - Automated : 294 K/uL  Mean Cell Volume : 92.7 fl  Mean Cell Hemoglobin : 28.7 pg  Mean Cell Hemoglobin Concentration : 31.0 gm/dL  Auto Neutrophil # : x  Auto Lymphocyte # : x  Auto Monocyte # : x  Auto Eosinophil # : x  Auto Basophil # : x  Auto Neutrophil % : x  Auto Lymphocyte % : x  Auto Monocyte % : x  Auto Eosinophil % : x  Auto Basophil % : x    04-19    141  |  111<H>  |  50<H>  ----------------------------<  81  3.9   |  18<L>  |  1.23    Ca    9.1      19 Apr 2024 07:17    Folate, Serum: >20.0 ng/mL (04.19.24 @ 07:16)

## 2024-04-19 NOTE — BH CONSULTATION LIAISON PROGRESS NOTE - NSBHCONSULTMEDSEVERE_PSY_A_CORE FT
Haldol 0.25mg IV q12 h prn severe agitation (hold if QTc is 500ms or greater)
Haldol 1mg IV q8h prn (hold if QTc is 500ms or greater)
Haldol 1mg IV q8h prn (hold if QTc is 500ms or greater)

## 2024-04-19 NOTE — BH CONSULTATION LIAISON PROGRESS NOTE - NSBHTIMEACTIVITIESPERFORMED_PSY_A_CORE
Reviewed the chart, examined the pt., discussed with NP and Dr. Mays. 
Reviewed the chart, examined the pt., and discussed with DORA Denis. 
Reviewed the chart, examined the pt., discussed with RN and NP.

## 2024-04-19 NOTE — BH CONSULTATION LIAISON PROGRESS NOTE - NSBHCHARTREVIEWVS_PSY_A_CORE FT
Vital Signs Last 24 Hrs  T(C): 36.9 (19 Apr 2024 13:13), Max: 37.1 (18 Apr 2024 21:04)  T(F): 98.5 (19 Apr 2024 13:13), Max: 98.7 (18 Apr 2024 21:04)  HR: 64 (19 Apr 2024 13:13) (64 - 77)  BP: 141/67 (19 Apr 2024 13:13) (141/67 - 159/76)  BP(mean): --  RR: 18 (19 Apr 2024 13:13) (17 - 18)  SpO2: 95% (19 Apr 2024 13:13) (95% - 99%)    Parameters below as of 19 Apr 2024 13:13  Patient On (Oxygen Delivery Method): room air    
Vital Signs Last 24 Hrs  T(C): 36.6 (17 Apr 2024 12:29), Max: 36.6 (16 Apr 2024 21:20)  T(F): 97.8 (17 Apr 2024 12:29), Max: 97.9 (16 Apr 2024 21:20)  HR: 82 (17 Apr 2024 12:29) (73 - 82)  BP: 151/68 (17 Apr 2024 12:29) (139/68 - 152/85)  BP(mean): --  RR: 18 (17 Apr 2024 12:29) (18 - 18)  SpO2: 92% (17 Apr 2024 12:29) (92% - 95%)    Parameters below as of 17 Apr 2024 12:29  Patient On (Oxygen Delivery Method): room air    
Vital Signs Last 24 Hrs  T(C): 36.5 (16 Apr 2024 12:17), Max: 36.8 (15 Apr 2024 22:06)  T(F): 97.7 (16 Apr 2024 12:17), Max: 98.2 (15 Apr 2024 22:06)  HR: 97 (16 Apr 2024 12:17) (70 - 97)  BP: 143/84 (16 Apr 2024 12:17) (127/61 - 146/59)  BP(mean): --  RR: 18 (16 Apr 2024 12:17) (18 - 18)  SpO2: 96% (16 Apr 2024 12:17) (93% - 96%)    Parameters below as of 16 Apr 2024 12:17  Patient On (Oxygen Delivery Method): room air

## 2024-04-19 NOTE — BH CONSULTATION LIAISON PROGRESS NOTE - NSBHCONSULTRECOMMENDOTHER_PSY_A_CORE FT
No antidepressant medicines given her delirium and blood in stool (risk of GI bleed) No antidepressant medicines given her delirium and blood in stool (risk of GI bleed)  Neurology evaluation as outpatient for dementia workup once the delirium clears

## 2024-04-19 NOTE — BH CONSULTATION LIAISON PROGRESS NOTE - CURRENT MEDICATION
MEDICATIONS  (STANDING):  apixaban 2.5 milliGRAM(s) Oral every 12 hours  atorvastatin 10 milliGRAM(s) Oral at bedtime  folic acid 1 milliGRAM(s) Oral daily  latanoprost 0.005% Ophthalmic Solution 1 Drop(s) Both EYES at bedtime  levothyroxine 125 MICROGram(s) Oral daily  metoprolol succinate ER 25 milliGRAM(s) Oral daily    MEDICATIONS  (PRN):  melatonin 3 milliGRAM(s) Oral at bedtime PRN Insomnia  
MEDICATIONS  (STANDING):  apixaban 2.5 milliGRAM(s) Oral every 12 hours  atorvastatin 10 milliGRAM(s) Oral at bedtime  cefTRIAXone   IVPB 1000 milliGRAM(s) IV Intermittent every 24 hours  folic acid 1 milliGRAM(s) Oral daily  latanoprost 0.005% Ophthalmic Solution 1 Drop(s) Both EYES at bedtime  levothyroxine 125 MICROGram(s) Oral daily  metoprolol succinate ER 25 milliGRAM(s) Oral daily  sodium chloride 0.9%. 1000 milliLiter(s) (50 mL/Hr) IV Continuous <Continuous>    MEDICATIONS  (PRN):  melatonin 3 milliGRAM(s) Oral at bedtime PRN Insomnia  
MEDICATIONS  (STANDING):  apixaban 2.5 milliGRAM(s) Oral every 12 hours  atorvastatin 10 milliGRAM(s) Oral at bedtime  folic acid 1 milliGRAM(s) Oral daily  latanoprost 0.005% Ophthalmic Solution 1 Drop(s) Both EYES at bedtime  levothyroxine 125 MICROGram(s) Oral daily  metoprolol succinate ER 25 milliGRAM(s) Oral daily  sodium chloride 0.9%. 1000 milliLiter(s) (50 mL/Hr) IV Continuous <Continuous>    MEDICATIONS  (PRN):  melatonin 3 milliGRAM(s) Oral at bedtime PRN Insomnia

## 2024-04-19 NOTE — BH CONSULTATION LIAISON PROGRESS NOTE - NSBHATTESTBILLING_PSY_A_CORE
53770-Dokibjpfww OBS or IP - moderate complexity OR 35-49 mins
66629-Hziycjlqae OBS or IP - moderate complexity OR 35-49 mins
76743-Zlfbqksioj OBS or IP - moderate complexity OR 35-49 mins

## 2024-04-19 NOTE — PROGRESS NOTE ADULT - SUBJECTIVE AND OBJECTIVE BOX
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" x past 1.5 yrs- last dose 4 months ago      MDS (Myelodysplastic Syndrome)      Skin Cancer  of face , basal cell   4 yrs ago      History of Total Knee Replacement  L 1998      Bilateral Cataracts  69 y/o      S/P Breast Lumpectomy  10 yrs ago      Cystocele  47 yrs ago      Rectocele  47 yrs ago      Carpal Tunnel Syndrome  10 yrs ago      Basal Cell Carcinoma of Face  4 yrs ago      S/P T&A  childhood        S/P TKR (Total Knee Replacement)  left      S/P Breast Lumpectomy  benign      S/P Cataract Surgery                                    MEDICATIONS  (STANDING):  cefTRIAXone   IVPB 1000 milliGRAM(s) IV Intermittent every 24 hours      Allergies    No Known Allergies    Intolerances        SOCIAL HISTORY:  Denies ETOh,Smoking,     FAMILY HISTORY:      REVIEW OF SYSTEMS:    CONSTITUTIONAL: No weakness, fevers or chills  RESPIRATORY: No cough, wheezing, hemoptysis; pos  shortness of breath  CARDIOVASCULAR: No chest pain or palpitations  GASTROINTESTINAL: No abdominal or epigastric pain. No nausea, vomiting,                                                                10.6   7.15  )-----------( 294      ( 19 Apr 2024 07:15 )             34.2       CBC Full  -  ( 19 Apr 2024 07:15 )  WBC Count : 7.15 K/uL  RBC Count : 3.69 M/uL  Hemoglobin : 10.6 g/dL  Hematocrit : 34.2 %  Platelet Count - Automated : 294 K/uL  Mean Cell Volume : 92.7 fl  Mean Cell Hemoglobin : 28.7 pg  Mean Cell Hemoglobin Concentration : 31.0 gm/dL  Auto Neutrophil # : x  Auto Lymphocyte # : x  Auto Monocyte # : x  Auto Eosinophil # : x  Auto Basophil # : x  Auto Neutrophil % : x  Auto Lymphocyte % : x  Auto Monocyte % : x  Auto Eosinophil % : x  Auto Basophil % : x      04-19    141  |  111<H>  |  50<H>  ----------------------------<  81  3.9   |  18<L>  |  1.23    Ca    9.1      19 Apr 2024 07:17        CAPILLARY BLOOD GLUCOSE          Vital Signs Last 24 Hrs  T(C): 36.9 (19 Apr 2024 13:13), Max: 37.1 (18 Apr 2024 21:04)  T(F): 98.5 (19 Apr 2024 13:13), Max: 98.7 (18 Apr 2024 21:04)  HR: 64 (19 Apr 2024 13:13) (64 - 77)  BP: 141/67 (19 Apr 2024 13:13) (141/67 - 159/76)  BP(mean): --  RR: 18 (19 Apr 2024 13:13) (17 - 18)  SpO2: 95% (19 Apr 2024 13:13) (95% - 99%)    Parameters below as of 19 Apr 2024 13:13  Patient On (Oxygen Delivery Method): room air        Urinalysis Basic - ( 19 Apr 2024 07:17 )    Color: x / Appearance: x / SG: x / pH: x  Gluc: 81 mg/dL / Ketone: x  / Bili: x / Urobili: x   Blood: x / Protein: x / Nitrite: x   Leuk Esterase: x / RBC: x / WBC x   Sq Epi: x / Non Sq Epi: x / Bacteria: x                    PHYSICAL EXAM:    Constitutional: NAD  HEENT: conjunctive   clear   Neck:  No JVD  Respiratory: CTAB  Cardiovascular: S1 and S2  Gastrointestinal: BS+, soft, NT/ND  Extremities: pos  peripheral edema  Neurological: , no focal deficits  Psychiatric: Normal mood, normal affect  : No Beckwith  Skin: dry   Access: Not applicable

## 2024-04-19 NOTE — PROGRESS NOTE ADULT - SUBJECTIVE AND OBJECTIVE BOX
C A R D I O L O G Y  **********************************     DATE OF SERVICE: 04-19-24    Patient reports R knee pain improving. denies chest pain or shortness of breath.   Review of symptoms otherwise negative.    MEDICATIONS:  apixaban 2.5 milliGRAM(s) Oral every 12 hours  atorvastatin 10 milliGRAM(s) Oral at bedtime  folic acid 1 milliGRAM(s) Oral daily  latanoprost 0.005% Ophthalmic Solution 1 Drop(s) Both EYES at bedtime  levothyroxine 125 MICROGram(s) Oral daily  melatonin 3 milliGRAM(s) Oral at bedtime PRN  metoprolol succinate ER 25 milliGRAM(s) Oral daily      LABS:                        10.6   7.15  )-----------( 294      ( 19 Apr 2024 07:15 )             34.2       Hemoglobin: 10.6 g/dL (04-19 @ 07:15)  Hemoglobin: 10.4 g/dL (04-18 @ 07:30)  Hemoglobin: 10.9 g/dL (04-17 @ 07:11)  Hemoglobin: 10.5 g/dL (04-16 @ 07:02)      04-19    141  |  111<H>  |  50<H>  ----------------------------<  81  3.9   |  18<L>  |  1.23    Ca    9.1      19 Apr 2024 07:17      Creatinine Trend: 1.23<--, 1.51<--, 1.74<--, 1.74<--, 1.49<--, 1.48<--    COAGS:           PHYSICAL EXAM:  T(C): 36.9 (04-19-24 @ 13:13), Max: 37.1 (04-18-24 @ 21:04)  HR: 64 (04-19-24 @ 13:13) (64 - 77)  BP: 141/67 (04-19-24 @ 13:13) (141/67 - 159/76)  RR: 18 (04-19-24 @ 13:13) (17 - 18)  SpO2: 95% (04-19-24 @ 13:13) (95% - 99%)  Wt(kg): --    I&O's Summary    18 Apr 2024 07:01  -  19 Apr 2024 07:00  --------------------------------------------------------  IN: 240 mL / OUT: 850 mL / NET: -610 mL    19 Apr 2024 07:01  -  19 Apr 2024 16:27  --------------------------------------------------------  IN: 480 mL / OUT: 600 mL / NET: -120 mL        Gen: NAD  HEENT:  (-)icterus (-)pallor  CV: N S1 S2 1/6 LUDMILA (+)2 Pulses B/l  Resp:  Clear to auscultation B/L, normal effort  GI: (+) BS Soft, NT, ND  Lymph:  (-)Edema, (-)obvious lymphadenopathy  Skin: Warm to touch, Normal turgor  Psych: Appropriate mood and affect      TELEMETRY: None	    ECG: Afib, RBBB - unchanged     ASSESSMENT/PLAN: Patient is a 89 y/o female well known to our office with PMH of persistent AF (since 2019), hypertension, hyperlipidemia, CAD s/p remote PCI, MDS, HFpEF, and severe MR refused MitraClip in past who presented s/p fall found to have UTI and mild acute on chronic HFpEF exacerbation. Cardiology consulted for further evaluation.     #s/p fall  - Denies LOC/Syncope  - CT Head with no hemorrhage  - Tele was stable in ED, AF with no events no longer on tele  - s/p Abx for UTI per med  - L shoulder xray with no fracture or dislocation. CT L shoulder with severe arthrosis w/large effusion. CT R knee with no fracture. Management per med/ortho.    #Acute on Chronic HFpEF Exacerbation  - Volume status improved s/p IV lasix  - Hold home PO maintenance lasix 20mg daily given MARIELA. Renal f/u noted - trend cr, improving  - Patient with known severe MR and at least mod AS but has refused further intervention and repeat echos in office therefore no repeat cardiac testing planned at this time    #Persistent Afib  - Continue Eliquis 2.5mg BID if no contraindications    - No further inpatient cardiac w/u planned  - Patient has f/u with Dr. Carbajal on 8/2 at 1:30 PM     Ivan Montero PA-C  Pager: [phone]

## 2024-04-19 NOTE — BH CONSULTATION LIAISON PROGRESS NOTE - MSE UNSTRUCTURED FT
Elderly WF in bed. Awake, alert, but oriented x 2 ("August" is the month. "1940" is the year). I and J impaired. Speech is coherent. No hallucinations or delusions. She denied any suicidal ideation or plan. No homicidal ideation or plan. Mood is dysphoric and affect constricted. Attention and concentration and LTM fair but impaired Short term memory (she cannot recall the month/year despite my reorienting her yesterday). 
Elderly WF in bed. Awake, alert, but oriented x 1 (year is "40", cannot state place or month). I and J impaired. No psychomotor abnormalities. Speech is tangential at times. No hallucinations or delusions. No suicidal or homicidal ideation or plan. Mood is frustrated and affect constricted and irritable. Poor attention and concentration and poor short and long term memories. 
Elderly WF in bed. Calm, alert, but oriented x 1-2 (not to place and year). I and J impaired. No psychomotor abnormalities. Speech is tangential at times (e.g. when I asked her how she is feeling she responded "I'm learning how to do this" and could not explain what she meant). No hallucinations or delusions. No suicidal or homicidal ideation or plan when asked. Mood is euthymic and affect constricted. Attention and concentration are fair but impaired short and long term memories.

## 2024-04-19 NOTE — PROGRESS NOTE ADULT - SUBJECTIVE AND OBJECTIVE BOX
Patient is a 89y old  Female who presents with a chief complaint of s/p Fall (16 Apr 2024 13:37)      INTERVAL HPI/OVERNIGHT EVENTS: seen and examined ,    T(C): 37.1 (04-19-24 @ 20:44), Max: 37.1 (04-19-24 @ 20:44)  HR: 71 (04-19-24 @ 20:44) (64 - 71)  BP: 138/87 (04-19-24 @ 20:44) (138/87 - 141/67)  RR: 18 (04-19-24 @ 20:44) (18 - 18)  SpO2: 96% (04-19-24 @ 20:44) (95% - 96%)        MEDICATIONS  (STANDING):  apixaban 2.5 milliGRAM(s) Oral every 12 hours  atorvastatin 10 milliGRAM(s) Oral at bedtime  folic acid 1 milliGRAM(s) Oral daily  latanoprost 0.005% Ophthalmic Solution 1 Drop(s) Both EYES at bedtime  levothyroxine 125 MICROGram(s) Oral daily  metoprolol succinate ER 25 milliGRAM(s) Oral daily    MEDICATIONS  (PRN):  melatonin 3 milliGRAM(s) Oral at bedtime PRN Insomnia              Chronic Osteoarthritis      Hypothyroidism      HTN (Hypertension)      Hypercholesterolemia      Chronic Glaucoma  R eye      GERD (Gastroesophageal Reflux Disease)      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" x past 1.5 yrs- last dose 4 months ago      MDS (Myelodysplastic Syndrome)      Skin Cancer  of face , basal cell   4 yrs ago      History of Total Knee Replacement  L 1998      Bilateral Cataracts  71 y/o      S/P Breast Lumpectomy  10 yrs ago      Cystocele  47 yrs ago      Rectocele  47 yrs ago      Carpal Tunnel Syndrome  10 yrs ago      Basal Cell Carcinoma of Face  4 yrs ago      S/P T&A  childhood        S/P TKR (Total Knee Replacement)  left      S/P Breast Lumpectomy  benign      S/P Cataract Surgery          SOCIAL HISTORY  Alcohol:  Tobacco:  Illicit substance use:    FAMILY HISTORY:    REVIEW OF SYSTEMS:  CONSTITUTIONAL: No fever, weight loss, or fatigue  EYES: No eye pain, visual disturbances, or discharge  ENMT:  No difficulty hearing, tinnitus, vertigo; No sinus or throat pain  NECK: No pain or stiffness  RESPIRATORY: No cough, wheezing, chills or hemoptysis; No shortness of breath  CARDIOVASCULAR: No chest pain, palpitations, dizziness, or leg swelling  GASTROINTESTINAL: No abdominal or epigastric pain. No nausea, vomiting, or hematemesis; No diarrhea or constipation. No melena or hematochezia.  GENITOURINARY: No dysuria, frequency, hematuria, or incontinence  NEUROLOGICAL: No headaches, memory loss, loss of strength, numbness, or tremors  SKIN: No itching, burning, rashes, or lesions   LYMPH NODES: No enlarged glands  ENDOCRINE: No heat or cold intolerance; No hair loss  MUSCULOSKELETAL: No joint pain or swelling; No muscle, back, or extremity pain  PSYCHIATRIC: No depression, anxiety, mood swings, or difficulty sleeping  HEME/LYMPH: No easy bruising, or bleeding gums  ALLERY AND IMMUNOLOGIC: No hives or eczema    RADIOLOGY & ADDITIONAL TESTS:    Imaging Personally Reviewed:  [ ] YES  [ ] NO    Consultant(s) Notes Reviewed:  [ ] YES  [ ] NO    PHYSICAL EXAM:  GENERAL: NAD, well-groomed, well-developed  HEAD:  Atraumatic, Normocephalic  NECK: Supple, No JVD, Normal thyroid  NERVOUS SYSTEM:  Alert & Oriented X3, Good concentration; Motor Strength 5/5 B/L upper and lower extremities; DTRs 2+ intact and symmetric  CHEST/LUNG: Clear to percussion bilaterally; No rales, rhonchi, wheezing, or rubs  HEART: Regular rate and rhythm; No murmurs, rubs, or gallops  ABDOMEN: Soft, Nontender, Nondistended; Bowel sounds present  EXTREMITIES:  2+ Peripheral Pulses, No clubbing, cyanosis, or edema  LYMPH: No lymphadenopathy noted  SKIN: No rashes or lesions                                            10.6   7.15  )-----------( 294      ( 19 Apr 2024 07:15 )             34.2               141|111|50<81  3.9|18|1.23  9.1,--,--  04-19 @ 07:17

## 2024-04-20 LAB
HCT VFR BLD CALC: 32.3 % — LOW (ref 34.5–45)
HGB BLD-MCNC: 10 G/DL — LOW (ref 11.5–15.5)
MCHC RBC-ENTMCNC: 28.6 PG — SIGNIFICANT CHANGE UP (ref 27–34)
MCHC RBC-ENTMCNC: 31 GM/DL — LOW (ref 32–36)
MCV RBC AUTO: 92.3 FL — SIGNIFICANT CHANGE UP (ref 80–100)
NRBC # BLD: 0 /100 WBCS — SIGNIFICANT CHANGE UP (ref 0–0)
PLATELET # BLD AUTO: 338 K/UL — SIGNIFICANT CHANGE UP (ref 150–400)
RBC # BLD: 3.5 M/UL — LOW (ref 3.8–5.2)
RBC # FLD: 17.2 % — HIGH (ref 10.3–14.5)
TSH SERPL-MCNC: 11.1 UIU/ML — HIGH (ref 0.27–4.2)
WBC # BLD: 9.61 K/UL — SIGNIFICANT CHANGE UP (ref 3.8–10.5)
WBC # FLD AUTO: 9.61 K/UL — SIGNIFICANT CHANGE UP (ref 3.8–10.5)

## 2024-04-20 RX ADMIN — APIXABAN 2.5 MILLIGRAM(S): 2.5 TABLET, FILM COATED ORAL at 11:44

## 2024-04-20 RX ADMIN — Medication 125 MICROGRAM(S): at 05:42

## 2024-04-20 RX ADMIN — Medication 25 MILLIGRAM(S): at 05:42

## 2024-04-20 RX ADMIN — APIXABAN 2.5 MILLIGRAM(S): 2.5 TABLET, FILM COATED ORAL at 21:05

## 2024-04-20 RX ADMIN — LATANOPROST 1 DROP(S): 0.05 SOLUTION/ DROPS OPHTHALMIC; TOPICAL at 21:06

## 2024-04-20 RX ADMIN — ATORVASTATIN CALCIUM 10 MILLIGRAM(S): 80 TABLET, FILM COATED ORAL at 21:05

## 2024-04-20 RX ADMIN — Medication 1 MILLIGRAM(S): at 11:44

## 2024-04-20 NOTE — PROGRESS NOTE ADULT - SUBJECTIVE AND OBJECTIVE BOX
C A R D I O L O G Y  **********************************     DATE OF SERVICE: 04-20-24      no chest pain        apixaban 2.5 milliGRAM(s) Oral every 12 hours  atorvastatin 10 milliGRAM(s) Oral at bedtime  folic acid 1 milliGRAM(s) Oral daily  latanoprost 0.005% Ophthalmic Solution 1 Drop(s) Both EYES at bedtime  levothyroxine 125 MICROGram(s) Oral daily  melatonin 3 milliGRAM(s) Oral at bedtime PRN  metoprolol succinate ER 25 milliGRAM(s) Oral daily                            10.6   7.15  )-----------( 294      ( 19 Apr 2024 07:15 )             34.2       Hemoglobin: 10.6 g/dL (04-19 @ 07:15)  Hemoglobin: 10.4 g/dL (04-18 @ 07:30)  Hemoglobin: 10.9 g/dL (04-17 @ 07:11)  Hemoglobin: 10.5 g/dL (04-16 @ 07:02)      04-19    141  |  111<H>  |  50<H>  ----------------------------<  81  3.9   |  18<L>  |  1.23    Ca    9.1      19 Apr 2024 07:17      Creatinine Trend: 1.23<--, 1.51<--, 1.74<--, 1.74<--, 1.49<--, 1.48<--    COAGS:           T(C): 37.6 (04-20-24 @ 04:56), Max: 37.6 (04-20-24 @ 04:56)  HR: 77 (04-20-24 @ 04:56) (64 - 77)  BP: 146/85 (04-20-24 @ 04:56) (138/87 - 146/85)  RR: 18 (04-20-24 @ 04:56) (18 - 18)  SpO2: 95% (04-20-24 @ 04:56) (95% - 96%)  Wt(kg): --    I&O's Summary    19 Apr 2024 07:01  -  20 Apr 2024 07:00  --------------------------------------------------------  IN: 540 mL / OUT: 750 mL / NET: -210 mL        Gen: NAD  HEENT:  (-)icterus (-)pallor  CV: N S1 S2 1/6 LUDMILA (+)2 Pulses B/l  Resp:  Clear to auscultation B/L, normal effort  GI: (+) BS Soft, NT, ND  Lymph:  (-)Edema, (-)obvious lymphadenopathy  Skin: Warm to touch, Normal turgor  Psych: Appropriate mood and affect      TELEMETRY: None	    ECG: Afib, RBBB - unchanged     ASSESSMENT/PLAN: Patient is a 89 y/o female well known to our office with PMH of persistent AF (since 2019), hypertension, hyperlipidemia, CAD s/p remote PCI, MDS, HFpEF, and severe MR refused MitraClip in past who presented s/p fall found to have UTI and mild acute on chronic HFpEF exacerbation. Cardiology consulted for further evaluation.     #s/p fall  - Denies LOC/Syncope  - CT Head with no hemorrhage  - Tele was stable in ED, AF with no events no longer on tele  - s/p Abx for UTI per med  - L shoulder xray with no fracture or dislocation. CT L shoulder with severe arthrosis w/large effusion. CT R knee with no fracture. Management per med/ortho.    #Acute on Chronic HFpEF Exacerbation  - Volume status improved s/p IV lasix  - Hold home PO maintenance lasix 20mg daily given MARIELA. Renal f/u noted - trend cr, improving  - Patient with known severe MR and at least mod AS but has refused further intervention and repeat echos in office therefore no repeat cardiac testing planned at this time    #Persistent Afib  - Continue Eliquis 2.5mg BID if no contraindications    - No further inpatient cardiac w/u planned  - Patient has f/u with Dr. Carbajal on 8/2 at 1:30 PM

## 2024-04-20 NOTE — PROGRESS NOTE ADULT - SUBJECTIVE AND OBJECTIVE BOX
Patient is a 89y old  Female who presents with a chief complaint of s/p Fall (16 Apr 2024 13:37)      INTERVAL HPI/OVERNIGHT EVENTS: seen and examined ,    T(C): 36.6 (04-20-24 @ 21:41), Max: 36.6 (04-20-24 @ 12:00)  HR: 83 (04-20-24 @ 21:41) (83 - 83)  BP: 130/77 (04-20-24 @ 21:41) (130/77 - 157/71)  RR: 18 (04-20-24 @ 21:41) (18 - 18)  SpO2: 94% (04-20-24 @ 21:41) (94% - 94%)        MEDICATIONS  (STANDING):  apixaban 2.5 milliGRAM(s) Oral every 12 hours  atorvastatin 10 milliGRAM(s) Oral at bedtime  folic acid 1 milliGRAM(s) Oral daily  latanoprost 0.005% Ophthalmic Solution 1 Drop(s) Both EYES at bedtime  levothyroxine 125 MICROGram(s) Oral daily  metoprolol succinate ER 25 milliGRAM(s) Oral daily    MEDICATIONS  (PRN):  melatonin 3 milliGRAM(s) Oral at bedtime PRN Insomnia          Chronic Osteoarthritis      Hypothyroidism      HTN (Hypertension)      Hypercholesterolemia      Chronic Glaucoma  R eye      GERD (Gastroesophageal Reflux Disease)      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" x past 1.5 yrs- last dose 4 months ago      MDS (Myelodysplastic Syndrome)      Skin Cancer  of face , basal cell   4 yrs ago      History of Total Knee Replacement  L 1998      Bilateral Cataracts  71 y/o      S/P Breast Lumpectomy  10 yrs ago      Cystocele  47 yrs ago      Rectocele  47 yrs ago      Carpal Tunnel Syndrome  10 yrs ago      Basal Cell Carcinoma of Face  4 yrs ago      S/P T&A  childhood        S/P TKR (Total Knee Replacement)  left      S/P Breast Lumpectomy  benign      S/P Cataract Surgery          SOCIAL HISTORY  Alcohol:  Tobacco:  Illicit substance use:    FAMILY HISTORY:    REVIEW OF SYSTEMS:  CONSTITUTIONAL: No fever, weight loss, or fatigue  EYES: No eye pain, visual disturbances, or discharge  ENMT:  No difficulty hearing, tinnitus, vertigo; No sinus or throat pain  NECK: No pain or stiffness  RESPIRATORY: No cough, wheezing, chills or hemoptysis; No shortness of breath  CARDIOVASCULAR: No chest pain, palpitations, dizziness, or leg swelling  GASTROINTESTINAL: No abdominal or epigastric pain. No nausea, vomiting, or hematemesis; No diarrhea or constipation. No melena or hematochezia.  GENITOURINARY: No dysuria, frequency, hematuria, or incontinence  NEUROLOGICAL: No headaches, memory loss, loss of strength, numbness, or tremors  SKIN: No itching, burning, rashes, or lesions   LYMPH NODES: No enlarged glands  ENDOCRINE: No heat or cold intolerance; No hair loss  MUSCULOSKELETAL: No joint pain or swelling; No muscle, back, or extremity pain  PSYCHIATRIC: No depression, anxiety, mood swings, or difficulty sleeping  HEME/LYMPH: No easy bruising, or bleeding gums  ALLERY AND IMMUNOLOGIC: No hives or eczema    RADIOLOGY & ADDITIONAL TESTS:    Imaging Personally Reviewed:  [ ] YES  [ ] NO    Consultant(s) Notes Reviewed:  [ ] YES  [ ] NO    PHYSICAL EXAM:  GENERAL: NAD, well-groomed, well-developed  HEAD:  Atraumatic, Normocephalic  NECK: Supple, No JVD, Normal thyroid  NERVOUS SYSTEM:  Alert & Oriented X3, Good concentration; Motor Strength 5/5 B/L upper and lower extremities; DTRs 2+ intact and symmetric  CHEST/LUNG: Clear to percussion bilaterally; No rales, rhonchi, wheezing, or rubs  HEART: Regular rate and rhythm; No murmurs, rubs, or gallops  ABDOMEN: Soft, Nontender, Nondistended; Bowel sounds present  EXTREMITIES:  2+ Peripheral Pulses, No clubbing, cyanosis, or edema  LYMPH: No lymphadenopathy noted  SKIN: No rashes or lesions                                            10.6   7.15  )-----------( 294      ( 19 Apr 2024 07:15 )             34.2               141|111|50<81  3.9|18|1.23  9.1,--,--  04-19 @ 07:17

## 2024-04-20 NOTE — PROGRESS NOTE ADULT - SUBJECTIVE AND OBJECTIVE BOX
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" x past 1.5 yrs- last dose 4 months ago      MDS (Myelodysplastic Syndrome)      Skin Cancer  of face , basal cell   4 yrs ago      History of Total Knee Replacement  L 1998      Bilateral Cataracts  69 y/o      S/P Breast Lumpectomy  10 yrs ago      Cystocele  47 yrs ago      Rectocele  47 yrs ago      Carpal Tunnel Syndrome  10 yrs ago      Basal Cell Carcinoma of Face  4 yrs ago      S/P T&A  childhood        S/P TKR (Total Knee Replacement)  left      S/P Breast Lumpectomy  benign      S/P Cataract Surgery                                    MEDICATIONS  (STANDING):  cefTRIAXone   IVPB 1000 milliGRAM(s) IV Intermittent every 24 hours      Allergies    No Known Allergies    Intolerances        SOCIAL HISTORY:  Denies ETOh,Smoking,     FAMILY HISTORY:      REVIEW OF SYSTEMS:    CONSTITUTIONAL: No weakness, fevers or chills  RESPIRATORY: No cough, wheezing, hemoptysis; pos  shortness of breath  CARDIOVASCULAR: No chest pain or palpitations  GASTROINTESTINAL: No abdominal or epigastric pain. No nausea, vomiting,                                                                               10.0   9.61  )-----------( 338      ( 20 Apr 2024 11:07 )             32.3       CBC Full  -  ( 20 Apr 2024 11:07 )  WBC Count : 9.61 K/uL  RBC Count : 3.50 M/uL  Hemoglobin : 10.0 g/dL  Hematocrit : 32.3 %  Platelet Count - Automated : 338 K/uL  Mean Cell Volume : 92.3 fl  Mean Cell Hemoglobin : 28.6 pg  Mean Cell Hemoglobin Concentration : 31.0 gm/dL  Auto Neutrophil # : x  Auto Lymphocyte # : x  Auto Monocyte # : x  Auto Eosinophil # : x  Auto Basophil # : x  Auto Neutrophil % : x  Auto Lymphocyte % : x  Auto Monocyte % : x  Auto Eosinophil % : x  Auto Basophil % : x      04-19    141  |  111<H>  |  50<H>  ----------------------------<  81  3.9   |  18<L>  |  1.23    Ca    9.1      19 Apr 2024 07:17        CAPILLARY BLOOD GLUCOSE          Vital Signs Last 24 Hrs  T(C): 36.6 (20 Apr 2024 12:00), Max: 37.6 (20 Apr 2024 04:56)  T(F): 97.8 (20 Apr 2024 12:00), Max: 99.7 (20 Apr 2024 04:56)  HR: 83 (20 Apr 2024 12:00) (71 - 83)  BP: 157/71 (20 Apr 2024 12:00) (138/87 - 157/71)  BP(mean): --  RR: 18 (20 Apr 2024 12:00) (18 - 18)  SpO2: 94% (20 Apr 2024 12:00) (94% - 96%)    Parameters below as of 20 Apr 2024 12:00  Patient On (Oxygen Delivery Method): room air        Urinalysis Basic - ( 19 Apr 2024 07:17 )    Color: x / Appearance: x / SG: x / pH: x  Gluc: 81 mg/dL / Ketone: x  / Bili: x / Urobili: x   Blood: x / Protein: x / Nitrite: x   Leuk Esterase: x / RBC: x / WBC x   Sq Epi: x / Non Sq Epi: x / Bacteria: x                PHYSICAL EXAM:    Constitutional: NAD  HEENT: conjunctive   clear   Neck:  No JVD  Respiratory: CTAB  Cardiovascular: S1 and S2  Gastrointestinal: BS+, soft, NT/ND  Extremities: pos  peripheral edema  Neurological: , no focal deficits  Psychiatric: Normal mood, normal affect  : No Beckwith  Skin: dry   Access: Not applicable

## 2024-04-20 NOTE — PROGRESS NOTE ADULT - NUTRITIONAL ASSESSMENT
MEDICATIONS  (STANDING):  apixaban 2.5 milliGRAM(s) Oral every 12 hours  atorvastatin 10 milliGRAM(s) Oral at bedtime  cefTRIAXone   IVPB 1000 milliGRAM(s) IV Intermittent every 24 hours  folic acid 1 milliGRAM(s) Oral daily  latanoprost 0.005% Ophthalmic Solution 1 Drop(s) Both EYES at bedtime  levothyroxine 125 MICROGram(s) Oral daily  melatonin 3 milliGRAM(s) Oral at bedtime  metoprolol succinate ER 25 milliGRAM(s) Oral daily  sodium chloride 0.9%. 1000 milliLiter(s) (50 mL/Hr) IV Continuous <Continuous>
MEDICATIONS  (STANDING):  apixaban 2.5 milliGRAM(s) Oral every 12 hours  atorvastatin 10 milliGRAM(s) Oral at bedtime  folic acid 1 milliGRAM(s) Oral daily  latanoprost 0.005% Ophthalmic Solution 1 Drop(s) Both EYES at bedtime  levothyroxine 125 MICROGram(s) Oral daily  metoprolol succinate ER 25 milliGRAM(s) Oral daily
MEDICATIONS  (STANDING):  atorvastatin 10 milliGRAM(s) Oral at bedtime  cefTRIAXone   IVPB 1000 milliGRAM(s) IV Intermittent every 24 hours  folic acid 1 milliGRAM(s) Oral daily  furosemide    Tablet 20 milliGRAM(s) Oral daily  melatonin 3 milliGRAM(s) Oral at bedtime
MEDICATIONS  (STANDING):  apixaban 2.5 milliGRAM(s) Oral every 12 hours  atorvastatin 10 milliGRAM(s) Oral at bedtime  cefTRIAXone   IVPB 1000 milliGRAM(s) IV Intermittent every 24 hours  folic acid 1 milliGRAM(s) Oral daily  latanoprost 0.005% Ophthalmic Solution 1 Drop(s) Both EYES at bedtime  levothyroxine 125 MICROGram(s) Oral daily  melatonin 3 milliGRAM(s) Oral at bedtime  metoprolol succinate ER 25 milliGRAM(s) Oral daily  sodium chloride 0.9%. 1000 milliLiter(s) (50 mL/Hr) IV Continuous <Continuous>
MEDICATIONS  (STANDING):  atorvastatin 10 milliGRAM(s) Oral at bedtime  cefTRIAXone   IVPB 1000 milliGRAM(s) IV Intermittent every 24 hours  folic acid 1 milliGRAM(s) Oral daily  furosemide    Tablet 20 milliGRAM(s) Oral daily  melatonin 3 milliGRAM(s) Oral at bedtime
MEDICATIONS  (STANDING):  apixaban 2.5 milliGRAM(s) Oral two times a day  atorvastatin 10 milliGRAM(s) Oral at bedtime  cefTRIAXone   IVPB 1000 milliGRAM(s) IV Intermittent every 24 hours  folic acid 1 milliGRAM(s) Oral daily  latanoprost 0.005% Ophthalmic Solution 1 Drop(s) Both EYES at bedtime  levothyroxine 125 MICROGram(s) Oral daily  melatonin 3 milliGRAM(s) Oral at bedtime  metoprolol succinate ER 25 milliGRAM(s) Oral daily  sodium chloride 0.45%. 1000 milliLiter(s) (45 mL/Hr) IV Continuous <Continuous>
MEDICATIONS  (STANDING):  apixaban 2.5 milliGRAM(s) Oral two times a day  atorvastatin 10 milliGRAM(s) Oral at bedtime  cefTRIAXone   IVPB 1000 milliGRAM(s) IV Intermittent every 24 hours  folic acid 1 milliGRAM(s) Oral daily  latanoprost 0.005% Ophthalmic Solution 1 Drop(s) Both EYES at bedtime  levothyroxine 125 MICROGram(s) Oral daily  melatonin 3 milliGRAM(s) Oral at bedtime  metoprolol succinate ER 25 milliGRAM(s) Oral daily  sodium chloride 0.45%. 1000 milliLiter(s) (45 mL/Hr) IV Continuous <Continuous>
MEDICATIONS  (STANDING):  apixaban 2.5 milliGRAM(s) Oral every 12 hours  atorvastatin 10 milliGRAM(s) Oral at bedtime  folic acid 1 milliGRAM(s) Oral daily  latanoprost 0.005% Ophthalmic Solution 1 Drop(s) Both EYES at bedtime  levothyroxine 125 MICROGram(s) Oral daily  metoprolol succinate ER 25 milliGRAM(s) Oral daily
MEDICATIONS  (STANDING):  apixaban 2.5 milliGRAM(s) Oral every 12 hours  atorvastatin 10 milliGRAM(s) Oral at bedtime  folic acid 1 milliGRAM(s) Oral daily  latanoprost 0.005% Ophthalmic Solution 1 Drop(s) Both EYES at bedtime  levothyroxine 125 MICROGram(s) Oral daily  metoprolol succinate ER 25 milliGRAM(s) Oral daily
MEDICATIONS  (STANDING):  atorvastatin 10 milliGRAM(s) Oral at bedtime  cefTRIAXone   IVPB 1000 milliGRAM(s) IV Intermittent every 24 hours  folic acid 1 milliGRAM(s) Oral daily  furosemide    Tablet 20 milliGRAM(s) Oral daily  melatonin 3 milliGRAM(s) Oral at bedtime

## 2024-04-21 ENCOUNTER — TRANSCRIPTION ENCOUNTER (OUTPATIENT)
Age: 89
End: 2024-04-21

## 2024-04-21 VITALS
DIASTOLIC BLOOD PRESSURE: 65 MMHG | OXYGEN SATURATION: 94 % | HEART RATE: 69 BPM | SYSTOLIC BLOOD PRESSURE: 147 MMHG | TEMPERATURE: 98 F | RESPIRATION RATE: 17 BRPM

## 2024-04-21 LAB
CULTURE RESULTS: SIGNIFICANT CHANGE UP
CULTURE RESULTS: SIGNIFICANT CHANGE UP
SPECIMEN SOURCE: SIGNIFICANT CHANGE UP
SPECIMEN SOURCE: SIGNIFICANT CHANGE UP

## 2024-04-21 PROCEDURE — 73562 X-RAY EXAM OF KNEE 3: CPT

## 2024-04-21 PROCEDURE — 97162 PT EVAL MOD COMPLEX 30 MIN: CPT

## 2024-04-21 PROCEDURE — 73030 X-RAY EXAM OF SHOULDER: CPT

## 2024-04-21 PROCEDURE — 82272 OCCULT BLD FECES 1-3 TESTS: CPT

## 2024-04-21 PROCEDURE — 36415 COLL VENOUS BLD VENIPUNCTURE: CPT

## 2024-04-21 PROCEDURE — 73700 CT LOWER EXTREMITY W/O DYE: CPT | Mod: MC

## 2024-04-21 PROCEDURE — 87086 URINE CULTURE/COLONY COUNT: CPT

## 2024-04-21 PROCEDURE — 73201 CT UPPER EXTREMITY W/DYE: CPT | Mod: MC

## 2024-04-21 PROCEDURE — 82962 GLUCOSE BLOOD TEST: CPT

## 2024-04-21 PROCEDURE — 84100 ASSAY OF PHOSPHORUS: CPT

## 2024-04-21 PROCEDURE — 96375 TX/PRO/DX INJ NEW DRUG ADDON: CPT

## 2024-04-21 PROCEDURE — 83605 ASSAY OF LACTIC ACID: CPT

## 2024-04-21 PROCEDURE — 93308 TTE F-UP OR LMTD: CPT

## 2024-04-21 PROCEDURE — 83540 ASSAY OF IRON: CPT

## 2024-04-21 PROCEDURE — 70450 CT HEAD/BRAIN W/O DYE: CPT | Mod: MC

## 2024-04-21 PROCEDURE — 73080 X-RAY EXAM OF ELBOW: CPT

## 2024-04-21 PROCEDURE — 73130 X-RAY EXAM OF HAND: CPT

## 2024-04-21 PROCEDURE — 97116 GAIT TRAINING THERAPY: CPT

## 2024-04-21 PROCEDURE — 84443 ASSAY THYROID STIM HORMONE: CPT

## 2024-04-21 PROCEDURE — 85027 COMPLETE CBC AUTOMATED: CPT

## 2024-04-21 PROCEDURE — 86140 C-REACTIVE PROTEIN: CPT

## 2024-04-21 PROCEDURE — 85730 THROMBOPLASTIN TIME PARTIAL: CPT

## 2024-04-21 PROCEDURE — 80048 BASIC METABOLIC PNL TOTAL CA: CPT

## 2024-04-21 PROCEDURE — 82607 VITAMIN B-12: CPT

## 2024-04-21 PROCEDURE — 83880 ASSAY OF NATRIURETIC PEPTIDE: CPT

## 2024-04-21 PROCEDURE — 85610 PROTHROMBIN TIME: CPT

## 2024-04-21 PROCEDURE — 81001 URINALYSIS AUTO W/SCOPE: CPT

## 2024-04-21 PROCEDURE — 83735 ASSAY OF MAGNESIUM: CPT

## 2024-04-21 PROCEDURE — 87186 SC STD MICRODIL/AGAR DIL: CPT

## 2024-04-21 PROCEDURE — 96374 THER/PROPH/DIAG INJ IV PUSH: CPT

## 2024-04-21 PROCEDURE — 87637 SARSCOV2&INF A&B&RSV AMP PRB: CPT

## 2024-04-21 PROCEDURE — 87635 SARS-COV-2 COVID-19 AMP PRB: CPT

## 2024-04-21 PROCEDURE — 72125 CT NECK SPINE W/O DYE: CPT | Mod: MC

## 2024-04-21 PROCEDURE — 71045 X-RAY EXAM CHEST 1 VIEW: CPT

## 2024-04-21 PROCEDURE — 97110 THERAPEUTIC EXERCISES: CPT

## 2024-04-21 PROCEDURE — 76604 US EXAM CHEST: CPT

## 2024-04-21 PROCEDURE — 72170 X-RAY EXAM OF PELVIS: CPT

## 2024-04-21 PROCEDURE — 80053 COMPREHEN METABOLIC PANEL: CPT

## 2024-04-21 PROCEDURE — 99285 EMERGENCY DEPT VISIT HI MDM: CPT | Mod: 25

## 2024-04-21 PROCEDURE — 87040 BLOOD CULTURE FOR BACTERIA: CPT

## 2024-04-21 PROCEDURE — 85025 COMPLETE CBC W/AUTO DIFF WBC: CPT

## 2024-04-21 PROCEDURE — 87507 IADNA-DNA/RNA PROBE TQ 12-25: CPT

## 2024-04-21 PROCEDURE — 76377 3D RENDER W/INTRP POSTPROCES: CPT

## 2024-04-21 PROCEDURE — 82746 ASSAY OF FOLIC ACID SERUM: CPT

## 2024-04-21 PROCEDURE — 85652 RBC SED RATE AUTOMATED: CPT

## 2024-04-21 PROCEDURE — 84484 ASSAY OF TROPONIN QUANT: CPT

## 2024-04-21 PROCEDURE — 97530 THERAPEUTIC ACTIVITIES: CPT

## 2024-04-21 RX ADMIN — Medication 125 MICROGRAM(S): at 05:56

## 2024-04-21 RX ADMIN — APIXABAN 2.5 MILLIGRAM(S): 2.5 TABLET, FILM COATED ORAL at 12:24

## 2024-04-21 RX ADMIN — Medication 25 MILLIGRAM(S): at 05:56

## 2024-04-21 RX ADMIN — Medication 1 MILLIGRAM(S): at 12:24

## 2024-04-21 NOTE — PROGRESS NOTE ADULT - ASSESSMENT
89 y/o female with pmhx AF (since 2019), hypertension, hyperlipidemia, CAD s/p remote PCI, MDS, HFpEF, and severe MR refused MitraClip in past s/p Fall.       Left shoulder Effusion Ortho consult called   no acive intervention needed      # Fall s/p Laceration of Scalp area   Wound care   Orthostatics neg  PT eval CHEN   CT head Laceration of Left parietal scalp area   CT C spine shows  Degenarative changes       #Acute on Chronic HFpEF Exacerbation  Cards consulted   Resume po meds   Hold Lasix for now     # UTI   -on Ceftriaxone , d/c after tomorrow   Follow up Urine cx E coli   Follow up sensitivity     # A fib   rate controlled   restarted on eliquis      MR / Aortic Stenosis   out patient follow up     #CAD s/p stent   c/w Aspirin     # Hypothyroidism   - Synthroid     rt. knee pain :   pending xray and ortho f/u     dispo: if ortho clears after knee xray , will plan for d/c to rehab   tomorrow last dose of IV abx 
89 y/o female with pmhx AF (since 2019), hypertension, hyperlipidemia, CAD s/p remote PCI, MDS, HFpEF, and severe MR refused MitraClip in past s/p Fall.     CT head Laceration of Left parietal scalp area   CT C spine shows  Degenarative changes     # Fall s/p Laceration of Scalp area   Wound care   Orthostatics   PT eval CHEN       #Acute on Chronic HFpEF Exacerbation  s/p Lasix   Cards consulted   Resume po meds   Resume Lasix     # UTI iv Ceftriaxone   Follow up Urine cx E coli   Follow up sensitivity     # A fib Eliquis from Tomorrow   Hold it today s/p Fall with Laceration     MR / Aortic Stenosis out patient follow up     #CAD s/p stent Aspirin   # Hypothyroidism Continuer with Synthroid 
89 y/o female with pmhx AF (since 2019), hypertension, hyperlipidemia, CAD s/p remote PCI, MDS, HFpEF, and severe MR refused MitraClip in past s/p Fall. Patient reports mechanical trip and fall. Denies LOC/syncope. Patient was given extra dose of Lasix last week as she has worsening lower extremity edema associated with SOB. No hx chest pain, palpitations, dizziness, or syncope.CT head Laceration of Left parietal scalp area CT C spine shows  Degenarative changes    (10 Apr 2024 14:48)      CHRONIC KIDNEY DISEASE, STAGE 3a :  Serum creatinine is controlled   There is no progression.  No uremic symptoms. No evidence of  worsening  Anemia. Fluid status stable.   Will continue to avoid nephrotoxic drugs.  Patient remains asymptomatic.  Continue current therapy.    BP monitoring,continue current  meds, low salt diet,followup with PMD in 1-2 weeks  metoprolol succinate ER 25 milliGRAM(s) Oral daily    ANEMIA PLAN:  Anemia of chronic disease:  is controlled   We will monitor Iron stores, B12 and RBC folate .    hypernatremia increase water intake   dc iv fluid  1/2 ns    Admit for septic workup and ID evaluation,send blood and urine cx,serial lactate levels,monitor vitals closley,ivfs hydration,monitor urine output and renal profile,iv abx as per id cons  cefTRIAXone   IVPB 1000 milliGRAM(s) IV Intermittent every 24 hours
89 y/o female with pmhx AF (since 2019), hypertension, hyperlipidemia, CAD s/p remote PCI, MDS, HFpEF, and severe MR refused MitraClip in past s/p Fall.       D/C planning awaiting auth    Left shoulder Effusion Ortho consult called   no acive intervention needed      # Fall s/p Laceration of Scalp area   Wound care   Orthostatics neg  PT eval CHEN   CT head Laceration of Left parietal scalp area   CT C spine shows  Degenarative changes       #Acute on Chronic HFpEF Exacerbation  Cards consulted   Resume po meds   Hold Lasix for now     # UTI   -on Ceftriaxone , d/c after tomorrow   Follow up Urine cx E coli   Follow up sensitivity     # A fib   rate controlled   restarted on eliquis      MR / Aortic Stenosis   out patient follow up     #CAD s/p stent   c/w Aspirin     # Hypothyroidism   - Synthroid     rt. knee pain :   pending xray and ortho f/u     dispo: if ortho clears after knee xray , will plan for d/c to rehab   tomorrow last dose of IV abx 
89 y/o female with pmhx AF (since 2019), hypertension, hyperlipidemia, CAD s/p remote PCI, MDS, HFpEF, and severe MR refused MitraClip in past s/p Fall.       D/C planning awaiting auth    Left shoulder Effusion Ortho consult called   no acive intervention needed      # Fall s/p Laceration of Scalp area   Wound care   Orthostatics neg  PT eval CHEN   CT head Laceration of Left parietal scalp area   CT C spine shows  Degenarative changes       #Acute on Chronic HFpEF Exacerbation  Cards consulted   Resume po meds   Hold Lasix for now     # UTI   -on Ceftriaxone , d/c after tomorrow   Follow up Urine cx E coli   Follow up sensitivity     # A fib   rate controlled   restarted on eliquis      MR / Aortic Stenosis   out patient follow up     #CAD s/p stent   c/w Aspirin     # Hypothyroidism   - Synthroid     rt. knee pain :   pending xray and ortho f/u     dispo: if ortho clears after knee xray , will plan for d/c to rehab   tomorrow last dose of IV abx 
89 y/o female with pmhx AF (since 2019), hypertension, hyperlipidemia, CAD s/p remote PCI, MDS, HFpEF, and severe MR refused MitraClip in past s/p Fall.       Left shoulder Effusion Ortho consult called   no acive intervention needed      # Fall s/p Laceration of Scalp area   Wound care   Orthostatics neg  PT eval CHEN   CT head Laceration of Left parietal scalp area   CT C spine shows  Degenarative changes       #Acute on Chronic HFpEF Exacerbation  Cards consulted   Resume po meds   Hold Lasix for now     # UTI   -on Ceftriaxone , d/c after tomorrow   Follow up Urine cx E coli   Follow up sensitivity     # A fib   rate controlled   restarted on eliquis      MR / Aortic Stenosis   out patient follow up     #CAD s/p stent   c/w Aspirin     # Hypothyroidism   - Synthroid     rt. knee pain :   pending xray and ortho f/u     dispo: if ortho clears after knee xray , will plan for d/c to rehab   tomorrow last dose of IV abx 
89 y/o female with pmhx AF (since 2019), hypertension, hyperlipidemia, CAD s/p remote PCI, MDS, HFpEF, and severe MR refused MitraClip in past s/p Fall.     CT head Laceration of Left parietal scalp area   CT C spine shows  Degenarative changes     # Fall s/p Laceration of Scalp area   Wound care   Orthostatics   PT eval     #Acute on Chronic HFpEF Exacerbation  s/p Lasix   Cards consulted   Resume po meds     # UTI iv Ceftriaxone   Follow up Urine cx     # A fib Eliquis from Tomorrow   Hold it today s/p Fall with Laceration     MR / Aortic Stenosis out patient follow up     #CAD s/p stent Aspirin   # Hypothyroidism Continuer with Synthroid 
89 y/o female with pmhx AF (since 2019), hypertension, hyperlipidemia, CAD s/p remote PCI, MDS, HFpEF, and severe MR refused MitraClip in past s/p Fall.     CT head Laceration of Left parietal scalp area   CT C spine shows  Degenarative changes     # Fall s/p Laceration of Scalp area   Wound care   Orthostatics   PT eval CHEN       #Acute on Chronic HFpEF Exacerbation  s/p Lasix   Cards consulted   Resume po meds   Resume Lasix     # UTI iv Ceftriaxone   Follow up Urine cx E coli   Follow up sensitivity     # A fib Eliquis from Tomorrow   Hold it today s/p Fall with Laceration     MR / Aortic Stenosis out patient follow up     #CAD s/p stent Aspirin   # Hypothyroidism Continuer with Synthroid 
89 y/o female with pmhx AF (since 2019), hypertension, hyperlipidemia, CAD s/p remote PCI, MDS, HFpEF, and severe MR refused MitraClip in past s/p Fall. Patient reports mechanical trip and fall. Denies LOC/syncope. Patient was given extra dose of Lasix last week as she has worsening lower extremity edema associated with SOB. No hx chest pain, palpitations, dizziness, or syncope.CT head Laceration of Left parietal scalp area CT C spine shows  Degenarative changes    (10 Apr 2024 14:48)      CHRONIC KIDNEY DISEASE, STAGE 3a :  Serum creatinine is controlled   There is no progression.  No uremic symptoms. No evidence of  worsening  Anemia. Fluid status stable.   Will continue to avoid nephrotoxic drugs.  Patient remains asymptomatic.  Continue current therapy.    BP monitoring,continue current  meds, low salt diet,followup with PMD in 1-2 weeks  metoprolol succinate ER 25 milliGRAM(s) Oral daily    ANEMIA PLAN:  Anemia of chronic disease:  is controlled   We will monitor Iron stores, B12 and RBC folate .    hypernatremia increase water intake   dc iv fluid  1/2 ns    Admit for septic workup and ID evaluation,send blood and urine cx,serial lactate levels,monitor vitals closley,ivfs hydration,monitor urine output and renal profile,iv abx as per id cons  cefTRIAXone   IVPB 1000 milliGRAM(s) IV Intermittent every 24 hours
89 y/o female with pmhx AF (since 2019), hypertension, hyperlipidemia, CAD s/p remote PCI, MDS, HFpEF, and severe MR refused MitraClip in past s/p Fall. Patient reports mechanical trip and fall. Denies LOC/syncope. Patient was given extra dose of Lasix last week as she has worsening lower extremity edema associated with SOB. No hx chest pain, palpitations, dizziness, or syncope.CT head Laceration of Left parietal scalp area CT C spine shows  Degenarative changes    (10 Apr 2024 14:48)      CHRONIC KIDNEY DISEASE, STAGE 3a : sodium chloride 0.9%. 1000 milliLiter(s) (50 mL/Hr) IV Continuous  Serum creatinine is increased    There is no progression.  No uremic symptoms. No evidence of  worsening  Anemia. Fluid status stable.   Will continue to avoid nephrotoxic drugs.  Patient remains asymptomatic.  Continue current therapy.    BP monitoring,continue current  meds, low salt diet,followup with PMD in 1-2 weeks      ANEMIA PLAN:  Anemia of chronic disease:  is controlled   We will monitor Iron stores, B12 and RBC folate .    hyponatremia increase water intake   dc iv fluid  1/2 ns    Admit for septic workup and ID evaluation,send blood and urine cx,serial lactate levels,monitor vitals closley,ivfs hydration,monitor urine output and renal profile,iv abx as per id cons  cefTRIAXone   IVPB 1000 milliGRAM(s) IV Intermittent every 24 hours
89 y/o female with pmhx AF (since 2019), hypertension, hyperlipidemia, CAD s/p remote PCI, MDS, HFpEF, and severe MR refused MitraClip in past s/p Fall. Patient reports mechanical trip and fall. Denies LOC/syncope. Patient was given extra dose of Lasix last week as she has worsening lower extremity edema associated with SOB. No hx chest pain, palpitations, dizziness, or syncope.CT head Laceration of Left parietal scalp area CT C spine shows  Degenarative changes    (10 Apr 2024 14:48)      CHRONIC KIDNEY DISEASE, STAGE 3a : sodium chloride 0.9%. 1000 milliLiter(s) (50 mL/Hr) IV Continuous  Serum creatinine is increased    There is no progression.  No uremic symptoms. No evidence of  worsening  Anemia. Fluid status stable.   Will continue to avoid nephrotoxic drugs.  Patient remains asymptomatic.  Continue current therapy.    BP monitoring,continue current  meds, low salt diet,followup with PMD in 1-2 weeks      ANEMIA PLAN:  Anemia of chronic disease:  is controlled   We will monitor Iron stores, B12 and RBC folate .    hyponatremia increase water intake   dc iv fluid  1/2 ns    Admit for septic workup and ID evaluation,send blood and urine cx,serial lactate levels,monitor vitals closley,ivfs hydration,monitor urine output and renal profile,iv abx as per id cons  cefTRIAXone   IVPB 1000 milliGRAM(s) IV Intermittent every 24 hours
89 y/o female with pmhx AF (since 2019), hypertension, hyperlipidemia, CAD s/p remote PCI, MDS, HFpEF, and severe MR refused MitraClip in past s/p Fall.       D/C planning awaiting auth    Left shoulder Effusion Ortho consult called   no acive intervention needed      # Fall s/p Laceration of Scalp area   Wound care   Orthostatics neg  PT eval CHEN   CT head Laceration of Left parietal scalp area   CT C spine shows  Degenarative changes       #Acute on Chronic HFpEF Exacerbation  Cards consulted   Resume po meds   Hold Lasix for now     # UTI   -on Ceftriaxone , d/c after tomorrow   Follow up Urine cx E coli   Follow up sensitivity     # A fib   rate controlled   restarted on eliquis      MR / Aortic Stenosis   out patient follow up     #CAD s/p stent   c/w Aspirin     # Hypothyroidism   - Synthroid     rt. knee pain :   pending xray and ortho f/u     dispo: if ortho clears after knee xray , will plan for d/c to rehab   tomorrow last dose of IV abx 
89 y/o female with pmhx AF (since 2019), hypertension, hyperlipidemia, CAD s/p remote PCI, MDS, HFpEF, and severe MR refused MitraClip in past s/p Fall.       Left shoulder Effusion Ortho consult called     Follow up recs       CT head Laceration of Left parietal scalp area   CT C spine shows  Degenarative changes     # Fall s/p Laceration of Scalp area   Wound care   Orthostatics neg  PT eval CHEN       #Acute on Chronic HFpEF Exacerbation  s/p Lasix   Cards consulted   Resume po meds   Hold Lasix for now     # UTI iv Ceftriaxone   Follow up Urine cx E coli   Follow up sensitivity     # A fib Eliquis from Tomorrow   Hold it today s/p Fall with Laceration     MR / Aortic Stenosis out patient follow up     #CAD s/p stent Aspirin   # Hypothyroidism Continuer with Synthroid 
89 y/o female with pmhx AF (since 2019), hypertension, hyperlipidemia, CAD s/p remote PCI, MDS, HFpEF, and severe MR refused MitraClip in past s/p Fall.     CT head Laceration of Left parietal scalp area   CT C spine shows  Degenarative changes     # Fall s/p Laceration of Scalp area   Wound care   Orthostatics   PT eval CHEN       #Acute on Chronic HFpEF Exacerbation  s/p Lasix   Cards consulted   Resume po meds   Resume Lasix     # UTI iv Ceftriaxone   Follow up Urine cx E coli   Follow up sensitivity     # A fib Eliquis from Tomorrow   Hold it today s/p Fall with Laceration     MR / Aortic Stenosis out patient follow up     #CAD s/p stent Aspirin   # Hypothyroidism Continuer with Synthroid 
89 y/o female with pmhx AF (since 2019), hypertension, hyperlipidemia, CAD s/p remote PCI, MDS, HFpEF, and severe MR refused MitraClip in past s/p Fall. Patient reports mechanical trip and fall. Denies LOC/syncope. Patient was given extra dose of Lasix last week as she has worsening lower extremity edema associated with SOB. No hx chest pain, palpitations, dizziness, or syncope.CT head Laceration of Left parietal scalp area CT C spine shows  Degenarative changes    (10 Apr 2024 14:48)      CHRONIC KIDNEY DISEASE, STAGE 3a : furosemide    Tablet 20 milliGRAM(s) Oral daily  Serum creatinine is improved   There is no progression.  No uremic symptoms. No evidence of  worsening  Anemia. Fluid status stable.   Will continue to avoid nephrotoxic drugs.  Patient remains asymptomatic.  Continue current therapy.    BP monitoring,continue current  meds, low salt diet,followup with PMD in 1-2 weeks      ANEMIA PLAN:  Anemia of chronic disease:  is controlled   We will monitor Iron stores, B12 and RBC folate .    hypernatremia increase water intake   lower ext edema on lasix 
89 y/o female with pmhx AF (since 2019), hypertension, hyperlipidemia, CAD s/p remote PCI, MDS, HFpEF, and severe MR refused MitraClip in past s/p Fall. Patient reports mechanical trip and fall. Denies LOC/syncope. Patient was given extra dose of Lasix last week as she has worsening lower extremity edema associated with SOB. No hx chest pain, palpitations, dizziness, or syncope.CT head Laceration of Left parietal scalp area CT C spine shows  Degenarative changes    (10 Apr 2024 14:48)      CHRONIC KIDNEY DISEASE, STAGE 3a : sodium chloride 0.45%. 1000 milliLiter(s) (45 mL/Hr) IV Continuous  Serum creatinine is increased    There is no progression.  No uremic symptoms. No evidence of  worsening  Anemia. Fluid status stable.   Will continue to avoid nephrotoxic drugs.  Patient remains asymptomatic.  Continue current therapy.    BP monitoring,continue current  meds, low salt diet,followup with PMD in 1-2 weeks      ANEMIA PLAN:  Anemia of chronic disease:  is controlled   We will monitor Iron stores, B12 and RBC folate .    hypernatremia increase water intake   lower ext edema on lasix 
89 y/o female with pmhx AF (since 2019), hypertension, hyperlipidemia, CAD s/p remote PCI, MDS, HFpEF, and severe MR refused MitraClip in past s/p Fall. Patient reports mechanical trip and fall. Denies LOC/syncope. Patient was given extra dose of Lasix last week as she has worsening lower extremity edema associated with SOB. No hx chest pain, palpitations, dizziness, or syncope.CT head Laceration of Left parietal scalp area CT C spine shows  Degenarative changes    (10 Apr 2024 14:48)      CHRONIC KIDNEY DISEASE, STAGE 3a : sodium chloride 0.45%. 1000 milliLiter(s) (45 mL/Hr) IV Continuous  Serum creatinine is increased    There is no progression.  No uremic symptoms. No evidence of  worsening  Anemia. Fluid status stable.   Will continue to avoid nephrotoxic drugs.  Patient remains asymptomatic.  Continue current therapy.    BP monitoring,continue current  meds, low salt diet,followup with PMD in 1-2 weeks      ANEMIA PLAN:  Anemia of chronic disease:  is controlled   We will monitor Iron stores, B12 and RBC folate .    hypernatremia increase water intake   lower ext edema on lasix 
89 y/o female with pmhx AF (since 2019), hypertension, hyperlipidemia, CAD s/p remote PCI, MDS, HFpEF, and severe MR refused MitraClip in past s/p Fall. Patient reports mechanical trip and fall. Denies LOC/syncope. Patient was given extra dose of Lasix last week as she has worsening lower extremity edema associated with SOB. No hx chest pain, palpitations, dizziness, or syncope.CT head Laceration of Left parietal scalp area CT C spine shows  Degenarative changes    (10 Apr 2024 14:48)      CHRONIC KIDNEY DISEASE, STAGE 3a :  Serum creatinine is controlled   There is no progression.  No uremic symptoms. No evidence of  worsening  Anemia. Fluid status stable.   Will continue to avoid nephrotoxic drugs.  Patient remains asymptomatic.  Continue current therapy.    BP monitoring,continue current  meds, low salt diet,followup with PMD in 1-2 weeks  metoprolol succinate ER 25 milliGRAM(s) Oral daily    ANEMIA PLAN:  Anemia of chronic disease:  is controlled   We will monitor Iron stores, B12 and RBC folate .    hypernatremia increase water intake   dc iv fluid  1/2 ns    Admit for septic workup and ID evaluation,send blood and urine cx,serial lactate levels,monitor vitals closley,ivfs hydration,monitor urine output and renal profile,iv abx as per id cons  cefTRIAXone   IVPB 1000 milliGRAM(s) IV Intermittent every 24 hours
89 y/o female with pmhx AF (since 2019), hypertension, hyperlipidemia, CAD s/p remote PCI, MDS, HFpEF, and severe MR refused MitraClip in past s/p Fall. Patient reports mechanical trip and fall. Denies LOC/syncope. Patient was given extra dose of Lasix last week as she has worsening lower extremity edema associated with SOB. No hx chest pain, palpitations, dizziness, or syncope.CT head Laceration of Left parietal scalp area CT C spine shows  Degenarative changes    (10 Apr 2024 14:48)      CHRONIC KIDNEY DISEASE, STAGE 3a : furosemide    Tablet 20 milliGRAM(s) Oral daily  Serum creatinine is improved   There is no progression.  No uremic symptoms. No evidence of  worsening  Anemia. Fluid status stable.   Will continue to avoid nephrotoxic drugs.  Patient remains asymptomatic.  Continue current therapy.    BP monitoring,continue current  meds, low salt diet,followup with PMD in 1-2 weeks      ANEMIA PLAN:  Anemia of chronic disease:  is controlled   We will monitor Iron stores, B12 and RBC folate .    hypernatremia increase water intake   lower ext edema on lasix 
Agree with above assessment and plan as outlined above.    - d/c plan for today    Maciej Courtney MD, [facility]  BEEPER [phone]  
CARDIOLOGY ATTENDING    Patient seen and examined. Agree with above. Continue lifellng a/c for AF. No further inpatient cardiac workup expected, and f/u with Lyandres as outlined above.
Patient seen and examined, agree with above assessment and plan as transcribed above.    - ,nof    Maciej Courtney MD, [facility]  BEEPER [phone]  
Patient seen and examined, agree with above assessment and plan as transcribed above.    - No need for further inpatient cardiac work up.    Maciej Courtney MD, [facility]  BEEPER [phone]  
Patient seen and examined, agree with above assessment and plan as transcribed above.    - awaiting d/c    Maciej Courtney MD, [facility]  BEEPER [phone]  
Patient seen and examined, agree with above assessment and plan as transcribed above.    - clinically improved  - No need for further inpatient cardiac work up.    Maciej Courtney MD, [facility]  BEEPER [phone]  
Patient seen and examined, agree with above assessment and plan as transcribed above.    - cont supportive care per CTS  - No need for further inpatient cardiac work up.    Maciej Courtney MD, [facility]  BEEPER [phone]  
Patient seen and examined, agree with above assessment and plan as transcribed above.    - f/u CT knee  - Ortho f/u    Maciej Courtney MD, [facility]  BEEPER [phone]  
Patient seen and examined, agree with above assessment and plan as transcribed above.    - outpt cardiac f/u with Dr. Israel Courtney MD, [facility]  BEEPER [phone]  
89 y/o female with pmhx AF (since 2019), hypertension, hyperlipidemia, CAD s/p remote PCI, MDS, HFpEF, and severe MR refused MitraClip in past s/p Fall. Patient reports mechanical trip and fall. Denies LOC/syncope. Patient was given extra dose of Lasix last week as she has worsening lower extremity edema associated with SOB. No hx chest pain, palpitations, dizziness, or syncope.CT head Laceration of Left parietal scalp area CT C spine shows  Degenarative changes    (10 Apr 2024 14:48)      CHRONIC KIDNEY DISEASE, STAGE 3a : furosemide    Tablet 20 milliGRAM(s) Oral daily  Serum creatinine is improved   There is no progression.  No uremic symptoms. No evidence of  worsening  Anemia. Fluid status stable.   Will continue to avoid nephrotoxic drugs.  Patient remains asymptomatic.  Continue current therapy.    BP monitoring,continue current  meds, low salt diet,followup with PMD in 1-2 weeks      ANEMIA PLAN:  Anemia of chronic disease:  is controlled   We will monitor Iron stores, B12 and RBC folate .    hypernatremia increase water intake   lower ext edema on lasix

## 2024-04-21 NOTE — PROGRESS NOTE ADULT - SUBJECTIVE AND OBJECTIVE BOX
C A R D I O L O G Y  **********************************     DATE OF SERVICE: 04-21-24       no events overnight       apixaban 2.5 milliGRAM(s) Oral every 12 hours  atorvastatin 10 milliGRAM(s) Oral at bedtime  folic acid 1 milliGRAM(s) Oral daily  latanoprost 0.005% Ophthalmic Solution 1 Drop(s) Both EYES at bedtime  levothyroxine 125 MICROGram(s) Oral daily  melatonin 3 milliGRAM(s) Oral at bedtime PRN  metoprolol succinate ER 25 milliGRAM(s) Oral daily                            10.0   9.61  )-----------( 338      ( 20 Apr 2024 11:07 )             32.3       Hemoglobin: 10.0 g/dL (04-20 @ 11:07)  Hemoglobin: 10.6 g/dL (04-19 @ 07:15)  Hemoglobin: 10.4 g/dL (04-18 @ 07:30)  Hemoglobin: 10.9 g/dL (04-17 @ 07:11)            Creatinine Trend: 1.23<--, 1.51<--, 1.74<--, 1.74<--, 1.49<--, 1.48<--    COAGS:           T(C): 36.2 (04-21-24 @ 04:23), Max: 36.6 (04-20-24 @ 12:00)  HR: 75 (04-21-24 @ 04:23) (75 - 83)  BP: 110/73 (04-21-24 @ 04:23) (110/73 - 157/71)  RR: 18 (04-21-24 @ 04:23) (18 - 18)  SpO2: 95% (04-21-24 @ 04:23) (94% - 95%)  Wt(kg): --    I&O's Summary    20 Apr 2024 07:01  -  21 Apr 2024 07:00  --------------------------------------------------------  IN: 300 mL / OUT: 600 mL / NET: -300 mL        Gen: NAD  HEENT:  (-)icterus (-)pallor  CV: N S1 S2 1/6 LUDMILA (+)2 Pulses B/l  Resp:  Clear to auscultation B/L, normal effort  GI: (+) BS Soft, NT, ND  Lymph:  (-)Edema, (-)obvious lymphadenopathy  Skin: Warm to touch, Normal turgor  Psych: Appropriate mood and affect      TELEMETRY: None	    ECG: Afib, RBBB - unchanged     ASSESSMENT/PLAN: Patient is a 89 y/o female well known to our office with PMH of persistent AF (since 2019), hypertension, hyperlipidemia, CAD s/p remote PCI, MDS, HFpEF, and severe MR refused MitraClip in past who presented s/p fall found to have UTI and mild acute on chronic HFpEF exacerbation. Cardiology consulted for further evaluation.     #s/p fall  - Denies LOC/Syncope  - CT Head with no hemorrhage  - Tele was stable in ED, AF with no events no longer on tele  - s/p Abx for UTI per med  - L shoulder xray with no fracture or dislocation. CT L shoulder with severe arthrosis w/large effusion. CT R knee with no fracture. Management per med/ortho.    #Acute on Chronic HFpEF Exacerbation  - Volume status improved s/p IV lasix  - Hold home PO maintenance lasix 20mg daily given MARIELA. Renal f/u noted - trend cr, improving  - Patient with known severe MR and at least mod AS but has refused further intervention and repeat echos in office therefore no repeat cardiac testing planned at this time    #Persistent Afib  - Continue Eliquis 2.5mg BID if no contraindications    - No further inpatient cardiac w/u planned  - Patient has f/u with Dr. Carbajal on 8/2 at 1:30 PM

## 2024-04-21 NOTE — PROGRESS NOTE ADULT - PROVIDER SPECIALTY LIST ADULT
Cardiology
Hospitalist
Internal Medicine
Nephrology
Orthopedics
Cardiology
Hospitalist
Internal Medicine
Nephrology
Cardiology
Hospitalist
Nephrology
Nephrology
Hospitalist
Hospitalist
Internal Medicine
Nephrology

## 2024-04-21 NOTE — DISCHARGE NOTE NURSING/CASE MANAGEMENT/SOCIAL WORK - PATIENT PORTAL LINK FT
You can access the FollowMyHealth Patient Portal offered by [facility] by registering at the following website: [email or web address]. By joining [facility]’s FollowMyHealth portal, you will also be able to view your health information using other applications (apps) compatible with our system.

## 2024-04-21 NOTE — DISCHARGE NOTE NURSING/CASE MANAGEMENT/SOCIAL WORK - NSDCFUADDAPPT_GEN_ALL_CORE_FT
APPTS ARE READY TO BE MADE: [X] YES    Best Family or Patient Contact (if needed):    Additional Information about above appointments (if needed):    1: Please follow up with primary care (Dr. Ronnie Childers).  2: Please follow up with cardiology.  3: Please follow up with nephrology.  4: Please call palliative care as outpatient, if this is still your desire.  5: Consider outpatient dementia work up, once delirium is resolved.    Other comments or requests:

## 2024-04-21 NOTE — PROGRESS NOTE ADULT - REASON FOR ADMISSION
s/p Fall

## 2024-04-21 NOTE — DISCHARGE NOTE NURSING/CASE MANAGEMENT/SOCIAL WORK - NSDCPEFALRISK_GEN_ALL_CORE
For information on Fall & Injury Prevention, visit: [email or web address] OR  [email or web address] OR  https://www.cdc.gov/steadi/patient.html

## 2024-09-11 NOTE — DISCHARGE NOTE PROVIDER - CARE PROVIDER_API CALL
stretcher Ronnie Aviles  PCP  Phone: [phone]  Fax: (   )    -  Follow Up Time:     Pennie Carbajal  Cardiologist  Phone: [phone]  Fax: (   )    -  Follow Up Time:     Zora Herrera)  Hematology; Medical Oncology  [address], [address]  [place] [address]  Phone: [phone]  Fax: [phone]  Follow Up Time: Routine

## 2024-10-23 NOTE — ED PROVIDER NOTE - BIRTH SEX
I have reviewed the history and physical and examined the patient.  I find no relevant changes.  I have reviewed with the patient and/or family members, during the preoperative office visit the risks, benefits, and alternatives to the procedure.    MARYURI LIVINGSTON MD    
Female

## 2024-11-07 NOTE — PROVIDER CONTACT NOTE (OTHER) - ACTION/TREATMENT ORDERED:
Subjective   Patient ID: Cristine Ann is a 37 y.o. female who presents for Breast Problem (C/o  possible left breast lump x 1 week/- does not hurt, just there//Chaperone declined/).  HPI  Patient denies any skin change, nipple discharge, or breast pain.  Maternal grandmother with history of breast cancer.  Review of Systems  Noncontributory  Objective   Physical Exam  Vital signs reviewed      BREASTS-normal appearance, no skin changes or nipple discharge.  Palpation of the left breast shows fibronodular changes with increased density in the lower medial aspect of the left breast.  No axillary lymphadenopathy in either axilla is noted right breast exam is negative   Diagnoses and all orders for this visit:  Mass of left breast, unspecified quadrant  -     BI mammo left diagnostic tomosynthesis; Future           Yg Paula DO 11/07/24 12:51 PM   
PA made aware. Will continue to monitor patient.
pt. placed in [facility] pos, NP made aware, repeat B/p 130/70, as per NP echo tech notified for stat bedside echo. kassandra. pt. states nausea resolved, kassandra  contd.
NP assessing pt, compression stockings ordered, pt remains in bed, will continue to monitor.

## 2025-02-28 NOTE — PATIENT PROFILE ADULT. - PAIN CHRONIC, PROFILE
[employer] Appeal Pending    Insurance response  Prescription Drug Insurance: [employer]  Notes: Prior authorization submitted - will update provider when decision has been made by insurance.     LOMN, Denial letter and chart noted faxed to insurance on 2-28-25.   no

## 2025-04-15 NOTE — ED ADULT NURSE NOTE - CHPI ED NUR SYMPTOMS POS
Physical Therapy    Visit Type: initial evaluation  SUBJECTIVE  \"I sleep in a recliner\"   Patient / Family Goal: maximize function and return home    Pain   Patient reports pain is not an issue/concern., Patient does not demonstrate pain behaviors.     OBJECTIVE     Cognitive Status   Orientation    - Oriented to: person, place, time and situation  Functional Communication   - Overall Communication Status: within functional limits   - Forms of Communication: verbal  Attention Span    - Attention: intact  Following Direction   - follows all commands and directions consistently  Transition Between Tasks   - transitions without difficulty  Memory   - intact  Safety Awareness/Insight   - intact  Awareness of Deficits   - fully aware of deficits    Vitals:  Blood Pressure (mmhg): 133/60      Range of Motion (ROM)   (degrees unless noted; active unless noted; norms in ( ); negative=lacking to 0, positive=beyond 0)  WFL: LLE, RLE    Strength  (out of 5 unless noted, standard test position unless noted)   Hip:    - Flexion:         Left: 3-         Right: 3-  Knee:    - Flexion:         Left: 4         Right: 4  Ankle:    - Dorsiflexion:         Left: 4         Right: 4      Sitting Balance  (KAMRAN = base of support)  Static      - Trial 1 details: stand by assist, with back unsupported and with double LE support  Dynamic      - Trial 1 details: stand by assist, contact guard, with back unsupported and with double LE support    Standing Balance  (KAMRAN = base of support)  Firm Surface: Double Leg      - Static, Eyes Open       - Trial 1 details: contact guard and with double UE support     - Dynamic, Eyes Open       - Trial 1 details: minimal assist and with double UE support       Bed Mobility  - Supine to sit: moderate assist  Head of bed elevated and use of bed rail    Patient states he usually sleeps in a recliner   Transfers  Assistive devices: gait belt, 2-wheeled walker  - Sit to stand: moderate assist, minimal assist  -  Stand to sit: minimal assist  - Stand pivot: minimal assist  Patient requires bed height elevated; difficulty rising from low surfaces     Total assist to don depends     Ambulation / Gait  - Assistive device: gait belt and 2-wheeled walker  - Distance (feet unless otherwise indicated): 4  Edge of bed > recliner  - Assist Level: minimal assist  - Surface: even  - Description: decreased max/pace, decreased step length left, decreased step length right and shuffling    Patient reporting feeling too fatigued to ambulate further       Interventions     Supine    Lower Extremity: Bilateral: ankle pumps, AROM, 10 reps, 2 sets  Seated    Lower Extremity: Bilateral: knee flexion, heel raises, toe raises, knee extensions and seated hip flexion, AROM and AAROM, 10 reps, 1 sets  Training provided: activity tolerance, balance retraining, bed mobility training, safety training, use of assistive device and transfer training    Skilled input: Verbal instruction/cues  Verbal Consent: Writer verbally educated and received verbal consent for hand placement, positioning of patient, and techniques to be performed today from patient for therapist position for techniques as described above and how they are pertinent to the patient's plan of care.         Education:   - Present and ready to learn: patient  Education provided during session:  - role of PT, safety, use of assistive device, transfer technique, energy conservation, compensatory techniques and bed mobility techniques    ASSESSMENT   Patient will benefit from skilled therapy to address listed impairments and functional limitations.  Interfering components: decreased activity tolerance    Discharge needs based on today's assessment:   - Current level of function: significantly below baseline level of function   - Therapy needs at discharge: therapy 5 or more times per week   - Activities of daily living (ADLs) requiring support at discharge: bed mobility, transfers,  ambulation and stairs   - Impairments that require further therapy intervention: balance, activity tolerance and strength    AM-PAC  - Generalized Prior Level of Function: Needs a little help ([facility] 12-21)       Key: MOD A=moderate assistance, IND/MOD I=independent/modified independent  - Generalized Current Level of Function     - Current Mobility Score: 8       AM-PAC Scoring Key= >21 Modified Independent; 20-21 Supervision; 18-19 Minimal assist; 13-17 Moderate assist; 9-12 Max assist; <9 Total assist      PLAN (while hospitalized)  Suggestions for next session as indicated: Progress mobility and transfers as able   PT Frequency: 3-5 x per week      PT/OT Mobility Equipment for Discharge: Owns      Interventions: balance, bed mobility, functional transfer training, gait training, HEP train/position, safety education, stairs retraining, ROM and strengthening  Agreement to plan and goals: patient agrees with goals and treatment plan        GOALS  Long Term Goals: (to be met by time of discharge from hospital)  Sit to supine: Patient will complete sit to supine contact guard or touching/steadying.  Supine to sit: Patient will complete supine to sit contact guard or touching/steadying.  Sit to stand: Patient will complete sit to stand transfer with 2-wheeled walker, contact guard or touching/steadying.   Stand to sit: Patient will complete stand to sit transfer with 2-wheeled walker, contact guard or touching/steadying.   Stand pivot: Patient will complete stand pivot transfer with 2-wheeled walker, contact guard or touching/steadying.   Ambulation (even): Patient will ambulate on even surface for 50 feet with 2-wheeled walker, contact guard or touching/steadying.   3-4 steps: Patient will ambulate 3-4 steps with using 2 rails, stand by assist.   Documented in the chart in the following areas: Prior Function.      Patient at End of Session:   Location: in chair  Safety measures: alarm system in place/re-engaged,  call light within reach and lines intact  Handoff to: nurse      Therapy procedure time and total treatment time can be found documented on the Time Entry flowsheet   Unknown if ever smoked COUGH/CHEST CONGESTION/DYSPNEA ON EXERTION

## 2025-06-19 NOTE — ED ADULT NURSE NOTE - RESPIRATORY ASSESSMENT
"Patient very upset that A1c was not authorized by PCP as required so she can get a discount for [employer] health insurance. Has appointment tomorrow with PCP. Suggested she bring form to appointment so she and PCP can review requirements together. Patient states she \"will be required\" to keep the appointment as scheduled but \"the same thing happened to me last year and I almost missed out on the discount\". I'm just going to have to find a new provider. Offered to send list of IM (patient's request for internist only) clinic providers to her via [employer] but she declines. No further questions per patient.     FYI to PCP    Cynthia Sherwood RN    " WDL